# Patient Record
Sex: FEMALE | Race: WHITE | NOT HISPANIC OR LATINO | Employment: FULL TIME | URBAN - METROPOLITAN AREA
[De-identification: names, ages, dates, MRNs, and addresses within clinical notes are randomized per-mention and may not be internally consistent; named-entity substitution may affect disease eponyms.]

---

## 2018-05-02 ENCOUNTER — OFFICE VISIT - HEALTHEAST (OUTPATIENT)
Dept: FAMILY MEDICINE | Facility: CLINIC | Age: 53
End: 2018-05-02

## 2018-05-02 DIAGNOSIS — R21 FACIAL RASH: ICD-10-CM

## 2018-05-02 DIAGNOSIS — Z12.31 VISIT FOR SCREENING MAMMOGRAM: ICD-10-CM

## 2018-05-02 DIAGNOSIS — Z12.11 SCREEN FOR COLON CANCER: ICD-10-CM

## 2018-05-02 DIAGNOSIS — E03.9 HYPOTHYROIDISM: ICD-10-CM

## 2018-05-02 LAB
ALBUMIN SERPL-MCNC: 3.7 G/DL (ref 3.5–5)
ALP SERPL-CCNC: 94 U/L (ref 45–120)
ALT SERPL W P-5'-P-CCNC: 25 U/L (ref 0–45)
ANION GAP SERPL CALCULATED.3IONS-SCNC: 11 MMOL/L (ref 5–18)
AST SERPL W P-5'-P-CCNC: 20 U/L (ref 0–40)
BILIRUB SERPL-MCNC: 0.5 MG/DL (ref 0–1)
BUN SERPL-MCNC: 10 MG/DL (ref 8–22)
CALCIUM SERPL-MCNC: 9.3 MG/DL (ref 8.5–10.5)
CHLORIDE BLD-SCNC: 102 MMOL/L (ref 98–107)
CO2 SERPL-SCNC: 25 MMOL/L (ref 22–31)
CREAT SERPL-MCNC: 0.77 MG/DL (ref 0.6–1.1)
ERYTHROCYTE [DISTWIDTH] IN BLOOD BY AUTOMATED COUNT: 12.5 % (ref 11–14.5)
GFR SERPL CREATININE-BSD FRML MDRD: >60 ML/MIN/1.73M2
GLUCOSE BLD-MCNC: 112 MG/DL (ref 70–125)
HCT VFR BLD AUTO: 39.4 % (ref 35–47)
HGB BLD-MCNC: 13.4 G/DL (ref 12–16)
MCH RBC QN AUTO: 28.9 PG (ref 27–34)
MCHC RBC AUTO-ENTMCNC: 34 G/DL (ref 32–36)
MCV RBC AUTO: 85 FL (ref 80–100)
PLATELET # BLD AUTO: 323 THOU/UL (ref 140–440)
PMV BLD AUTO: 6.9 FL (ref 7–10)
POTASSIUM BLD-SCNC: 4.5 MMOL/L (ref 3.5–5)
PROT SERPL-MCNC: 7.1 G/DL (ref 6–8)
RBC # BLD AUTO: 4.63 MILL/UL (ref 3.8–5.4)
SODIUM SERPL-SCNC: 138 MMOL/L (ref 136–145)
T4 FREE SERPL-MCNC: 0.8 NG/DL (ref 0.7–1.8)
TSH SERPL DL<=0.005 MIU/L-ACNC: 4.27 UIU/ML (ref 0.3–5)
WBC: 5 THOU/UL (ref 4–11)

## 2018-05-02 RX ORDER — METRONIDAZOLE 10 MG/G
GEL TOPICAL
Qty: 45 G | Refills: 0 | Status: SHIPPED | OUTPATIENT
Start: 2018-05-02 | End: 2024-04-11

## 2018-05-03 LAB — ANA SER QL: 0.7 U

## 2018-05-08 LAB
SS-A/RO AUTOANTIBODIES - HISTORICAL: 2 EU
SS-B/LA AUTOANTIBODIES - HISTORICAL: 0 EU

## 2018-05-16 ENCOUNTER — HOSPITAL ENCOUNTER (OUTPATIENT)
Dept: MAMMOGRAPHY | Facility: CLINIC | Age: 53
Discharge: HOME | End: 2018-05-16
Attending: FAMILY MEDICINE

## 2018-05-16 DIAGNOSIS — Z12.31 VISIT FOR SCREENING MAMMOGRAM: ICD-10-CM

## 2018-05-17 ENCOUNTER — COMMUNICATION - HEALTHEAST (OUTPATIENT)
Dept: MAMMOGRAPHY | Facility: CLINIC | Age: 53
End: 2018-05-17

## 2018-05-23 ENCOUNTER — HOSPITAL ENCOUNTER (OUTPATIENT)
Dept: MAMMOGRAPHY | Facility: CLINIC | Age: 53
Discharge: HOME | End: 2018-05-23
Attending: FAMILY MEDICINE

## 2018-05-23 ENCOUNTER — HOSPITAL ENCOUNTER (OUTPATIENT)
Dept: ULTRASOUND IMAGING | Facility: CLINIC | Age: 53
Discharge: HOME | End: 2018-05-23
Attending: FAMILY MEDICINE

## 2018-05-23 DIAGNOSIS — N64.89 BREAST ASYMMETRY: ICD-10-CM

## 2018-06-07 ENCOUNTER — COMMUNICATION - HEALTHEAST (OUTPATIENT)
Dept: ADMINISTRATIVE | Facility: CLINIC | Age: 53
End: 2018-06-07

## 2019-09-15 ENCOUNTER — COMMUNICATION - HEALTHEAST (OUTPATIENT)
Dept: TELEHEALTH | Facility: CLINIC | Age: 54
End: 2019-09-15

## 2020-10-29 ENCOUNTER — AMBULATORY - HEALTHEAST (OUTPATIENT)
Dept: NURSING | Facility: CLINIC | Age: 55
End: 2020-10-29

## 2021-05-29 ENCOUNTER — RECORDS - HEALTHEAST (OUTPATIENT)
Dept: ADMINISTRATIVE | Facility: CLINIC | Age: 56
End: 2021-05-29

## 2021-05-30 ENCOUNTER — RECORDS - HEALTHEAST (OUTPATIENT)
Dept: ADMINISTRATIVE | Facility: CLINIC | Age: 56
End: 2021-05-30

## 2021-06-01 VITALS — BODY MASS INDEX: 43.12 KG/M2 | WEIGHT: 247.31 LBS

## 2021-06-17 NOTE — PROGRESS NOTES
ASSESSMENT/PLAN:    Facial rash  53-year-old female with hypothyroidism presented with worsening butterfly-like rash on her face.  Patient has hypothyroidism but has not been taking her medication for over a year.  I am checking thyroid function.  I spoke to the patient about differential diagnoses including thyroid dysfunction, SLE, rosacea.  I will give her MetroGel and obtain labs today.  Further management will depend on her results and her symptoms.  The patient verbalized understanding and agreed with the plan  -     Thyroid Stimulating Hormone (TSH)  -     HM2(CBC w/o Differential)  -     Comprehensive Metabolic Panel  -     Antinuclear Antibody (REYNALDO) Cascade  -     SS-A/RO Auto Antibodies  -     SS-B/LA Auto Antibodies  -     metroNIDAZOLE (METROGEL) 1 % gel; Apply twice daily  Dispense: 45 g; Refill: 0    Visit for screening mammogram  -     Mammo Screening Bilateral; Future; Expected date: 5/2/18    Screen for colon cancer  -     Ambulatory referral for Colonoscopy    Hypothyroidism  -     T4, Free  -     Thyroid Stimulating Hormone (TSH)    Orders Placed This Encounter   Procedures     Mammo Screening Bilateral     Standing Status:   Future     Standing Expiration Date:   8/2/2019     Order Specific Question:   Patient's previous breast density:     Answer:   Heterogeneously dense [3]     Order Specific Question:   Is the patient pregnant?     Answer:   No     Order Specific Question:   Can the procedure be changed per Radiologist protocol?     Answer:   Yes     T4, Free     Thyroid Stimulating Hormone (TSH)     HM2(CBC w/o Differential)     Comprehensive Metabolic Panel     Antinuclear Antibody (REYNALDO) Cascade     SS-A/RO Auto Antibodies     SS-B/LA Auto Antibodies     Ambulatory referral for Colonoscopy     Referral Priority:   Routine     Referral Type:   Colonoscopy     Referral Reason:   Evaluation and Treatment     Requested Specialty:   Gastroenterology     Number of Visits Requested:   1            CHIEF COMPLAINT:  Chief Complaint   Patient presents with     burning and itchy face     dry during the Winter,  tried OTC does get better, but when stops using, it comes right back       HISTORY OF PRESENT ILLNESS:  Louise is a 53 y.o. female presenting to the clinic today for a facial rash. She has been having problems with burning, itchy, dry skin of her face. She has had this for a while, but feels it has been worse since this winter. She also has redness of her face, most notably the cheeks. She has tried multiple topicals for the skin and benadryl. She does get some temporary relief with the topicals and OTC medications, but when she stops using these the redness and burning comes back. The redness will get better, but come back for her. Heat and taking a hot shower makes the redness, itching and burning worse. Denies sore throat, fevers, chills. She does have some joint pains in her knees and neck. No changes is bowel or bladder functions. No hair changes. No new lotions, soaps, or detergents. She is always fatigued. She does have a history of hypothyroidism, and was taking levothyroxine 88mcg. However, she stopped this medication one year ago. She took the levothyroxine for 2-3 years prior to stopping. She is not taking any medications at this time. She does have a history of eczema as a child.     She got a new dog this year.  Has had chronic knee, neck, back pain.  Chronic fatigue.     Health Maintenance: She is due for a mammogram and colonoscopy.         REVIEW OF SYSTEMS:   Constitutional: Negative.   HENT: Negative.   Eyes: Negative.   Respiratory: Negative.   Cardiovascular: Negative.   Gastrointestinal: Negative.   Endocrine: Negative.   Genitourinary: Negative.   Musculoskeletal: Negative.   Allergic/Immunologic: Negative.   Neurological: Negative.   Hematological: Negative.   Psychiatric/Behavioral: Negative.   All other systems are negative.    PFSH:  Family history of coronary artery  disease, diabetes, thyroid dysfunction.    No family history of any skin concerns    TOBACCO USE:  History   Smoking Status     Never Smoker   Smokeless Tobacco     Never Used       VITALS:  Vitals:    05/02/18 1534   BP: 116/84   Pulse: 64   Weight: (!) 247 lb 5 oz (112.2 kg)     Wt Readings from Last 3 Encounters:   05/02/18 (!) 247 lb 5 oz (112.2 kg)   12/12/16 (!) 236 lb 11.2 oz (107.4 kg)   12/10/15 (!) 233 lb 12.8 oz (106.1 kg)       PHYSICAL EXAM:  Constitutional: Patient is oriented to person, place, and time. Patient appears well-developed and well-nourished. No distress.   Cardiovascular: Normal rate.  Pulmonary/Chest: Effort normal. No respiratory distress.   Neurological: Patient is alert and oriented to person, place, and time.   Skin: Skin is warm and dry. Erythema of cheeks and nose bridge, with spotty erythema of forehead and chin.     Results for orders placed or performed in visit on 05/02/18   HM2(CBC w/o Differential)   Result Value Ref Range    WBC 5.0 4.0 - 11.0 thou/uL    RBC 4.63 3.80 - 5.40 mill/uL    Hemoglobin 13.4 12.0 - 16.0 g/dL    Hematocrit 39.4 35.0 - 47.0 %    MCV 85 80 - 100 fL    MCH 28.9 27.0 - 34.0 pg    MCHC 34.0 32.0 - 36.0 g/dL    RDW 12.5 11.0 - 14.5 %    Platelets 323 140 - 440 thou/uL    MPV 6.9 (L) 7.0 - 10.0 fL         ADDITIONAL HISTORY SUMMARIZED (2): None.  DECISION TO OBTAIN EXTRA INFORMATION (1): None.   RADIOLOGY TESTS (1): None.  LABS (1): Ordered labs today.  MEDICINE TESTS (1): None.  INDEPENDENT REVIEW (2 each): None.     ITara, am scribing for and in the presence of, Dr. Wakefield.    IWest MD , personally performed the services described in this documentation, as scribed by Tara Perez in my presence, and it is both accurate and complete.    MEDICATIONS:  Current Outpatient Prescriptions   Medication Sig Dispense Refill     fluticasone (FLONASE) 50 mcg/actuation nasal spray 1-2 sprays in each nostril at  bedtime. 16 g 0     levothyroxine (SYNTHROID, LEVOTHROID) 88 MCG tablet Take 88 mcg by mouth once. Take once a day       metroNIDAZOLE (METROGEL) 1 % gel Apply twice daily 45 g 0     No current facility-administered medications for this visit.        Total data points: 1

## 2021-07-13 ENCOUNTER — RECORDS - HEALTHEAST (OUTPATIENT)
Dept: ADMINISTRATIVE | Facility: CLINIC | Age: 56
End: 2021-07-13

## 2021-07-21 ENCOUNTER — RECORDS - HEALTHEAST (OUTPATIENT)
Dept: ADMINISTRATIVE | Facility: CLINIC | Age: 56
End: 2021-07-21

## 2021-08-21 ENCOUNTER — HEALTH MAINTENANCE LETTER (OUTPATIENT)
Age: 56
End: 2021-08-21

## 2021-10-16 ENCOUNTER — HEALTH MAINTENANCE LETTER (OUTPATIENT)
Age: 56
End: 2021-10-16

## 2022-09-25 ENCOUNTER — HEALTH MAINTENANCE LETTER (OUTPATIENT)
Age: 57
End: 2022-09-25

## 2023-04-17 ENCOUNTER — OFFICE VISIT (OUTPATIENT)
Dept: FAMILY MEDICINE | Facility: CLINIC | Age: 58
End: 2023-04-17
Payer: COMMERCIAL

## 2023-04-17 VITALS
OXYGEN SATURATION: 97 % | RESPIRATION RATE: 16 BRPM | DIASTOLIC BLOOD PRESSURE: 87 MMHG | BODY MASS INDEX: 41.15 KG/M2 | HEART RATE: 95 BPM | SYSTOLIC BLOOD PRESSURE: 143 MMHG | TEMPERATURE: 99.7 F | WEIGHT: 236 LBS

## 2023-04-17 DIAGNOSIS — J10.1 INFLUENZA A: Primary | ICD-10-CM

## 2023-04-17 DIAGNOSIS — E66.01 MORBID OBESITY (H): ICD-10-CM

## 2023-04-17 PROCEDURE — 99203 OFFICE O/P NEW LOW 30 MIN: CPT | Performed by: PHYSICIAN ASSISTANT

## 2023-04-17 RX ORDER — OSELTAMIVIR PHOSPHATE 75 MG/1
75 CAPSULE ORAL 2 TIMES DAILY
Qty: 10 CAPSULE | Refills: 0 | Status: SHIPPED | OUTPATIENT
Start: 2023-04-17 | End: 2023-04-22

## 2023-04-17 RX ORDER — BENZONATATE 100 MG/1
100 CAPSULE ORAL 3 TIMES DAILY PRN
Qty: 21 CAPSULE | Refills: 0 | Status: SHIPPED | OUTPATIENT
Start: 2023-04-17 | End: 2024-04-11

## 2023-04-17 NOTE — PATIENT INSTRUCTIONS
1. Take Tylenol or Ibuprofen as needed for fever relief.   2. Take Tamiflu, follow directions on prescription.  3. Increase fluids and rest.  4. Take Tessalon Perles along with any over the counter cough medicine as needed for cough relief.   5. Influenza is typically considered contagious one day prior and 5-7 days after your symptoms begin. I recommend returning to work/school after you are fever free for 24 hours. Continue to practice good hand hygiene and cough coverage well after you are fever free.   6. Follow up if you develop fever that can not be controlled, difficulty breathing, or severe dehydration.    Influenza  Influenza ( the flu ) is an infection that affects your respiratory tract (the mouth, nose, and lungs, and the passages between them). Unlike a cold, the flu can make you very ill. And it can lead to pneumonia, a serious lung infection. For some people, especially older adults, young children, and people with certain chronic conditions, the flu can have serious complications and even be fatal.  How Does the Flu Spread?  The flu is caused by viruses. The viruses spread through the air in droplets when someone who has the flu coughs, sneezes, laughs, or talks. You can become infected when you inhale these viruses directly. You can also become infected when you touch a surface on which the droplets have landed and then transfer the germs to your eyes, nose, or mouth. Touching used tissues, or sharing utensils, drinking glasses, or a toothbrush with an infected person can expose you to flu viruses, too.  What Are the Symptoms of the Flu?  Flu symptoms tend to come on quickly and may last a few days to a few weeks. They include:  Fever usually higher than 101 F  (38.3 C) and chills  Sore throat and headache  Dry cough  Runny nose  Tiredness and weakness  Muscle aches  Factors That Can Make Flu Worse  For some people, the flu can be very serious. The risk of complications is greater for:  Children  under age 5.  Adults 65 years of age and older.  People with a chronic illness, such as diabetes or heart, kidney, or lung disease.  People who live in a nursing home or long-term care facility.   How Is the Flu Treated?  Influenza usually improves after 7 days or so. In some cases, your health care provider may prescribe an antiviral medication. This may help you get well sooner. For the medication to help, you need to take it as soon as possible (ideally within 48 hours) after your symptoms start. If you develop pneumonia or other serious illness, hospital care may be needed.  Easing Flu Symptoms  Drink lots of fluids such as water, juice, and warm soup. A good rule is to drink enough so that you urinate your normal amount.  Get plenty of rest.  Ask your health care provider what to take for fever and pain.  Call your provider if your fever rises over 101 F (38.3 C) or you become dizzy, lightheaded, or short of breath.

## 2023-04-17 NOTE — PROGRESS NOTES
Patient presents with:  Cough: Has had cough muscles aches for 3 days nausea states had influenza test at work and was  positive for influenza A looking for Tamiflu and something for cough had n negative COVID test      Clinical Decision Making:  Patient had an influenza test that was positive at work.  She was within the 48-hour time.  Start Tamiflu.  Comorbidity includes morbid obesity.  Patient started on Tamiflu today.  Patient also prescribed Tessalon Perles for comfort.  Lungs are CTA.      ICD-10-CM    1. Influenza A  J10.1 oseltamivir (TAMIFLU) 75 MG capsule     benzonatate (TESSALON) 100 MG capsule      2. Morbid obesity (H)  E66.01           Patient Instructions     1. Take Tylenol or Ibuprofen as needed for fever relief.   2. Take Tamiflu, follow directions on prescription.  3. Increase fluids and rest.  4. Take Tessalon Perles along with any over the counter cough medicine as needed for cough relief.   5. Influenza is typically considered contagious one day prior and 5-7 days after your symptoms begin. I recommend returning to work/school after you are fever free for 24 hours. Continue to practice good hand hygiene and cough coverage well after you are fever free.   6. Follow up if you develop fever that can not be controlled, difficulty breathing, or severe dehydration.    Influenza  Influenza ( the flu ) is an infection that affects your respiratory tract (the mouth, nose, and lungs, and the passages between them). Unlike a cold, the flu can make you very ill. And it can lead to pneumonia, a serious lung infection. For some people, especially older adults, young children, and people with certain chronic conditions, the flu can have serious complications and even be fatal.  How Does the Flu Spread?  The flu is caused by viruses. The viruses spread through the air in droplets when someone who has the flu coughs, sneezes, laughs, or talks. You can become infected when you inhale these viruses directly.  You can also become infected when you touch a surface on which the droplets have landed and then transfer the germs to your eyes, nose, or mouth. Touching used tissues, or sharing utensils, drinking glasses, or a toothbrush with an infected person can expose you to flu viruses, too.  What Are the Symptoms of the Flu?  Flu symptoms tend to come on quickly and may last a few days to a few weeks. They include:    Fever usually higher than 101 F  (38.3 C) and chills    Sore throat and headache    Dry cough    Runny nose    Tiredness and weakness    Muscle aches  Factors That Can Make Flu Worse  For some people, the flu can be very serious. The risk of complications is greater for:    Children under age 5.    Adults 65 years of age and older.    People with a chronic illness, such as diabetes or heart, kidney, or lung disease.    People who live in a nursing home or long-term care facility.   How Is the Flu Treated?  Influenza usually improves after 7 days or so. In some cases, your health care provider may prescribe an antiviral medication. This may help you get well sooner. For the medication to help, you need to take it as soon as possible (ideally within 48 hours) after your symptoms start. If you develop pneumonia or other serious illness, hospital care may be needed.  Easing Flu Symptoms    Drink lots of fluids such as water, juice, and warm soup. A good rule is to drink enough so that you urinate your normal amount.    Get plenty of rest.    Ask your health care provider what to take for fever and pain.    Call your provider if your fever rises over 101 F (38.3 C) or you become dizzy, lightheaded, or short of breath.        HPI:  Louise Corado is a 58 year old female who presents today complaining of cough, body aches, and nausea for the past 3 days.  Patient completed an influenza test at work and it was positive for influenza A.  Patient is interested in receiving Tamiflu and something to help with her cough.   Patient had a negative COVID test.    History obtained from the patient.    Problem List:  2023-04: Morbid obesity (H)  2016-12: Throat pain  Herpes Zoster (Shingles)  Anterior Wall Chest Pain With Respiration  Contusion With Intact Skin Surface      No past medical history on file.    Social History     Tobacco Use     Smoking status: Never     Smokeless tobacco: Never   Vaping Use     Vaping status: Not on file   Substance Use Topics     Alcohol use: Not on file       Review of Systems    Vitals:    04/17/23 1827   BP: (!) 143/87   Pulse: 95   Resp: 16   Temp: 99.7  F (37.6  C)   TempSrc: Oral   SpO2: 97%   Weight: 107 kg (236 lb)       Physical Exam  Vitals and nursing note reviewed.   Constitutional:       General: She is not in acute distress.     Appearance: She is not toxic-appearing or diaphoretic.   HENT:      Head: Normocephalic and atraumatic.      Right Ear: External ear normal.      Left Ear: External ear normal.   Eyes:      Conjunctiva/sclera: Conjunctivae normal.   Cardiovascular:      Rate and Rhythm: Normal rate and regular rhythm.      Heart sounds: No murmur heard.  Pulmonary:      Effort: Pulmonary effort is normal. No respiratory distress.      Breath sounds: No stridor. No wheezing, rhonchi or rales.   Neurological:      Mental Status: She is alert.   Psychiatric:         Mood and Affect: Mood normal.         Behavior: Behavior normal.         Thought Content: Thought content normal.         Judgment: Judgment normal.           At the end of the encounter, I discussed results, diagnosis, medications. Discussed red flags for immediate return to clinic/ER, as well as indications for follow up if no improvement. Patient understood and agreed to plan. Patient was stable for discharge.

## 2023-04-17 NOTE — LETTER
April 17, 2023      Louise Corado  [address] [place] [address]        To Whom It May Concern:    Louise Corado  was seen on 4/17/23.  Please excuse her absence from work until she is fever free for 24 hours. Expected time away is 1-6 days.         Sincerely,        Alycia Vincent PA-C

## 2023-10-14 ENCOUNTER — HEALTH MAINTENANCE LETTER (OUTPATIENT)
Age: 58
End: 2023-10-14

## 2024-04-10 PROCEDURE — 96374 THER/PROPH/DIAG INJ IV PUSH: CPT | Mod: 59

## 2024-04-10 PROCEDURE — 99285 EMERGENCY DEPT VISIT HI MDM: CPT | Mod: 25

## 2024-04-10 ASSESSMENT — COLUMBIA-SUICIDE SEVERITY RATING SCALE - C-SSRS
2. HAVE YOU ACTUALLY HAD ANY THOUGHTS OF KILLING YOURSELF IN THE PAST MONTH?: NO
6. HAVE YOU EVER DONE ANYTHING, STARTED TO DO ANYTHING, OR PREPARED TO DO ANYTHING TO END YOUR LIFE?: NO
1. IN THE PAST MONTH, HAVE YOU WISHED YOU WERE DEAD OR WISHED YOU COULD GO TO SLEEP AND NOT WAKE UP?: NO

## 2024-04-11 ENCOUNTER — HOSPITAL ENCOUNTER (INPATIENT)
Facility: CLINIC | Age: 59
LOS: 1 days | Discharge: HOME | End: 2024-04-12
Attending: EMERGENCY MEDICINE | Admitting: HOSPITALIST
Payer: COMMERCIAL

## 2024-04-11 ENCOUNTER — APPOINTMENT (OUTPATIENT)
Dept: ULTRASOUND IMAGING | Facility: CLINIC | Age: 59
End: 2024-04-11
Attending: HOSPITALIST
Payer: COMMERCIAL

## 2024-04-11 DIAGNOSIS — D3A.8 NEUROENDOCRINE TUMOR (H): ICD-10-CM

## 2024-04-11 DIAGNOSIS — R07.1 PAINFUL RESPIRATION: Primary | ICD-10-CM

## 2024-04-11 DIAGNOSIS — R16.0 LIVER MASS: ICD-10-CM

## 2024-04-11 DIAGNOSIS — J90 PLEURAL EFFUSION: ICD-10-CM

## 2024-04-11 DIAGNOSIS — R91.8 LUNG MASS: ICD-10-CM

## 2024-04-11 LAB
ALBUMIN SERPL BCG-MCNC: 4 G/DL (ref 3.5–5.2)
ALP SERPL-CCNC: 88 U/L (ref 40–150)
ALT SERPL W P-5'-P-CCNC: 15 U/L (ref 0–50)
ANION GAP SERPL CALCULATED.3IONS-SCNC: 6 MMOL/L (ref 7–15)
APPEARANCE FLD: ABNORMAL
AST SERPL W P-5'-P-CCNC: 17 U/L (ref 0–45)
BASOPHILS # BLD AUTO: 0 10E3/UL (ref 0–0.2)
BASOPHILS NFR BLD AUTO: 0 %
BILIRUB SERPL-MCNC: 0.3 MG/DL
BUN SERPL-MCNC: 9.7 MG/DL (ref 6–20)
CALCIUM SERPL-MCNC: 9.2 MG/DL (ref 8.6–10)
CELL COUNT BODY FLUID SOURCE: ABNORMAL
CHLORIDE SERPL-SCNC: 104 MMOL/L (ref 98–107)
COLOR FLD: ABNORMAL
CREAT SERPL-MCNC: 0.68 MG/DL (ref 0.51–0.95)
DEPRECATED HCO3 PLAS-SCNC: 29 MMOL/L (ref 22–29)
EGFRCR SERPLBLD CKD-EPI 2021: >90 ML/MIN/1.73M2
EOSINOPHIL # BLD AUTO: 0 10E3/UL (ref 0–0.7)
EOSINOPHIL NFR BLD AUTO: 1 %
ERYTHROCYTE [DISTWIDTH] IN BLOOD BY AUTOMATED COUNT: 13 % (ref 10–15)
GLUCOSE SERPL-MCNC: 179 MG/DL (ref 70–99)
HCT VFR BLD AUTO: 38.3 % (ref 35–47)
HGB BLD-MCNC: 12.7 G/DL (ref 11.7–15.7)
IMM GRANULOCYTES # BLD: 0 10E3/UL
IMM GRANULOCYTES NFR BLD: 1 %
LD BODY BODY FLUID SOURCE: NORMAL
LDH FLD L TO P-CCNC: 112 U/L
LDH SERPL L TO P-CCNC: 179 U/L (ref 0–250)
LYMPHOCYTES # BLD AUTO: 0.8 10E3/UL (ref 0.8–5.3)
LYMPHOCYTES NFR BLD AUTO: 12 %
LYMPHOCYTES NFR FLD MANUAL: 84 %
MCH RBC QN AUTO: 27.7 PG (ref 26.5–33)
MCHC RBC AUTO-ENTMCNC: 33.2 G/DL (ref 31.5–36.5)
MCV RBC AUTO: 83 FL (ref 78–100)
MONOCYTES # BLD AUTO: 0.3 10E3/UL (ref 0–1.3)
MONOCYTES NFR BLD AUTO: 4 %
MONOS+MACROS NFR FLD MANUAL: 10 %
NEUTROPHILS # BLD AUTO: 5.5 10E3/UL (ref 1.6–8.3)
NEUTROPHILS NFR BLD AUTO: 83 %
NEUTS BAND NFR FLD MANUAL: 3 %
NRBC # BLD AUTO: 0 10E3/UL
NRBC BLD AUTO-RTO: 0 /100
OTHER CELLS FLD MANUAL: 3 %
PATH REV: NORMAL
PLATELET # BLD AUTO: 278 10E3/UL (ref 150–450)
POTASSIUM SERPL-SCNC: 4.1 MMOL/L (ref 3.4–5.3)
PROT FLD-MCNC: 4 G/DL
PROT SERPL-MCNC: 7 G/DL (ref 6.4–8.3)
PROTEIN BODY FLUID SOURCE: NORMAL
RBC # BLD AUTO: 4.59 10E6/UL (ref 3.8–5.2)
SODIUM SERPL-SCNC: 139 MMOL/L (ref 135–145)
WBC # BLD AUTO: 6.6 10E3/UL (ref 4–11)
WBC # FLD AUTO: 779 /UL

## 2024-04-11 PROCEDURE — 120N000001 HC R&B MED SURG/OB

## 2024-04-11 PROCEDURE — 83615 LACTATE (LD) (LDH) ENZYME: CPT | Performed by: HOSPITALIST

## 2024-04-11 PROCEDURE — 80053 COMPREHEN METABOLIC PANEL: CPT | Performed by: HOSPITALIST

## 2024-04-11 PROCEDURE — 88360 TUMOR IMMUNOHISTOCHEM/MANUAL: CPT | Mod: 26 | Performed by: PATHOLOGY

## 2024-04-11 PROCEDURE — 88341 IMHCHEM/IMCYTCHM EA ADD ANTB: CPT | Mod: 26 | Performed by: PATHOLOGY

## 2024-04-11 PROCEDURE — 250N000013 HC RX MED GY IP 250 OP 250 PS 637: Performed by: HOSPITALIST

## 2024-04-11 PROCEDURE — 36415 COLL VENOUS BLD VENIPUNCTURE: CPT | Performed by: HOSPITALIST

## 2024-04-11 PROCEDURE — 88342 IMHCHEM/IMCYTCHM 1ST ANTB: CPT | Mod: 26 | Performed by: PATHOLOGY

## 2024-04-11 PROCEDURE — 82664 ELECTROPHORETIC TEST: CPT | Performed by: HOSPITALIST

## 2024-04-11 PROCEDURE — 88305 TISSUE EXAM BY PATHOLOGIST: CPT | Mod: 26 | Performed by: PATHOLOGY

## 2024-04-11 PROCEDURE — 88360 TUMOR IMMUNOHISTOCHEM/MANUAL: CPT | Mod: TC | Performed by: HOSPITALIST

## 2024-04-11 PROCEDURE — 0W993ZX DRAINAGE OF RIGHT PLEURAL CAVITY, PERCUTANEOUS APPROACH, DIAGNOSTIC: ICD-10-PCS | Performed by: RADIOLOGY

## 2024-04-11 PROCEDURE — 84478 ASSAY OF TRIGLYCERIDES: CPT | Performed by: HOSPITALIST

## 2024-04-11 PROCEDURE — 89050 BODY FLUID CELL COUNT: CPT | Performed by: HOSPITALIST

## 2024-04-11 PROCEDURE — 88305 TISSUE EXAM BY PATHOLOGIST: CPT | Mod: TC | Performed by: HOSPITALIST

## 2024-04-11 PROCEDURE — 250N000011 HC RX IP 250 OP 636: Performed by: HOSPITALIST

## 2024-04-11 PROCEDURE — 258N000003 HC RX IP 258 OP 636: Performed by: HOSPITALIST

## 2024-04-11 PROCEDURE — 272N000042 US THORACENTESIS

## 2024-04-11 PROCEDURE — 250N000013 HC RX MED GY IP 250 OP 250 PS 637: Performed by: EMERGENCY MEDICINE

## 2024-04-11 PROCEDURE — 250N000011 HC RX IP 250 OP 636: Performed by: EMERGENCY MEDICINE

## 2024-04-11 PROCEDURE — 84157 ASSAY OF PROTEIN OTHER: CPT | Performed by: HOSPITALIST

## 2024-04-11 PROCEDURE — 87205 SMEAR GRAM STAIN: CPT | Performed by: HOSPITALIST

## 2024-04-11 PROCEDURE — 88112 CYTOPATH CELL ENHANCE TECH: CPT | Mod: 26 | Performed by: PATHOLOGY

## 2024-04-11 PROCEDURE — 88344 IMHCHEM/IMCYTCHM EA MLT ANTB: CPT | Mod: 26 | Performed by: PATHOLOGY

## 2024-04-11 PROCEDURE — 99207 PR APP CREDIT; MD BILLING SHARED VISIT: CPT | Performed by: INTERNAL MEDICINE

## 2024-04-11 PROCEDURE — 250N000013 HC RX MED GY IP 250 OP 250 PS 637: Performed by: INTERNAL MEDICINE

## 2024-04-11 PROCEDURE — 99255 IP/OBS CONSLTJ NEW/EST HI 80: CPT | Performed by: INTERNAL MEDICINE

## 2024-04-11 PROCEDURE — 87075 CULTR BACTERIA EXCEPT BLOOD: CPT | Performed by: HOSPITALIST

## 2024-04-11 PROCEDURE — 85004 AUTOMATED DIFF WBC COUNT: CPT | Performed by: HOSPITALIST

## 2024-04-11 PROCEDURE — 99222 1ST HOSP IP/OBS MODERATE 55: CPT | Performed by: HOSPITALIST

## 2024-04-11 RX ORDER — HYDROMORPHONE HCL IN WATER/PF 6 MG/30 ML
0.4 PATIENT CONTROLLED ANALGESIA SYRINGE INTRAVENOUS
Status: DISCONTINUED | OUTPATIENT
Start: 2024-04-11 | End: 2024-04-12 | Stop reason: HOSPADM

## 2024-04-11 RX ORDER — ACETAMINOPHEN 650 MG/1
650 SUPPOSITORY RECTAL EVERY 4 HOURS PRN
Status: DISCONTINUED | OUTPATIENT
Start: 2024-04-11 | End: 2024-04-12 | Stop reason: HOSPADM

## 2024-04-11 RX ORDER — LIDOCAINE 40 MG/G
CREAM TOPICAL
Status: DISCONTINUED | OUTPATIENT
Start: 2024-04-11 | End: 2024-04-12 | Stop reason: HOSPADM

## 2024-04-11 RX ORDER — PROCHLORPERAZINE 25 MG
25 SUPPOSITORY, RECTAL RECTAL EVERY 12 HOURS PRN
Status: DISCONTINUED | OUTPATIENT
Start: 2024-04-11 | End: 2024-04-12 | Stop reason: HOSPADM

## 2024-04-11 RX ORDER — ONDANSETRON 2 MG/ML
4 INJECTION INTRAMUSCULAR; INTRAVENOUS ONCE
Status: COMPLETED | OUTPATIENT
Start: 2024-04-11 | End: 2024-04-11

## 2024-04-11 RX ORDER — ONDANSETRON 2 MG/ML
4 INJECTION INTRAMUSCULAR; INTRAVENOUS EVERY 6 HOURS PRN
Status: DISCONTINUED | OUTPATIENT
Start: 2024-04-11 | End: 2024-04-12 | Stop reason: HOSPADM

## 2024-04-11 RX ORDER — PROCHLORPERAZINE MALEATE 10 MG
10 TABLET ORAL EVERY 6 HOURS PRN
Status: DISCONTINUED | OUTPATIENT
Start: 2024-04-11 | End: 2024-04-12 | Stop reason: HOSPADM

## 2024-04-11 RX ORDER — CALCIUM CARBONATE 500 MG/1
1000 TABLET, CHEWABLE ORAL 4 TIMES DAILY PRN
Status: DISCONTINUED | OUTPATIENT
Start: 2024-04-11 | End: 2024-04-12 | Stop reason: HOSPADM

## 2024-04-11 RX ORDER — LORAZEPAM 0.5 MG/1
.5-1 TABLET ORAL EVERY 4 HOURS PRN
Status: DISCONTINUED | OUTPATIENT
Start: 2024-04-11 | End: 2024-04-12 | Stop reason: HOSPADM

## 2024-04-11 RX ORDER — IBUPROFEN 200 MG
200-400 TABLET ORAL EVERY 4 HOURS PRN
COMMUNITY
End: 2024-04-30

## 2024-04-11 RX ORDER — SODIUM CHLORIDE, SODIUM LACTATE, POTASSIUM CHLORIDE, CALCIUM CHLORIDE 600; 310; 30; 20 MG/100ML; MG/100ML; MG/100ML; MG/100ML
INJECTION, SOLUTION INTRAVENOUS CONTINUOUS
Status: DISCONTINUED | OUTPATIENT
Start: 2024-04-11 | End: 2024-04-12 | Stop reason: HOSPADM

## 2024-04-11 RX ORDER — ACETAMINOPHEN 325 MG/1
325-650 TABLET ORAL EVERY 6 HOURS PRN
COMMUNITY

## 2024-04-11 RX ORDER — ONDANSETRON 4 MG/1
4 TABLET, ORALLY DISINTEGRATING ORAL EVERY 6 HOURS PRN
Status: DISCONTINUED | OUTPATIENT
Start: 2024-04-11 | End: 2024-04-12 | Stop reason: HOSPADM

## 2024-04-11 RX ORDER — AMOXICILLIN 250 MG
1 CAPSULE ORAL 2 TIMES DAILY PRN
Status: DISCONTINUED | OUTPATIENT
Start: 2024-04-11 | End: 2024-04-12 | Stop reason: HOSPADM

## 2024-04-11 RX ORDER — OXYCODONE HYDROCHLORIDE 5 MG/1
5 TABLET ORAL ONCE
Status: COMPLETED | OUTPATIENT
Start: 2024-04-11 | End: 2024-04-11

## 2024-04-11 RX ORDER — HYDROMORPHONE HYDROCHLORIDE 4 MG/1
2 TABLET ORAL EVERY 4 HOURS PRN
Status: DISCONTINUED | OUTPATIENT
Start: 2024-04-11 | End: 2024-04-12 | Stop reason: HOSPADM

## 2024-04-11 RX ORDER — ACETAMINOPHEN 325 MG/1
650 TABLET ORAL EVERY 4 HOURS PRN
Status: DISCONTINUED | OUTPATIENT
Start: 2024-04-11 | End: 2024-04-12 | Stop reason: HOSPADM

## 2024-04-11 RX ORDER — HYDROMORPHONE HCL IN WATER/PF 6 MG/30 ML
0.2 PATIENT CONTROLLED ANALGESIA SYRINGE INTRAVENOUS
Status: DISCONTINUED | OUTPATIENT
Start: 2024-04-11 | End: 2024-04-12 | Stop reason: HOSPADM

## 2024-04-11 RX ORDER — DOCUSATE SODIUM 100 MG/1
100 CAPSULE, LIQUID FILLED ORAL 2 TIMES DAILY
Status: DISCONTINUED | OUTPATIENT
Start: 2024-04-11 | End: 2024-04-12 | Stop reason: HOSPADM

## 2024-04-11 RX ORDER — GUAIFENESIN/DEXTROMETHORPHAN 100-10MG/5
10 SYRUP ORAL EVERY 4 HOURS PRN
Status: DISCONTINUED | OUTPATIENT
Start: 2024-04-11 | End: 2024-04-12 | Stop reason: HOSPADM

## 2024-04-11 RX ORDER — AMOXICILLIN 250 MG
2 CAPSULE ORAL 2 TIMES DAILY PRN
Status: DISCONTINUED | OUTPATIENT
Start: 2024-04-11 | End: 2024-04-12 | Stop reason: HOSPADM

## 2024-04-11 RX ADMIN — ONDANSETRON 4 MG: 2 INJECTION INTRAMUSCULAR; INTRAVENOUS at 09:01

## 2024-04-11 RX ADMIN — HYDROMORPHONE HYDROCHLORIDE 0.2 MG: 0.2 INJECTION, SOLUTION INTRAMUSCULAR; INTRAVENOUS; SUBCUTANEOUS at 23:52

## 2024-04-11 RX ADMIN — HYDROMORPHONE HYDROCHLORIDE 0.2 MG: 0.2 INJECTION, SOLUTION INTRAMUSCULAR; INTRAVENOUS; SUBCUTANEOUS at 06:18

## 2024-04-11 RX ADMIN — ACETAMINOPHEN 650 MG: 325 TABLET ORAL at 13:54

## 2024-04-11 RX ADMIN — HYDROMORPHONE HYDROCHLORIDE 0.2 MG: 0.2 INJECTION, SOLUTION INTRAMUSCULAR; INTRAVENOUS; SUBCUTANEOUS at 08:27

## 2024-04-11 RX ADMIN — ACETAMINOPHEN 650 MG: 325 TABLET ORAL at 23:22

## 2024-04-11 RX ADMIN — SODIUM CHLORIDE, POTASSIUM CHLORIDE, SODIUM LACTATE AND CALCIUM CHLORIDE: 600; 310; 30; 20 INJECTION, SOLUTION INTRAVENOUS at 02:22

## 2024-04-11 RX ADMIN — LORAZEPAM 0.5 MG: 0.5 TABLET ORAL at 23:22

## 2024-04-11 RX ADMIN — ONDANSETRON 4 MG: 2 INJECTION INTRAMUSCULAR; INTRAVENOUS at 01:51

## 2024-04-11 RX ADMIN — PROCHLORPERAZINE EDISYLATE 10 MG: 5 INJECTION INTRAMUSCULAR; INTRAVENOUS at 13:49

## 2024-04-11 RX ADMIN — Medication 5 MG: at 01:50

## 2024-04-11 RX ADMIN — ACETAMINOPHEN 650 MG: 325 TABLET ORAL at 04:38

## 2024-04-11 RX ADMIN — SODIUM CHLORIDE, POTASSIUM CHLORIDE, SODIUM LACTATE AND CALCIUM CHLORIDE: 600; 310; 30; 20 INJECTION, SOLUTION INTRAVENOUS at 13:57

## 2024-04-11 RX ADMIN — OXYCODONE HYDROCHLORIDE 5 MG: 5 TABLET ORAL at 01:50

## 2024-04-11 ASSESSMENT — ACTIVITIES OF DAILY LIVING (ADL)
ADLS_ACUITY_SCORE: 37
ADLS_ACUITY_SCORE: 37
ADLS_ACUITY_SCORE: 24
ADLS_ACUITY_SCORE: 35
ADLS_ACUITY_SCORE: 37
ADLS_ACUITY_SCORE: 35
ADLS_ACUITY_SCORE: 35
ADLS_ACUITY_SCORE: 26
ADLS_ACUITY_SCORE: 35
ADLS_ACUITY_SCORE: 37
ADLS_ACUITY_SCORE: 25
ADLS_ACUITY_SCORE: 37
ADLS_ACUITY_SCORE: 35
ADLS_ACUITY_SCORE: 37
ADLS_ACUITY_SCORE: 37
ADLS_ACUITY_SCORE: 26
ADLS_ACUITY_SCORE: 25
ADLS_ACUITY_SCORE: 37
ADLS_ACUITY_SCORE: 37
ADLS_ACUITY_SCORE: 35
ADLS_ACUITY_SCORE: 37

## 2024-04-11 NOTE — H&P
[facility] MEDICINE ADMISSION HISTORY AND PHYSICAL     Brief Synopsis:     Louise Corado is a 59 year old female who presented with complaints of chest pain, dyspnea. CTA chest done as outpt with large pleural effusion, concern for malignancy.    Initial evaluation revealed CTA chest from UR that showed large rt pleural effusion, pleural nodularity, multiple scattered lesions in lungs, liver.      Assessment and Plan:  Large rt pleural effusion  Scattered lesions in lungs, liver  Concern for malignancy  Ordered thoracentesis with fluid studies  Consult oncology regarding further workup  Symptomatic tx prn  Not up-to-date on screening colonoscopy, mammograms    Clinically Significant Risk Factors Present on Admission                                    Disposition Plan      Expected Discharge Date: 04/13/2024               Chief Complaint Back pain, dyspnea     HISTORY   Louise Corado is a 59 year old female who presented with complaints of back pain, dyspnea.    Per ED provider:  Louise Corado is a 59 y.o. female who presents to the UR for evaluation of right mid back pain and shortness of breath. Patient reports for several months she has been experiencing right mid back pain. This pain has been constant, it fluctuates, and is not brought on by a consistent provocation. Pain is made worse with moving or while wearing a bra, but not exacerbated with breathing. Patient also reports she has been experiencing shortness of breath with the back pain. Shortness of breath is exacerbated with exertion. Patient additionally reports she has been experiencing anterior chest pain intermittently that is exacerbated with eating. She denies any hx of any of these symptoms that she is presenting with. Patient also denies any chronic illnesses, being a smoker/drinker, or hx of DVT/PE.     Has had a slight cough, no fever.  No nausea, vomiting, diarrhea, dysuria, lower extremity edema.  Past Medical  History     No past medical history on file.     Surgical History   No past surgical history on file.  Family History      Family History   Problem Relation Age of Onset    Breast Cancer Mother 70.00    Breast Cancer Sister 53.00      Social History      Social History     Tobacco Use    Smoking status: Never    Smokeless tobacco: Never      Allergies   No Known Allergies  Prior to Admission Medications      Prior to Admission Medications   Prescriptions Last Dose Informant Patient Reported? Taking?   benzonatate (TESSALON) 100 MG capsule   No No   Sig: Take 1 capsule (100 mg) by mouth 3 times daily as needed for cough   fluticasone (FLONASE) 50 mcg/actuation nasal spray   No No   Sig: [FLUTICASONE (FLONASE) 50 MCG/ACTUATION NASAL SPRAY] 1-2 sprays in each nostril at bedtime.   Patient not taking: Reported on 4/17/2023   levothyroxine (SYNTHROID, LEVOTHROID) 88 MCG tablet   Yes No   Sig: [LEVOTHYROXINE (SYNTHROID, LEVOTHROID) 88 MCG TABLET] Take 88 mcg by mouth once. Take once a day   Patient not taking: Reported on 4/17/2023   metroNIDAZOLE (METROGEL) 1 % gel   No No   Sig: [METRONIDAZOLE (METROGEL) 1 % GEL] Apply twice daily   Patient not taking: Reported on 4/17/2023      Facility-Administered Medications: None      Review of Systems     A 12 point comprehensive review of systems was negative except as noted above in HPI.    PHYSICAL EXAMINATION     Vitals      Temp:  [97.8  F (36.6  C)] 97.8  F (36.6  C)  Pulse:  [83-93] 83  Resp:  [18-20] 18  BP: (159-204)/() 159/76  SpO2:  [94 %-96 %] 94 %    Examination   Physical Exam:    Gen: no acute distress, comfortable, alert, pleasant  ENT: no scleral icterus  Pulm: lungs are diminished at right base, breathing comfortably at rest on room air  CV: regular rate and rhythm, may be a little edema in the lower extremities, left worse than right  GI: abdomen is soft, nondistended  MSK: no obvious deformities of the extremities  Derm: Not pale, no jaundice  Psych:  appropriate affect      Pertinent Radiology     Radiology Results:   Recent Results (from the past 24 hour(s))   CTA Chest with Contrast    Narrative    For Patients: As a result of the 21st Century Cures Act, medical imaging exams and procedure reports are released immediately into your electronic medical record. You may view this report before your referring provider. If you have questions, please contact your health care provider.    EXAM: CTA CHEST  LOCATION: [facility]  DATE: 4/10/2024    INDICATION: Pulmonary embolism (PE) suspected, unknown D-dimer; right lower shoulder and right flank pain, shortness of breath, nonproductive cough  COMPARISON: 02/26/2014  TECHNIQUE: CT chest pulmonary angiogram during arterial phase injection of IV contrast. Multiplanar reformats and MIP reconstructions were performed. Dose reduction techniques were used.   CONTRAST: IOPAMIDOL 300 MG/ML  ML BOTTLE: 100mL    FINDINGS:  ANGIOGRAM CHEST: No evidence of central or segmental pulmonary embolus. No definite subsegmental embolus. Exam limited by streak artifact and motion. Thoracic aorta is negative for dissection. No CT evidence of right heart strain.    LUNGS AND PLEURA: New large right pleural effusion. Heterogeneous groundglass opacities in the right upper lobe. Subsegmental consolidation in the right middle lobe and right lower lobe and/or atelectasis. 11 mm left upper lobe pulmonary nodule, image 34 series 10. 6 monitor nodule left upper lobe image 44 suspected pleural nodularity along the right hemidiaphragm, new in the interval, best visualized on image 121 series 4. Discrete measurable pleural nodule anteriorly on image 48 series 4 reaches 12 mm.    MEDIASTINUM/AXILLAE: New mediastinal adenopathy including right paratracheal node reaching 2.1 cm, image 38 series 4, and subcarinal adenopathy reaching 2.4 cm, image 60. Confluent adenopathy in the right hilum. Additional enlarged prevascular, subcarinal,  and left hilar nodes.    CORONARY ARTERY CALCIFICATION: Cannot evaluate.    UPPER ABDOMEN: 3.6 cm hyperenhancing right hepatic lobe lesion, with additional smaller scattered left hepatic lobe lesion suspicious for metastatic disease.    MUSCULOSKELETAL: Localized sclerosis in the proximal manubrium    Impression    1.  No evidence of pulmonary embolus given limitations of exam.  2.  Right-sided pleural nodularity and large amount of pleural fluid suspicious for malignant effusion.  3.  Associated mediastinal and hilar adenopathy and left pulmonary nodules.  4.  Right-sided middle and lower lobe consolidation/atelectasis could represent coexistent pneumonia or postobstructive atelectasis.  5.  New sclerotic manubrial lesion suspicious for metastatic disease.  6.  Hepatic lesions suspicious for metastatic disease. Dedicated abdominal/hepatic hepatic imaging recommended.    Impression was called to Dr. Regina Ames by Dr. Ren Rodney on 4/10/2024 9:33 PM CDT.      Man Lehman [facility]   Phone: #[phone]

## 2024-04-11 NOTE — PLAN OF CARE
Problem: Nausea and Vomiting  Goal: Nausea and Vomiting Relief  Outcome: Progressing   Patient complaining of nausea with emesis x1 Zofran and compazine administered with good relief. After antiemetics patient able to eat few bites of her lunch.    Problem: Pain Acute  Goal: Optimal Pain Control and Function  Outcome: Progressing  Patient complained of back pain dilaudid was administered x1 did cause nausea. Patient wants to try oral dilaudid next time she needs pain medication.  Jeffery Sargent RN  4/11/2024  2:52 PM

## 2024-04-11 NOTE — PROGRESS NOTES
[facility]    Medicine Progress Note - Hospitalist Service    Date of Admission:  4/11/2024    Assessment & Plan   Louise Corado is a 59 year old female with history of obesity, presented with complaints of chest pain, dyspnea to [facility] ([facility]).   CTA chest from [facility] that showed large rt pleural effusion, pleural nodularity, multiple scattered lesions in lungs, liver.  Concern for malignancy.  Underwent thoracentesis on 4/11/24.     Large rt pleural effusion  Scattered lesions in lungs, liver  Concern for malignancy  Thoracentesis diagnostic/therapeutic on 4/11/24.   (Cell count, LDH, Total Protein, Triglycerides, cell count with diff, body fluid culture, and cytology).  Oncology consulted regarding further workup  Not up-to-date on screening colonoscopy, mammograms        Diet: NPO for Medical/Clinical Reasons Except for: Meds, Ice Chips    DVT Prophylaxis: Pneumatic Compression Devices  T[place]oco Catheter: Not present  Lines: None     Cardiac Monitoring: None  Code Status: Full Code      Clinically Significant Risk Factors Present on Admission                                  Disposition Plan            Jeremias Cruz DO  Hospitalist Service  [facility]  Securely message with [employer] (more info)  Text page via 20:[employer] Mobile Paging/Directory   ______________________________________________________________________    Interval History   Louise reports no new complaints.  Pain is improved with pain medications.     Physical Exam   Vital Signs: Temp: 97.8  F (36.6  C) Temp src: Oral BP: (!) 157/80 Pulse: 75   Resp: 16 SpO2: 91 % O2 Device: None (Room air)    Weight: 0 lbs 0 oz    Gen: no acute distress, comfortable, alert, pleasant  ENT: no scleral icterus  Pulm: lungs are diminished at right base, breathing comfortably at rest on room air  CV: regular rate and rhythm, may be a little edema in the lower extremities, left worse than right  GI: abdomen is soft,  nondistended  MSK: no obvious deformities of the extremities  Derm: Not pale, no jaundice  Psych: appropriate affect    Medical Decision Making       40 MINUTES SPENT BY ME on the date of service doing chart review, history, exam, documentation & further activities per the note.      Data     I have personally reviewed the following data over the past 24 hrs:    6.6  \   12.7   / 278     139 104 9.7 /  179 (H)   4.1 29 0.68 \     ALT: 15 AST: 17 AP: 88 TBILI: 0.3   ALB: 4.0 TOT PROTEIN: 7.0 LIPASE: N/A     Ferritin:  N/A % Retic:  N/A LDH:  179       Imaging results reviewed over the past 24 hrs:   Recent Results (from the past 24 hour(s))   CTA Chest with Contrast    Narrative    For Patients: As a result of the 21st Century Cures Act, medical imaging exams and procedure reports are released immediately into your electronic medical record. You may view this report before your referring provider. If you have questions, please contact your health care provider.    EXAM: CTA CHEST  LOCATION: [facility] [place]  DATE: 4/10/2024    INDICATION: Pulmonary embolism (PE) suspected, unknown D-dimer; right lower shoulder and right flank pain, shortness of breath, nonproductive cough  COMPARISON: 02/26/2014  TECHNIQUE: CT chest pulmonary angiogram during arterial phase injection of IV contrast. Multiplanar reformats and MIP reconstructions were performed. Dose reduction techniques were used.   CONTRAST: IOPAMIDOL 300 MG/ML  ML BOTTLE: 100mL    FINDINGS:  ANGIOGRAM CHEST: No evidence of central or segmental pulmonary embolus. No definite subsegmental embolus. Exam limited by streak artifact and motion. Thoracic aorta is negative for dissection. No CT evidence of right heart strain.    LUNGS AND PLEURA: New large right pleural effusion. Heterogeneous groundglass opacities in the right upper lobe. Subsegmental consolidation in the right middle lobe and right lower lobe and/or atelectasis. 11 mm left upper lobe  pulmonary nodule, image 34 series 10. 6 monitor nodule left upper lobe image 44 suspected pleural nodularity along the right hemidiaphragm, new in the interval, best visualized on image 121 series 4. Discrete measurable pleural nodule anteriorly on image 48 series 4 reaches 12 mm.    MEDIASTINUM/AXILLAE: New mediastinal adenopathy including right paratracheal node reaching 2.1 cm, image 38 series 4, and subcarinal adenopathy reaching 2.4 cm, image 60. Confluent adenopathy in the right hilum. Additional enlarged prevascular, subcarinal, and left hilar nodes.    CORONARY ARTERY CALCIFICATION: Cannot evaluate.    UPPER ABDOMEN: 3.6 cm hyperenhancing right hepatic lobe lesion, with additional smaller scattered left hepatic lobe lesion suspicious for metastatic disease.    MUSCULOSKELETAL: Localized sclerosis in the proximal manubrium    Impression    1.  No evidence of pulmonary embolus given limitations of exam.  2.  Right-sided pleural nodularity and large amount of pleural fluid suspicious for malignant effusion.  3.  Associated mediastinal and hilar adenopathy and left pulmonary nodules.  4.  Right-sided middle and lower lobe consolidation/atelectasis could represent coexistent pneumonia or postobstructive atelectasis.  5.  New sclerotic manubrial lesion suspicious for metastatic disease.  6.  Hepatic lesions suspicious for metastatic disease. Dedicated abdominal/hepatic hepatic imaging recommended.    Impression was called to Dr. Regina Ames by Dr. Ren Rodney on 4/10/2024 9:33 PM CDT.

## 2024-04-11 NOTE — ED TRIAGE NOTES
Patient arrives to the ER with c/o R mid back pain that started about a year ago that has come and gone. The past couple of months wasn't really helping as much. She also endorses dyspnea on exertion. She was sent over from the [facility] for further testing.      Triage Assessment (Adult)       Row Name 04/10/24 2324          Triage Assessment    Airway WDL WDL        Respiratory WDL    Respiratory WDL X;rhythm/pattern     Rhythm/Pattern, Respiratory dyspnea upon exertion        Skin Circulation/Temperature WDL    Skin Circulation/Temperature WDL WDL        Cardiac WDL    Cardiac WDL X  hypertensive        Peripheral/Neurovascular WDL    Peripheral Neurovascular WDL WDL        Cognitive/Neuro/Behavioral WDL    Cognitive/Neuro/Behavioral WDL WDL

## 2024-04-11 NOTE — ED NOTES
Expected Patient Referral to ED  10:28 PM    Referring Clinic/Provider:  [facility]    Reason for referral/Clinical facts:  Right posterior back pain and shortness of breath for months and months. Has very large right pleural effusion with nodularity along the pleura with mediastinal and hilar lymph nodes, Left lung, and liver, concern for need for admission due to large pleural effusion needing drainage, currently vitals normal and no oxygen need    Recommendations provided:  Send to ED for further evaluation    Caller was informed that this institution does possess the capabilities and/or resources to provide for patient and should be transferred to our facility.    Discussed that if direct admit is sought and any hurdles are encountered, this ED would be happy to see the patient and evaluate.    Informed caller that recommendations provided are recommendations based only on the facts provided and that they responsible to accept or reject the advice, or to seek a formal in person consultation as needed and that this ED will see/treat patient should they arrive.      Vanda Yepez MD  [facility] EMERGENCY ROOM  [facility] [id number] [phone]       Vanda Yepez MD  04/10/24 2343

## 2024-04-11 NOTE — ED PROVIDER NOTES
EMERGENCY DEPARTMENT ENCOUNTER      NAME: Louise Corado  AGE: 59 year old female  YOB: 1965  MRN: [id number]  EVALUATION DATE & TIME: 4/11/2024 12:27 AM    PCP: System, Provider Not In    ED PROVIDER: Vanda Yepez MD      Chief Complaint   Patient presents with    Back Pain    Shortness of Breath         FINAL IMPRESSION:  1. Lung mass    2. Pleural effusion          ED COURSE & MEDICAL DECISION MAKING:    Pertinent Labs & Imaging studies reviewed. (See chart for details)    1:32 AM I introduced myself to the patient, obtained patient history, performed a physical exam, and discussed plan for ED workup including potential diagnostic laboratory/imaging studies and interventions.    59 year old previously relatively healthy female who presents to the Emergency Department for evaluation after being found to have possible malignancy on evaluation at the emergency room today after having longstanding worsening right-sided upper back and chest pain for the past several months to year.  I did review the urgency room workup and actually spoke with the physician who saw her there.  Laboratory studies revealed normal white blood cell count of 6.7 with a hemoglobin of 13.9.  D-dimer was elevated and thus CT chest with contrast was obtained.  Troponin within normal limits.  BMP largely unremarkable.  CT revealed right-sided pleural nodularity and large amount of pleural fluid suspicious for malignant effusion.  Associated mediastinal and hilar adenopathy and left pulmonary nodules.  Right-sided middle and lower lobe consolidation/atelectasis could represent coexistent pneumonia or postobstructive atelectasis.  New sclerotic manubrial lesion suspicious for the static disease.  Hepatic lesions suspicious for metastatic disease.  With this, the emergency room sent the patient here for likely admission as would require drainage of the large pleural effusion and further workup of this newly diagnosed likely  cancer.  Patient and her  had discussed these results with the emergency room physician prior to arrival here so they understood what was occurring at this point.  They were in agreement with admission for further evaluation and management.  She was hypertensive on arrival however this improved and likely related to anxiety of this whole situation as well as possibly some discomfort however she states her pain is relatively improved.  She was in agreement with 5 mg of oral oxycodone and 4 mg of IV Zofran for pain control at this time.  Did also order melatonin as needed for sleep.  Discussed the case with the hospitalist Dr. Lehman who accepted the patient for admission.  We discussed that she also had had a slight cough however had a normal white blood cell count and thus felt that overall infectious process or pneumonia would be less likely and he agreed with holding on any antibiotics at this time.  He will place radiology consult for thoracentesis tomorrow morning.  She has no signs of respiratory distress and is satting normally on room air and thus does not require emergent thoracentesis at this time.  Patient was in agreement with this plan.  She was admitted in stable condition.         At the conclusion of the encounter I discussed the results of all of the tests and the disposition. The questions were answered. The patient or family acknowledged understanding and was agreeable with the care plan.       Medical Decision Making  Obtained supplemental history:Supplemental history obtained?: Documented in chart  Reviewed external records: External records reviewed?: Documented in chart  Care impacted by chronic illness:N/A  Care significantly affected by social determinants of health:N/A  Did you consider but not order tests?: Work up considered but not performed and documented in chart, if applicable  Did you interpret images independently?: Independent interpretation of ECG and images noted in  documentation, when applicable.  Consultation discussion with other provider:Did you involve another provider (consultant, , pharmacy, etc.)?: I discussed the care with another health care provider, see documentation for details.  Admit.      MEDICATIONS GIVEN IN THE EMERGENCY:  Medications   melatonin tablet 5 mg (5 mg Oral $Given 4/11/24 0150)   lidocaine 1 % 0.1-1 mL (has no administration in time range)   lidocaine (LMX4) cream (has no administration in time range)   sodium chloride (PF) 0.9% PF flush 3 mL (3 mLs Intracatheter $Given 4/11/24 0223)   sodium chloride (PF) 0.9% PF flush 3 mL (has no administration in time range)   senna-docusate (SENOKOT-S/PERICOLACE) 8.6-50 MG per tablet 1 tablet (has no administration in time range)     Or   senna-docusate (SENOKOT-S/PERICOLACE) 8.6-50 MG per tablet 2 tablet (has no administration in time range)   calcium carbonate (TUMS) chewable tablet 1,000 mg (has no administration in time range)   lactated ringers infusion ( Intravenous Rate/Dose Verify 4/11/24 0654)   acetaminophen (TYLENOL) tablet 650 mg (650 mg Oral $Given 4/11/24 0438)     Or   acetaminophen (TYLENOL) Suppository 650 mg ( Rectal See Alternative 4/11/24 0438)   HYDROmorphone (DILAUDID) half-tab 1 mg (has no administration in time range)   HYDROmorphone (DILAUDID) half-tab 2 mg (has no administration in time range)   HYDROmorphone (DILAUDID) injection 0.2 mg (0.2 mg Intravenous $Given 4/11/24 0618)   HYDROmorphone (DILAUDID) injection 0.4 mg (has no administration in time range)   docusate sodium (COLACE) capsule 100 mg (has no administration in time range)   ondansetron (ZOFRAN ODT) ODT tab 4 mg (has no administration in time range)     Or   ondansetron (ZOFRAN) injection 4 mg (has no administration in time range)   prochlorperazine (COMPAZINE) injection 10 mg (has no administration in time range)     Or   prochlorperazine (COMPAZINE) tablet 10 mg (has no administration in time range)     Or    prochlorperazine (COMPAZINE) suppository 25 mg (has no administration in time range)   benzocaine-menthol (CEPACOL) 15-3.6 MG lozenge 1 lozenge (has no administration in time range)   guaiFENesin-dextromethorphan (ROBITUSSIN DM) 100-10 MG/5ML syrup 10 mL (has no administration in time range)   oxyCODONE (ROXICODONE) tablet 5 mg (5 mg Oral $Given 4/11/24 0150)   ondansetron (ZOFRAN) injection 4 mg (4 mg Intravenous $Given 4/11/24 0151)       NEW PRESCRIPTIONS STARTED AT TODAY'S ER VISIT  New Prescriptions    No medications on file          =================================================================    HPI    Patient information was obtained from: Patient    Louise Corado is a 59 year old female with a pertinent history of obesity and herpes zoster who presents to this ED for evaluation of right-sided back/chest pain and shortness of breath.  Patient had reported that she had had months of right-sided upper back pain that she felt was potentially related to caring for her father and doing more lifting.  She states that then it started to radiate around into her chest and she noticed dyspnea on exertion that had been worsening and thus went to emergency room [facility] for evaluation.  She states she has had a slight cough over the past couple days but has been nonproductive.  No fevers.  She denies any abdominal pain, nausea, vomiting, diarrhea, numbness, tingling, weakness, or other complaints.  She reports she is otherwise healthy and not taking any daily medications.  States she was taking Tylenol and ibuprofen as needed for pain but this stopped working and that is why she came for evaluation.  She was seen at the [facility] emergency room and I reviewed the laboratory studies and CT imaging as above.  Was sent here for likely admission for pleural effusion drainage and further workup of likely new malignancy. She does state that she was exposed to her mother smoking as a child.  She states that maybe she  "smoked 1 or 2 cigarettes when she was 22 years old but no consistent smoking.      REVIEW OF SYSTEMS   Review of Systems   Pertinent positives and negatives are documented in the HPI. All other systems reviewed and are negative.      PAST MEDICAL HISTORY:  No past medical history on file.    PAST SURGICAL HISTORY:  No past surgical history on file.        CURRENT MEDICATIONS:    benzonatate (TESSALON) 100 MG capsule  fluticasone (FLONASE) 50 mcg/actuation nasal spray  levothyroxine (SYNTHROID, LEVOTHROID) 88 MCG tablet  metroNIDAZOLE (METROGEL) 1 % gel        ALLERGIES:  No Known Allergies    FAMILY HISTORY:  Family History   Problem Relation Age of Onset    Breast Cancer Mother 70.00    Breast Cancer Sister 53.00       SOCIAL HISTORY:   Social History     Socioeconomic History    Marital status:    Tobacco Use    Smoking status: Never    Smokeless tobacco: Never       VITALS:  BP (!) 157/80 (BP Location: Left arm, Patient Position: Supine)   Pulse 75   Temp 97.8  F (36.6  C) (Oral)   Resp 16   Ht 1.6 m (5' 3\")   SpO2 91%   BMI 41.81 kg/m      PHYSICAL EXAM    Physical Exam  Constitutional: Well developed, Well nourished, NAD, GCS 15  HENT: Normocephalic, Atraumatic, Bilateral external ears normal, Oropharynx normal, mucous membranes moist, Nose normal. Neck- Normal range of motion, No tenderness, Supple, No stridor.    Eyes: PERRL, EOMI, Conjunctiva normal, No discharge.   Respiratory: Somewhat diminished breath sounds on the right compared to the left, No respiratory distress, No wheezing or crackles, Speaks in full sentences easily. Satting normally on room air.    Cardiovascular: Normal heart rate, Regular rhythm, No murmurs, No rubs, No gallops. 2+ radial pulses bilaterally  GI: Bowel sounds normal, Soft, No tenderness, No masses, No rebound or guarding.   Musculoskeletal: 2+ DP pulses. No notable lower extremity edema. Good range of motion in all major joints. No tenderness to palpation or major " deformities noted.   Integument: Warm, Dry, No erythema, No rash. No petechiae.   Neurologic: Alert & oriented x 3, 5/5 strength in all 4 extremities bilaterally. Sensation intact to light touch in all 4 extremities and the face bilaterally. No focal deficits noted.   Psychiatric: Affect normal, Judgment normal, Mood normal. Cooperative.      LAB:  All pertinent labs reviewed and interpreted.  Results for orders placed or performed during the hospital encounter of 04/11/24   Comprehensive metabolic panel   Result Value Ref Range    Sodium 139 135 - 145 mmol/L    Potassium 4.1 3.4 - 5.3 mmol/L    Carbon Dioxide (CO2) 29 22 - 29 mmol/L    Anion Gap 6 (L) 7 - 15 mmol/L    Urea Nitrogen 9.7 6.0 - 20.0 mg/dL    Creatinine 0.68 0.51 - 0.95 mg/dL    GFR Estimate >90 >60 mL/min/1.73m2    Calcium 9.2 8.6 - 10.0 mg/dL    Chloride 104 98 - 107 mmol/L    Glucose 179 (H) 70 - 99 mg/dL    Alkaline Phosphatase 88 40 - 150 U/L    AST 17 0 - 45 U/L    ALT 15 0 - 50 U/L    Protein Total 7.0 6.4 - 8.3 g/dL    Albumin 4.0 3.5 - 5.2 g/dL    Bilirubin Total 0.3 <=1.2 mg/dL   Result Value Ref Range    Lactate Dehydrogenase 179 0 - 250 U/L   CBC with platelets and differential   Result Value Ref Range    WBC Count 6.6 4.0 - 11.0 10e3/uL    RBC Count 4.59 3.80 - 5.20 10e6/uL    Hemoglobin 12.7 11.7 - 15.7 g/dL    Hematocrit 38.3 35.0 - 47.0 %    MCV 83 78 - 100 fL    MCH 27.7 26.5 - 33.0 pg    MCHC 33.2 31.5 - 36.5 g/dL    RDW 13.0 10.0 - 15.0 %    Platelet Count 278 150 - 450 10e3/uL    % Neutrophils 83 %    % Lymphocytes 12 %    % Monocytes 4 %    % Eosinophils 1 %    % Basophils 0 %    % Immature Granulocytes 1 %    NRBCs per 100 WBC 0 <1 /100    Absolute Neutrophils 5.5 1.6 - 8.3 10e3/uL    Absolute Lymphocytes 0.8 0.8 - 5.3 10e3/uL    Absolute Monocytes 0.3 0.0 - 1.3 10e3/uL    Absolute Eosinophils 0.0 0.0 - 0.7 10e3/uL    Absolute Basophils 0.0 0.0 - 0.2 10e3/uL    Absolute Immature Granulocytes 0.0 <=0.4 10e3/uL    Absolute NRBCs 0.0  10e3/uL       RADIOLOGY:  Reviewed all pertinent imaging. Please see official radiology report.  US Thoracentesis    (Results Pending)       PROCEDURES:   None      [facility] Documentation:   CMS Diagnoses:               I, Altagraciaavril Suarez, am serving as a scribe to document services personally performed by Vanda Yepez MD based on my observation and the provider's statements to me. I, Vanda Yepez MD, attest that Altagracia Suarez is acting in a scribe capacity, has observed my performance of the services and has documented them in accordance with my direction.    Vanda Yepez MD  [facility] EMERGENCY ROOM  [facility] [id number] [phone]       Vanda Yepez MD  04/11/24 0710

## 2024-04-11 NOTE — PLAN OF CARE
Problem: Gas Exchange Impaired  Goal: Optimal Gas Exchange  Outcome: Progressing   Patient has a thoracentesis with 1 liter of fluid out. Breathing improved after thoracentesis . Fluid cystology results pending.  Jeffery Sargent RN  4/11/2024  2:54 PM

## 2024-04-12 ENCOUNTER — TRANSFERRED RECORDS (OUTPATIENT)
Dept: HEALTH INFORMATION MANAGEMENT | Facility: CLINIC | Age: 59
End: 2024-04-12

## 2024-04-12 ENCOUNTER — PATIENT OUTREACH (OUTPATIENT)
Dept: ONCOLOGY | Facility: CLINIC | Age: 59
End: 2024-04-12

## 2024-04-12 ENCOUNTER — APPOINTMENT (OUTPATIENT)
Dept: ULTRASOUND IMAGING | Facility: CLINIC | Age: 59
End: 2024-04-12
Attending: RADIOLOGY
Payer: COMMERCIAL

## 2024-04-12 VITALS
BODY MASS INDEX: 38.91 KG/M2 | RESPIRATION RATE: 16 BRPM | HEART RATE: 80 BPM | HEIGHT: 63 IN | OXYGEN SATURATION: 94 % | SYSTOLIC BLOOD PRESSURE: 137 MMHG | WEIGHT: 219.58 LBS | DIASTOLIC BLOOD PRESSURE: 72 MMHG | TEMPERATURE: 98 F

## 2024-04-12 DIAGNOSIS — R91.8 LUNG MASS: Primary | ICD-10-CM

## 2024-04-12 LAB — INR PPP: 1.06 (ref 0.85–1.15)

## 2024-04-12 PROCEDURE — 76942 ECHO GUIDE FOR BIOPSY: CPT

## 2024-04-12 PROCEDURE — 88305 TISSUE EXAM BY PATHOLOGIST: CPT | Mod: 26 | Performed by: PATHOLOGY

## 2024-04-12 PROCEDURE — 99239 HOSP IP/OBS DSCHRG MGMT >30: CPT | Performed by: STUDENT IN AN ORGANIZED HEALTH CARE EDUCATION/TRAINING PROGRAM

## 2024-04-12 PROCEDURE — 85610 PROTHROMBIN TIME: CPT | Performed by: RADIOLOGY

## 2024-04-12 PROCEDURE — 250N000011 HC RX IP 250 OP 636: Performed by: RADIOLOGY

## 2024-04-12 PROCEDURE — 88342 IMHCHEM/IMCYTCHM 1ST ANTB: CPT | Mod: 26 | Performed by: PATHOLOGY

## 2024-04-12 PROCEDURE — 36415 COLL VENOUS BLD VENIPUNCTURE: CPT | Performed by: RADIOLOGY

## 2024-04-12 PROCEDURE — 88360 TUMOR IMMUNOHISTOCHEM/MANUAL: CPT | Mod: TC | Performed by: STUDENT IN AN ORGANIZED HEALTH CARE EDUCATION/TRAINING PROGRAM

## 2024-04-12 PROCEDURE — 258N000003 HC RX IP 258 OP 636: Performed by: HOSPITALIST

## 2024-04-12 PROCEDURE — 88341 IMHCHEM/IMCYTCHM EA ADD ANTB: CPT | Mod: 26 | Performed by: PATHOLOGY

## 2024-04-12 PROCEDURE — 250N000009 HC RX 250: Performed by: RADIOLOGY

## 2024-04-12 PROCEDURE — 250N000013 HC RX MED GY IP 250 OP 250 PS 637: Performed by: HOSPITALIST

## 2024-04-12 PROCEDURE — 88360 TUMOR IMMUNOHISTOCHEM/MANUAL: CPT | Mod: 26 | Performed by: PATHOLOGY

## 2024-04-12 PROCEDURE — 0FD03ZX EXTRACTION OF LIVER, PERCUTANEOUS APPROACH, DIAGNOSTIC: ICD-10-PCS | Performed by: RADIOLOGY

## 2024-04-12 PROCEDURE — 88333 PATH CONSLTJ SURG CYTO XM 1: CPT | Mod: TC | Performed by: STUDENT IN AN ORGANIZED HEALTH CARE EDUCATION/TRAINING PROGRAM

## 2024-04-12 PROCEDURE — 250N000011 HC RX IP 250 OP 636: Performed by: HOSPITALIST

## 2024-04-12 RX ORDER — NALOXONE HYDROCHLORIDE 0.4 MG/ML
0.2 INJECTION, SOLUTION INTRAMUSCULAR; INTRAVENOUS; SUBCUTANEOUS
Status: DISCONTINUED | OUTPATIENT
Start: 2024-04-12 | End: 2024-04-12 | Stop reason: HOSPADM

## 2024-04-12 RX ORDER — ONDANSETRON 2 MG/ML
4 INJECTION INTRAMUSCULAR; INTRAVENOUS
Status: DISCONTINUED | OUTPATIENT
Start: 2024-04-12 | End: 2024-04-12 | Stop reason: HOSPADM

## 2024-04-12 RX ORDER — HYDROCODONE BITARTRATE AND ACETAMINOPHEN 5; 325 MG/1; MG/1
1 TABLET ORAL
Status: DISCONTINUED | OUTPATIENT
Start: 2024-04-12 | End: 2024-04-12 | Stop reason: HOSPADM

## 2024-04-12 RX ORDER — NALOXONE HYDROCHLORIDE 0.4 MG/ML
0.4 INJECTION, SOLUTION INTRAMUSCULAR; INTRAVENOUS; SUBCUTANEOUS
Status: DISCONTINUED | OUTPATIENT
Start: 2024-04-12 | End: 2024-04-12 | Stop reason: HOSPADM

## 2024-04-12 RX ORDER — LORAZEPAM 0.5 MG/1
.5-1 TABLET ORAL EVERY 8 HOURS PRN
Qty: 30 TABLET | Refills: 0 | Status: SHIPPED | OUTPATIENT
Start: 2024-04-12 | End: 2024-04-25

## 2024-04-12 RX ORDER — FENTANYL CITRATE 50 UG/ML
25-50 INJECTION, SOLUTION INTRAMUSCULAR; INTRAVENOUS EVERY 5 MIN PRN
Status: DISCONTINUED | OUTPATIENT
Start: 2024-04-12 | End: 2024-04-12 | Stop reason: HOSPADM

## 2024-04-12 RX ORDER — FLUMAZENIL 0.1 MG/ML
0.2 INJECTION, SOLUTION INTRAVENOUS
Status: DISCONTINUED | OUTPATIENT
Start: 2024-04-12 | End: 2024-04-12 | Stop reason: HOSPADM

## 2024-04-12 RX ORDER — OXYCODONE HYDROCHLORIDE 5 MG/1
5 TABLET ORAL EVERY 8 HOURS PRN
Qty: 18 TABLET | Refills: 0 | Status: SHIPPED | OUTPATIENT
Start: 2024-04-12 | End: 2024-04-25

## 2024-04-12 RX ORDER — TRAMADOL HYDROCHLORIDE 50 MG/1
50 TABLET ORAL EVERY 6 HOURS PRN
Qty: 20 TABLET | Refills: 0 | Status: SHIPPED | OUTPATIENT
Start: 2024-04-12 | End: 2024-04-30

## 2024-04-12 RX ORDER — ACETAMINOPHEN 325 MG/1
650 TABLET ORAL EVERY 4 HOURS PRN
Status: DISCONTINUED | OUTPATIENT
Start: 2024-04-12 | End: 2024-04-12 | Stop reason: HOSPADM

## 2024-04-12 RX ADMIN — SODIUM CHLORIDE, POTASSIUM CHLORIDE, SODIUM LACTATE AND CALCIUM CHLORIDE: 600; 310; 30; 20 INJECTION, SOLUTION INTRAVENOUS at 03:35

## 2024-04-12 RX ADMIN — LIDOCAINE HYDROCHLORIDE 15 ML: 10 INJECTION, SOLUTION EPIDURAL; INFILTRATION; INTRACAUDAL; PERINEURAL at 13:08

## 2024-04-12 RX ADMIN — ACETAMINOPHEN 650 MG: 325 TABLET ORAL at 10:46

## 2024-04-12 RX ADMIN — ACETAMINOPHEN 650 MG: 325 TABLET ORAL at 16:06

## 2024-04-12 RX ADMIN — FENTANYL CITRATE 50 MCG: 50 INJECTION, SOLUTION INTRAMUSCULAR; INTRAVENOUS at 13:09

## 2024-04-12 RX ADMIN — HYDROMORPHONE HYDROCHLORIDE 0.2 MG: 0.2 INJECTION, SOLUTION INTRAMUSCULAR; INTRAVENOUS; SUBCUTANEOUS at 05:08

## 2024-04-12 RX ADMIN — MIDAZOLAM HYDROCHLORIDE 1 MG: 1 INJECTION, SOLUTION INTRAMUSCULAR; INTRAVENOUS at 13:08

## 2024-04-12 RX ADMIN — FENTANYL CITRATE 50 MCG: 50 INJECTION, SOLUTION INTRAMUSCULAR; INTRAVENOUS at 13:00

## 2024-04-12 RX ADMIN — MIDAZOLAM HYDROCHLORIDE 1 MG: 1 INJECTION, SOLUTION INTRAMUSCULAR; INTRAVENOUS at 13:00

## 2024-04-12 ASSESSMENT — ACTIVITIES OF DAILY LIVING (ADL)
ADLS_ACUITY_SCORE: 25
ADLS_ACUITY_SCORE: 26
ADLS_ACUITY_SCORE: 25

## 2024-04-12 NOTE — PLAN OF CARE
Problem: Pain Acute  Goal: Optimal Pain Control and Function  Outcome: Progressing  Intervention: Develop Pain Management Plan  Recent Flowsheet Documentation  Taken 4/12/2024 0508 by Carleen Molina RN  Pain Management Interventions: medication (see MAR)  Taken 4/11/2024 2352 by Carleen Molina RN  Pain Management Interventions: medication (see MAR)  Taken 4/11/2024 2322 by Carleen Molina RN  Pain Management Interventions: medication (see MAR)  Intervention: Prevent or Manage Pain  Recent Flowsheet Documentation  Taken 4/11/2024 2352 by Carleen Molina RN  Medication Review/Management: medications reviewed  Intervention: Optimize Psychosocial Wellbeing  Recent Flowsheet Documentation  Taken 4/11/2024 2352 by Carleen Molina RN  Supportive Measures: active listening utilized   Goal Outcome Evaluation:       Complaints of pain at her middle back and towards the right side. PRN Dilaudid given overnight and was effective. Denied having any nausea and states that she hasn't had an issue with feeling short of breath on shift. Up independently to bathroom. Calls appropriately. IVF running. NPO since  midnight.

## 2024-04-12 NOTE — CONSULTS
SPIRITUAL HEALTH SERVICES CONSULT NOTE  [facility]; 2N    Saw pt Louise Corado per Spiritual Care Consult    Patient/Family Understanding of Illness and Goals of Care - Louise was lying flat, recovering from a procedure. Her  and son accompanied her. Louise is hopeful that she will be able to discharge today, noting that her hospital bed has not been comfortable for sleeping.     Distress and Loss - Experiencing uncertainty due to changes in her health    Strengths, Coping, and Resources - Not discussed    Meaning, Beliefs, and Spirituality - Louise is [employer]. She does not wish to reach out to her parish at this time. She welcomes a prayer.     Plan of Care: Spiritual care staff will remain available for further follow-up as requested by patient, family or staff.      Time Spent with Patient/Family: 10 minutes    Roxanne Molina M.Div.      Office: [phone] (for non-urgent requests)  Please Vocera or page through [facility] for time-sensitive requests

## 2024-04-12 NOTE — IR NOTE
"Patient Name: Louise Corado  Medical Record Number: [id number]  Today's Date: 4/12/2024    Procedure: US guided liver bx  Proceduralist: Dr. Ann      Sedation medications administered: 2 mg midazolam and 100 mcg fentanyl   Sedation time: 15 minutes    Report given to: Harini Mosley  : n/a    Other Notes: Pt arrived to US room 1 from  218 . Consent reviewed. Pt denies any questions or concerns regarding procedure. Pt positioned supine and monitored per protocol. Pathology present outside room for procedure.  Pt tolerated procedure without any noted complications. VSS on monitor. Pt transferred back to  218 .      02 sats reading 89-90% on RA upon arrival to the floor.  Placed patient on 2L nasal cannula.  Patient drowsy but easily rousable and states, \"I'm very comfortable right now\".      biopsy site has bandaid applied and is c/d/I at time of transfer to [facility].  Site assessed jointly with JANET Schuler.  Liver bx discharge instructions added to AVS.  Also discussed post-procedure recovery with patient prior to procedure.      Bedrest x2 hours per Dr. Ann (until 1520).  If 2 hour recovery period goes well for patient, can discharge home today from a Radiology standpoint.      "

## 2024-04-12 NOTE — PLAN OF CARE
Louise is A & O x4, independent, able to make needs known. No c/o pain, N/V. Stated that her SOB has resolved with thoracentesis. Still feeling sleepy from meds given earlier. Up in chair this afternoon, evening. Ate 75% dinner. Ambulated on unit. Had medium BM. IV infusing LR at 75. Hem/Onc MD visited. Patient checked after visit, in good spirits. VSS.

## 2024-04-12 NOTE — PROCEDURES
[facility]    Procedure: Imaging Procedure Note    Date/Time: 4/12/2024 1:23 PM    Performed by: Ciro Ann MD  Authorized by: Ciro Ann MD      UNIVERSAL PROTOCOL   Site Marked: Yes  Prior Images Obtained and Reviewed:  Yes  Required items: Required blood products, implants, devices and special equipment available    Patient identity confirmed:  Verbally with patient  Patient was reevaluated immediately before administering moderate or deep sedation or anesthesia  Confirmation Checklist:  Patient's identity using two indicators, relevant allergies, procedure was appropriate and matched the consent or emergent situation and correct equipment/implants were available  Time out: Immediately prior to the procedure a time out was called    Universal Protocol: the Joint Commission Universal Protocol was followed    Preparation: Patient was prepped and draped in usual sterile fashion    ESBL (mL):  5     ANESTHESIA    Local Anesthetic:  Lidocaine 1% with epinephrine  Anesthetic Total (mL):  20      SEDATION  Patient Sedated: Yes    Sedation:  Fentanyl, midazolam and see MAR for details  Vital signs: Vital signs monitored during sedation    See dictated procedure note for full details.    PROCEDURE  Describe Procedure: Limited US of liver demonstrated multiple hepatic masses. Performed US guided biopsy of right hepatic mass.    6 passes with 17/18 ga Bard coaxial needle. Needle tract plugged with gelfoam.  Patient Tolerance:  Patient tolerated the procedure well with no immediate complications  Length of time physician/provider present for 1:1 monitoring during sedation: 15

## 2024-04-12 NOTE — CONSULTS
Consultation - Hematology/Oncology  Louise Corado,  1965, MRN [id number]    Admitting Dx: Lung mass [R91.8]  Pleural effusion [J90]    PCP: System, Provider Not In, None   Code status:  Full Code       Extended Emergency Contact Information  Primary Emergency Contact: Rowdy Corado  Address: [address] [place] [address] [place] [address] [facility]  Mobile Phone: [phone]  Relation: Spouse  Secondary Emergency Contact: Bernard Jenkins   [place]  Mobile Phone: [phone]  Relation: Mother       Assessment and Plan     Likely advanced cancer: We will plan for liver biopsy to attain more tissue for better tumor classification and genetic testing.  She had a thoracentesis and feels symptomatically improved.  Cytology is pending.  Will try to get the biopsy done tomorrow.  If this cannot happen then she probably can be discharged home with plan for outpatient biopsy.  She will also need a PET scan as an outpatient.  I reviewed the radiographic findings with her.  Discussed that this is most likely an advanced malignancy.  Unable to discuss the prognosis or treatment options until we have a diagnosis.  Questions were answered.  Will see her after discharge to review her pathologic findings and make a plan going forward.       Chief Complaint <principal problem not specified>       HPI      We have been requested by Dr. Cruz to evaluate Louise Corado who is a 59 year old year old female for possible cancer.  She presented to the emergency room with progressive dyspnea.  She has been noticing progressive shortness of breath for a few months but has progressed to the point now where she can only ambulate a few feet before getting significantly short of breath.  She has had some cough.  No hemoptysis.  Denies unintentional weight loss or significant pain.  While in the emergency room she had a chest CT performed which showed no evidence of PE, right-sided pleural nodularity and a large right  effusion.  She also had some left-sided pulmonary nodules and some evidence of postobstructive atelectasis.  There was also evidence of liver metastases.  She is feeling better after her thoracentesis.  1 L was removed.       Medical History  No past medical history on file.   Surgical History  She  has no past surgical history on file.   Social History  Reviewed, and she  reports that she has never smoked. She has never used smokeless tobacco.   Allergies  No Known Allergies Family History  Reviewed, and family history includes Breast Cancer (age of onset: 53.00) in her sister; Breast Cancer (age of onset: 70.00) in her mother.   Psychosocial Needs  Social History     Social History Narrative    Not on file     Additional psychosocial needs reviewed per nursing assessment.     Prior to Admission Medications   Medications Prior to Admission   Medication Sig Dispense Refill Last Dose    acetaminophen (TYLENOL) 325 MG tablet Take 325-650 mg by mouth every 6 hours as needed for mild pain   4/11/2024 at am    ibuprofen (ADVIL/MOTRIN) 200 MG tablet Take 200-400 mg by mouth every 4 hours as needed for pain   4/10/2024 at 1500          Review of Systems:    As above in the history.     Review of Systems otherwise Negative for:  General: chills, fever or night sweats  Psychological: anxiety or depression  Ophthalmic: blurry vision, double vision or loss of vision, vision change  ENT: epistaxis, oral lesions, hearing changes  Hematological and Lymphatic: bleeding, bruising, jaundice, swollen lymph nodes  Endocrine: hot flashes, unexpected weight changes  Respiratory: cough, hemoptysis, orthopnea  Cardiovascular: chest pain, edema, palpitations or PND  Gastrointestinal: abdominal pain, blood in stools, change in bowel habits, constipation, diarrhea or nausea/vomiting  Genito-Urinary: change in urinary stream, incontinence, frequency/urgency  Musculoskeletal: joint pain, stiffness, swelling, muscle pain or  "weakness  Neurological: dizziness, headaches, numbness/tingling  Dermatological: lumps and rash    ECOG performance status is     Physical Exam:    Vitals:    04/11/24 1500 04/11/24 1515 04/11/24 1530 04/11/24 1647   BP:    109/54   Pulse: 76 74 73 91   Resp:    18   Temp:    97.5  F (36.4  C)   TempSrc:    Oral   SpO2: 92% 93% 94% 93%   Weight:    107 kg (235 lb 14.3 oz)   Height:    1.6 m (5' 2.99\")     General: patient appears stated age of 59 year old. Nontoxic and in no distress.   HEENT: Head: atraumatic, normocephalic. Sclerae anicteric.  Chest:  Normal respiratory effort  Cardiac:  No edema.   Abdomen: abdomen is non-distended  Extremities: normal tone and muscle bulk.  Skin: no lesions or rash. Warm and dry.   CNS: alert and oriented x3. Grossly non-focal.   Psychiatric: normal mood and affect.        Pertinent Labs:    Lab Results: personally reviewed.   Lab Results   Component Value Date     04/11/2024    CO2 29 04/11/2024    CO2 25 05/02/2018    BUN 9.7 04/11/2024    BUN 10 05/02/2018     Lab Results   Component Value Date    WBC 6.6 04/11/2024    HGB 12.7 04/11/2024    HCT 38.3 04/11/2024    MCV 83 04/11/2024     04/11/2024        Pertinent Radiology:    Radiology Results: Personally reviewed image/s and Personally reviewed impression/s    No results found.             "

## 2024-04-12 NOTE — PROGRESS NOTES
New Patient Oncology Nurse Navigator Note     Referring provider: Dr. Colton Arroyo    Referring Clinic/Organization: [facility]  Referred to: Medical Oncology  Requested provider (if applicable): Dr. Barreto  Referral Received: 04/12/24       Evaluation for :   Diagnosis   R91.8 (ICD-10-CM) - Lung mass   R16.0 (ICD-10-CM) - Liver mass     Clinical History (per Nurse review of records provided):      04/10/2024 CTA Chest w/ contrast (bookmarked) showed:   Impression    1.  No evidence of pulmonary embolus given limitations of exam.  2.  Right-sided pleural nodularity and large amount of pleural fluid suspicious for malignant effusion.  3.  Associated mediastinal and hilar adenopathy and left pulmonary nodules.  4.  Right-sided middle and lower lobe consolidation/atelectasis could represent coexistent pneumonia or postobstructive atelectasis.  5.  New sclerotic manubrial lesion suspicious for metastatic disease.  6.  Hepatic lesions suspicious for metastatic disease. Dedicated abdominal/hepatic hepatic imaging recommended.    04/11/2024 Inpatient note from Dr. Barreto (bookmarked)     04/11/2024 US Thoracentesis (cytology pending as of 4/15)    04/12/2024 Surgical Pathology (bookmarked) showed:   Final Diagnosis   MASS, LIVER, ULTRASOUND-GUIDED NEEDLE CORE BIOPSY:  -POORLY DIFFERENTIATED MALIGNANT NEOPLASM, CDX-2 (+)  -PLEASE SEE COMMENT      Electronically signed by Harpreet Leroy MD on 4/15/2024 at 10:46 AM     Comment  [facility]   The combined morphologic and immunophenotypic features of this lesion are nonspecific.  Expression of CDX-2 suggests origin in the gastrointestinal tract.  Additional studies are being performed for further characterization.  The results will be reported in an addendum.        Clinical Assessment / Barriers to Care (Per Nurse):  Never smoker  Records Location: [facility]   Records Needed: None  Additional testing needed prior to consult: PET  Referral updates and Plan:     4/12: Msg sent  to Dr. Barreto, next available is 5/3. Liver bx path still pending.    4/15: Per chart review, PET has been ordered by Dr. Barreto. Pt is discharged and Liver bx results are finalized. Per clinic director, next available with Dr. Barreto is 4/29 and there are only 30 min appts available and Dr. Barreto needs 60 min for NEW pts.    Writer called Louise to discuss the referral. Writer offered her a last min med onc appt with Dr. Mackay 4/16 but she declined this, noting she was instructed by Dr. Barreto to get PET scan then follow up with him in clinic. Writer explained I have sent him a message and am waiting to hear back on when to schedule follow up with him but have not heard back yet. Will continue to keep her posted.     Per Dr. Tejeda's RNCC ok to use 30 min return spot on 4/29. PET is now scheduled 4/17. Writer spoke with Louise and she expressed frustration that she cannot be seen sooner, closer to her PET appt. Message sent to Dr. Barreto.    4/16: Per Dr. Barreto, ok to add on 4/18 at 11:00. Clarifying message sent to Dr. Barreto about which location he would like to see Louise at. Louise was updated that Dr. Barreto will see her on the 18th in [place]. Dr. Barreto messaged his clinic schedulers to schedule. Will watch to confirm appt is scheduled.     FARIHA SalinasN, RN, OCN  [facility] Oncology Nurse Navigator  [phone] / [phone]

## 2024-04-12 NOTE — PRE-PROCEDURE
GENERAL PRE-PROCEDURE:   Date/Time:  4/12/2024 12:45 PM    Written consent obtained?: Yes    Risks and benefits: Risks, benefits and alternatives were discussed    Consent given by:  Patient  Patient states understanding of procedure being performed: Yes    Patient's understanding of procedure matches consent: Yes    Procedure consent matches procedure scheduled: Yes    Expected level of sedation:  Moderate  Appropriately NPO:  Yes  ASA Class:  1  Mallampati  :  Grade 1- soft palate, uvula, tonsillar pillars, and posterior pharyngeal wall visible  Lungs:  Lungs clear with good breath sounds bilaterally  Heart:  Normal heart sounds and rate  History & Physical reviewed:  History and physical reviewed and no updates needed  Statement of review:  I have reviewed the lab findings, diagnostic data, medications, and the plan for sedation

## 2024-04-12 NOTE — DISCHARGE SUMMARY
"[facility]  Hospitalist Discharge Summary      Date of Admission:  4/11/2024  Date of Discharge:  4/12/2024  Discharging Provider: DAYSI MCCLELLAND MD  Discharge Service: Hospitalist Service    Discharge Diagnoses   Large R pleural effusion  Scattered lesions in lungs, liver concerned for malignancy    Clinically Significant Risk Factors     # Obesity: Estimated body mass index is 38.91 kg/m  as calculated from the following:    Height as of this encounter: 1.6 m (5' 2.99\").    Weight as of this encounter: 99.6 kg (219 lb 9.3 oz).       Follow-ups Needed After Discharge   Follow-up Appointments     Follow-up and recommended labs and tests       Follow up with primary care provider within 7 days for hospital follow-   up.  Follow up with hematology/oncology.            Unresulted Labs Ordered in the Past 30 Days of this Admission       Date and Time Order Name Status Description    4/12/2024 12:49 PM Surgical Pathology Exam In process     4/11/2024  2:04 AM Cytology non gyn In process     4/11/2024  2:04 AM Anaerobic bacterial culture In process     4/11/2024  2:04 AM Triglycerides Body Fluid with Reflex to Chylomicron Electrophoresis In process     4/11/2024  2:04 AM Pleural fluid Aerobic Bacterial Culture Routine With Gram Stain Preliminary         These results will be followed up by our group and Man Baron    Discharge Disposition   Discharged to home  Condition at discharge: Stable    Hospital Course   Louise Corado is a 59 year old female with history of obesity, presented with complaints of chest pain, dyspnea to [facility] ([facility]).   CTA chest from UR that showed large rt pleural effusion, pleural nodularity, multiple scattered lesions in lungs, liver.  Concern for malignancy.  Underwent thoracentesis on 4/11/24 and liver biopsy on 4/12/24. Oncology is consulted and to set up follow-up for her. Her pain and dyspnea are better with prn meds. She is to be discharged " home.    Based on pathology back after discharge, she has malignancy in her liver which could be primary site or metastatic. Her pleural effusion finds rare atypical cell which suggests that to be primary site or metastatic site of cancer.    Consultations This Hospital Stay   HEMATOLOGY IP CONSULT  INTERVENTIONAL RADIOLOGY ADULT/PEDS IP CONSULT    Code Status   Full Code    Time Spent on this Encounter   I, DAYSI MCCLELLAND MD, personally saw the patient today and spent greater than 30 minutes discharging this patient.       DAYSI MCCLELLAND MD  [address] [address]  Phone: [phone]  Fax: [phone]  ______________________________________________________________________    Physical Exam   Vital Signs: Temp: 98  F (36.7  C) Temp src: Oral BP: 137/72 Pulse: 80   Resp: 16 SpO2: 94 % O2 Device: Nasal cannula Oxygen Delivery: 1 LPM  Weight: 219 lbs 9.25 oz  General Appearance: Alert and wake, not in distress  Respiratory: clear lungs, no crackles or wheezing  Cardiovascular: rhythmic, normal S1 and S2, no murmur  Neurology: oriented x 3  Psych: cooperative and calm, normal affect  GI: soft, non-tender, normal bowel sound         Primary Care Physician   Provider Not In System    Discharge Orders      Primary Care Referral      Adult Oncology/Hematology  Referral      Reason for your hospital stay    Shortness of breath and back pain     Activity    Your activity upon discharge: activity as tolerated     Follow-up and recommended labs and tests     Follow up with primary care provider within 7 days for hospital follow- up.  Follow up with hematology/oncology.     Diet    Follow this diet upon discharge: Orders Placed This Encounter     Regular diet       Significant Results and Procedures   Most Recent 3 CBC's:  Recent Labs   Lab Test 04/11/24  0607 05/02/18  1558   WBC 6.6 5.0   HGB 12.7 13.4   MCV 83 85    323     Most Recent 3  BMP's:  Recent Labs   Lab Test 04/11/24  0607 05/02/18  1558    138   POTASSIUM 4.1 4.5   CHLORIDE 104 102   CO2 29 25   BUN 9.7 10   CR 0.68 0.77   ANIONGAP 6* 11   ANGELA 9.2 9.3   * 112     Most Recent 2 LFT's:  Recent Labs   Lab Test 04/11/24  0607 05/02/18  1558   AST 17 20   ALT 15 25   ALKPHOS 88 94   BILITOTAL 0.3 0.5   ,   Results for orders placed or performed during the hospital encounter of 04/11/24   US Thoracentesis    Narrative    EXAM:   1. RIGHT THORACENTESIS  2. ULTRASOUND GUIDANCE  LOCATION: [facility]  DATE: 4/11/2024    INDICATION: Pleural effusion.    PROCEDURE: Informed consent obtained. Time out performed. The chest was prepped and draped in sterile fashion. 10 mL of 1 % lidocaine was infused into the local soft tissues. Under direct ultrasound guidance, a 5 Tajik catheter system was placed into   the pleural effusion.     1 liters of darron-colored fluid were removed and sent to lab, if requested.    Patient tolerated procedure well.    Ultrasound imaging was obtained and placed in the patient's permanent medical record.      Impression    IMPRESSION:  Status post right ultrasound-guided thoracentesis.    Reference CPT Code: 00064   US Biopsy Liver    Narrative    EXAM:  1. PERCUTANEOUS BIOPSY RIGHT HEPATIC MASS  2. ULTRASOUND GUIDANCE  3. CONSCIOUS SEDATION  LOCATION: [facility]  DATE: 4/12/2024    INDICATION: Right pleural effusion with pleural nodularity, mediastinal lymphadenopathy and multiple hepatic masses suspicious for metastatic malignancy.    PROCEDURE: Informed consent obtained. Site marked. Prior images reviewed. Required items made available. Patient identity was confirmed verbally and with arm band. Patient reevaluated immediately before administering sedation. Universal protocol was   followed. Time out performed. A limited ultrasound of the liver is performed demonstrating multiple hypoechoic masses. A 5 cm mass  in the right hepatic lobe was localized and the overlying skin prepped and draped in sterile fashion. 20 mL of 1 percent   lidocaine was infused into the local soft tissues. Using standard technique and under direct ultrasound guidance, a 17/18 gauge Bard coaxial biopsy needle was used to make total of 6 core biopsies. Pulmonary cytopathologic analysis was deemed adequate.    The patient tolerated the procedure well. No complications.    SEDATION: Versed 2 mg. Fentanyl 100 mcg. The procedure was performed with administration intravenous conscious sedation with appropriate preoperative, intraoperative, and postoperative evaluation.    15 minutes of supervised face to face conscious sedation time was provided by a radiology nurse under my direct supervision.      Impression    IMPRESSION:  Ultrasound-guided percutaneous biopsy of right hepatic mass performed without complication. Final pathology pending.    Reference CPT Code: 51217, 05261, 51190       Discharge Medications   Current Discharge Medication List        START taking these medications    Details   oxyCODONE (ROXICODONE) 5 MG tablet Take 1 tablet (5 mg) by mouth every 8 hours as needed for pain  Qty: 18 tablet, Refills: 0    Associated Diagnoses: Painful respiration           CONTINUE these medications which have NOT CHANGED    Details   acetaminophen (TYLENOL) 325 MG tablet Take 325-650 mg by mouth every 6 hours as needed for mild pain      ibuprofen (ADVIL/MOTRIN) 200 MG tablet Take 200-400 mg by mouth every 4 hours as needed for pain           Allergies   No Known Allergies

## 2024-04-12 NOTE — CONSULTS
Chart and imaging reviewed. Will attempt US guided biopsy of liver mass. Pt may need additional imaging of abdomen and pelvis if liver mass not accessible by US guidance.

## 2024-04-12 NOTE — PLAN OF CARE
Pt A&Ox4 and VSS expect for mild hypertension. Pt had liver biopsy NPO prior surgery and pt states no blood thinner. Pt discharging with son and . Discharge instructions given and pt discharged. Biopsy site was within normal limits. Pt was on bedrest for 2 hours after biopsy.     Problem: Pain Acute  Goal: Optimal Pain Control and Function  Outcome: Met   Goal Outcome Evaluation:

## 2024-04-14 DIAGNOSIS — J90 PLEURAL EFFUSION: Primary | ICD-10-CM

## 2024-04-14 DIAGNOSIS — C78.7 METASTASES TO THE LIVER (H): ICD-10-CM

## 2024-04-16 ENCOUNTER — PATIENT OUTREACH (OUTPATIENT)
Dept: ONCOLOGY | Facility: HOSPITAL | Age: 59
End: 2024-04-16
Payer: COMMERCIAL

## 2024-04-16 LAB
BACTERIA PLR CULT: NO GROWTH
GRAM STAIN RESULT: NORMAL
GRAM STAIN RESULT: NORMAL

## 2024-04-16 NOTE — PROGRESS NOTES
[facility]: Cancer Care                                                                                          Called IR scheduling to let them know to cancel Dr. Barreto IR liver biopsy order due to the patient having a liver biopsy on 4/12 per Dr. Barreto' request.    Signature:  Carolin Biswas RN

## 2024-04-17 ENCOUNTER — HOSPITAL ENCOUNTER (OUTPATIENT)
Dept: PET IMAGING | Facility: HOSPITAL | Age: 59
Discharge: HOME | End: 2024-04-17
Attending: INTERNAL MEDICINE | Admitting: INTERNAL MEDICINE
Payer: COMMERCIAL

## 2024-04-17 DIAGNOSIS — R91.8 LUNG MASS: ICD-10-CM

## 2024-04-17 LAB
CHYLO FLD QL: NORMAL
CHYLOMICRON BILL: NORMAL
GLUCOSE BLDC GLUCOMTR-MCNC: 141 MG/DL (ref 70–99)
TRIGL FLD-MCNC: 33 MG/DL

## 2024-04-17 PROCEDURE — A9552 F18 FDG: HCPCS | Performed by: INTERNAL MEDICINE

## 2024-04-17 PROCEDURE — 343N000001 HC RX 343: Performed by: INTERNAL MEDICINE

## 2024-04-17 PROCEDURE — 78816 PET IMAGE W/CT FULL BODY: CPT | Mod: PI

## 2024-04-17 PROCEDURE — 82962 GLUCOSE BLOOD TEST: CPT

## 2024-04-17 RX ADMIN — FLUDEOXYGLUCOSE F-18 13.13 MILLICURIE: 500 INJECTION, SOLUTION INTRAVENOUS at 09:40

## 2024-04-18 ENCOUNTER — ONCOLOGY VISIT (OUTPATIENT)
Dept: ONCOLOGY | Facility: HOSPITAL | Age: 59
End: 2024-04-18
Attending: INTERNAL MEDICINE
Payer: COMMERCIAL

## 2024-04-18 VITALS
OXYGEN SATURATION: 98 % | RESPIRATION RATE: 18 BRPM | SYSTOLIC BLOOD PRESSURE: 145 MMHG | WEIGHT: 217.5 LBS | HEART RATE: 89 BPM | HEIGHT: 63 IN | BODY MASS INDEX: 38.54 KG/M2 | TEMPERATURE: 98.3 F | DIASTOLIC BLOOD PRESSURE: 67 MMHG

## 2024-04-18 DIAGNOSIS — R91.8 LUNG MASS: ICD-10-CM

## 2024-04-18 DIAGNOSIS — R16.0 LIVER MASS: ICD-10-CM

## 2024-04-18 DIAGNOSIS — C78.7 SECONDARY MALIGNANT NEOPLASM OF LIVER (H): Primary | ICD-10-CM

## 2024-04-18 LAB — BACTERIA PLR CULT: NORMAL

## 2024-04-18 PROCEDURE — 99213 OFFICE O/P EST LOW 20 MIN: CPT | Performed by: INTERNAL MEDICINE

## 2024-04-18 PROCEDURE — G2211 COMPLEX E/M VISIT ADD ON: HCPCS | Performed by: INTERNAL MEDICINE

## 2024-04-18 PROCEDURE — 99205 OFFICE O/P NEW HI 60 MIN: CPT | Performed by: INTERNAL MEDICINE

## 2024-04-18 ASSESSMENT — PAIN SCALES - GENERAL: PAINLEVEL: EXTREME PAIN (8)

## 2024-04-18 NOTE — PROGRESS NOTES
"Oncology Rooming Note    April 18, 2024 11:06 AM   Louise Corado is a 59 year old female who presents for:    Chief Complaint   Patient presents with    Oncology Clinic Visit     Return visit to review PET done yesterday. Lung mass. Liver mass.     Initial Vitals: BP (!) 145/67 (BP Location: Right arm, Patient Position: Sitting, Cuff Size: Adult Large)   Pulse 89   Temp 98.3  F (36.8  C) (Oral)   Resp 18   Ht 1.6 m (5' 2.99\")   Wt 98.7 kg (217 lb 8 oz)   SpO2 98%   BMI 38.54 kg/m   Estimated body mass index is 38.54 kg/m  as calculated from the following:    Height as of this encounter: 1.6 m (5' 2.99\").    Weight as of this encounter: 98.7 kg (217 lb 8 oz). Body surface area is 2.09 meters squared.  Extreme Pain (8) Comment: Data Unavailable   No LMP recorded. Patient is postmenopausal.  Allergies reviewed: Yes  Medications reviewed: Yes    Medications: Medication refills not needed today.  Pharmacy name entered into Screenie:    [id number] [place], [place] - [address] AT [facility]  [id number] [place], [place] - [address] AT [facility]    Frailty Screening:   Is the patient here for a new oncology consult visit in cancer care? 2. No      Clinical concerns: Return visit to review PET done yesterday. Lung mass. Liver mass. RUQ pain 8/10.      Shannan Gandara CMA            "

## 2024-04-18 NOTE — LETTER
"    4/18/2024         RE: Louise Corado  [address]   [place] [address]        Dear Colleague,    Thank you for referring your patient, Louise Corado, to the [facility]. Please see a copy of my visit note below.    Oncology Rooming Note    April 18, 2024 11:06 AM   Louise Corado is a 59 year old female who presents for:    Chief Complaint   Patient presents with     Oncology Clinic Visit     Return visit to review PET done yesterday. Lung mass. Liver mass.     Initial Vitals: BP (!) 145/67 (BP Location: Right arm, Patient Position: Sitting, Cuff Size: Adult Large)   Pulse 89   Temp 98.3  F (36.8  C) (Oral)   Resp 18   Ht 1.6 m (5' 2.99\")   Wt 98.7 kg (217 lb 8 oz)   SpO2 98%   BMI 38.54 kg/m   Estimated body mass index is 38.54 kg/m  as calculated from the following:    Height as of this encounter: 1.6 m (5' 2.99\").    Weight as of this encounter: 98.7 kg (217 lb 8 oz). Body surface area is 2.09 meters squared.  Extreme Pain (8) Comment: Data Unavailable   No LMP recorded. Patient is postmenopausal.  Allergies reviewed: Yes  Medications reviewed: Yes    Medications: Medication refills not needed today.  Pharmacy name entered into [facility]:    [id number] [place], [place] - [address] AT [id number] [place], [place] - [address] AT [facility]    Frailty Screening:   Is the patient here for a new oncology consult visit in cancer care? 2. No      Clinical concerns: Return visit to review PET done yesterday. Lung mass. Liver mass. RUQ pain 8/10.      [facility] Hematology and Oncology Progress Note    Patient: Louise Corado  MRN: [id number]  Date of Service: Apr 18, 2024        Assessment and Plan:    1.  Metastatic adenocarcinoma: We reviewed her liver biopsy.  This shows a poorly differentiated malignant neoplasm, CDX2 positive.  " Nonspecific immunophenotype.  Possible GI tract origin.  Additional studies are still pending.  Will have the specimen sent out for CancerTYPE ID..  Will also consider pathology second opinion. Her PET/CT was also reviewed.  I personally reviewed the images.  This shows malignant involvement of the liver, mediastinal nodes, left upper quadrant omentum, manubrium, and bilateral pleura.  There is some wall thickening and FDG activity in a short segment of the terminal ileum.  All of these findings were reviewed with the patient and her .  We talked about the plan going forward.  She is going to have port placement in anticipation of systemic therapy.  I would like her to get a colonoscopy and, if negative, an upper endoscopy.  An urgent referral will be placed to GI in this regard.  We will also wait the biopsy NGS results.  She will be going out of town for a long weekend next week.  Will have her return to clinic in about 2 weeks to hopefully review all of the information that is currently pending.  At that time we can make a more definitive plan going forward.  At this time it seems like this might be a colorectal or hepatobiliary primary.  As such would anticipate treating her with gemcitabine/cisplatin/nivolumab or FOLFOX with Avastin.  They had an opportunity to have their questions answered.    2.  Pleural effusion: Thoracentesis performed on April 11.  1 L was removed.  Cytology shows chronic inflammation with rare atypical cells.  We are going to get her set up for a thoracentesis on the 24th.  I asked her to let us know in the interim if she feels like she is getting more short of breath.    3. Anxiety: She is taking her lorazepam a little more frequently now.  This was refilled for her.  If it becomes a chronic problem then we can start citalopram or something similar.      4.  Pain: She has occasional twinges of right thoracic/upper quadrant pain.  They only last for a few minutes.  I asked her to  take ibuprofen 600 mg twice daily prophylactically to try to control this better.  She also has tramadol and oxycodone at home.      Medical decision Making:  I spent  68 minutes in the care of this patient today, which included time necessary for preparation for the visit, face to face time with the patient, communication of recommendations to the care team, and documentation time.  Today's visit was centered around a cancer diagnosis in the setting of additional medical comorbidities. Complex medical decision making was required. The risk of additional morbidity without further treatment is high.     ECOG Performance  0    Diagnosis:    1.  Metastatic carcinoma, poorly differentiated: Diagnosed April 2024 from a liver biopsy.  Her PET/CT shows malignant involvement of the liver, mediastinal nodes, left upper quadrant omentum, manubrium, and bilateral pleura.  There is some wall thickening and FDG activity in a short segment of the terminal ileum.    Treatment:    None to date    Interim History:    Louise returns today for a follow-up visit.  I first saw her about a week ago while she was hospitalized.  She presented with worsening shortness of breath and was found to have a large right-sided pleural effusion with evidence of metastatic cancer.  She is here to review the results of the liver biopsy.  She states that she is feeling somewhat short of breath but not as bad as when she was admitted to the hospital.  She gets twinges of right upper quadrant/lower right thoracic pain usually a few times a day.  No headaches, vision changes, weakness, or nausea or vomiting    Review of Systems:    As above in the history.     Review of Systems otherwise Negative for:  General: chills, fever or night sweats  Psychological: anxiety or depression  Ophthalmic: blurry vision, double vision or loss of vision, vision change  ENT: epistaxis, oral lesions, hearing changes  Hematological and Lymphatic: bleeding, bruising,  "jaundice, swollen lymph nodes  Endocrine: hot flashes, unexpected weight changes  Respiratory: cough, hemoptysis, orthopnea  Cardiovascular: chest pain, edema, palpitations or PND  Gastrointestinal: blood in stools, change in bowel habits, constipation, diarrhea or nausea/vomiting  Genito-Urinary: change in urinary stream, incontinence, frequency/urgency  Musculoskeletal: joint pain, stiffness, swelling, muscle pain  Neurological: dizziness, headaches, numbness/tingling  Dermatological: lumps and rash    Physical Exam:    BP (!) 145/67 (BP Location: Right arm, Patient Position: Sitting, Cuff Size: Adult Large)   Pulse 89   Temp 98.3  F (36.8  C) (Oral)   Resp 18   Ht 1.6 m (5' 2.99\")   Wt 98.7 kg (217 lb 8 oz)   SpO2 98%   BMI 38.54 kg/m      General: patient appears stated age of 59 year old. Nontoxic and in no distress.   HEENT: Head: atraumatic, normocephalic. Sclerae anicteric.  Chest:  Normal respiratory effort  Cardiac:  No edema.   Abdomen: abdomen is non-distended  Extremities: normal tone and muscle bulk.  Skin: no lesions or rash on visible skin. Warm and dry.   CNS: alert and oriented. Grossly non-focal.   Psychiatric: normal mood and affect.     Lab Results:    Recent Results (from the past 168 hour(s))   INR   Result Value Ref Range    INR 1.06 0.85 - 1.15   Surgical Pathology Exam   Result Value Ref Range    Case Report       Surgical Pathology Report                         Case: [id number]                                  Authorizing Provider:  Colton Arroyo MD           Collected:           04/12/2024 01:00 PM          Ordering Location:     [facility]          Received:            04/12/2024 01:22 PM                                 [facility]                                                                             Interventional Radiology                                                     Pathologist:           Harpreet Leroy MD                                     "                    Specimen:    Liver                                                                                      Final Diagnosis       MASS, LIVER, ULTRASOUND-GUIDED NEEDLE CORE BIOPSY:  -POORLY DIFFERENTIATED MALIGNANT NEOPLASM, CDX-2 (+)  -PLEASE SEE COMMENT        Comment       The combined morphologic and immunophenotypic features of this lesion are nonspecific.  Expression of CDX-2 suggests origin in the gastrointestinal tract.  Additional studies are being performed for further characterization.  The results will be reported in an addendum.        Clinical Information       Liver masses.      Gross Description       A(). Liver, :  Site #1, Episode #1, # Passes 5: Adequate. BHS  Biopsy was performed under Ultrasound guidance by Dr. Ciro Ann with 5 pass(es) from which:    5 Air-dried smear(s)  1 cell/tissue block slides are prepared and placed into formalin at 1300.    Assisted by:  PRISCILLA Zuniga      Microscopic Description       Diff-Quik stained touch imprint slides show several clusters of epithelioid cells with moderate to severe nuclear pleomorphism overall.  In addition, several tumor giant cells are identified.  The cells have finely to coarsely granular chromatin and conspicuous nucleoli.  Many of the cells have eccentrically positioned nuclei.  The background is bloody, and occasional reactive-appearing hepatocytes are observed.  H&E-stained needle biopsy sections show liver parenchyma that is partially replaced by nests of neoplastic cells with cytological features described above.  Cytoplasm is eosinophilic.  Rare microcalcifications are identified.  The uninvolved liver parenchyma is notable for mild macrovesicular steatosis (10-15% of parenchyma).    Immunohistochemical stains are performed on paraffin sections of needle biopsy in order to characterize the neoplastic cells.  The results are as follows:    Cytokeratin-7: Negative  Cytokeratin-20: Negative  CDX2: Positive  SATB2: Rare  positive tumor cells identified; vast majority of the cells are negative  GATA3: Negative  TTF-1: Negative  PAX8: Negative    All controls stain appropriately.        MCRS Yes (A) N/A    Performing Labs       The technical component of this testing was completed at [facility]      Case Images     Glucose by meter   Result Value Ref Range    GLUCOSE BY METER POCT 141 (H) 70 - 99 mg/dL     Imaging:    PET Oncology Whole Body    Result Date: 4/17/2024  EXAM: PET ONCOLOGY WHOLE BODY LOCATION: [facility] DATE: 4/17/2024 INDICATION: Right middle lobe pulmonary mass COMPARISON: CTs from 4/10/2024 and 2/26/2014 are reviewed. CONTRAST: None TECHNIQUE: Serum glucose level 141  mg/dL. One hour post intravenous administration of 13.1  mCi F-18 FDG, PET imaging was performed from the skull vertex to feet, utilizing attenuation correction with concurrent axial CT and PET/CT image fusion. Dose reduction techniques were used. PET/CT FINDINGS: 5.6 x 5.2 cm markedly FDG avid (SUVmax 7.0) solid mass in the right lobe of the liver with an adjacent 3.2 x 2.6 cm (SUVmax 5.2) solid nodule. FDG avid right hilar, subcarinal, and lower right paratracheal lymphadenopathy. The subcarinal  station 7 node, as an example, measures 2.6 x 1.8 cm (SUVmax  5.2). FDG avid bilateral pleural soft tissue nodularity with a moderate-sized right pleural effusion. FDG avid soft tissue nodularity in the left upper quadrant greater omentum. Moderately FDG avid (SUVmax 5.0) sclerosis in the manubrium. Moderate circumferential wall thickening and inflammatory FDG activity involving a short segment of terminal ileum with inflammation in the adjacent mesenteric fat and some tethering of adjacent loops of small bowel. Extensive fat deposition in the wall of the cecum. CT FINDINGS: Passive atelectasis in the right lung. Small non-FDG avid bilateral pulmonary nodules, most of which  are too small for reliable PET characterization. Trace calcified atherosclerosis. No coronary artery calcifications. Diffuse fatty infiltration of the liver. Noncirrhotic liver. Benign hyperostosis frontalis interna. Benign bone island left femoral neck. Mild degenerative change in the spine.     IMPRESSION: 1.  Findings suspicious for metastatic cancer involving the liver, thoracic lymph nodes, omentum, bilateral pleura, and manubrium. The appearance is nonspecific and the primary tumor site is unclear. 2.  Inflammation in the terminal ileum suggests Crohn's disease.     US Biopsy Liver    Result Date: 4/12/2024  EXAM: 1. PERCUTANEOUS BIOPSY RIGHT HEPATIC MASS 2. ULTRASOUND GUIDANCE 3. CONSCIOUS SEDATION LOCATION: [facility] DATE: 4/12/2024 INDICATION: Right pleural effusion with pleural nodularity, mediastinal lymphadenopathy and multiple hepatic masses suspicious for metastatic malignancy. PROCEDURE: Informed consent obtained. Site marked. Prior images reviewed. Required items made available. Patient identity was confirmed verbally and with arm band. Patient reevaluated immediately before administering sedation. Universal protocol was followed. Time out performed. A limited ultrasound of the liver is performed demonstrating multiple hypoechoic masses. A 5 cm mass in the right hepatic lobe was localized and the overlying skin prepped and draped in sterile fashion. 20 mL of 1 percent lidocaine was infused into the local soft tissues. Using standard technique and under direct ultrasound guidance, a 17/18 gauge Bard coaxial biopsy needle was used to make total of 6 core biopsies. Pulmonary cytopathologic analysis was deemed adequate. The patient tolerated the procedure well. No complications. SEDATION: Versed 2 mg. Fentanyl 100 mcg. The procedure was performed with administration intravenous conscious sedation with appropriate preoperative, intraoperative, and postoperative evaluation. 15  minutes of supervised face to face conscious sedation time was provided by a radiology nurse under my direct supervision.     IMPRESSION: Ultrasound-guided percutaneous biopsy of right hepatic mass performed without complication. Final pathology pending. Reference CPT Code: 55193, 70369, 43717    US Thoracentesis    Result Date: 4/11/2024  EXAM: 1. RIGHT THORACENTESIS 2. ULTRASOUND GUIDANCE LOCATION: [facility] DATE: 4/11/2024 INDICATION: Pleural effusion. PROCEDURE: Informed consent obtained. Time out performed. The chest was prepped and draped in sterile fashion. 10 mL of 1 % lidocaine was infused into the local soft tissues. Under direct ultrasound guidance, a 5 Omani catheter system was placed into the pleural effusion. 1 liters of darron-colored fluid were removed and sent to lab, if requested. Patient tolerated procedure well. Ultrasound imaging was obtained and placed in the patient's permanent medical record.     IMPRESSION: Status post right ultrasound-guided thoracentesis. Reference CPT Code: 92642    CTA Chest with Contrast    Result Date: 4/10/2024  For Patients: As a result of the 21st Century Cures Act, medical imaging exams and procedure reports are released immediately into your electronic medical record. You may view this report before your referring provider. If you have questions, please contact your health care provider. EXAM: CTA CHEST LOCATION: [facility] DATE: 4/10/2024 INDICATION: Pulmonary embolism (PE) suspected, unknown D-dimer; right lower shoulder and right flank pain, shortness of breath, nonproductive cough COMPARISON: 02/26/2014 TECHNIQUE: CT chest pulmonary angiogram during arterial phase injection of IV contrast. Multiplanar reformats and MIP reconstructions were performed. Dose reduction techniques were used. CONTRAST: IOPAMIDOL 300 MG/ML  ML BOTTLE: 100mL FINDINGS: ANGIOGRAM CHEST: No evidence of central or segmental pulmonary  embolus. No definite subsegmental embolus. Exam limited by streak artifact and motion. Thoracic aorta is negative for dissection. No CT evidence of right heart strain. LUNGS AND PLEURA: New large right pleural effusion. Heterogeneous groundglass opacities in the right upper lobe. Subsegmental consolidation in the right middle lobe and right lower lobe and/or atelectasis. 11 mm left upper lobe pulmonary nodule, image 34 series 10. 6 monitor nodule left upper lobe image 44 suspected pleural nodularity along the right hemidiaphragm, new in the interval, best visualized on image 121 series 4. Discrete measurable pleural nodule anteriorly on image 48 series 4 reaches 12 mm. MEDIASTINUM/AXILLAE: New mediastinal adenopathy including right paratracheal node reaching 2.1 cm, image 38 series 4, and subcarinal adenopathy reaching 2.4 cm, image 60. Confluent adenopathy in the right hilum. Additional enlarged prevascular, subcarinal, and left hilar nodes. CORONARY ARTERY CALCIFICATION: Cannot evaluate. UPPER ABDOMEN: 3.6 cm hyperenhancing right hepatic lobe lesion, with additional smaller scattered left hepatic lobe lesion suspicious for metastatic disease. MUSCULOSKELETAL: Localized sclerosis in the proximal manubrium    1.  No evidence of pulmonary embolus given limitations of exam. 2.  Right-sided pleural nodularity and large amount of pleural fluid suspicious for malignant effusion. 3.  Associated mediastinal and hilar adenopathy and left pulmonary nodules. 4.  Right-sided middle and lower lobe consolidation/atelectasis could represent coexistent pneumonia or postobstructive atelectasis. 5.  New sclerotic manubrial lesion suspicious for metastatic disease. 6.  Hepatic lesions suspicious for metastatic disease. Dedicated abdominal/hepatic hepatic imaging recommended. Impression was called to Dr. Regina Ames by Dr. Ren Rodney on 4/10/2024 9:33 PM CDT.      Signed by: Man Barreto MD  +      Again, thank you for  allowing me to participate in the care of your patient.        Sincerely,        Man Barreto MD

## 2024-04-18 NOTE — PROGRESS NOTES
[facility] Hematology and Oncology Progress Note    Patient: Louise Corado  MRN: [id number]  Date of Service: Apr 18, 2024        Assessment and Plan:    1.  Metastatic adenocarcinoma: We reviewed her liver biopsy.  This shows a poorly differentiated malignant neoplasm, CDX2 positive.  Nonspecific immunophenotype.  Possible GI tract origin.  Additional studies are still pending.  Will have the specimen sent out for CancerTYPE ID..  Will also consider pathology second opinion. Her PET/CT was also reviewed.  I personally reviewed the images.  This shows malignant involvement of the liver, mediastinal nodes, left upper quadrant omentum, manubrium, and bilateral pleura.  There is some wall thickening and FDG activity in a short segment of the terminal ileum.  All of these findings were reviewed with the patient and her .  We talked about the plan going forward.  She is going to have port placement in anticipation of systemic therapy.  I would like her to get a colonoscopy and, if negative, an upper endoscopy.  An urgent referral will be placed to GI in this regard.  We will also wait the biopsy NGS results.  She will be going out of town for a long weekend next week.  Will have her return to clinic in about 2 weeks to hopefully review all of the information that is currently pending.  At that time we can make a more definitive plan going forward.  At this time it seems like this might be a colorectal or hepatobiliary primary.  As such would anticipate treating her with gemcitabine/cisplatin/nivolumab or FOLFOX with Avastin.  They had an opportunity to have their questions answered.    2.  Pleural effusion: Thoracentesis performed on April 11.  1 L was removed.  Cytology shows chronic inflammation with rare atypical cells.  We are going to get her set up for a thoracentesis on the 24th.  I asked her to let us know in the interim if she feels like she is getting more short of breath.    3. Anxiety: She is  taking her lorazepam a little more frequently now.  This was refilled for her.  If it becomes a chronic problem then we can start citalopram or something similar.      4.  Pain: She has occasional twinges of right thoracic/upper quadrant pain.  They only last for a few minutes.  I asked her to take ibuprofen 600 mg twice daily prophylactically to try to control this better.  She also has tramadol and oxycodone at home.      Medical decision Making:  I spent  68 minutes in the care of this patient today, which included time necessary for preparation for the visit, face to face time with the patient, communication of recommendations to the care team, and documentation time.  Today's visit was centered around a cancer diagnosis in the setting of additional medical comorbidities. Complex medical decision making was required. The risk of additional morbidity without further treatment is high.     ECOG Performance  0    Diagnosis:    1.  Metastatic carcinoma, poorly differentiated: Diagnosed April 2024 from a liver biopsy.  Her PET/CT shows malignant involvement of the liver, mediastinal nodes, left upper quadrant omentum, manubrium, and bilateral pleura.  There is some wall thickening and FDG activity in a short segment of the terminal ileum.    Treatment:    None to date    Interim History:    Louise returns today for a follow-up visit.  I first saw her about a week ago while she was hospitalized.  She presented with worsening shortness of breath and was found to have a large right-sided pleural effusion with evidence of metastatic cancer.  She is here to review the results of the liver biopsy.  She states that she is feeling somewhat short of breath but not as bad as when she was admitted to the hospital.  She gets twinges of right upper quadrant/lower right thoracic pain usually a few times a day.  No headaches, vision changes, weakness, or nausea or vomiting    Review of Systems:    As above in the history.  "    Review of Systems otherwise Negative for:  General: chills, fever or night sweats  Psychological: anxiety or depression  Ophthalmic: blurry vision, double vision or loss of vision, vision change  ENT: epistaxis, oral lesions, hearing changes  Hematological and Lymphatic: bleeding, bruising, jaundice, swollen lymph nodes  Endocrine: hot flashes, unexpected weight changes  Respiratory: cough, hemoptysis, orthopnea  Cardiovascular: chest pain, edema, palpitations or PND  Gastrointestinal: blood in stools, change in bowel habits, constipation, diarrhea or nausea/vomiting  Genito-Urinary: change in urinary stream, incontinence, frequency/urgency  Musculoskeletal: joint pain, stiffness, swelling, muscle pain  Neurological: dizziness, headaches, numbness/tingling  Dermatological: lumps and rash    Physical Exam:    BP (!) 145/67 (BP Location: Right arm, Patient Position: Sitting, Cuff Size: Adult Large)   Pulse 89   Temp 98.3  F (36.8  C) (Oral)   Resp 18   Ht 1.6 m (5' 2.99\")   Wt 98.7 kg (217 lb 8 oz)   SpO2 98%   BMI 38.54 kg/m      General: patient appears stated age of 59 year old. Nontoxic and in no distress.   HEENT: Head: atraumatic, normocephalic. Sclerae anicteric.  Chest:  Normal respiratory effort  Cardiac:  No edema.   Abdomen: abdomen is non-distended  Extremities: normal tone and muscle bulk.  Skin: no lesions or rash on visible skin. Warm and dry.   CNS: alert and oriented. Grossly non-focal.   Psychiatric: normal mood and affect.     Lab Results:    Recent Results (from the past 168 hour(s))   INR   Result Value Ref Range    INR 1.06 0.85 - 1.15   Surgical Pathology Exam   Result Value Ref Range    Case Report       Surgical Pathology Report                         Case: [id number]                                  Authorizing Provider:  Colton Arroyo MD           Collected:           04/12/2024 01:00 PM          Ordering Location:     [facility]          Received:            04/12/2024 " 01:22 PM                                 [facility]                                                                             Interventional Radiology                                                     Pathologist:           Harpreet Leroy MD                                                        Specimen:    Liver                                                                                      Final Diagnosis       MASS, LIVER, ULTRASOUND-GUIDED NEEDLE CORE BIOPSY:  -POORLY DIFFERENTIATED MALIGNANT NEOPLASM, CDX-2 (+)  -PLEASE SEE COMMENT        Comment       The combined morphologic and immunophenotypic features of this lesion are nonspecific.  Expression of CDX-2 suggests origin in the gastrointestinal tract.  Additional studies are being performed for further characterization.  The results will be reported in an addendum.        Clinical Information       Liver masses.      Gross Description       A(). Liver, :  Site #1, Episode #1, # Passes 5: Adequate. BHS  Biopsy was performed under Ultrasound guidance by Dr. Ciro Ann with 5 pass(es) from which:    5 Air-dried smear(s)  1 cell/tissue block slides are prepared and placed into formalin at 1300.    Assisted by:  PRISCILLA Zuniga      Microscopic Description       Diff-Quik stained touch imprint slides show several clusters of epithelioid cells with moderate to severe nuclear pleomorphism overall.  In addition, several tumor giant cells are identified.  The cells have finely to coarsely granular chromatin and conspicuous nucleoli.  Many of the cells have eccentrically positioned nuclei.  The background is bloody, and occasional reactive-appearing hepatocytes are observed.  H&E-stained needle biopsy sections show liver parenchyma that is partially replaced by nests of neoplastic cells with cytological features described above.  Cytoplasm is eosinophilic.  Rare microcalcifications are identified.  The uninvolved liver parenchyma is  notable for mild macrovesicular steatosis (10-15% of parenchyma).    Immunohistochemical stains are performed on paraffin sections of needle biopsy in order to characterize the neoplastic cells.  The results are as follows:    Cytokeratin-7: Negative  Cytokeratin-20: Negative  CDX2: Positive  SATB2: Rare positive tumor cells identified; vast majority of the cells are negative  GATA3: Negative  TTF-1: Negative  PAX8: Negative    All controls stain appropriately.        MCRS Yes (A) N/A    Performing Labs       The technical component of this testing was completed at [facility]      Case Images     Glucose by meter   Result Value Ref Range    GLUCOSE BY METER POCT 141 (H) 70 - 99 mg/dL     Imaging:    PET Oncology Whole Body    Result Date: 4/17/2024  EXAM: PET ONCOLOGY WHOLE BODY LOCATION: [facility] DATE: 4/17/2024 INDICATION: Right middle lobe pulmonary mass COMPARISON: CTs from 4/10/2024 and 2/26/2014 are reviewed. CONTRAST: None TECHNIQUE: Serum glucose level 141  mg/dL. One hour post intravenous administration of 13.1  mCi F-18 FDG, PET imaging was performed from the skull vertex to feet, utilizing attenuation correction with concurrent axial CT and PET/CT image fusion. Dose reduction techniques were used. PET/CT FINDINGS: 5.6 x 5.2 cm markedly FDG avid (SUVmax 7.0) solid mass in the right lobe of the liver with an adjacent 3.2 x 2.6 cm (SUVmax 5.2) solid nodule. FDG avid right hilar, subcarinal, and lower right paratracheal lymphadenopathy. The subcarinal  station 7 node, as an example, measures 2.6 x 1.8 cm (SUVmax  5.2). FDG avid bilateral pleural soft tissue nodularity with a moderate-sized right pleural effusion. FDG avid soft tissue nodularity in the left upper quadrant greater omentum. Moderately FDG avid (SUVmax 5.0) sclerosis in the manubrium. Moderate circumferential wall thickening and inflammatory FDG activity  involving a short segment of terminal ileum with inflammation in the adjacent mesenteric fat and some tethering of adjacent loops of small bowel. Extensive fat deposition in the wall of the cecum. CT FINDINGS: Passive atelectasis in the right lung. Small non-FDG avid bilateral pulmonary nodules, most of which are too small for reliable PET characterization. Trace calcified atherosclerosis. No coronary artery calcifications. Diffuse fatty infiltration of the liver. Noncirrhotic liver. Benign hyperostosis frontalis interna. Benign bone island left femoral neck. Mild degenerative change in the spine.     IMPRESSION: 1.  Findings suspicious for metastatic cancer involving the liver, thoracic lymph nodes, omentum, bilateral pleura, and manubrium. The appearance is nonspecific and the primary tumor site is unclear. 2.  Inflammation in the terminal ileum suggests Crohn's disease.     US Biopsy Liver    Result Date: 4/12/2024  EXAM: 1. PERCUTANEOUS BIOPSY RIGHT HEPATIC MASS 2. ULTRASOUND GUIDANCE 3. CONSCIOUS SEDATION LOCATION: [facility] DATE: 4/12/2024 INDICATION: Right pleural effusion with pleural nodularity, mediastinal lymphadenopathy and multiple hepatic masses suspicious for metastatic malignancy. PROCEDURE: Informed consent obtained. Site marked. Prior images reviewed. Required items made available. Patient identity was confirmed verbally and with arm band. Patient reevaluated immediately before administering sedation. Universal protocol was followed. Time out performed. A limited ultrasound of the liver is performed demonstrating multiple hypoechoic masses. A 5 cm mass in the right hepatic lobe was localized and the overlying skin prepped and draped in sterile fashion. 20 mL of 1 percent lidocaine was infused into the local soft tissues. Using standard technique and under direct ultrasound guidance, a 17/18 gauge Bard coaxial biopsy needle was used to make total of 6 core biopsies.  Pulmonary cytopathologic analysis was deemed adequate. The patient tolerated the procedure well. No complications. SEDATION: Versed 2 mg. Fentanyl 100 mcg. The procedure was performed with administration intravenous conscious sedation with appropriate preoperative, intraoperative, and postoperative evaluation. 15 minutes of supervised face to face conscious sedation time was provided by a radiology nurse under my direct supervision.     IMPRESSION: Ultrasound-guided percutaneous biopsy of right hepatic mass performed without complication. Final pathology pending. Reference CPT Code: 03647, 03562, 99622    US Thoracentesis    Result Date: 4/11/2024  EXAM: 1. RIGHT THORACENTESIS 2. ULTRASOUND GUIDANCE LOCATION: [facility] DATE: 4/11/2024 INDICATION: Pleural effusion. PROCEDURE: Informed consent obtained. Time out performed. The chest was prepped and draped in sterile fashion. 10 mL of 1 % lidocaine was infused into the local soft tissues. Under direct ultrasound guidance, a 5 Liberian catheter system was placed into the pleural effusion. 1 liters of darron-colored fluid were removed and sent to lab, if requested. Patient tolerated procedure well. Ultrasound imaging was obtained and placed in the patient's permanent medical record.     IMPRESSION: Status post right ultrasound-guided thoracentesis. Reference CPT Code: 95531    CTA Chest with Contrast    Result Date: 4/10/2024  For Patients: As a result of the 21st Century Cures Act, medical imaging exams and procedure reports are released immediately into your electronic medical record. You may view this report before your referring provider. If you have questions, please contact your health care provider. EXAM: CTA CHEST LOCATION: [facility] DATE: 4/10/2024 INDICATION: Pulmonary embolism (PE) suspected, unknown D-dimer; right lower shoulder and right flank pain, shortness of breath, nonproductive cough COMPARISON: 02/26/2014  TECHNIQUE: CT chest pulmonary angiogram during arterial phase injection of IV contrast. Multiplanar reformats and MIP reconstructions were performed. Dose reduction techniques were used. CONTRAST: IOPAMIDOL 300 MG/ML  ML BOTTLE: 100mL FINDINGS: ANGIOGRAM CHEST: No evidence of central or segmental pulmonary embolus. No definite subsegmental embolus. Exam limited by streak artifact and motion. Thoracic aorta is negative for dissection. No CT evidence of right heart strain. LUNGS AND PLEURA: New large right pleural effusion. Heterogeneous groundglass opacities in the right upper lobe. Subsegmental consolidation in the right middle lobe and right lower lobe and/or atelectasis. 11 mm left upper lobe pulmonary nodule, image 34 series 10. 6 monitor nodule left upper lobe image 44 suspected pleural nodularity along the right hemidiaphragm, new in the interval, best visualized on image 121 series 4. Discrete measurable pleural nodule anteriorly on image 48 series 4 reaches 12 mm. MEDIASTINUM/AXILLAE: New mediastinal adenopathy including right paratracheal node reaching 2.1 cm, image 38 series 4, and subcarinal adenopathy reaching 2.4 cm, image 60. Confluent adenopathy in the right hilum. Additional enlarged prevascular, subcarinal, and left hilar nodes. CORONARY ARTERY CALCIFICATION: Cannot evaluate. UPPER ABDOMEN: 3.6 cm hyperenhancing right hepatic lobe lesion, with additional smaller scattered left hepatic lobe lesion suspicious for metastatic disease. MUSCULOSKELETAL: Localized sclerosis in the proximal manubrium    1.  No evidence of pulmonary embolus given limitations of exam. 2.  Right-sided pleural nodularity and large amount of pleural fluid suspicious for malignant effusion. 3.  Associated mediastinal and hilar adenopathy and left pulmonary nodules. 4.  Right-sided middle and lower lobe consolidation/atelectasis could represent coexistent pneumonia or postobstructive atelectasis. 5.  New sclerotic manubrial  lesion suspicious for metastatic disease. 6.  Hepatic lesions suspicious for metastatic disease. Dedicated abdominal/hepatic hepatic imaging recommended. Impression was called to Dr. Regina Ames by Dr. Ren Rodney on 4/10/2024 9:33 PM CDT.      Signed by: aMn Brareto MD  +

## 2024-04-22 ENCOUNTER — MYC MEDICAL ADVICE (OUTPATIENT)
Dept: INTERVENTIONAL RADIOLOGY/VASCULAR | Facility: CLINIC | Age: 59
End: 2024-04-22
Payer: COMMERCIAL

## 2024-04-22 NOTE — PROGRESS NOTES
Interventional Radiology - Pre Procedure Chart Review  Outpatient - [facility]  Apr 23, 2024    Procedure Requested: port placement  Requested by: Dr. Barreto      History and Physical Reviewed: H&P documented within 30 days (by Dr. Lehman on 4/11/2024).  I have personally reviewed the patient's medical history and have updated the medical record as necessary.    HPI: 59 year old patient with history of Secondary malignant neoplasm of liver and plans for chemotherapy. Here today to have a port inserted.     Clinical notes reviewed    Pertinent medications reviewed:   Anticoagulation: none per chart review   Antibiotic: ancef 2g IV for IR procedure    No Known Allergies    IMAGING:  EXAM: PET ONCOLOGY WHOLE BODY  LOCATION: [facility]  DATE: 4/17/2024     INDICATION: Right middle lobe pulmonary mass   COMPARISON: CTs from 4/10/2024 and 2/26/2014 are reviewed.   CONTRAST: None   TECHNIQUE: Serum glucose level 141  mg/dL. One hour post intravenous administration of 13.1  mCi F-18 FDG, PET imaging was performed from the skull vertex to feet, utilizing attenuation correction with concurrent axial CT and PET/CT image fusion. Dose   reduction techniques were used.     PET/CT FINDINGS: 5.6 x 5.2 cm markedly FDG avid (SUVmax 7.0) solid mass in the right lobe of the liver with an adjacent 3.2 x 2.6 cm (SUVmax 5.2) solid nodule. FDG avid right hilar, subcarinal, and lower right paratracheal lymphadenopathy. The subcarinal   station 7 node, as an example, measures 2.6 x 1.8 cm (SUVmax  5.2). FDG avid bilateral pleural soft tissue nodularity with a moderate-sized right pleural effusion. FDG avid soft tissue nodularity in the left upper quadrant greater omentum. Moderately   FDG avid (SUVmax 5.0) sclerosis in the manubrium.      Moderate circumferential wall thickening and inflammatory FDG activity involving a short segment of terminal ileum with inflammation in the adjacent  "mesenteric fat and some tethering of adjacent loops of small bowel. Extensive fat deposition in the wall   of the cecum.     CT FINDINGS: Passive atelectasis in the right lung. Small non-FDG avid bilateral pulmonary nodules, most of which are too small for reliable PET characterization. Trace calcified atherosclerosis. No coronary artery calcifications. Diffuse fatty   infiltration of the liver. Noncirrhotic liver. Benign hyperostosis frontalis interna. Benign bone island left femoral neck. Mild degenerative change in the spine.                                                                      IMPRESSION:     1.  Findings suspicious for metastatic cancer involving the liver, thoracic lymph nodes, omentum, bilateral pleura, and manubrium. The appearance is nonspecific and the primary tumor site is unclear.   2.  Inflammation in the terminal ileum suggests Crohn's disease.     EXAM:  There were no vitals taken for this visit.  Not assessed    LABS:  INR (no units)   Date Value   04/12/2024 1.06       Lab Results   Component Value Date    WBC 6.6 04/11/2024    HGB 12.7 04/11/2024     04/11/2024       No results found for: \"CREATININE\"    No components found for: \"K\"      PLAN:  Planning for port placement with sedation; laterality per radiologist in IR.     Radiologist to further review procedure with patient.    EMR reviewed. No formal assessment completed.     Total time: 15 minutes       ABDIAZIZ Jhaveri CNP  Interventional Radiology    "

## 2024-04-23 ENCOUNTER — HOSPITAL ENCOUNTER (OUTPATIENT)
Dept: INTERVENTIONAL RADIOLOGY/VASCULAR | Facility: CLINIC | Age: 59
Discharge: HOME | End: 2024-04-23
Attending: INTERNAL MEDICINE | Admitting: RADIOLOGY
Payer: COMMERCIAL

## 2024-04-23 VITALS
TEMPERATURE: 98.3 F | HEART RATE: 79 BPM | SYSTOLIC BLOOD PRESSURE: 131 MMHG | RESPIRATION RATE: 16 BRPM | OXYGEN SATURATION: 97 % | DIASTOLIC BLOOD PRESSURE: 65 MMHG

## 2024-04-23 DIAGNOSIS — R91.8 LUNG MASS: ICD-10-CM

## 2024-04-23 DIAGNOSIS — R07.1 PAINFUL RESPIRATION: ICD-10-CM

## 2024-04-23 DIAGNOSIS — C7A.8 NEUROENDOCRINE CARCINOMA (H): Primary | ICD-10-CM

## 2024-04-23 DIAGNOSIS — D3A.8 NEUROENDOCRINE TUMOR (H): Primary | ICD-10-CM

## 2024-04-23 PROCEDURE — 272N000500 HC NEEDLE CR2

## 2024-04-23 PROCEDURE — 250N000011 HC RX IP 250 OP 636: Performed by: RADIOLOGY

## 2024-04-23 PROCEDURE — 36561 INSERT TUNNELED CV CATH: CPT

## 2024-04-23 PROCEDURE — 250N000009 HC RX 250: Performed by: RADIOLOGY

## 2024-04-23 PROCEDURE — 250N000011 HC RX IP 250 OP 636: Performed by: NURSE PRACTITIONER

## 2024-04-23 PROCEDURE — 250N000009 HC RX 250: Performed by: NURSE PRACTITIONER

## 2024-04-23 PROCEDURE — C1788 PORT, INDWELLING, IMP: HCPCS

## 2024-04-23 PROCEDURE — 99152 MOD SED SAME PHYS/QHP 5/>YRS: CPT

## 2024-04-23 RX ORDER — NALOXONE HYDROCHLORIDE 0.4 MG/ML
0.4 INJECTION, SOLUTION INTRAMUSCULAR; INTRAVENOUS; SUBCUTANEOUS
Status: DISCONTINUED | OUTPATIENT
Start: 2024-04-23 | End: 2024-04-24 | Stop reason: HOSPADM

## 2024-04-23 RX ORDER — HEPARIN SODIUM,PORCINE 10 UNIT/ML
5-10 VIAL (ML) INTRAVENOUS
Status: CANCELLED | OUTPATIENT
Start: 2024-04-23

## 2024-04-23 RX ORDER — SODIUM CHLORIDE 9 MG/ML
INJECTION, SOLUTION INTRAVENOUS CONTINUOUS
Status: DISCONTINUED | OUTPATIENT
Start: 2024-04-23 | End: 2024-04-24 | Stop reason: HOSPADM

## 2024-04-23 RX ORDER — CEFAZOLIN SODIUM/WATER 2 G/20 ML
2 SYRINGE (ML) INTRAVENOUS
Status: COMPLETED | OUTPATIENT
Start: 2024-04-23 | End: 2024-04-23

## 2024-04-23 RX ORDER — NALOXONE HYDROCHLORIDE 0.4 MG/ML
0.2 INJECTION, SOLUTION INTRAMUSCULAR; INTRAVENOUS; SUBCUTANEOUS
Status: DISCONTINUED | OUTPATIENT
Start: 2024-04-23 | End: 2024-04-24 | Stop reason: HOSPADM

## 2024-04-23 RX ORDER — LIDOCAINE 40 MG/G
CREAM TOPICAL
Status: DISCONTINUED | OUTPATIENT
Start: 2024-04-23 | End: 2024-04-24 | Stop reason: HOSPADM

## 2024-04-23 RX ORDER — HEPARIN SODIUM (PORCINE) LOCK FLUSH IV SOLN 100 UNIT/ML 100 UNIT/ML
500 SOLUTION INTRAVENOUS ONCE
Status: COMPLETED | OUTPATIENT
Start: 2024-04-23 | End: 2024-04-23

## 2024-04-23 RX ORDER — FLUMAZENIL 0.1 MG/ML
0.2 INJECTION, SOLUTION INTRAVENOUS
Status: DISCONTINUED | OUTPATIENT
Start: 2024-04-23 | End: 2024-04-24 | Stop reason: HOSPADM

## 2024-04-23 RX ORDER — HEPARIN SODIUM (PORCINE) LOCK FLUSH IV SOLN 100 UNIT/ML 100 UNIT/ML
5-10 SOLUTION INTRAVENOUS
Status: CANCELLED | OUTPATIENT
Start: 2024-04-23

## 2024-04-23 RX ORDER — FENTANYL CITRATE 50 UG/ML
25-50 INJECTION, SOLUTION INTRAMUSCULAR; INTRAVENOUS EVERY 5 MIN PRN
Status: DISCONTINUED | OUTPATIENT
Start: 2024-04-23 | End: 2024-04-24 | Stop reason: HOSPADM

## 2024-04-23 RX ORDER — LIDOCAINE HYDROCHLORIDE AND EPINEPHRINE 10; 10 MG/ML; UG/ML
1-20 INJECTION, SOLUTION INFILTRATION; PERINEURAL ONCE
Status: COMPLETED | OUTPATIENT
Start: 2024-04-23 | End: 2024-04-23

## 2024-04-23 RX ORDER — HEPARIN SODIUM,PORCINE 10 UNIT/ML
5-10 VIAL (ML) INTRAVENOUS EVERY 24 HOURS
Status: CANCELLED | OUTPATIENT
Start: 2024-04-23

## 2024-04-23 RX ADMIN — LIDOCAINE HYDROCHLORIDE 20 ML: 10 INJECTION, SOLUTION INFILTRATION; PERINEURAL at 13:21

## 2024-04-23 RX ADMIN — MIDAZOLAM HYDROCHLORIDE 1 MG: 1 INJECTION, SOLUTION INTRAMUSCULAR; INTRAVENOUS at 13:20

## 2024-04-23 RX ADMIN — FENTANYL CITRATE 50 MCG: 50 INJECTION, SOLUTION INTRAMUSCULAR; INTRAVENOUS at 13:20

## 2024-04-23 RX ADMIN — FENTANYL CITRATE 50 MCG: 50 INJECTION, SOLUTION INTRAMUSCULAR; INTRAVENOUS at 13:25

## 2024-04-23 RX ADMIN — MIDAZOLAM HYDROCHLORIDE 1 MG: 1 INJECTION, SOLUTION INTRAMUSCULAR; INTRAVENOUS at 13:16

## 2024-04-23 RX ADMIN — HEPARIN 500 UNITS: 100 SYRINGE at 13:30

## 2024-04-23 RX ADMIN — SODIUM CHLORIDE 1 BAG: 9 INJECTION, SOLUTION INTRAVENOUS at 13:09

## 2024-04-23 RX ADMIN — Medication 2 G: at 12:37

## 2024-04-23 RX ADMIN — LIDOCAINE HYDROCHLORIDE,EPINEPHRINE BITARTRATE 20 ML: 10; .01 INJECTION, SOLUTION INFILTRATION; PERINEURAL at 13:22

## 2024-04-23 NOTE — PROGRESS NOTES
Interventional Radiology Brief Post Procedure Note    Procedure: IR CHEST PORT PLACEMENT > 5 YRS OF AGE        Time Out: Prior to the start of the procedure and with procedural staff participation, I verbally confirmed the patient s identity using two indicators, relevant allergies, that the procedure was appropriate and matched the consent or emergent situation, and that the correct equipment/implants were available. Immediately prior to starting the procedure I conducted the Time Out with the procedural staff and re-confirmed the patient s name, procedure, and site/side. (The [employer] universal protocol was followed.)  Yes    Medications   Medication Event Details Admin User Admin Time       Sedation: IR Nurse Monitored Care   Post Procedure Summary:  Prior to the start of the procedure and with procedural staff participation, I verbally confirmed the patient s identity using two indicators, relevant allergies, that the procedure was appropriate and matched the consent or emergent situation, and that the correct equipment/implants were available. Immediately prior to starting the procedure I conducted the Time Out with the procedural staff and re-confirmed the patient s name, procedure, and site/side. (The Joint [employer] universal protocol was followed.)  Yes       Sedatives: Fentanyl and Midazolam (Versed)    Vital signs, airway and pulse oximetry were monitored and remained stable throughout the procedure and sedation was maintained until the procedure was complete.  The patient was monitored by staff until sedation discharge criteria were met.    Patient tolerance: Patient tolerated the procedure well with no immediate complications.    Time of sedation in minutes: 15 Minutes minutes from beginning to end of physician one to one monitoring.    Findings: WNL    Estimated Blood Loss: Minimal    Fluoroscopy Time: 0.2 minute(s)    SPECIMENS: None    Complications: 1. None     Condition: Stable    Plan:  Port ready for use    Comments: See dictated procedure note for full details.    Wesley Williamson MD

## 2024-04-23 NOTE — PROGRESS NOTES
[facility] Procedure Note        Sedation:      Performed by: Wesley Williamson MD  Authorized by: Wesley Williamson MD    Pre-Procedure Assessment done at 1300.    Expected Level:  Moderate Sedation    Indication:  Sedation is required to allow for  Port    Consent obtained from patient after discussing the risks, benefits and alternatives.    PO Intake:  Appropriately NPO for procedure    ASA Class:  Class 2 - MILD SYSTEMIC DISEASE, NO ACUTE PROBLEMS, NO FUNCTIONAL LIMITATIONS.    Mallampati:  Grade 2:  Soft palate, base of uvula, tonsillar pillars, and portion of posterior pharyngeal wall visible    Lungs: Lungs Clear with good breath sounds bilaterally.     Heart: Normal heart sounds and rate    History and physical reviewed and no updates needed. I have reviewed the lab findings, diagnostic data, medications, and the plan for sedation. I have determined this patient to be an appropriate candidate for the planned sedation and procedure and have reassessed the patient IMMEDIATELY PRIOR to sedation and procedure.

## 2024-04-23 NOTE — SEDATION DOCUMENTATION
Patient Name: Louise Corado  Medical Record Number: [id number]  Today's Date: 4/23/2024    Procedure: Image guided chest port placement with sedation  Proceduralist: Dr. Williamson  Pathology present: NA    Procedure Start: 1319  Procedure end: 1332  Sedation medications administered: 2 mg versed, 100 mcg fentanyl     : BAL    Other Notes: Pt arrived to IR room 2 from Radiology waiting room. Consent reviewed. Pt denies any questions or concerns regarding procedure. Pt positioned supine and monitored per protocol. Pt tolerated procedure without any noted complications. Pt transferred back to IR pre/post 2.    Angiodynamics SmartPort 8F placed right chest today. Lot # [id number] Ref # [id number].     Discharge education given to patient and , AVS provided. Site clean, dry and intact upon discharge. Wheelchair ride to car without incident.

## 2024-04-23 NOTE — DISCHARGE INSTRUCTIONS
Port Placement Procedure Discharge Instructions:  You had a port placed. A port is a small medical device that is placed under the skin and is connected to a vein with a catheter (thin, flexible tube). Ports can be used to administer IV medications (including chemotherapy), fluids or blood products or for blood lab draws. Please follow the below instructions after your procedure:    Care Instructions:  - If you received sedation for your procedure, do not drive or operate heavy machinery for the rest of the day.  - You may shower beginning tomorrow (post procedure day #1). Do not scrub site until well healed; pat dry gently with a towel.  - You likely have skin adhesive over your port site. Skin adhesive works like a bandage to keep the site covered and protected. Do not use antibiotic ointment or creams/lotions over adhesive as it can break it down. The skin adhesive will peel off on its own (typically in 5-14 days).  - Avoid submerging the port site under water (ex: tub baths, Jacuzzis, lakes, hot tubs and pools) for 10 days or until your site is well healed.  - You may have some discomfort, minimal swelling, redness and/or bruising at your port site/procedure site. You may take over the counter pain medication for discomfort (follow the package directions) or apply an ice pack wrapped in a towel over the site (rotating 20 minutes with ice pack on and 20 minutes with ice pack off) for comfort as needed. It can take several days for these to resolve.  - Avoid heavy lifting (greater than 10 pounds) and strenuous activities for 2 days following your procedure.   - If you experience significant bleeding at site, apply pressure with hands above the clavicle bone, sit upright and seek immediate medical assistance.  - Ports need to be flushed approximately every 4-6 weeks, if not being used more frequently. Follow up with the provider who ordered your port placement for further instructions for this.    Seek medical  evaluation or contact [facility] RN [facility] at [phone] if you experience the following:  - Uncontrolled bleeding from port site  - Fever (greater than 101 F (38.3C)  - Purulent (yellow/green/foul smelling) drainage from port insertion site  - Increasing pain at port site  - Increasing redness at port site          * Recovery After Conscious Sedation (Adult)  We gave you medicine by vein to make you sleepy or relaxed during your procedure. This may have included both a pain medicine and sleeping medicine. Most of the effects have worn off. But you may still feel sleepy for the next 6 to 8 hours.  Home care  Follow these guidelines when you get home:  You may feel sleepy and clumsy and have poor balance for the next few hours.  A responsible adult should stay with you for the next 8 hours. This person should make sure your condition doesn t get worse.  Don't drink any alcohol for the next 24 hours.  Don't drive, operate dangerous machinery, make important business or personal decisions or sign legal documents during the next 24 hours.  You may vomit (throw up) if you eat too soon after the procedure. If this happens, drink small amounts of water, juice or clear broth. Wait to try solid food until you no longer have nausea (upset stomach).  Note: Your care team may tell you not to take any medicine by mouth for pain or sleep in the next 4 hours. These medicines may react with the medicines you had in the hospital. This could cause a much stronger response than usual.  Follow-up care  Follow up with your care team if you are not alert and back to your usual level of activity within 12 hours.  When to seek medical advice  Call your care team right away if any of these occur:  You still feel sleepy or clumsy after 12 hours, or your sleepiness gets worse  Weakness or dizziness gets worse  Repeated vomiting  If you can't be woken up and someone is staying with you, they should call 911.  For informational purposes only.  Not to replace the advice of your health care provider.  Copyright   2018 [employer]. All rights reserved.

## 2024-04-24 ENCOUNTER — TELEPHONE (OUTPATIENT)
Dept: ONCOLOGY | Facility: HOSPITAL | Age: 59
End: 2024-04-24
Payer: COMMERCIAL

## 2024-04-24 ENCOUNTER — HOSPITAL ENCOUNTER (OUTPATIENT)
Dept: ULTRASOUND IMAGING | Facility: HOSPITAL | Age: 59
Discharge: HOME | End: 2024-04-24
Attending: INTERNAL MEDICINE | Admitting: INTERNAL MEDICINE
Payer: COMMERCIAL

## 2024-04-24 ENCOUNTER — PATIENT OUTREACH (OUTPATIENT)
Dept: ONCOLOGY | Facility: HOSPITAL | Age: 59
End: 2024-04-24
Payer: COMMERCIAL

## 2024-04-24 DIAGNOSIS — C78.7 SECONDARY MALIGNANT NEOPLASM OF LIVER (H): ICD-10-CM

## 2024-04-24 DIAGNOSIS — R91.8 LUNG MASS: ICD-10-CM

## 2024-04-24 DIAGNOSIS — R07.1 PAINFUL RESPIRATION: ICD-10-CM

## 2024-04-24 DIAGNOSIS — C7A.8 NEUROENDOCRINE CARCINOMA METASTATIC TO LIVER (H): ICD-10-CM

## 2024-04-24 DIAGNOSIS — C7B.8 NEUROENDOCRINE CARCINOMA METASTATIC TO LIVER (H): ICD-10-CM

## 2024-04-24 PROCEDURE — 272N000042 US THORACENTESIS

## 2024-04-24 RX ORDER — LORAZEPAM 0.5 MG/1
.5-1 TABLET ORAL EVERY 8 HOURS PRN
Qty: 30 TABLET | Refills: 0 | Status: CANCELLED | OUTPATIENT
Start: 2024-04-24

## 2024-04-24 RX ORDER — OXYCODONE HYDROCHLORIDE 5 MG/1
5 TABLET ORAL EVERY 8 HOURS PRN
Qty: 18 TABLET | Refills: 0 | Status: CANCELLED | OUTPATIENT
Start: 2024-04-24

## 2024-04-24 NOTE — PROGRESS NOTES
[facility]: Cancer Care                                                                                          Received a call from the patient who needed some assistance in filling out the KELLIE form.  I assisted the patient over the phone and completing the KELLIE form.  They stated that the LA has to start on 4/11/2024 because that was the first day that Dr. Barreto saw the patient in the hospital for a consult.  Patient verbalized understanding.  Upon reviewing the [facility] paperwork, patient is needing 1 more signature on the form and so patient agreed to come in earlier than her scheduled thoracentesis today to fill out the remaining paperwork and sign the employee signature section of the [facility] paperwork.  Patient also stated that she would get the fax number of her spouses employer.  Patient asked about any follow-up appointments with Dr. Barreto.  They stated that our schedulers would give her a call to schedule a follow-up appointment with Dr. Barreto to review the endoscopy results.    Signature:  Carolin Biswas RN

## 2024-04-24 NOTE — PROGRESS NOTES
[facility]: Cancer Care                                                                                          Met patient and spouse in the lobby to sign FMLA paperwork. Also had patient fill ut a consent to communicate form. Patient completed KELLIE forms. Patient requested refill of oxycodone and wanted to check to see if Dr. Barreto sent in an ativan prescription.     Signature:  Carolin Biswas RN

## 2024-04-24 NOTE — PROGRESS NOTES
[facility]: Cancer Care                                                                                          Called patient to go over FMLA paperwork.  Per patient, the FMLA paperwork is due today and patient was adamant that he get faxed over to both her employer and her 's employer by today.  I stated that the patient will need to fill out an KELLIE for both employers before we fill out the FMLA paperwork.  Patient verbalized understanding and stated that she was looking for continuous FMLA and not intermittent FMLA.  Patient stated that she has a lot of chest pain and is not able to physically work right now.  Patient stated that her endoscopy appointment at [facility] is for tomorrow 4/25/2024 at 12:45 PM.  I emailed her a copy of the KELLIE form via the fax machine.    Signature:  Carolin Biswas RN

## 2024-04-24 NOTE — PROGRESS NOTES
[facility]: Cancer Care                                                                                          Called patient to let her know that Dr. Barreto sent in an ativan prescription on 4/12 so it should be ready for pickup. Patient stated that she called her pharmacy yesterday and it wasn't available for pickup. I stated I would call the pharmacy. Patient verbalized understanding.     Signature:  Carolin Biswas RN

## 2024-04-24 NOTE — TELEPHONE ENCOUNTER
I received a phone call today from Louise.  She is calling because she turned in FMLA paperwork on 4/18 when she was in the clinic to see Dr. Barreto.  It is due today.  She states she called 2 days ago and left a message and has not received a phone call back.  She would like a call today to discuss.  She can be reached at [phone]. Thanks!    Argelia Simon RN on 4/24/2024 at 9:03 AM

## 2024-04-24 NOTE — PROGRESS NOTES
[facility]: Cancer Care                                                                                          Patient and spouse came back to the clinic after thoracentesis procedure to I've me the fax number of her spouse's employer for the [facility] paperwork. Confirmed patient's upcoming GI procedure with the patient and follow up appt with Dr. Barreto for next week 5/1.     Signature:  Carolin Biswas RN

## 2024-04-25 ENCOUNTER — TRANSFERRED RECORDS (OUTPATIENT)
Dept: MULTI SPECIALTY CLINIC | Facility: CLINIC | Age: 59
End: 2024-04-25
Payer: COMMERCIAL

## 2024-04-25 ENCOUNTER — TELEPHONE (OUTPATIENT)
Dept: ONCOLOGY | Facility: HOSPITAL | Age: 59
End: 2024-04-25
Payer: COMMERCIAL

## 2024-04-25 ENCOUNTER — DOCUMENTATION ONLY (OUTPATIENT)
Dept: ONCOLOGY | Facility: HOSPITAL | Age: 59
End: 2024-04-25
Payer: COMMERCIAL

## 2024-04-25 RX ORDER — LORAZEPAM 0.5 MG/1
TABLET ORAL
Qty: 30 TABLET | OUTPATIENT
Start: 2024-04-25

## 2024-04-25 RX ORDER — LORAZEPAM 0.5 MG/1
.5-1 TABLET ORAL EVERY 8 HOURS PRN
Qty: 30 TABLET | Refills: 2 | Status: SHIPPED | OUTPATIENT
Start: 2024-04-25 | End: 2024-05-08

## 2024-04-25 RX ORDER — OXYCODONE HYDROCHLORIDE 5 MG/1
5 TABLET ORAL EVERY 8 HOURS PRN
Qty: 30 TABLET | Refills: 0 | Status: SHIPPED | OUTPATIENT
Start: 2024-04-25 | End: 2024-05-08

## 2024-04-25 NOTE — PROGRESS NOTES
Faxed KELLIE's and FMLA paperwork to [employer]. Faxed FMLA paperwork for employee [id number]. Faxed Family FMLA to [employer].    Lady Johnson LPN on 4/25/2024 at 11:43 AM

## 2024-04-27 DIAGNOSIS — C7B.8 NEUROENDOCRINE CARCINOMA METASTATIC TO LIVER (H): Primary | ICD-10-CM

## 2024-04-27 DIAGNOSIS — C7A.8 NEUROENDOCRINE CARCINOMA METASTATIC TO LIVER (H): Primary | ICD-10-CM

## 2024-04-30 ENCOUNTER — OFFICE VISIT (OUTPATIENT)
Dept: FAMILY MEDICINE | Facility: CLINIC | Age: 59
End: 2024-04-30
Attending: STUDENT IN AN ORGANIZED HEALTH CARE EDUCATION/TRAINING PROGRAM
Payer: COMMERCIAL

## 2024-04-30 ENCOUNTER — PATIENT OUTREACH (OUTPATIENT)
Dept: ONCOLOGY | Facility: HOSPITAL | Age: 59
End: 2024-04-30

## 2024-04-30 VITALS
HEART RATE: 84 BPM | SYSTOLIC BLOOD PRESSURE: 124 MMHG | TEMPERATURE: 98 F | RESPIRATION RATE: 14 BRPM | DIASTOLIC BLOOD PRESSURE: 60 MMHG | BODY MASS INDEX: 37.56 KG/M2 | OXYGEN SATURATION: 97 % | HEIGHT: 63 IN | WEIGHT: 212 LBS

## 2024-04-30 DIAGNOSIS — Z12.11 SCREEN FOR COLON CANCER: ICD-10-CM

## 2024-04-30 DIAGNOSIS — Z12.31 VISIT FOR SCREENING MAMMOGRAM: ICD-10-CM

## 2024-04-30 DIAGNOSIS — Z11.59 NEED FOR HEPATITIS C SCREENING TEST: ICD-10-CM

## 2024-04-30 DIAGNOSIS — R52 PAIN: ICD-10-CM

## 2024-04-30 DIAGNOSIS — E66.01 MORBID OBESITY (H): ICD-10-CM

## 2024-04-30 DIAGNOSIS — Z00.00 ENCOUNTER FOR PREVENTATIVE ADULT HEALTH CARE EXAMINATION: Primary | ICD-10-CM

## 2024-04-30 DIAGNOSIS — R01.1 SYSTOLIC MURMUR: ICD-10-CM

## 2024-04-30 DIAGNOSIS — R73.09 ELEVATED HEMOGLOBIN A1C: ICD-10-CM

## 2024-04-30 DIAGNOSIS — R91.8 LUNG MASS: ICD-10-CM

## 2024-04-30 DIAGNOSIS — R73.09 ELEVATED GLUCOSE: ICD-10-CM

## 2024-04-30 DIAGNOSIS — Z12.4 CERVICAL CANCER SCREENING: ICD-10-CM

## 2024-04-30 DIAGNOSIS — Z11.4 SCREENING FOR HIV (HUMAN IMMUNODEFICIENCY VIRUS): ICD-10-CM

## 2024-04-30 DIAGNOSIS — Z00.00 ROUTINE GENERAL MEDICAL EXAMINATION AT A HEALTH CARE FACILITY: ICD-10-CM

## 2024-04-30 DIAGNOSIS — J90 PLEURAL EFFUSION: ICD-10-CM

## 2024-04-30 DIAGNOSIS — C7B.8 METASTATIC MALIGNANT NEUROENDOCRINE TUMOR TO LIVER (H): ICD-10-CM

## 2024-04-30 DIAGNOSIS — R06.02 SHORTNESS OF BREATH: ICD-10-CM

## 2024-04-30 LAB — HBA1C MFR BLD: 6.8 % (ref 0–5.6)

## 2024-04-30 PROCEDURE — 90472 IMMUNIZATION ADMIN EACH ADD: CPT | Performed by: STUDENT IN AN ORGANIZED HEALTH CARE EDUCATION/TRAINING PROGRAM

## 2024-04-30 PROCEDURE — 90471 IMMUNIZATION ADMIN: CPT | Performed by: STUDENT IN AN ORGANIZED HEALTH CARE EDUCATION/TRAINING PROGRAM

## 2024-04-30 PROCEDURE — 90677 PCV20 VACCINE IM: CPT | Performed by: STUDENT IN AN ORGANIZED HEALTH CARE EDUCATION/TRAINING PROGRAM

## 2024-04-30 PROCEDURE — 99396 PREV VISIT EST AGE 40-64: CPT | Mod: 25 | Performed by: STUDENT IN AN ORGANIZED HEALTH CARE EDUCATION/TRAINING PROGRAM

## 2024-04-30 PROCEDURE — 36415 COLL VENOUS BLD VENIPUNCTURE: CPT | Performed by: STUDENT IN AN ORGANIZED HEALTH CARE EDUCATION/TRAINING PROGRAM

## 2024-04-30 PROCEDURE — 99214 OFFICE O/P EST MOD 30 MIN: CPT | Mod: 25 | Performed by: STUDENT IN AN ORGANIZED HEALTH CARE EDUCATION/TRAINING PROGRAM

## 2024-04-30 PROCEDURE — 90480 ADMN SARSCOV2 VAC 1/ONLY CMP: CPT | Performed by: STUDENT IN AN ORGANIZED HEALTH CARE EDUCATION/TRAINING PROGRAM

## 2024-04-30 PROCEDURE — 83880 ASSAY OF NATRIURETIC PEPTIDE: CPT | Performed by: STUDENT IN AN ORGANIZED HEALTH CARE EDUCATION/TRAINING PROGRAM

## 2024-04-30 PROCEDURE — G0145 SCR C/V CYTO,THINLAYER,RESCR: HCPCS | Performed by: STUDENT IN AN ORGANIZED HEALTH CARE EDUCATION/TRAINING PROGRAM

## 2024-04-30 PROCEDURE — 87389 HIV-1 AG W/HIV-1&-2 AB AG IA: CPT | Performed by: STUDENT IN AN ORGANIZED HEALTH CARE EDUCATION/TRAINING PROGRAM

## 2024-04-30 PROCEDURE — 86803 HEPATITIS C AB TEST: CPT | Performed by: STUDENT IN AN ORGANIZED HEALTH CARE EDUCATION/TRAINING PROGRAM

## 2024-04-30 PROCEDURE — 83036 HEMOGLOBIN GLYCOSYLATED A1C: CPT | Performed by: STUDENT IN AN ORGANIZED HEALTH CARE EDUCATION/TRAINING PROGRAM

## 2024-04-30 PROCEDURE — 90750 HZV VACC RECOMBINANT IM: CPT | Performed by: STUDENT IN AN ORGANIZED HEALTH CARE EDUCATION/TRAINING PROGRAM

## 2024-04-30 PROCEDURE — 91320 SARSCV2 VAC 30MCG TRS-SUC IM: CPT | Performed by: STUDENT IN AN ORGANIZED HEALTH CARE EDUCATION/TRAINING PROGRAM

## 2024-04-30 PROCEDURE — 90715 TDAP VACCINE 7 YRS/> IM: CPT | Performed by: STUDENT IN AN ORGANIZED HEALTH CARE EDUCATION/TRAINING PROGRAM

## 2024-04-30 PROCEDURE — 80061 LIPID PANEL: CPT | Performed by: STUDENT IN AN ORGANIZED HEALTH CARE EDUCATION/TRAINING PROGRAM

## 2024-04-30 PROCEDURE — 82947 ASSAY GLUCOSE BLOOD QUANT: CPT | Performed by: STUDENT IN AN ORGANIZED HEALTH CARE EDUCATION/TRAINING PROGRAM

## 2024-04-30 PROCEDURE — 87624 HPV HI-RISK TYP POOLED RSLT: CPT | Performed by: STUDENT IN AN ORGANIZED HEALTH CARE EDUCATION/TRAINING PROGRAM

## 2024-04-30 RX ORDER — PREGABALIN 25 MG/1
25 CAPSULE ORAL 2 TIMES DAILY
Qty: 60 CAPSULE | Refills: 0 | Status: SHIPPED | OUTPATIENT
Start: 2024-04-30 | End: 2024-06-07

## 2024-04-30 NOTE — PROGRESS NOTES
[facility]: Cancer Care                                                                                          Called patient to let her know that the pathology results of the colonoscopy will most liekly not be resulted until mid to late next week and so we will need to reschedule her follow up appt with Dr. Barreto. Patient rescheduled follow up appt to 5/13 at 8:30am. Patient verbalized understanding. I will send patient a [employer] retargeting message with the address to the cancer center in [facility].     Signature:  Carolin Biswas RN

## 2024-04-30 NOTE — PATIENT INSTRUCTIONS
Return in one month for hepatitis B vaccination    Return in 6 months for shingles vaccination and hepatitis B vaccination.   Preventive Care Advice   This is general advice given by our system to help you stay healthy. However, your care team may have specific advice just for you. Please talk to your care team about your preventive care needs.  Nutrition  Eat 5 or more servings of fruits and vegetables each day.  Try wheat bread, brown rice and whole grain pasta (instead of white bread, rice, and pasta).  Get enough calcium and vitamin D. Check the label on foods and aim for 100% of the RDA (recommended daily allowance).  Lifestyle  Exercise at least 150 minutes each week   (30 minutes a day, 5 days a week).  Do muscle strengthening activities 2 days a week. These help control your weight and prevent disease.  No smoking.  Wear sunscreen to prevent skin cancer.  Have a dental exam and cleaning every 6 months.  Yearly exams  See your health care team every year to talk about:  Any changes in your health.  Any medicines your care team has prescribed.  Preventive care, family planning, and ways to prevent chronic diseases.  Shots (vaccines)   HPV shots (up to age 26), if you've never had them before.  Hepatitis B shots (up to age 59), if you've never had them before.  COVID-19 shot: Get this shot when it's due.  Flu shot: Get a flu shot every year.  Tetanus shot: Get a tetanus shot every 10 years.  Pneumococcal, hepatitis A, and RSV shots: Ask your care team if you need these based on your risk.  Shingles shot (for age 50 and up).  General health tests  Diabetes screening:  Starting at age 35, Get screened for diabetes at least every 3 years.  If you are younger than age 35, ask your care team if you should be screened for diabetes.  Cholesterol test: At age 39, start having a cholesterol test every 5 years, or more often if advised.  Bone density scan (DEXA): At age 50, ask your care team if you should have this scan  for osteoporosis (brittle bones).  Hepatitis C: Get tested at least once in your life.  STIs (sexually transmitted infections)  Before age 24: Ask your care team if you should be screened for STIs.  After age 24: Get screened for STIs if you're at risk. You are at risk for STIs (including HIV) if:  You are sexually active with more than one person.  You don't use condoms every time.  You or a partner was diagnosed with a sexually transmitted infection.  If you are at risk for HIV, ask about PrEP medicine to prevent HIV.  Get tested for HIV at least once in your life, whether you are at risk for HIV or not.  Cancer screening tests  Cervical cancer screening: If you have a cervix, begin getting regular cervical cancer screening tests at age 21. Most people who have regular screenings with normal results can stop after age 65. Talk about this with your provider.  Breast cancer scan (mammogram): If you've ever had breasts, begin having regular mammograms starting at age 40. This is a scan to check for breast cancer.  Colon cancer screening: It is important to start screening for colon cancer at age 45.  Have a colonoscopy test every 10 years (or more often if you're at risk) Or, ask your provider about stool tests like a FIT test every year or Cologuard test every 3 years.  To learn more about your testing options, visit: [email or web address]://[email or web address]/471084.pdf.  For help making a decision, visit: https://bit.ly/cq32663.  Prostate cancer screening test: If you have a prostate and are age 55 to 69, ask your provider if you would benefit from a yearly prostate cancer screening test.  Lung cancer screening: If you are a current or former smoker age 50 to 80, ask your care team if ongoing lung cancer screenings are right for you.  For informational purposes only. Not to replace the advice of your health care provider. Copyright   2023 [employer]. All rights reserved. Clinically reviewed by the [facility]  Transitions Program. [place] [id number] - REV 01/24.

## 2024-04-30 NOTE — PROGRESS NOTES
Assessment & Plan     Encounter for preventative adult health care examination  Louise is a 59-year-old female presents today for adult preventative examination and establish care with follow-up after hospital visit.  This is discussed further below.    Recommended age-appropriate screening for patient, to include breast cancer screening, cervical cancer screening , she was agreeable and Pap smear was collected today, mammogram was ordered, she will make an appointment to do this.  Per [place] [facility]  guidelines recommend hepatitis C and HIV screening, she is agreeable and this was ordered.    Reports colonoscopy was done in April 2024 as part of her workup for cancer, still awaiting those notes from [facility].    - Lipid panel reflex to direct LDL Non-fasting  - Hemoglobin A1c  - Lipid panel reflex to direct LDL Non-fasting  - Hemoglobin A1c      Cervical cancer screening  Patient with atrophic postmenopausal cervix, specimen was collected and sent to pathology.  - Pap Screen with HPV - recommended age 30 - 65 years    Visit for screening mammogram    - MA SCREENING DIGITAL BILAT - Future  (s+30)    Need for hepatitis C screening test    - Hepatitis C Screen Reflex to HCV RNA Quant and Genotype  - Hepatitis C Screen Reflex to HCV RNA Quant and Genotype    Screening for HIV (human immunodeficiency virus)    - HIV Antigen Antibody Combo  - HIV Antigen Antibody Combo    Morbid obesity (H)  Recommend 5 servings of vegetables and fruits daily, 150 minutes exercise weekly.    Elevated glucose  Had elevated fasting glucose on April 11, 2024 and April 17, 2024, recommend repeat with A1c to evaluate for possible diabetes.  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  The shortness of breath  occurred with walking about a block, causing her to need to take a break.  Because of a positive D-dimer, CT scan was ordered and although PE was not found, there were findings concerning for widespread metastatic cancer as well as a large right-sided pleural effusion.  She had thoracentesis on April 11 as well as liver biopsy on April 12.  Pathology showed malignancy in her liver which could be either primary or metastatic, pleural effusions had rare atypical cells which suggested to either be primary or metastatic site of cancer.  Findings seem most suggestive of GI tract (colon versus hepatobiliary) being primary site.  Colonoscopy was done last week per patient with [facility], did not have an official report however the patient reports to me that the colonoscopy is believed that what they found in the terminal ileum was cancerous.    Second thoracentesis was done on the 24th, she reports that pain and shortness of breath did improve after the second thoracentesis however still having significant pain.  This is on the right chest, last for few minutes, is very severe.  She has been taking oxycodone 3 times a day, 5 mg each time.  We discussed other options for treatment, do recommend that she attempt pregabalin starting at 25 mg twice daily, we will monitor for improvement.  She should avoid taking benzodiazepine, which she was taking for these episodes.  Question whether these are related to the thoracentesis, versus possible costal nerve injury.  She reports this pain never started until she had the thoracentesis done.  Will continue to monitor, if symptoms starts to suggest more of a panic attack type picture will recommend for SSRI.    Patient had a port placed in anticipation of chemotherapy.  Has follow-up with oncology to review results on May 13, 2024    - pregabalin (LYRICA) 25 MG capsule  Dispense: 60 capsule; Refill: 0      Systolic murmur  On examination today has 2 out of 6 holosystolic murmur,  "recommended echocardiogram to further evaluate, especially given that she has been reporting shortness of breath, also will obtain BNP  - Echocardiogram Complete              Patient to follow-up in the next 2 to 4 weeks    MED REC REQUIRED  Post Medication Reconciliation Status: discharge medications reconciled and changed, per note/orders  BMI  Estimated body mass index is 37.56 kg/m  as calculated from the following:    Height as of this encounter: 1.6 m (5' 2.99\").    Weight as of this encounter: 96.2 kg (212 lb).   Weight management plan: Discussed healthy diet and exercise guidelines          Charlene Gil is a 59 year old, presenting for the following health issues:  Hospital F/U (04/11/2024 Sob, chest and back pain.) and [facility]        4/30/2024    12:47 PM   Additional Questions   Roomed by Harriet LEW     Via the Health Maintenance questionnaire, the patient has reported the following services have been completed -Colonscopy, this information has been sent to the abstraction team.  HPI     Had originally gone to the ED due to back pain and some shortness of breath.  Has had the back pain in thoracic area for about a year.  The shortness of breath has been worsening over the past couple of months.  Has had to sit and rest after going about a block.    Still has the back pain, but since the thoracentesis has had chest pain as well. The pain is under the right breast area which can travel from mid to lateral right chest, with a squeezing/tight pain 'feels like someone is strangling you from the inside\".  Raising her right arm really bothers the pain. This pain occurs episodically and will lead to feeling of not being able to breathe, where the pain is 10/10.  Did not have this prior to thoracentesis, but has not had as bad of episodes since the second thoracentesis.  These episodes last for minutes with the intense severity but tightness persists. She will have some improvement with laying in " "recliner or standing up.  Will take ativan with these attacks and it does help.     Had been having pain despite motrin use, had been taking it daily for the past two months, but she stopped taking Motrin because her gastroenterologist told her that there were signs of NSAID overuse on her exam.  Has been given tramadol before and it did not work, has started taking oxycodone 5 mg, which works for 4-5 hours usually, but sometimes will last more than 8 hours.  She would like to try something else      Review of Systems  Constitutional, neuro, ENT, endocrine, pulmonary, cardiac, gastrointestinal, genitourinary, musculoskeletal, integument and psychiatric systems are negative, except as otherwise noted.      Objective    /60 (BP Location: Right arm, Patient Position: Sitting, Cuff Size: Adult Large)   Pulse 84   Temp 98  F (36.7  C) (Oral)   Resp 14   Ht 1.6 m (5' 2.99\")   Wt 96.2 kg (212 lb)   SpO2 97%   BMI 37.56 kg/m    Body mass index is 37.56 kg/m .  Physical Exam   GENERAL: alert and no distress  EYES: Eyes grossly normal to inspection, PERRL and conjunctivae and sclerae normal  HENT: ear canals and TM's normal, nose and mouth without ulcers or lesions  NECK: no adenopathy, no asymmetry, masses, or scars  RESP: lungs clear to auscultation - no rales, rhonchi or wheezes  CV: regular rates and rhythm, normal S1 S2, no S3 or S4, grade 2/6 holosystolic murmur heard best over the left lateral chest, peripheral pulses strong, and no peripheral edema  ABDOMEN: soft, nontender, no hepatosplenomegaly, no masses and bowel sounds normal   (female): normal postmenopausal female external genitalia, normal urethral meatus , normal vaginal mucosa, and normal postmenopausal cervix with atrophy  MS: no gross musculoskeletal defects noted, no edema  SKIN: no suspicious lesions or rashes  NEURO: Normal strength and tone, mentation intact and speech normal  PSYCH: mentation appears normal, affect " [facility]    Hospital Outpatient Visit on 04/17/2024   Component Date Value Ref Range Status    GLUCOSE BY METER POCT 04/17/2024 141 (H)  70 - 99 mg/dL Final         Narrative & Impression   EXAM:   1. RIGHT THORACENTESIS  2. ULTRASOUND GUIDANCE  LOCATION: [facility]  DATE: 4/24/2024     INDICATION: Pleural effusion.     PROCEDURE: Informed consent obtained. Time out performed. The chest was prepped and draped in sterile fashion. 10 mL of 1 % lidocaine was infused into the local soft tissues. Under direct ultrasound guidance, a 5 Maori catheter system was placed into   the pleural effusion.      450 mL of clear red fluid were removed and sent to lab, if requested.     Patient tolerated procedure well.     Ultrasound imaging was obtained and placed in the patient's permanent medical record.                                                                      IMPRESSION:  Status post right ultrasound-guided thoracentesis.     Reference CPT Code: 95628     Study Result    Narrative & Impression   EXAM: PET ONCOLOGY WHOLE BODY  LOCATION: [facility]  DATE: 4/17/2024     INDICATION: Right middle lobe pulmonary mass   COMPARISON: CTs from 4/10/2024 and 2/26/2014 are reviewed.   CONTRAST: None   TECHNIQUE: Serum glucose level 141  mg/dL. One hour post intravenous administration of 13.1  mCi F-18 FDG, PET imaging was performed from the skull vertex to feet, utilizing attenuation correction with concurrent axial CT and PET/CT image fusion. Dose   reduction techniques were used.     PET/CT FINDINGS: 5.6 x 5.2 cm markedly FDG avid (SUVmax 7.0) solid mass in the right lobe of the liver with an adjacent 3.2 x 2.6 cm (SUVmax 5.2) solid nodule. FDG avid right hilar, subcarinal, and lower right paratracheal lymphadenopathy. The subcarinal   station 7 node, as an example, measures 2.6 x 1.8 cm (SUVmax  5.2). FDG avid bilateral pleural soft tissue nodularity with a moderate-sized  right pleural effusion. FDG avid soft tissue nodularity in the left upper quadrant greater omentum. Moderately   FDG avid (SUVmax 5.0) sclerosis in the manubrium.      Moderate circumferential wall thickening and inflammatory FDG activity involving a short segment of terminal ileum with inflammation in the adjacent mesenteric fat and some tethering of adjacent loops of small bowel. Extensive fat deposition in the wall   of the cecum.     CT FINDINGS: Passive atelectasis in the right lung. Small non-FDG avid bilateral pulmonary nodules, most of which are too small for reliable PET characterization. Trace calcified atherosclerosis. No coronary artery calcifications. Diffuse fatty   infiltration of the liver. Noncirrhotic liver. Benign hyperostosis frontalis interna. Benign bone island left femoral neck. Mild degenerative change in the spine.                                                                      IMPRESSION:     1.  Findings suspicious for metastatic cancer involving the liver, thoracic lymph nodes, omentum, bilateral pleura, and manubrium. The appearance is nonspecific and the primary tumor site is unclear.   2.  Inflammation in the terminal ileum suggests Crohn's disease.      Narrative & Impression   EXAM:  1. PERCUTANEOUS BIOPSY RIGHT HEPATIC MASS  2. ULTRASOUND GUIDANCE  3. CONSCIOUS SEDATION  LOCATION: [facility]  DATE: 4/12/2024     INDICATION: Right pleural effusion with pleural nodularity, mediastinal lymphadenopathy and multiple hepatic masses suspicious for metastatic malignancy.     PROCEDURE: Informed consent obtained. Site marked. Prior images reviewed. Required items made available. Patient identity was confirmed verbally and with arm band. Patient reevaluated immediately before administering sedation. Universal protocol was   followed. Time out performed. A limited ultrasound of the liver is performed demonstrating multiple hypoechoic masses. A 5 cm mass in the  right hepatic lobe was localized and the overlying skin prepped and draped in sterile fashion. 20 mL of 1 percent   lidocaine was infused into the local soft tissues. Using standard technique and under direct ultrasound guidance, a 17/18 gauge Bard coaxial biopsy needle was used to make total of 6 core biopsies. Pulmonary cytopathologic analysis was deemed adequate.     The patient tolerated the procedure well. No complications.     SEDATION: Versed 2 mg. Fentanyl 100 mcg. The procedure was performed with administration intravenous conscious sedation with appropriate preoperative, intraoperative, and postoperative evaluation.     15 minutes of supervised face to face conscious sedation time was provided by a radiology nurse under my direct supervision.                                                                      IMPRESSION:  Ultrasound-guided percutaneous biopsy of right hepatic mass performed without complication. Final pathology pending.     Reference CPT Code: 67795, 44025, 24966     Narrative & Impression   EXAM:   1. RIGHT THORACENTESIS  2. ULTRASOUND GUIDANCE  LOCATION: [facility]  DATE: 4/11/2024     INDICATION: Pleural effusion.     PROCEDURE: Informed consent obtained. Time out performed. The chest was prepped and draped in sterile fashion. 10 mL of 1 % lidocaine was infused into the local soft tissues. Under direct ultrasound guidance, a 5 Lao catheter system was placed into   the pleural effusion.      1 liters of darron-colored fluid were removed and sent to lab, if requested.     Patient tolerated procedure well.     Ultrasound imaging was obtained and placed in the patient's permanent medical record.                                                                      IMPRESSION:  Status post right ultrasound-guided thoracentesis.        CTA CHEST WITH CONTRAST  Order: [id number]  Impression    1.  No evidence of pulmonary embolus given limitations of exam.  2.   Right-sided pleural nodularity and large amount of pleural fluid suspicious for malignant effusion.  3.  Associated mediastinal and hilar adenopathy and left pulmonary nodules.  4.  Right-sided middle and lower lobe consolidation/atelectasis could represent coexistent pneumonia or postobstructive atelectasis.  5.  New sclerotic manubrial lesion suspicious for metastatic disease.  6.  Hepatic lesions suspicious for metastatic disease. Dedicated abdominal/hepatic hepatic imaging recommended.    Impression was called to Dr. Regina Ames by Dr. Ren Rodney on 4/10/2024 9:33 PM CDT.  Narrative    For Patients: As a result of the 21st Century Cures Act, medical imaging exams and procedure reports are released immediately into your electronic medical record. You may view this report before your referring provider. If you have questions, please contact your health care provider.    EXAM: CTA CHEST  LOCATION: [facility]  DATE: 4/10/2024    INDICATION: Pulmonary embolism (PE) suspected, unknown D-dimer; right lower shoulder and right flank pain, shortness of breath, nonproductive cough  COMPARISON: 02/26/2014  TECHNIQUE: CT chest pulmonary angiogram during arterial phase injection of IV contrast. Multiplanar reformats and MIP reconstructions were performed. Dose reduction techniques were used.  CONTRAST: IOPAMIDOL 300 MG/ML  ML BOTTLE: 100mL    FINDINGS:  ANGIOGRAM CHEST: No evidence of central or segmental pulmonary embolus. No definite subsegmental embolus. Exam limited by streak artifact and motion. Thoracic aorta is negative for dissection. No CT evidence of right heart strain.    LUNGS AND PLEURA: New large right pleural effusion. Heterogeneous groundglass opacities in the right upper lobe. Subsegmental consolidation in the right middle lobe and right lower lobe and/or atelectasis. 11 mm left upper lobe pulmonary nodule, image 34 series 10. 6 monitor nodule left upper lobe image 44 suspected  pleural nodularity along the right hemidiaphragm, new in the interval, best visualized on image 121 series 4. Discrete measurable pleural nodule anteriorly on image 48 series 4 reaches 12 mm.    MEDIASTINUM/AXILLAE: New mediastinal adenopathy including right paratracheal node reaching 2.1 cm, image 38 series 4, and subcarinal adenopathy reaching 2.4 cm, image 60. Confluent adenopathy in the right hilum. Additional enlarged prevascular, subcarinal, and left hilar nodes.    CORONARY ARTERY CALCIFICATION: Cannot evaluate.    UPPER ABDOMEN: 3.6 cm hyperenhancing right hepatic lobe lesion, with additional smaller scattered left hepatic lobe lesion suspicious for metastatic disease.    MUSCULOSKELETAL: Localized sclerosis in the proximal manubrium          Case Report   Surgical Pathology Report                         Case: [id number]                                   Authorizing Provider:  Colton Arroyo MD           Collected:           04/12/2024 01:00 PM           Ordering Location:     [facility]          Received:            04/12/2024 01:22 PM                                  [facility]                                                                              Interventional Radiology                                                     Pathologist:           Harpreet Leroy MD                                                         Specimen:    Liver                                                                                      Addendum   This addendum is issued in order to report the results of additional immunohistochemical stains performed on this liver biopsy specimen.  The results are as follows:     Cytokeratin-19: Variably positive, moderate staining intensity overall  Chromogranin: Strongly and diffusely positive  Synaptophysin: Strongly and diffusely positive  Estrogen receptor: Variably positive (2+ out of 3 nuclear staining, 75% of tumor cells)  Ki-67: Moderate increase in  proliferation index (3+ out of 3 nuclear staining, 15-20% of tumor cells)  P63: Negative     The combined morphologic and immunophenotypic features of this lesion are most consistent with a neuroendocrine carcinoma.  The lesion most likely arises in the gastrointestinal tract due to expression of CDX2 (strong) and SATB2 (weak and focal, 5% of tumor cells).  Possible specific sites of origin include, but are not limited to, the lower gastrointestinal tract or the biliary tract.  Correlation with clinical and radiographic studies is required.      Addendum electronically signed by Harpreet Leroy MD on 4/22/2024 at  9:20 PM   Final Diagnosis   MASS, LIVER, ULTRASOUND-GUIDED NEEDLE CORE BIOPSY:  -POORLY DIFFERENTIATED MALIGNANT NEOPLASM, CDX-2 (+)  -PLEASE SEE COMMENT      Electronically signed by Harpreet Leroy MD on 4/15/2024 at 10:46 AM   Comment  [facility]   The combined morphologic and immunophenotypic features of this lesion are nonspecific.  Expression of CDX-2 suggests origin in the gastrointestinal tract.  Additional studies are being performed for further characterization.  The results will be reported in an addendum.       Contains abnormal data Cell Count Body Fluid  Order: [id number] - Part of Panel Order [id number]  Status: Final result       Visible to patient: Yes (seen)    0 Result Notes         Component  Ref Range & Units 2 wk ago    Color  Colorless, Yellow Red Abnormal     Clarity  Clear Turbid Abnormal     Cell Count Fluid Source Pleural Cavity, Right    Total Nucleated Cells  /uL 779          Signed Electronically by: Kee Mccullough MD

## 2024-04-30 NOTE — PROGRESS NOTES
[facility]: Cancer Care                                                                                          Called [facility] to check on the status of upper endoscopy & colonoscopy results. Per staff member that I talked to on the phone, the results are still pending and it usually takes 7-10 business days for the results to finalize.     Signature:  Carolin Biswas RN

## 2024-05-01 ENCOUNTER — TRANSFERRED RECORDS (OUTPATIENT)
Dept: HEALTH INFORMATION MANAGEMENT | Facility: CLINIC | Age: 59
End: 2024-05-01
Payer: COMMERCIAL

## 2024-05-01 DIAGNOSIS — R73.09 ELEVATED HEMOGLOBIN A1C: Primary | ICD-10-CM

## 2024-05-01 LAB
CHOLEST SERPL-MCNC: 212 MG/DL
FASTING STATUS PATIENT QL REPORTED: NO
FASTING STATUS PATIENT QL REPORTED: NO
GLUCOSE SERPL-MCNC: 122 MG/DL (ref 70–99)
HCV AB SERPL QL IA: NONREACTIVE
HDLC SERPL-MCNC: 34 MG/DL
HIV 1+2 AB+HIV1 P24 AG SERPL QL IA: NONREACTIVE
LDLC SERPL CALC-MCNC: 142 MG/DL
NONHDLC SERPL-MCNC: 178 MG/DL
NT-PROBNP SERPL-MCNC: 38 PG/ML (ref 0–900)
TRIGL SERPL-MCNC: 182 MG/DL

## 2024-05-03 DIAGNOSIS — R73.03 PREDIABETES: Primary | ICD-10-CM

## 2024-05-06 ENCOUNTER — PATIENT OUTREACH (OUTPATIENT)
Dept: ONCOLOGY | Facility: HOSPITAL | Age: 59
End: 2024-05-06
Payer: COMMERCIAL

## 2024-05-06 ENCOUNTER — MYC MEDICAL ADVICE (OUTPATIENT)
Dept: ONCOLOGY | Facility: HOSPITAL | Age: 59
End: 2024-05-06
Payer: COMMERCIAL

## 2024-05-06 LAB
BKR LAB AP GYN ADEQUACY: NORMAL
BKR LAB AP GYN INTERPRETATION: NORMAL
BKR LAB AP HPV REFLEX: NORMAL
BKR LAB AP LMP: NORMAL
BKR LAB AP PREVIOUS ABNORMAL: NORMAL
PATH REPORT.COMMENTS IMP SPEC: NORMAL
PATH REPORT.COMMENTS IMP SPEC: NORMAL
PATH REPORT.RELEVANT HX SPEC: NORMAL

## 2024-05-06 NOTE — PROGRESS NOTES
[facility]: Cancer Care                                                                                          Called [facility] to get the results of the colonoscopy and upper endoscopy faxed over to our clinic.    Signature:  Carolin Biswas RN

## 2024-05-07 ENCOUNTER — TELEPHONE (OUTPATIENT)
Dept: ONCOLOGY | Facility: HOSPITAL | Age: 59
End: 2024-05-07
Payer: COMMERCIAL

## 2024-05-07 DIAGNOSIS — J90 PLEURAL EFFUSION: Primary | ICD-10-CM

## 2024-05-07 NOTE — TELEPHONE ENCOUNTER
Pierre from [employer] called requesting a phone call back. They have questions on the order form that was submitted. They can be reached at [phone], option 3. Reference number for the case is [id number]. Thank you!    Argelia Simon RN on 5/7/2024 at 2:41 PM

## 2024-05-08 ENCOUNTER — TELEPHONE (OUTPATIENT)
Dept: ONCOLOGY | Facility: HOSPITAL | Age: 59
End: 2024-05-08

## 2024-05-08 ENCOUNTER — PATIENT OUTREACH (OUTPATIENT)
Dept: ONCOLOGY | Facility: HOSPITAL | Age: 59
End: 2024-05-08
Payer: COMMERCIAL

## 2024-05-08 ENCOUNTER — HOSPITAL ENCOUNTER (OUTPATIENT)
Dept: ULTRASOUND IMAGING | Facility: HOSPITAL | Age: 59
Discharge: HOME | End: 2024-05-08
Attending: INTERNAL MEDICINE
Payer: COMMERCIAL

## 2024-05-08 ENCOUNTER — ONCOLOGY VISIT (OUTPATIENT)
Dept: ONCOLOGY | Facility: HOSPITAL | Age: 59
End: 2024-05-08
Attending: INTERNAL MEDICINE
Payer: COMMERCIAL

## 2024-05-08 VITALS
WEIGHT: 211 LBS | SYSTOLIC BLOOD PRESSURE: 119 MMHG | HEART RATE: 91 BPM | DIASTOLIC BLOOD PRESSURE: 56 MMHG | OXYGEN SATURATION: 97 % | TEMPERATURE: 99.5 F | BODY MASS INDEX: 37.39 KG/M2 | HEIGHT: 63 IN | RESPIRATION RATE: 28 BRPM

## 2024-05-08 DIAGNOSIS — C7B.8 NEUROENDOCRINE CARCINOMA METASTATIC TO LIVER (H): Primary | ICD-10-CM

## 2024-05-08 DIAGNOSIS — J90 PLEURAL EFFUSION: ICD-10-CM

## 2024-05-08 DIAGNOSIS — R07.1 PAINFUL RESPIRATION: ICD-10-CM

## 2024-05-08 DIAGNOSIS — G89.3 CANCER RELATED PAIN: ICD-10-CM

## 2024-05-08 DIAGNOSIS — C7A.8 NEUROENDOCRINE CARCINOMA METASTATIC TO LIVER (H): Primary | ICD-10-CM

## 2024-05-08 DIAGNOSIS — R91.8 LUNG MASS: ICD-10-CM

## 2024-05-08 LAB
HUMAN PAPILLOMA VIRUS 16 DNA: NEGATIVE
HUMAN PAPILLOMA VIRUS 18 DNA: NEGATIVE
HUMAN PAPILLOMA VIRUS FINAL DIAGNOSIS: NORMAL
HUMAN PAPILLOMA VIRUS OTHER HR: NEGATIVE
PATH REPORT.ADDENDUM SPEC: ABNORMAL
PATH REPORT.COMMENTS IMP SPEC: ABNORMAL
PATH REPORT.COMMENTS IMP SPEC: YES
PATH REPORT.FINAL DX SPEC: ABNORMAL
PATH REPORT.GROSS SPEC: ABNORMAL
PATH REPORT.RELEVANT HX SPEC: ABNORMAL

## 2024-05-08 PROCEDURE — 99213 OFFICE O/P EST LOW 20 MIN: CPT | Mod: 25 | Performed by: INTERNAL MEDICINE

## 2024-05-08 PROCEDURE — 272N000042 US THORACENTESIS

## 2024-05-08 PROCEDURE — 99215 OFFICE O/P EST HI 40 MIN: CPT | Performed by: INTERNAL MEDICINE

## 2024-05-08 PROCEDURE — G2211 COMPLEX E/M VISIT ADD ON: HCPCS | Performed by: INTERNAL MEDICINE

## 2024-05-08 RX ORDER — HEPARIN SODIUM (PORCINE) LOCK FLUSH IV SOLN 100 UNIT/ML 100 UNIT/ML
5 SOLUTION INTRAVENOUS
Status: CANCELLED | OUTPATIENT
Start: 2024-06-03

## 2024-05-08 RX ORDER — LORAZEPAM 2 MG/ML
0.5 INJECTION INTRAMUSCULAR EVERY 4 HOURS PRN
Status: CANCELLED | OUTPATIENT
Start: 2024-05-14

## 2024-05-08 RX ORDER — DIPHENHYDRAMINE HYDROCHLORIDE 50 MG/ML
50 INJECTION INTRAMUSCULAR; INTRAVENOUS
Status: CANCELLED
Start: 2024-06-05

## 2024-05-08 RX ORDER — LORAZEPAM 0.5 MG/1
.5-1 TABLET ORAL EVERY 6 HOURS PRN
Status: SHIPPED
Start: 2024-05-08 | End: 2024-05-20

## 2024-05-08 RX ORDER — HEPARIN SODIUM,PORCINE 10 UNIT/ML
5-20 VIAL (ML) INTRAVENOUS DAILY PRN
Status: CANCELLED | OUTPATIENT
Start: 2024-06-05

## 2024-05-08 RX ORDER — LORAZEPAM 2 MG/ML
0.5 INJECTION INTRAMUSCULAR EVERY 4 HOURS PRN
Status: CANCELLED | OUTPATIENT
Start: 2024-05-15

## 2024-05-08 RX ORDER — HEPARIN SODIUM,PORCINE 10 UNIT/ML
5-20 VIAL (ML) INTRAVENOUS DAILY PRN
Status: CANCELLED | OUTPATIENT
Start: 2024-05-14

## 2024-05-08 RX ORDER — MEPERIDINE HYDROCHLORIDE 25 MG/ML
25 INJECTION INTRAMUSCULAR; INTRAVENOUS; SUBCUTANEOUS EVERY 30 MIN PRN
Status: CANCELLED | OUTPATIENT
Start: 2024-06-05

## 2024-05-08 RX ORDER — ALBUTEROL SULFATE 90 UG/1
1-2 AEROSOL, METERED RESPIRATORY (INHALATION)
Status: CANCELLED
Start: 2024-06-03

## 2024-05-08 RX ORDER — HEPARIN SODIUM,PORCINE 10 UNIT/ML
5-20 VIAL (ML) INTRAVENOUS DAILY PRN
Status: CANCELLED | OUTPATIENT
Start: 2024-06-04

## 2024-05-08 RX ORDER — EPINEPHRINE 1 MG/ML
0.3 INJECTION, SOLUTION INTRAMUSCULAR; SUBCUTANEOUS EVERY 5 MIN PRN
Status: CANCELLED | OUTPATIENT
Start: 2024-06-04

## 2024-05-08 RX ORDER — HEPARIN SODIUM,PORCINE 10 UNIT/ML
5-20 VIAL (ML) INTRAVENOUS DAILY PRN
Status: CANCELLED | OUTPATIENT
Start: 2024-05-13

## 2024-05-08 RX ORDER — HEPARIN SODIUM (PORCINE) LOCK FLUSH IV SOLN 100 UNIT/ML 100 UNIT/ML
5 SOLUTION INTRAVENOUS
Status: CANCELLED | OUTPATIENT
Start: 2024-05-13

## 2024-05-08 RX ORDER — ALBUTEROL SULFATE 0.83 MG/ML
2.5 SOLUTION RESPIRATORY (INHALATION)
Status: CANCELLED | OUTPATIENT
Start: 2024-06-05

## 2024-05-08 RX ORDER — METHYLPREDNISOLONE SODIUM SUCCINATE 125 MG/2ML
125 INJECTION, POWDER, LYOPHILIZED, FOR SOLUTION INTRAMUSCULAR; INTRAVENOUS
Status: CANCELLED
Start: 2024-05-15

## 2024-05-08 RX ORDER — DIPHENHYDRAMINE HYDROCHLORIDE 50 MG/ML
50 INJECTION INTRAMUSCULAR; INTRAVENOUS
Status: CANCELLED
Start: 2024-05-15

## 2024-05-08 RX ORDER — DIPHENHYDRAMINE HYDROCHLORIDE 50 MG/ML
50 INJECTION INTRAMUSCULAR; INTRAVENOUS
Status: CANCELLED
Start: 2024-05-13

## 2024-05-08 RX ORDER — ALBUTEROL SULFATE 90 UG/1
1-2 AEROSOL, METERED RESPIRATORY (INHALATION)
Status: CANCELLED
Start: 2024-06-05

## 2024-05-08 RX ORDER — EPINEPHRINE 1 MG/ML
0.3 INJECTION, SOLUTION INTRAMUSCULAR; SUBCUTANEOUS EVERY 5 MIN PRN
Status: CANCELLED | OUTPATIENT
Start: 2024-05-13

## 2024-05-08 RX ORDER — MEPERIDINE HYDROCHLORIDE 25 MG/ML
25 INJECTION INTRAMUSCULAR; INTRAVENOUS; SUBCUTANEOUS EVERY 30 MIN PRN
Status: CANCELLED | OUTPATIENT
Start: 2024-05-13

## 2024-05-08 RX ORDER — PALONOSETRON 0.05 MG/ML
0.25 INJECTION, SOLUTION INTRAVENOUS ONCE
Status: CANCELLED | OUTPATIENT
Start: 2024-06-03

## 2024-05-08 RX ORDER — ALBUTEROL SULFATE 0.83 MG/ML
2.5 SOLUTION RESPIRATORY (INHALATION)
Status: CANCELLED | OUTPATIENT
Start: 2024-06-03

## 2024-05-08 RX ORDER — ALBUTEROL SULFATE 0.83 MG/ML
2.5 SOLUTION RESPIRATORY (INHALATION)
Status: CANCELLED | OUTPATIENT
Start: 2024-05-14

## 2024-05-08 RX ORDER — ALBUTEROL SULFATE 90 UG/1
1-2 AEROSOL, METERED RESPIRATORY (INHALATION)
Status: CANCELLED
Start: 2024-05-15

## 2024-05-08 RX ORDER — LORAZEPAM 2 MG/ML
0.5 INJECTION INTRAMUSCULAR EVERY 4 HOURS PRN
Status: CANCELLED | OUTPATIENT
Start: 2024-06-04

## 2024-05-08 RX ORDER — METHYLPREDNISOLONE SODIUM SUCCINATE 125 MG/2ML
125 INJECTION, POWDER, LYOPHILIZED, FOR SOLUTION INTRAMUSCULAR; INTRAVENOUS
Status: CANCELLED
Start: 2024-06-05

## 2024-05-08 RX ORDER — EPINEPHRINE 1 MG/ML
0.3 INJECTION, SOLUTION INTRAMUSCULAR; SUBCUTANEOUS EVERY 5 MIN PRN
Status: CANCELLED | OUTPATIENT
Start: 2024-05-15

## 2024-05-08 RX ORDER — MEPERIDINE HYDROCHLORIDE 25 MG/ML
25 INJECTION INTRAMUSCULAR; INTRAVENOUS; SUBCUTANEOUS EVERY 30 MIN PRN
Status: CANCELLED | OUTPATIENT
Start: 2024-05-15

## 2024-05-08 RX ORDER — HEPARIN SODIUM,PORCINE 10 UNIT/ML
5-20 VIAL (ML) INTRAVENOUS DAILY PRN
Status: CANCELLED | OUTPATIENT
Start: 2024-05-15

## 2024-05-08 RX ORDER — EPINEPHRINE 1 MG/ML
0.3 INJECTION, SOLUTION INTRAMUSCULAR; SUBCUTANEOUS EVERY 5 MIN PRN
Status: CANCELLED | OUTPATIENT
Start: 2024-06-05

## 2024-05-08 RX ORDER — LORAZEPAM 2 MG/ML
0.5 INJECTION INTRAMUSCULAR EVERY 4 HOURS PRN
Status: CANCELLED | OUTPATIENT
Start: 2024-05-13

## 2024-05-08 RX ORDER — ONDANSETRON 8 MG/1
8 TABLET, FILM COATED ORAL EVERY 6 HOURS PRN
Qty: 30 TABLET | Refills: 2 | Status: SHIPPED | OUTPATIENT
Start: 2024-05-12 | End: 2024-09-24

## 2024-05-08 RX ORDER — MEPERIDINE HYDROCHLORIDE 25 MG/ML
25 INJECTION INTRAMUSCULAR; INTRAVENOUS; SUBCUTANEOUS EVERY 30 MIN PRN
Status: CANCELLED | OUTPATIENT
Start: 2024-05-14

## 2024-05-08 RX ORDER — EPINEPHRINE 1 MG/ML
0.3 INJECTION, SOLUTION INTRAMUSCULAR; SUBCUTANEOUS EVERY 5 MIN PRN
Status: CANCELLED | OUTPATIENT
Start: 2024-06-03

## 2024-05-08 RX ORDER — HEPARIN SODIUM,PORCINE 10 UNIT/ML
5-20 VIAL (ML) INTRAVENOUS DAILY PRN
Status: CANCELLED | OUTPATIENT
Start: 2024-06-03

## 2024-05-08 RX ORDER — METHYLPREDNISOLONE SODIUM SUCCINATE 125 MG/2ML
125 INJECTION, POWDER, LYOPHILIZED, FOR SOLUTION INTRAMUSCULAR; INTRAVENOUS
Status: CANCELLED
Start: 2024-05-13

## 2024-05-08 RX ORDER — METHYLPREDNISOLONE SODIUM SUCCINATE 125 MG/2ML
125 INJECTION, POWDER, LYOPHILIZED, FOR SOLUTION INTRAMUSCULAR; INTRAVENOUS
Status: CANCELLED
Start: 2024-06-04

## 2024-05-08 RX ORDER — LORAZEPAM 2 MG/ML
0.5 INJECTION INTRAMUSCULAR EVERY 4 HOURS PRN
Status: CANCELLED | OUTPATIENT
Start: 2024-06-03

## 2024-05-08 RX ORDER — MEPERIDINE HYDROCHLORIDE 25 MG/ML
25 INJECTION INTRAMUSCULAR; INTRAVENOUS; SUBCUTANEOUS EVERY 30 MIN PRN
Status: CANCELLED | OUTPATIENT
Start: 2024-06-03

## 2024-05-08 RX ORDER — MEPERIDINE HYDROCHLORIDE 25 MG/ML
25 INJECTION INTRAMUSCULAR; INTRAVENOUS; SUBCUTANEOUS EVERY 30 MIN PRN
Status: CANCELLED | OUTPATIENT
Start: 2024-06-04

## 2024-05-08 RX ORDER — LORAZEPAM 2 MG/ML
0.5 INJECTION INTRAMUSCULAR EVERY 4 HOURS PRN
Status: CANCELLED | OUTPATIENT
Start: 2024-06-05

## 2024-05-08 RX ORDER — HEPARIN SODIUM (PORCINE) LOCK FLUSH IV SOLN 100 UNIT/ML 100 UNIT/ML
5 SOLUTION INTRAVENOUS
Status: CANCELLED | OUTPATIENT
Start: 2024-05-15

## 2024-05-08 RX ORDER — HEPARIN SODIUM (PORCINE) LOCK FLUSH IV SOLN 100 UNIT/ML 100 UNIT/ML
5 SOLUTION INTRAVENOUS
Status: CANCELLED | OUTPATIENT
Start: 2024-06-04

## 2024-05-08 RX ORDER — ALBUTEROL SULFATE 90 UG/1
1-2 AEROSOL, METERED RESPIRATORY (INHALATION)
Status: CANCELLED
Start: 2024-05-13

## 2024-05-08 RX ORDER — ALBUTEROL SULFATE 0.83 MG/ML
2.5 SOLUTION RESPIRATORY (INHALATION)
Status: CANCELLED | OUTPATIENT
Start: 2024-05-13

## 2024-05-08 RX ORDER — DIPHENHYDRAMINE HYDROCHLORIDE 50 MG/ML
50 INJECTION INTRAMUSCULAR; INTRAVENOUS
Status: CANCELLED
Start: 2024-06-04

## 2024-05-08 RX ORDER — ALBUTEROL SULFATE 0.83 MG/ML
2.5 SOLUTION RESPIRATORY (INHALATION)
Status: CANCELLED | OUTPATIENT
Start: 2024-05-15

## 2024-05-08 RX ORDER — HEPARIN SODIUM (PORCINE) LOCK FLUSH IV SOLN 100 UNIT/ML 100 UNIT/ML
5 SOLUTION INTRAVENOUS
Status: CANCELLED | OUTPATIENT
Start: 2024-05-14

## 2024-05-08 RX ORDER — DIPHENHYDRAMINE HYDROCHLORIDE 50 MG/ML
50 INJECTION INTRAMUSCULAR; INTRAVENOUS
Status: CANCELLED
Start: 2024-06-03

## 2024-05-08 RX ORDER — DIPHENHYDRAMINE HYDROCHLORIDE 50 MG/ML
50 INJECTION INTRAMUSCULAR; INTRAVENOUS
Status: CANCELLED
Start: 2024-05-14

## 2024-05-08 RX ORDER — OXYCODONE HYDROCHLORIDE 5 MG/1
5-10 TABLET ORAL EVERY 4 HOURS PRN
Qty: 60 TABLET | Refills: 0 | Status: SHIPPED | OUTPATIENT
Start: 2024-05-08 | End: 2024-05-20

## 2024-05-08 RX ORDER — ALBUTEROL SULFATE 0.83 MG/ML
2.5 SOLUTION RESPIRATORY (INHALATION)
Status: CANCELLED | OUTPATIENT
Start: 2024-06-04

## 2024-05-08 RX ORDER — HEPARIN SODIUM (PORCINE) LOCK FLUSH IV SOLN 100 UNIT/ML 100 UNIT/ML
5 SOLUTION INTRAVENOUS
Status: CANCELLED | OUTPATIENT
Start: 2024-06-05

## 2024-05-08 RX ORDER — ALBUTEROL SULFATE 90 UG/1
1-2 AEROSOL, METERED RESPIRATORY (INHALATION)
Status: CANCELLED
Start: 2024-06-04

## 2024-05-08 RX ORDER — EPINEPHRINE 1 MG/ML
0.3 INJECTION, SOLUTION INTRAMUSCULAR; SUBCUTANEOUS EVERY 5 MIN PRN
Status: CANCELLED | OUTPATIENT
Start: 2024-05-14

## 2024-05-08 RX ORDER — PALONOSETRON 0.05 MG/ML
0.25 INJECTION, SOLUTION INTRAVENOUS ONCE
Status: CANCELLED | OUTPATIENT
Start: 2024-05-13

## 2024-05-08 RX ORDER — METHYLPREDNISOLONE SODIUM SUCCINATE 125 MG/2ML
125 INJECTION, POWDER, LYOPHILIZED, FOR SOLUTION INTRAMUSCULAR; INTRAVENOUS
Status: CANCELLED
Start: 2024-05-14

## 2024-05-08 RX ORDER — ALBUTEROL SULFATE 90 UG/1
1-2 AEROSOL, METERED RESPIRATORY (INHALATION)
Status: CANCELLED
Start: 2024-05-14

## 2024-05-08 RX ORDER — LIDOCAINE/PRILOCAINE 2.5 %-2.5%
CREAM (GRAM) TOPICAL PRN
Qty: 30 G | Refills: 1 | Status: SHIPPED | OUTPATIENT
Start: 2024-05-08

## 2024-05-08 RX ORDER — METHYLPREDNISOLONE SODIUM SUCCINATE 125 MG/2ML
125 INJECTION, POWDER, LYOPHILIZED, FOR SOLUTION INTRAMUSCULAR; INTRAVENOUS
Status: CANCELLED
Start: 2024-06-03

## 2024-05-08 ASSESSMENT — PAIN SCALES - GENERAL: PAINLEVEL: MODERATE PAIN (5)

## 2024-05-08 NOTE — PROGRESS NOTES
[facility]: Cancer Care                                                                                          Called [facility] to inquire about Louise Corado. Per [facility] staff personnel, a staff member named Claudia from [place] called [facility] on Monday 4/29 to ask them to cancel testing. After [facility] looked further at the patient's [employer] form and file, they called our clinic back and are recommending additional testing. Specifically they are recommending [facility] (NGS) solid tumor testing, and IHC PDL1 testing. Both of those testing go hand in hand. I stated I would ask Dr. Barreto if he would like those additional tests and then give them a call back.     Signature:  Carolin Biswas RN

## 2024-05-08 NOTE — TELEPHONE ENCOUNTER
Retail Pharmacy Prior Authorization Team   Phone: [phone]    PA Initiation    Medication: OXYCODONE HCL 5 MG PO TABS  Insurance Company: [employer] - Phone [phone] Fax [phone]  Pharmacy Filling the Rx: [id number] [place], [place] - [address] AT [facility] [address]  Filling Pharmacy Phone: [phone]  Filling Pharmacy Fax: [phone]  Start Date: 5/8/2024

## 2024-05-08 NOTE — PROGRESS NOTES
"Oncology Rooming Note    May 8, 2024 9:43 AM   Louise Corado is a 59 year old female who presents for:    Chief Complaint   Patient presents with    Oncology Clinic Visit     Return visit related to Secondary malignant neoplasm of liver; Lung mass, Liver mass       Initial Vitals: /56 (BP Location: Left arm, Patient Position: Sitting, Cuff Size: Adult Large)   Pulse 91   Temp 99.5  F (37.5  C) (Tympanic)   Resp 28   Ht 1.6 m (5' 3\")   Wt 95.7 kg (211 lb)   SpO2 97%   BMI 37.38 kg/m   Estimated body mass index is 37.38 kg/m  as calculated from the following:    Height as of this encounter: 1.6 m (5' 3\").    Weight as of this encounter: 95.7 kg (211 lb). Body surface area is 2.06 meters squared.  Moderate Pain (5) Comment: Data Unavailable   No LMP recorded. Patient is postmenopausal.  Allergies reviewed: Yes  Medications reviewed: Yes    Medications: MEDICATION REFILLS NEEDED TODAY. Provider was notified.  Pharmacy name entered into [facility]:    [id number] [place], [place] - [address] AT [id number] [place], [place] - [address] AT [facility]    Frailty Screening:   Is the patient here for a new oncology consult visit in cancer care? 2. No      Clinical concerns: Pain and shortness of breath has been very bothersome. Pt having thoracentesis today after apt  Dr. Barreto was notified.      RAGHAVENDRA GAGE CMA            "

## 2024-05-08 NOTE — LETTER
"    5/8/2024         RE: Louise Corado  [address]   [place] [address]        Dear Colleague,    Thank you for referring your patient, Louise Corado, to the [facility]. Please see a copy of my visit note below.    Oncology Rooming Note    May 8, 2024 9:43 AM   Louise Corado is a 59 year old female who presents for:    Chief Complaint   Patient presents with     Oncology Clinic Visit     Return visit related to Secondary malignant neoplasm of liver; Lung mass, Liver mass       Initial Vitals: /56 (BP Location: Left arm, Patient Position: Sitting, Cuff Size: Adult Large)   Pulse 91   Temp 99.5  F (37.5  C) (Tympanic)   Resp 28   Ht 1.6 m (5' 3\")   Wt 95.7 kg (211 lb)   SpO2 97%   BMI 37.38 kg/m   Estimated body mass index is 37.38 kg/m  as calculated from the following:    Height as of this encounter: 1.6 m (5' 3\").    Weight as of this encounter: 95.7 kg (211 lb). Body surface area is 2.06 meters squared.  Moderate Pain (5) Comment: Data Unavailable   No LMP recorded. Patient is postmenopausal.  Allergies reviewed: Yes  Medications reviewed: Yes    Medications: MEDICATION REFILLS NEEDED TODAY. Provider was notified.  Pharmacy name entered into [facility]:    [id number] [place], [place] - [address] AT [id number] [place], [place] - [address] AT [facility]    Frailty Screening:   Is the patient here for a new oncology consult visit in cancer care? 2. No      Clinical concerns: Pain and shortness of breath has been very bothersome. Pt having thoracentesis today after apt  Dr. Barreto was notified.      RAGHAVENDRA GAGE CMA              [facility] Hematology and Oncology Progress Note    Patient: Louise Corado  MRN: [id number]  Date of Service: May 8, 2024        Assessment and Plan:    1.  Metastatic neuroendocrine carcinoma: I spent some time reviewing her pathology today with " the reading pathologist.  Her Ki-67 is approaching 20% with significant cellular atypia.  This appears to be a more poorly differentiated higher grade tumor.  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  She had a colonoscopy which showed a lesion at the terminal ileum with biopsy showing a grade 1 well-differentiated neuroendocrine tumor.  I think that there was no higher grade tumor found secondary to sampling differences.  More tissue was obtained with her liver core biopsies.  The pathologist reviewing agree that this is a higher grade poorly differentiated tumor.  This was all reviewed with Louise and her .  We talked about the plan going forward.  She has a Port-A-Cath in place.  Regarding started on chemotherapy with carboplatin and etoposide.  I reviewed the schedule of this regimen.  We talked about some of the more common side effects which include, but are not limited to, alopecia, cytopenias, fatigue, nausea, and diarrhea.  She was given some written information about these medications to take, and review.  Will try to start her next Monday.  Will image after cycles 2, 4, and 6.  Will plan for at least 6 cycles.  Next generation sequencing is in progress as is cancerTYPE ID.    2.  Pleural effusion: She had a second thoracentesis performed on April 24.  450 mL of clear red fluid was removed.  This was done prior to vacation to make sure that she was feeling okay.    3. Anxiety: Lorazepam seems to be helping.  If her anxiety becomes more consistent problem then we can start citalopram or something similar.      4.  Pain/Spells: Having some episodes where she has flushing and shortness of breath with abdominal discomfort.  I think these might be secondary to carcinoid syndrome.  Otherwise he may be panic attacks.  Finally she could have painful attacks from her pleural metastases.  She was recently started on  Lyrica 25 mg twice daily for this problem.  She is also taking Oxy codon and Tylenol.  I told her that it would be okay to take the Lyrica and oxycodone in proximity to each other as the Lyrica is at a very low dose.  Would anticipate that her symptoms improve relatively quickly after restart her treatment.    5.. Bowels: Started an over-the-counter stool softener.  Will continue to monitor.    Medical decision Making:  I spent 45 minutes in the care of this patient today, which included time necessary for preparation for the visit, face to face time with the patient, communication of recommendations to the care team, and documentation time.  Today's visit was centered around a cancer diagnosis in the setting of additional medical comorbidities. Complex medical decision making was required. The risk of additional morbidity without further treatment is high.     ECOG Performance  0    Diagnosis:    1.  Metastatic neuroendocrine carcinoma, poorly differentiated: Diagnosed April 2024 from a liver biopsy.  Her PET/CT shows malignant involvement of the liver, mediastinal nodes, left upper quadrant omentum, manubrium, and bilateral pleura.  There is some wall thickening and FDG activity in a short segment of the terminal ileum.    Treatment:    None to date    Interim History:    Louise returns today for a follow-up visit.  At her last visit she had an upper endoscopy and colonoscopy as well as a Port-A-Cath placement.  She also had a thoracentesis performed in April 24.  She continues to have episodes of sudden onset flushing and chest wall pain.  She has some associated shortness of breath with this.  These episodes last a few minutes.  Seem to be provoked by activity and startle.  She has no diarrhea or sweating.  No headaches.    Review of Systems:    As above in the history.     Review of Systems otherwise Negative for:  General: chills, fever or night sweats  Psychological: anxiety or depression  Ophthalmic: blurry  "vision, double vision or loss of vision, vision change  ENT: epistaxis, oral lesions, hearing changes  Hematological and Lymphatic: bleeding, bruising, jaundice, swollen lymph nodes  Endocrine: hot flashes, unexpected weight changes  Respiratory: cough, hemoptysis, orthopnea  Cardiovascular: edema, palpitations or PND  Gastrointestinal: blood in stools, change in bowel habits, constipation, diarrhea or nausea/vomiting  Genito-Urinary: change in urinary stream, incontinence, frequency/urgency  Musculoskeletal: joint pain, stiffness, swelling, muscle pain  Neurological: dizziness, headaches, numbness/tingling  Dermatological: lumps and rash    Physical Exam:    /56 (BP Location: Left arm, Patient Position: Sitting, Cuff Size: Adult Large)   Pulse 91   Temp 99.5  F (37.5  C) (Tympanic)   Resp 28   Ht 1.6 m (5' 3\")   Wt 95.7 kg (211 lb)   SpO2 97%   BMI 37.38 kg/m      General: patient appears stated age of 59 year old. Nontoxic and in no distress.   HEENT: Head: atraumatic, normocephalic. Sclerae anicteric.  Chest:  Normal respiratory effort  Cardiac:  No edema.   Abdomen: abdomen is non-distended  Extremities: normal tone and muscle bulk.  Skin: no lesions or rash on visible skin. Warm and dry.   CNS: alert and oriented. Grossly non-focal.   Psychiatric: normal mood and affect.     Lab Results:    No results found for this or any previous visit (from the past 168 hour(s)).    Imaging:    US Thoracentesis    Result Date: 5/8/2024  EXAM: 1. RIGHT THORACENTESIS 2. ULTRASOUND GUIDANCE LOCATION: [facility] DATE: 5/8/2024 INDICATION: Pleural effusion. PROCEDURE: Informed consent obtained. Time out performed. The chest was prepped and draped in sterile fashion. 8 mL of 1 % lidocaine was infused into the local soft tissues. Under direct ultrasound guidance, a 5 Upper sorbian catheter system was placed into the  pleural effusion. 0.5 liters of clear red fluid were removed and sent to lab, if " requested. Patient tolerated procedure well. Ultrasound imaging was obtained and placed in the patient's permanent medical record.     IMPRESSION: Status post right ultrasound-guided thoracentesis. Reference CPT Code: 93326    US Thoracentesis    Result Date: 4/24/2024  EXAM: 1. RIGHT THORACENTESIS 2. ULTRASOUND GUIDANCE LOCATION: [facility] DATE: 4/24/2024 INDICATION: Pleural effusion. PROCEDURE: Informed consent obtained. Time out performed. The chest was prepped and draped in sterile fashion. 10 mL of 1 % lidocaine was infused into the local soft tissues. Under direct ultrasound guidance, a 5 Sinhala catheter system was placed into the pleural effusion. 450 mL of clear red fluid were removed and sent to lab, if requested. Patient tolerated procedure well. Ultrasound imaging was obtained and placed in the patient's permanent medical record.     IMPRESSION: Status post right ultrasound-guided thoracentesis. Reference CPT Code: 75797    IR Chest Port Placement > 5 Yrs of Age    Result Date: 4/23/2024  [place] RADIOLOGY EXAM: IMPLANTABLE RIGHT INTERNAL JUGULAR CHEST PORT PLACEMENT LOCATION: [facility] CLINICAL HISTORY: The patient has a history of metastatic cancer unknown primary and needs long-term access for chemotherapy. PROCEDURES PERFORMED: Ultrasound guided puncture of the internal jugular vein. Creation of subcutaneous tunnel in chest wall. Placement of a jugular PowerPort. MODERATE SEDATION: Versed and Fentanyl were administered intravenously for moderate sedation. Pulse oximetry, heart rate and blood pressure were continuously monitored by an independent trained observer. The physician spent 13 minutes of face-to-face moderate sedation time with the patient. ADDITIONAL MEDICATIONS: IV antibiotics were administered within one hour of port placement. See EMR for dose details. CONTRAST: none FLUOROSCOPIC TIME: 0.2 minutes CUMULATIVE AIR KERMA/DOSE: 3 mGy. STERILE  BARRIER TECHNIQUE: Maximal Sterile Barrier Technique Utilized: Cap AND mask AND sterile gown AND sterile gloves AND sterile full body drape AND hand hygiene AND skin preparation 2% chlorhexidine for cutaneous antisepsis (or acceptable alternative  antiseptics). Sterile Ultrasound Technique Utilized ?Sterile gel AND sterile probe covers. UNIVERSAL PROTOCOL: Standard universal protocol per facility guidelines was followed. See EMR for documentation. TECHNIQUE: Risks, benefits and alternatives were explained to the patient in detail.  All questions were answered.  Written, informed consent was given to proceed with the procedure. The patient was then brought to the angiography suite and placed in the  supine position. The neck and chest prepped and draped in the usual sterile fashion. One percent lidocaine was used for local anesthesia. Then, under ultrasound guidance, the internal jugular vein was accessed with a micropuncture system. An 0.035 guidewire which was advanced into the IVC under fluoroscopic guidance. A subcutaneous pocket was then created in the chest wall, inferior to the clavicle. The port was then placed into the pocket. A subcutaneous tunnel was created between the port pocket  and the venous access site using blunt dissection. The port catheter was attached to a tunneling device and brought through the tunnel. It was then measured and cut to the appropriate length. The venous access site was serially dilated and a peel away sheath was advanced over the wire and into the SVC under direct fluoroscopic visualization. The catheter was placed through the peel-away sheath into the SVC. The port was accessed, tested and flushed with with 5 cc of 100 units/cc heparinized saline solution. The chest wall incision was closed with layered absorbable suture and surgical glue.  FINDINGS: Ultrasound demonstrates a patent, anechoic and compressible  jugular  vein. Ultrasound images obtained during access demonstrate  the needle tip within the vein. An ultrasound image was stored permanently for documentation. Fluoroscopic images obtained following placement of the catheter, show that the catheter terminates near the cavoatrial junction.     IMPRESSION: Fluoroscopic and ultrasound guided placement of right internal jugular Power Port. :     PET Oncology Whole Body    Result Date: 4/17/2024  EXAM: PET ONCOLOGY WHOLE BODY LOCATION: [facility] DATE: 4/17/2024 INDICATION: Right middle lobe pulmonary mass COMPARISON: CTs from 4/10/2024 and 2/26/2014 are reviewed. CONTRAST: None TECHNIQUE: Serum glucose level 141  mg/dL. One hour post intravenous administration of 13.1  mCi F-18 FDG, PET imaging was performed from the skull vertex to feet, utilizing attenuation correction with concurrent axial CT and PET/CT image fusion. Dose reduction techniques were used. PET/CT FINDINGS: 5.6 x 5.2 cm markedly FDG avid (SUVmax 7.0) solid mass in the right lobe of the liver with an adjacent 3.2 x 2.6 cm (SUVmax 5.2) solid nodule. FDG avid right hilar, subcarinal, and lower right paratracheal lymphadenopathy. The subcarinal  station 7 node, as an example, measures 2.6 x 1.8 cm (SUVmax  5.2). FDG avid bilateral pleural soft tissue nodularity with a moderate-sized right pleural effusion. FDG avid soft tissue nodularity in the left upper quadrant greater omentum. Moderately FDG avid (SUVmax 5.0) sclerosis in the manubrium. Moderate circumferential wall thickening and inflammatory FDG activity involving a short segment of terminal ileum with inflammation in the adjacent mesenteric fat and some tethering of adjacent loops of small bowel. Extensive fat deposition in the wall of the cecum. CT FINDINGS: Passive atelectasis in the right lung. Small non-FDG avid bilateral pulmonary nodules, most of which are too small for reliable PET characterization. Trace calcified atherosclerosis. No coronary artery calcifications. Diffuse fatty  infiltration of the liver. Noncirrhotic liver. Benign hyperostosis frontalis interna. Benign bone island left femoral neck. Mild degenerative change in the spine.     IMPRESSION: 1.  Findings suspicious for metastatic cancer involving the liver, thoracic lymph nodes, omentum, bilateral pleura, and manubrium. The appearance is nonspecific and the primary tumor site is unclear. 2.  Inflammation in the terminal ileum suggests Crohn's disease.     US Biopsy Liver    Result Date: 4/12/2024  EXAM: 1. PERCUTANEOUS BIOPSY RIGHT HEPATIC MASS 2. ULTRASOUND GUIDANCE 3. CONSCIOUS SEDATION LOCATION: [facility] DATE: 4/12/2024 INDICATION: Right pleural effusion with pleural nodularity, mediastinal lymphadenopathy and multiple hepatic masses suspicious for metastatic malignancy. PROCEDURE: Informed consent obtained. Site marked. Prior images reviewed. Required items made available. Patient identity was confirmed verbally and with arm band. Patient reevaluated immediately before administering sedation. Universal protocol was followed. Time out performed. A limited ultrasound of the liver is performed demonstrating multiple hypoechoic masses. A 5 cm mass in the right hepatic lobe was localized and the overlying skin prepped and draped in sterile fashion. 20 mL of 1 percent lidocaine was infused into the local soft tissues. Using standard technique and under direct ultrasound guidance, a 17/18 gauge Bard coaxial biopsy needle was used to make total of 6 core biopsies. Pulmonary cytopathologic analysis was deemed adequate. The patient tolerated the procedure well. No complications. SEDATION: Versed 2 mg. Fentanyl 100 mcg. The procedure was performed with administration intravenous conscious sedation with appropriate preoperative, intraoperative, and postoperative evaluation. 15 minutes of supervised face to face conscious sedation time was provided by a radiology nurse under my direct supervision.      IMPRESSION: Ultrasound-guided percutaneous biopsy of right hepatic mass performed without complication. Final pathology pending. Reference CPT Code: 65991, 72717, 65015    US Thoracentesis    Result Date: 4/11/2024  EXAM: 1. RIGHT THORACENTESIS 2. ULTRASOUND GUIDANCE LOCATION: [facility] DATE: 4/11/2024 INDICATION: Pleural effusion. PROCEDURE: Informed consent obtained. Time out performed. The chest was prepped and draped in sterile fashion. 10 mL of 1 % lidocaine was infused into the local soft tissues. Under direct ultrasound guidance, a 5 Sammarinese catheter system was placed into the pleural effusion. 1 liters of darron-colored fluid were removed and sent to lab, if requested. Patient tolerated procedure well. Ultrasound imaging was obtained and placed in the patient's permanent medical record.     IMPRESSION: Status post right ultrasound-guided thoracentesis. Reference CPT Code: 26712    CTA CHEST WITH CONTRAST    Result Date: 4/10/2024  For Patients: As a result of the 21st Century Cures Act, medical imaging exams and procedure reports are released immediately into your electronic medical record. You may view this report before your referring provider. If you have questions, please contact your health care provider. EXAM: CTA CHEST LOCATION: [facility] Room [place] DATE: 4/10/2024 INDICATION: Pulmonary embolism (PE) suspected, unknown D-dimer; right lower shoulder and right flank pain, shortness of breath, nonproductive cough COMPARISON: 02/26/2014 TECHNIQUE: CT chest pulmonary angiogram during arterial phase injection of IV contrast. Multiplanar reformats and MIP reconstructions were performed. Dose reduction techniques were used. CONTRAST: IOPAMIDOL 300 MG/ML  ML BOTTLE: 100mL FINDINGS: ANGIOGRAM CHEST: No evidence of central or segmental pulmonary embolus. No definite subsegmental embolus. Exam limited by streak artifact and motion. Thoracic aorta is negative for dissection.  No CT evidence of right heart strain. LUNGS AND PLEURA: New large right pleural effusion. Heterogeneous groundglass opacities in the right upper lobe. Subsegmental consolidation in the right middle lobe and right lower lobe and/or atelectasis. 11 mm left upper lobe pulmonary nodule, image 34 series 10. 6 monitor nodule left upper lobe image 44 suspected pleural nodularity along the right hemidiaphragm, new in the interval, best visualized on image 121 series 4. Discrete measurable pleural nodule anteriorly on image 48 series 4 reaches 12 mm. MEDIASTINUM/AXILLAE: New mediastinal adenopathy including right paratracheal node reaching 2.1 cm, image 38 series 4, and subcarinal adenopathy reaching 2.4 cm, image 60. Confluent adenopathy in the right hilum. Additional enlarged prevascular, subcarinal, and left hilar nodes. CORONARY ARTERY CALCIFICATION: Cannot evaluate. UPPER ABDOMEN: 3.6 cm hyperenhancing right hepatic lobe lesion, with additional smaller scattered left hepatic lobe lesion suspicious for metastatic disease. MUSCULOSKELETAL: Localized sclerosis in the proximal manubrium    1.  No evidence of pulmonary embolus given limitations of exam. 2.  Right-sided pleural nodularity and large amount of pleural fluid suspicious for malignant effusion. 3.  Associated mediastinal and hilar adenopathy and left pulmonary nodules. 4.  Right-sided middle and lower lobe consolidation/atelectasis could represent coexistent pneumonia or postobstructive atelectasis. 5.  New sclerotic manubrial lesion suspicious for metastatic disease. 6.  Hepatic lesions suspicious for metastatic disease. Dedicated abdominal/hepatic hepatic imaging recommended. Impression was called to Dr. Regina Ames by Dr. Ren Rodney on 4/10/2024 9:33 PM CDT.        Signed by: Man Barreto MD  +      Again, thank you for allowing me to participate in the care of your patient.        Sincerely,        Man Barreto MD

## 2024-05-08 NOTE — PROGRESS NOTES
[facility] Hematology and Oncology Progress Note    Patient: Luoise Corado  MRN: [id number]  Date of Service: May 8, 2024        Assessment and Plan:    1.  Metastatic neuroendocrine carcinoma: I spent some time reviewing her pathology today with the reading pathologist.  Her Ki-67 is approaching 20% with significant cellular atypia.  This appears to be a more poorly differentiated higher grade tumor.  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  She had a colonoscopy which showed a lesion at the terminal ileum with biopsy showing a grade 1 well-differentiated neuroendocrine tumor.  I think that there was no higher grade tumor found secondary to sampling differences.  More tissue was obtained with her liver core biopsies.  The pathologist reviewing agree that this is a higher grade poorly differentiated tumor.  This was all reviewed with Louise and her .  We talked about the plan going forward.  She has a Port-A-Cath in place.  Regarding started on chemotherapy with carboplatin and etoposide.  I reviewed the schedule of this regimen.  We talked about some of the more common side effects which include, but are not limited to, alopecia, cytopenias, fatigue, nausea, and diarrhea.  She was given some written information about these medications to take, and review.  Will try to start her next Monday.  Will image after cycles 2, 4, and 6.  Will plan for at least 6 cycles.  Next generation sequencing is in progress as is cancerTYPE ID.    2.  Pleural effusion: She had a second thoracentesis performed on April 24.  450 mL of clear red fluid was removed.  This was done prior to vacation to make sure that she was feeling okay.    3. Anxiety: Lorazepam seems to be helping.  If her anxiety becomes more consistent problem then we can start citalopram or something similar.      4.  Pain/Spells: Having some episodes where she  has flushing and shortness of breath with abdominal discomfort.  I think these might be secondary to carcinoid syndrome.  Otherwise he may be panic attacks.  Finally she could have painful attacks from her pleural metastases.  She was recently started on Lyrica 25 mg twice daily for this problem.  She is also taking Oxy codon and Tylenol.  I told her that it would be okay to take the Lyrica and oxycodone in proximity to each other as the Lyrica is at a very low dose.  Would anticipate that her symptoms improve relatively quickly after restart her treatment.    5.. Bowels: Started an over-the-counter stool softener.  Will continue to monitor.    Medical decision Making:  I spent 45 minutes in the care of this patient today, which included time necessary for preparation for the visit, face to face time with the patient, communication of recommendations to the care team, and documentation time.  Today's visit was centered around a cancer diagnosis in the setting of additional medical comorbidities. Complex medical decision making was required. The risk of additional morbidity without further treatment is high.     ECOG Performance  0    Diagnosis:    1.  Metastatic neuroendocrine carcinoma, poorly differentiated: Diagnosed April 2024 from a liver biopsy.  Her PET/CT shows malignant involvement of the liver, mediastinal nodes, left upper quadrant omentum, manubrium, and bilateral pleura.  There is some wall thickening and FDG activity in a short segment of the terminal ileum.    Treatment:    None to date    Interim History:    Louise returns today for a follow-up visit.  At her last visit she had an upper endoscopy and colonoscopy as well as a Port-A-Cath placement.  She also had a thoracentesis performed in April 24.  She continues to have episodes of sudden onset flushing and chest wall pain.  She has some associated shortness of breath with this.  These episodes last a few minutes.  Seem to be provoked by activity  "and startle.  She has no diarrhea or sweating.  No headaches.    Review of Systems:    As above in the history.     Review of Systems otherwise Negative for:  General: chills, fever or night sweats  Psychological: anxiety or depression  Ophthalmic: blurry vision, double vision or loss of vision, vision change  ENT: epistaxis, oral lesions, hearing changes  Hematological and Lymphatic: bleeding, bruising, jaundice, swollen lymph nodes  Endocrine: hot flashes, unexpected weight changes  Respiratory: cough, hemoptysis, orthopnea  Cardiovascular: edema, palpitations or PND  Gastrointestinal: blood in stools, change in bowel habits, constipation, diarrhea or nausea/vomiting  Genito-Urinary: change in urinary stream, incontinence, frequency/urgency  Musculoskeletal: joint pain, stiffness, swelling, muscle pain  Neurological: dizziness, headaches, numbness/tingling  Dermatological: lumps and rash    Physical Exam:    /56 (BP Location: Left arm, Patient Position: Sitting, Cuff Size: Adult Large)   Pulse 91   Temp 99.5  F (37.5  C) (Tympanic)   Resp 28   Ht 1.6 m (5' 3\")   Wt 95.7 kg (211 lb)   SpO2 97%   BMI 37.38 kg/m      General: patient appears stated age of 59 year old. Nontoxic and in no distress.   HEENT: Head: atraumatic, normocephalic. Sclerae anicteric.  Chest:  Normal respiratory effort  Cardiac:  No edema.   Abdomen: abdomen is non-distended  Extremities: normal tone and muscle bulk.  Skin: no lesions or rash on visible skin. Warm and dry.   CNS: alert and oriented. Grossly non-focal.   Psychiatric: normal mood and affect.     Lab Results:    No results found for this or any previous visit (from the past 168 hour(s)).    Imaging:    US Thoracentesis    Result Date: 5/8/2024  EXAM: 1. RIGHT THORACENTESIS 2. ULTRASOUND GUIDANCE LOCATION: [facility] DATE: 5/8/2024 INDICATION: Pleural effusion. PROCEDURE: Informed consent obtained. Time out performed. The chest was prepped and " draped in sterile fashion. 8 mL of 1 % lidocaine was infused into the local soft tissues. Under direct ultrasound guidance, a 5 Venezuelan catheter system was placed into the  pleural effusion. 0.5 liters of clear red fluid were removed and sent to lab, if requested. Patient tolerated procedure well. Ultrasound imaging was obtained and placed in the patient's permanent medical record.     IMPRESSION: Status post right ultrasound-guided thoracentesis. Reference CPT Code: 72658    US Thoracentesis    Result Date: 4/24/2024  EXAM: 1. RIGHT THORACENTESIS 2. ULTRASOUND GUIDANCE LOCATION: [facility] DATE: 4/24/2024 INDICATION: Pleural effusion. PROCEDURE: Informed consent obtained. Time out performed. The chest was prepped and draped in sterile fashion. 10 mL of 1 % lidocaine was infused into the local soft tissues. Under direct ultrasound guidance, a 5 Venezuelan catheter system was placed into the pleural effusion. 450 mL of clear red fluid were removed and sent to lab, if requested. Patient tolerated procedure well. Ultrasound imaging was obtained and placed in the patient's permanent medical record.     IMPRESSION: Status post right ultrasound-guided thoracentesis. Reference CPT Code: 38402    IR Chest Port Placement > 5 Yrs of Age    Result Date: 4/23/2024  [place] RADIOLOGY EXAM: IMPLANTABLE RIGHT INTERNAL JUGULAR CHEST PORT PLACEMENT LOCATION: [facility] CLINICAL HISTORY: The patient has a history of metastatic cancer unknown primary and needs long-term access for chemotherapy. PROCEDURES PERFORMED: Ultrasound guided puncture of the internal jugular vein. Creation of subcutaneous tunnel in chest wall. Placement of a jugular PowerPort. MODERATE SEDATION: Versed and Fentanyl were administered intravenously for moderate sedation. Pulse oximetry, heart rate and blood pressure were continuously monitored by an independent trained observer. The physician spent 13 minutes of  face-to-face moderate sedation time with the patient. ADDITIONAL MEDICATIONS: IV antibiotics were administered within one hour of port placement. See EMR for dose details. CONTRAST: none FLUOROSCOPIC TIME: 0.2 minutes CUMULATIVE AIR KERMA/DOSE: 3 mGy. STERILE BARRIER TECHNIQUE: Maximal Sterile Barrier Technique Utilized: Cap AND mask AND sterile gown AND sterile gloves AND sterile full body drape AND hand hygiene AND skin preparation 2% chlorhexidine for cutaneous antisepsis (or acceptable alternative  antiseptics). Sterile Ultrasound Technique Utilized ?Sterile gel AND sterile probe covers. UNIVERSAL PROTOCOL: Standard universal protocol per facility guidelines was followed. See EMR for documentation. TECHNIQUE: Risks, benefits and alternatives were explained to the patient in detail.  All questions were answered.  Written, informed consent was given to proceed with the procedure. The patient was then brought to the angiography suite and placed in the  supine position. The neck and chest prepped and draped in the usual sterile fashion. One percent lidocaine was used for local anesthesia. Then, under ultrasound guidance, the internal jugular vein was accessed with a micropuncture system. An 0.035 guidewire which was advanced into the IVC under fluoroscopic guidance. A subcutaneous pocket was then created in the chest wall, inferior to the clavicle. The port was then placed into the pocket. A subcutaneous tunnel was created between the port pocket  and the venous access site using blunt dissection. The port catheter was attached to a tunneling device and brought through the tunnel. It was then measured and cut to the appropriate length. The venous access site was serially dilated and a peel away sheath was advanced over the wire and into the SVC under direct fluoroscopic visualization. The catheter was placed through the peel-away sheath into the SVC. The port was accessed, tested and flushed with with 5 cc of 100  units/cc heparinized saline solution. The chest wall incision was closed with layered absorbable suture and surgical glue.  FINDINGS: Ultrasound demonstrates a patent, anechoic and compressible  jugular  vein. Ultrasound images obtained during access demonstrate the needle tip within the vein. An ultrasound image was stored permanently for documentation. Fluoroscopic images obtained following placement of the catheter, show that the catheter terminates near the cavoatrial junction.     IMPRESSION: Fluoroscopic and ultrasound guided placement of right internal jugular Power Port. :     PET Oncology Whole Body    Result Date: 4/17/2024  EXAM: PET ONCOLOGY WHOLE BODY LOCATION: [facility] DATE: 4/17/2024 INDICATION: Right middle lobe pulmonary mass COMPARISON: CTs from 4/10/2024 and 2/26/2014 are reviewed. CONTRAST: None TECHNIQUE: Serum glucose level 141  mg/dL. One hour post intravenous administration of 13.1  mCi F-18 FDG, PET imaging was performed from the skull vertex to feet, utilizing attenuation correction with concurrent axial CT and PET/CT image fusion. Dose reduction techniques were used. PET/CT FINDINGS: 5.6 x 5.2 cm markedly FDG avid (SUVmax 7.0) solid mass in the right lobe of the liver with an adjacent 3.2 x 2.6 cm (SUVmax 5.2) solid nodule. FDG avid right hilar, subcarinal, and lower right paratracheal lymphadenopathy. The subcarinal  station 7 node, as an example, measures 2.6 x 1.8 cm (SUVmax  5.2). FDG avid bilateral pleural soft tissue nodularity with a moderate-sized right pleural effusion. FDG avid soft tissue nodularity in the left upper quadrant greater omentum. Moderately FDG avid (SUVmax 5.0) sclerosis in the manubrium. Moderate circumferential wall thickening and inflammatory FDG activity involving a short segment of terminal ileum with inflammation in the adjacent mesenteric fat and some tethering of adjacent loops of small bowel. Extensive fat deposition in the wall  of the cecum. CT FINDINGS: Passive atelectasis in the right lung. Small non-FDG avid bilateral pulmonary nodules, most of which are too small for reliable PET characterization. Trace calcified atherosclerosis. No coronary artery calcifications. Diffuse fatty infiltration of the liver. Noncirrhotic liver. Benign hyperostosis frontalis interna. Benign bone island left femoral neck. Mild degenerative change in the spine.     IMPRESSION: 1.  Findings suspicious for metastatic cancer involving the liver, thoracic lymph nodes, omentum, bilateral pleura, and manubrium. The appearance is nonspecific and the primary tumor site is unclear. 2.  Inflammation in the terminal ileum suggests Crohn's disease.     US Biopsy Liver    Result Date: 4/12/2024  EXAM: 1. PERCUTANEOUS BIOPSY RIGHT HEPATIC MASS 2. ULTRASOUND GUIDANCE 3. CONSCIOUS SEDATION LOCATION: [facility] DATE: 4/12/2024 INDICATION: Right pleural effusion with pleural nodularity, mediastinal lymphadenopathy and multiple hepatic masses suspicious for metastatic malignancy. PROCEDURE: Informed consent obtained. Site marked. Prior images reviewed. Required items made available. Patient identity was confirmed verbally and with arm band. Patient reevaluated immediately before administering sedation. Universal protocol was followed. Time out performed. A limited ultrasound of the liver is performed demonstrating multiple hypoechoic masses. A 5 cm mass in the right hepatic lobe was localized and the overlying skin prepped and draped in sterile fashion. 20 mL of 1 percent lidocaine was infused into the local soft tissues. Using standard technique and under direct ultrasound guidance, a 17/18 gauge Bard coaxial biopsy needle was used to make total of 6 core biopsies. Pulmonary cytopathologic analysis was deemed adequate. The patient tolerated the procedure well. No complications. SEDATION: Versed 2 mg. Fentanyl 100 mcg. The procedure was performed with  administration intravenous conscious sedation with appropriate preoperative, intraoperative, and postoperative evaluation. 15 minutes of supervised face to face conscious sedation time was provided by a radiology nurse under my direct supervision.     IMPRESSION: Ultrasound-guided percutaneous biopsy of right hepatic mass performed without complication. Final pathology pending. Reference CPT Code: 14708, 23099, 36931    US Thoracentesis    Result Date: 4/11/2024  EXAM: 1. RIGHT THORACENTESIS 2. ULTRASOUND GUIDANCE LOCATION: [facility] DATE: 4/11/2024 INDICATION: Pleural effusion. PROCEDURE: Informed consent obtained. Time out performed. The chest was prepped and draped in sterile fashion. 10 mL of 1 % lidocaine was infused into the local soft tissues. Under direct ultrasound guidance, a 5 Vietnamese catheter system was placed into the pleural effusion. 1 liters of darron-colored fluid were removed and sent to lab, if requested. Patient tolerated procedure well. Ultrasound imaging was obtained and placed in the patient's permanent medical record.     IMPRESSION: Status post right ultrasound-guided thoracentesis. Reference CPT Code: 25704    CTA CHEST WITH CONTRAST    Result Date: 4/10/2024  For Patients: As a result of the 21st Century Cures Act, medical imaging exams and procedure reports are released immediately into your electronic medical record. You may view this report before your referring provider. If you have questions, please contact your health care provider. EXAM: CTA CHEST LOCATION: [facility] [place] DATE: 4/10/2024 INDICATION: Pulmonary embolism (PE) suspected, unknown D-dimer; right lower shoulder and right flank pain, shortness of breath, nonproductive cough COMPARISON: 02/26/2014 TECHNIQUE: CT chest pulmonary angiogram during arterial phase injection of IV contrast. Multiplanar reformats and MIP reconstructions were performed. Dose reduction techniques were used.  CONTRAST: IOPAMIDOL 300 MG/ML  ML BOTTLE: 100mL FINDINGS: ANGIOGRAM CHEST: No evidence of central or segmental pulmonary embolus. No definite subsegmental embolus. Exam limited by streak artifact and motion. Thoracic aorta is negative for dissection. No CT evidence of right heart strain. LUNGS AND PLEURA: New large right pleural effusion. Heterogeneous groundglass opacities in the right upper lobe. Subsegmental consolidation in the right middle lobe and right lower lobe and/or atelectasis. 11 mm left upper lobe pulmonary nodule, image 34 series 10. 6 monitor nodule left upper lobe image 44 suspected pleural nodularity along the right hemidiaphragm, new in the interval, best visualized on image 121 series 4. Discrete measurable pleural nodule anteriorly on image 48 series 4 reaches 12 mm. MEDIASTINUM/AXILLAE: New mediastinal adenopathy including right paratracheal node reaching 2.1 cm, image 38 series 4, and subcarinal adenopathy reaching 2.4 cm, image 60. Confluent adenopathy in the right hilum. Additional enlarged prevascular, subcarinal, and left hilar nodes. CORONARY ARTERY CALCIFICATION: Cannot evaluate. UPPER ABDOMEN: 3.6 cm hyperenhancing right hepatic lobe lesion, with additional smaller scattered left hepatic lobe lesion suspicious for metastatic disease. MUSCULOSKELETAL: Localized sclerosis in the proximal manubrium    1.  No evidence of pulmonary embolus given limitations of exam. 2.  Right-sided pleural nodularity and large amount of pleural fluid suspicious for malignant effusion. 3.  Associated mediastinal and hilar adenopathy and left pulmonary nodules. 4.  Right-sided middle and lower lobe consolidation/atelectasis could represent coexistent pneumonia or postobstructive atelectasis. 5.  New sclerotic manubrial lesion suspicious for metastatic disease. 6.  Hepatic lesions suspicious for metastatic disease. Dedicated abdominal/hepatic hepatic imaging recommended. Impression was called to Dr. Tapia  Kwaku by Dr. Ren Rodney on 4/10/2024 9:33 PM CDT.        Signed by: Man Barreto MD  +

## 2024-05-08 NOTE — PROGRESS NOTES
[facility]: Cancer Care                                                                                          Made a chemo ed folder for the patient and discussed briefly the components of the chemo ed folder. Patient and I agreed on a a time and date for a chemo teach on 5/9 after 12pm. Answered patient questions to the best of my ability. Sent a Myrl message for [employer] and [employer] resources.     Signature:  Carolin Biswas RN

## 2024-05-09 ENCOUNTER — TELEPHONE (OUTPATIENT)
Dept: ONCOLOGY | Facility: HOSPITAL | Age: 59
End: 2024-05-09
Payer: COMMERCIAL

## 2024-05-09 ENCOUNTER — PATIENT OUTREACH (OUTPATIENT)
Dept: ONCOLOGY | Facility: HOSPITAL | Age: 59
End: 2024-05-09
Payer: COMMERCIAL

## 2024-05-09 NOTE — TELEPHONE ENCOUNTER
Carolin Biswas RN care coordinator called to give approval per Dr. Barreto for further testing that is requested.    Cher Lott RN

## 2024-05-09 NOTE — TELEPHONE ENCOUNTER
Adrianna from [employer] calls and leaves message on triage voicemail. Per message their pathologist is recommending neocomprehensive solid tumor testing from results of cancer type ID. They are also recommending PDL1 testing. They are wondering if ordering provider would ok adding this testing on. Please call [phone] ref# [id number] with recommendations.    Cher Lott RN

## 2024-05-09 NOTE — TELEPHONE ENCOUNTER
This was not able to be initiated via CMM. I spoke to Adrianna at [facility]. This has been approved from 5/9/24 to 5/9/25. She is faxing the approval letter. I will update the encounter when received. Pharmacy was notified.

## 2024-05-09 NOTE — PROGRESS NOTES
[facility]: Cancer Care                                                                                          Called patient to attempt to go over chemotherapy teaching. Patient preferred for RNCC to call tomorrow May 10th by 10am. RNCC will plan to call patient tomorrow, May 10th by 10am.     Signature:  Carolin Biswas RN

## 2024-05-09 NOTE — PROGRESS NOTES
[facility]: Cancer Care                                                                                          Called [employer] to let them know that per Dr. Barreto, it is okay to proceed with the additional recommended testing of the [facility]ai Comprehensive (NGS) solid tumor testing, and IHC PDL1 testing. Staff member confirmed that those additional tests will be added on to the pathology testing.     Signature:  Carolin Biswas RN

## 2024-05-10 ENCOUNTER — PATIENT OUTREACH (OUTPATIENT)
Dept: ONCOLOGY | Facility: HOSPITAL | Age: 59
End: 2024-05-10
Payer: COMMERCIAL

## 2024-05-10 NOTE — TELEPHONE ENCOUNTER
Prior Authorization Approval    Authorization Effective Date: 4/9/2024  Authorization Expiration Date: 5/9/2025  Medication: oxyCODONE (ROXICODONE) 5 MG tablet-PA initiated  Approved Dose/Quantity:   Reference #:     Insurance Company: [employer] - Phone [phone] Fax [phone]  Expected CoPay:       CoPay Card Available:      Foundation Assistance Needed:    Which Pharmacy is filling the prescription (Not needed for infusion/clinic administered): [id number] [place], [place] - [facility] [address] AT [facility]  Pharmacy Notified:  yes  Patient Notified:  yes    Pharmacy previously called with approval

## 2024-05-10 NOTE — PROGRESS NOTES
[facility]: Cancer Care Plan of Care Education Note                                    Discussion with Patient:                                                      Went over chemotherapy teaching with the patient. Answered most of her questions to the best of my ability. Patient had some follow up questions that I was unable to answer during the conversation but I stated I would follow up with Dr. Barreto on those questions and then get back to her. The questions were     1) Whether Dr. Barreto would like a CT scan after cycle 2 and cycle 4?   2) If Trilaciclib was taken out of her treatment plan?   3) If vaccines are okay to receive during chemotherapy?   4) If she is okay to start chemotherapy on Mon 5/13 due to having a lingering cough with little production of mucous after catching a cold a few weeks ago from family?     Goals          General     Functional (pt-stated)      Notes - Note created  5/8/2024  1:20 PM by Carolin Biswas RN     Goal Statement: I want to maintain or improve my current ability for my activities of daily living.   Date Goal set: 5/8/2024  Barriers: disease burden  Strengths: support, health awareness, and involvement with Care Team  Date to Achieve By: ongoing  Patient expressed understanding of goal:  Yes   Action steps to achieve this goal:  I will rest when needed.  I will communicate with my care team if not able to maintain ADLs.                Assessment:                                                      Assessment completed with:: Patient    Plan of Care Education   Yearly learning assessment completed?: No  Does patient understand diagnosis?: Yes  Does patient understand treatment plan/regimen?: Yes  Preparing for treatment:: Reviewed treatment preparation information with patient (vascular access, day of chemo, visitor policy, what to bring, etc.)  Vascular access education provided for:: Port  Side effect education:: Diarrhea/Constipation;Fatigue;Hair  loss;Immune-mediated effects;Infection;Lab value monitoring (anemia, neutropenia, thrombocytopenia);Urinary;Mouth sores;Mylosuppression;Nausea/Vomiting;Neuropathy  Safety/self care at home reviewed with patient:: Yes  Coping - concerns/fears reviewed with patient:: Yes  Plan of Care:: SONYA follow-up appointment;Lab appointment;Imaging;MD follow-up appointment;Treatment schedule  When to call provider:: Bleeding;Increased shortness of breath;New/worsening pain;Shaking chills;Temperature >100.4F;Uncontrolled diarrhea/constipation;Uncontrolled nausea/vomiting  Reasons for deferring treatment reviewed with patient:: Yes  Additional education provided for: : Neutropenic precautions  Procedure education provided for: : Port/PICC placement    Evaluation of Learning  Patient Education Provided: Yes  Readiness:: Acceptance  Method:: Booklet/Handout;Explanation  Response:: Verbalizes understanding        Intervention/Education provided during outreach:                                                         Follow up call in 1-2 weeks  Patient to follow up as scheduled at next appt  Patient to call/[employer] message with updates  Confirmed patient has clinic and triage numbers    Signature:  Carolin Biswas RN

## 2024-05-10 NOTE — PROGRESS NOTES
[facility]: Cancer Care                                                                                          Sent patient a mychart regarding update on her questions that she had earlier from chemo teach.    Signature:  Carolin Biswas RN

## 2024-05-13 ENCOUNTER — LAB (OUTPATIENT)
Dept: INFUSION THERAPY | Facility: HOSPITAL | Age: 59
End: 2024-05-13
Attending: INTERNAL MEDICINE
Payer: COMMERCIAL

## 2024-05-13 ENCOUNTER — PATIENT OUTREACH (OUTPATIENT)
Dept: ONCOLOGY | Facility: HOSPITAL | Age: 59
End: 2024-05-13

## 2024-05-13 VITALS
OXYGEN SATURATION: 95 % | RESPIRATION RATE: 16 BRPM | SYSTOLIC BLOOD PRESSURE: 101 MMHG | HEIGHT: 63 IN | TEMPERATURE: 98 F | WEIGHT: 212.1 LBS | DIASTOLIC BLOOD PRESSURE: 58 MMHG | BODY MASS INDEX: 37.58 KG/M2

## 2024-05-13 DIAGNOSIS — C7A.8 NEUROENDOCRINE CARCINOMA METASTATIC TO LIVER (H): Primary | ICD-10-CM

## 2024-05-13 DIAGNOSIS — C7B.8 NEUROENDOCRINE CARCINOMA METASTATIC TO LIVER (H): Primary | ICD-10-CM

## 2024-05-13 LAB
ALBUMIN SERPL BCG-MCNC: 3.9 G/DL (ref 3.5–5.2)
ALP SERPL-CCNC: 88 U/L (ref 40–150)
ALT SERPL W P-5'-P-CCNC: <5 U/L (ref 0–50)
ANION GAP SERPL CALCULATED.3IONS-SCNC: 11 MMOL/L (ref 7–15)
AST SERPL W P-5'-P-CCNC: 13 U/L (ref 0–45)
BASOPHILS # BLD AUTO: 0 10E3/UL (ref 0–0.2)
BASOPHILS NFR BLD AUTO: 0 %
BILIRUB SERPL-MCNC: 0.4 MG/DL
BUN SERPL-MCNC: 7.7 MG/DL (ref 6–20)
CALCIUM SERPL-MCNC: 8.9 MG/DL (ref 8.6–10)
CHLORIDE SERPL-SCNC: 100 MMOL/L (ref 98–107)
CREAT SERPL-MCNC: 0.6 MG/DL (ref 0.51–0.95)
DEPRECATED HCO3 PLAS-SCNC: 26 MMOL/L (ref 22–29)
EGFRCR SERPLBLD CKD-EPI 2021: >90 ML/MIN/1.73M2
EOSINOPHIL # BLD AUTO: 0.1 10E3/UL (ref 0–0.7)
EOSINOPHIL NFR BLD AUTO: 2 %
ERYTHROCYTE [DISTWIDTH] IN BLOOD BY AUTOMATED COUNT: 13.1 % (ref 10–15)
GLUCOSE SERPL-MCNC: 191 MG/DL (ref 70–99)
HCT VFR BLD AUTO: 34.5 % (ref 35–47)
HGB BLD-MCNC: 11.2 G/DL (ref 11.7–15.7)
IMM GRANULOCYTES # BLD: 0 10E3/UL
IMM GRANULOCYTES NFR BLD: 0 %
LYMPHOCYTES # BLD AUTO: 0.8 10E3/UL (ref 0.8–5.3)
LYMPHOCYTES NFR BLD AUTO: 14 %
MCH RBC QN AUTO: 26.1 PG (ref 26.5–33)
MCHC RBC AUTO-ENTMCNC: 32.5 G/DL (ref 31.5–36.5)
MCV RBC AUTO: 80 FL (ref 78–100)
MONOCYTES # BLD AUTO: 0.3 10E3/UL (ref 0–1.3)
MONOCYTES NFR BLD AUTO: 6 %
NEUTROPHILS # BLD AUTO: 4.1 10E3/UL (ref 1.6–8.3)
NEUTROPHILS NFR BLD AUTO: 77 %
NRBC # BLD AUTO: 0 10E3/UL
NRBC BLD AUTO-RTO: 0 /100
PLATELET # BLD AUTO: 336 10E3/UL (ref 150–450)
POTASSIUM SERPL-SCNC: 3.9 MMOL/L (ref 3.4–5.3)
PROT SERPL-MCNC: 7.2 G/DL (ref 6.4–8.3)
RBC # BLD AUTO: 4.29 10E6/UL (ref 3.8–5.2)
SODIUM SERPL-SCNC: 137 MMOL/L (ref 135–145)
WBC # BLD AUTO: 5.3 10E3/UL (ref 4–11)

## 2024-05-13 PROCEDURE — 250N000011 HC RX IP 250 OP 636: Performed by: INTERNAL MEDICINE

## 2024-05-13 PROCEDURE — 85025 COMPLETE CBC W/AUTO DIFF WBC: CPT | Performed by: INTERNAL MEDICINE

## 2024-05-13 PROCEDURE — 36591 DRAW BLOOD OFF VENOUS DEVICE: CPT | Performed by: INTERNAL MEDICINE

## 2024-05-13 PROCEDURE — 96417 CHEMO IV INFUS EACH ADDL SEQ: CPT

## 2024-05-13 PROCEDURE — 96375 TX/PRO/DX INJ NEW DRUG ADDON: CPT

## 2024-05-13 PROCEDURE — 96367 TX/PROPH/DG ADDL SEQ IV INF: CPT

## 2024-05-13 PROCEDURE — 258N000003 HC RX IP 258 OP 636: Performed by: INTERNAL MEDICINE

## 2024-05-13 PROCEDURE — 96413 CHEMO IV INFUSION 1 HR: CPT

## 2024-05-13 PROCEDURE — 82040 ASSAY OF SERUM ALBUMIN: CPT | Performed by: INTERNAL MEDICINE

## 2024-05-13 RX ORDER — HEPARIN SODIUM (PORCINE) LOCK FLUSH IV SOLN 100 UNIT/ML 100 UNIT/ML
5 SOLUTION INTRAVENOUS
Status: DISCONTINUED | OUTPATIENT
Start: 2024-05-13 | End: 2024-05-13 | Stop reason: HOSPADM

## 2024-05-13 RX ORDER — ALBUTEROL SULFATE 90 UG/1
1-2 AEROSOL, METERED RESPIRATORY (INHALATION)
Status: DISCONTINUED | OUTPATIENT
Start: 2024-05-13 | End: 2024-05-13 | Stop reason: HOSPADM

## 2024-05-13 RX ORDER — DIPHENHYDRAMINE HYDROCHLORIDE 50 MG/ML
50 INJECTION INTRAMUSCULAR; INTRAVENOUS
Status: DISCONTINUED | OUTPATIENT
Start: 2024-05-13 | End: 2024-05-13 | Stop reason: HOSPADM

## 2024-05-13 RX ORDER — ALBUTEROL SULFATE 0.83 MG/ML
2.5 SOLUTION RESPIRATORY (INHALATION)
Status: DISCONTINUED | OUTPATIENT
Start: 2024-05-13 | End: 2024-05-13 | Stop reason: HOSPADM

## 2024-05-13 RX ORDER — EPINEPHRINE 1 MG/ML
0.3 INJECTION, SOLUTION INTRAMUSCULAR; SUBCUTANEOUS EVERY 5 MIN PRN
Status: DISCONTINUED | OUTPATIENT
Start: 2024-05-13 | End: 2024-05-13 | Stop reason: HOSPADM

## 2024-05-13 RX ORDER — MEPERIDINE HYDROCHLORIDE 50 MG/ML
25 INJECTION INTRAMUSCULAR; INTRAVENOUS; SUBCUTANEOUS EVERY 30 MIN PRN
Status: DISCONTINUED | OUTPATIENT
Start: 2024-05-13 | End: 2024-05-13 | Stop reason: HOSPADM

## 2024-05-13 RX ORDER — METHYLPREDNISOLONE SODIUM SUCCINATE 125 MG/2ML
125 INJECTION, POWDER, LYOPHILIZED, FOR SOLUTION INTRAMUSCULAR; INTRAVENOUS
Status: DISCONTINUED | OUTPATIENT
Start: 2024-05-13 | End: 2024-05-13 | Stop reason: HOSPADM

## 2024-05-13 RX ORDER — PALONOSETRON 0.05 MG/ML
0.25 INJECTION, SOLUTION INTRAVENOUS ONCE
Status: COMPLETED | OUTPATIENT
Start: 2024-05-13 | End: 2024-05-13

## 2024-05-13 RX ADMIN — ETOPOSIDE 200 MG: 20 INJECTION, SOLUTION INTRAVENOUS at 11:52

## 2024-05-13 RX ADMIN — CARBOPLATIN 695 MG: 10 INJECTION, SOLUTION INTRAVENOUS at 11:16

## 2024-05-13 RX ADMIN — HEPARIN 5 ML: 100 SYRINGE at 13:23

## 2024-05-13 RX ADMIN — SODIUM CHLORIDE 250 ML: 9 INJECTION, SOLUTION INTRAVENOUS at 10:30

## 2024-05-13 RX ADMIN — FOSAPREPITANT: 150 INJECTION, POWDER, LYOPHILIZED, FOR SOLUTION INTRAVENOUS at 10:46

## 2024-05-13 RX ADMIN — PALONOSETRON 0.25 MG: 0.05 INJECTION, SOLUTION INTRAVENOUS at 10:28

## 2024-05-13 NOTE — PROGRESS NOTES
[facility]: Cancer Care                                                                                          Met with Louise in the infusion bay as she had presented an application to her infusion nurse for [facility] [employer], a resource to obtain a free wig. Relayed that writer would be happy to facilitate the completion of the form but also offered a local alternative, if she is interested. She is feeling very financially stretched at this time with medical bills and loss of income. She additionally has a daughter graduating and they have additional expenses. She does not feel that she can afford a wig at this time. Shared with her about [facility] program and she would like a referral placed. Writer made a referral to [facility] and Louise understands that the , Marleen , will contact her on her mobile line to offer an apt time for a wig fitting. Louise would much prefer this over the mail option that she brought form in for today, as she would like a fitting. Form that Louise brought to clinic for [facility] was returned to her uncompleted as she no longer wants to pursue that option,. Louise would also like a prescription for a wig and she will contact her insurance carrier about amount of coverage for a new wig. Hair loss resource list provided for salons that provide wigs/services if she decides to pursue new wig option. She will make her determination on purchase vs [facility] after she contacts her insurance info.  -Will provide prescription for cranial prosthesis when she returns to clinic on 5/14 for treatment as order not placed prior to her departure today. She stated understanding and appreciation.    Signature:  Liliana Bro RN

## 2024-05-13 NOTE — PROGRESS NOTES
Infusion Nursing Note:  Louise Corado presents today for D1C1 Chemotherapy.    Patient seen by provider today: No   present during visit today: Not Applicable.    Note: Presents today for D1C1 Carboplatin and Etopiside, with emend, decadron and aloxi.as premeds.  Reviewed side effects and adverse reactions with her and her , all questions answered.  She tolerated treatment well, offers no complaints.  She will return tomorrow for D2C1.  PAC flushed with NS and heparin and left intact per patient request.  She does have significant mid back pain with movement.  But seems very comfortable in a reclined sitting position during treatment.  Discharge to home ambulating per self with spouse.       Intravenous Access:  Labs drawn without difficulty.  Implanted Port.    Treatment Conditions:  Results reviewed, labs MET treatment parameters, ok to proceed with treatment.      Post Infusion Assessment:  Patient tolerated infusion without incident.  Blood return noted pre and post infusion.  Site patent and intact, free from redness, edema or discomfort.  No evidence of extravasations.       Discharge Plan:   Patient and/or family verbalized understanding of discharge instructions and all questions answered.      Roslyn Echavarria RN

## 2024-05-14 ENCOUNTER — INFUSION THERAPY VISIT (OUTPATIENT)
Dept: INFUSION THERAPY | Facility: HOSPITAL | Age: 59
End: 2024-05-14
Attending: INTERNAL MEDICINE
Payer: COMMERCIAL

## 2024-05-14 VITALS
SYSTOLIC BLOOD PRESSURE: 127 MMHG | DIASTOLIC BLOOD PRESSURE: 68 MMHG | OXYGEN SATURATION: 98 % | TEMPERATURE: 97.8 F | RESPIRATION RATE: 16 BRPM | HEART RATE: 78 BPM

## 2024-05-14 DIAGNOSIS — C7B.8 NEUROENDOCRINE CARCINOMA METASTATIC TO LIVER (H): Primary | ICD-10-CM

## 2024-05-14 DIAGNOSIS — C7A.8 NEUROENDOCRINE CARCINOMA METASTATIC TO LIVER (H): Primary | ICD-10-CM

## 2024-05-14 PROCEDURE — 250N000011 HC RX IP 250 OP 636: Performed by: INTERNAL MEDICINE

## 2024-05-14 PROCEDURE — 96415 CHEMO IV INFUSION ADDL HR: CPT

## 2024-05-14 PROCEDURE — 96375 TX/PRO/DX INJ NEW DRUG ADDON: CPT

## 2024-05-14 PROCEDURE — 96413 CHEMO IV INFUSION 1 HR: CPT

## 2024-05-14 PROCEDURE — 258N000003 HC RX IP 258 OP 636: Performed by: INTERNAL MEDICINE

## 2024-05-14 RX ORDER — ALBUTEROL SULFATE 0.83 MG/ML
2.5 SOLUTION RESPIRATORY (INHALATION)
Status: DISCONTINUED | OUTPATIENT
Start: 2024-05-14 | End: 2024-05-14 | Stop reason: HOSPADM

## 2024-05-14 RX ORDER — METHYLPREDNISOLONE SODIUM SUCCINATE 125 MG/2ML
125 INJECTION, POWDER, LYOPHILIZED, FOR SOLUTION INTRAMUSCULAR; INTRAVENOUS
Status: DISCONTINUED | OUTPATIENT
Start: 2024-05-14 | End: 2024-05-14 | Stop reason: HOSPADM

## 2024-05-14 RX ORDER — EPINEPHRINE 1 MG/ML
0.3 INJECTION, SOLUTION INTRAMUSCULAR; SUBCUTANEOUS EVERY 5 MIN PRN
Status: DISCONTINUED | OUTPATIENT
Start: 2024-05-14 | End: 2024-05-14 | Stop reason: HOSPADM

## 2024-05-14 RX ORDER — HEPARIN SODIUM (PORCINE) LOCK FLUSH IV SOLN 100 UNIT/ML 100 UNIT/ML
5 SOLUTION INTRAVENOUS
Status: DISCONTINUED | OUTPATIENT
Start: 2024-05-14 | End: 2024-05-14 | Stop reason: HOSPADM

## 2024-05-14 RX ORDER — MEPERIDINE HYDROCHLORIDE 50 MG/ML
25 INJECTION INTRAMUSCULAR; INTRAVENOUS; SUBCUTANEOUS EVERY 30 MIN PRN
Status: DISCONTINUED | OUTPATIENT
Start: 2024-05-14 | End: 2024-05-14 | Stop reason: HOSPADM

## 2024-05-14 RX ORDER — ALBUTEROL SULFATE 90 UG/1
1-2 AEROSOL, METERED RESPIRATORY (INHALATION)
Status: DISCONTINUED | OUTPATIENT
Start: 2024-05-14 | End: 2024-05-14 | Stop reason: HOSPADM

## 2024-05-14 RX ORDER — DIPHENHYDRAMINE HYDROCHLORIDE 50 MG/ML
50 INJECTION INTRAMUSCULAR; INTRAVENOUS
Status: DISCONTINUED | OUTPATIENT
Start: 2024-05-14 | End: 2024-05-14 | Stop reason: HOSPADM

## 2024-05-14 RX ADMIN — ETOPOSIDE 200 MG: 20 INJECTION, SOLUTION INTRAVENOUS at 13:21

## 2024-05-14 RX ADMIN — DEXAMETHASONE SODIUM PHOSPHATE 12 MG: 10 INJECTION, SOLUTION INTRAMUSCULAR; INTRAVENOUS at 12:47

## 2024-05-14 RX ADMIN — SODIUM CHLORIDE 250 ML: 9 INJECTION, SOLUTION INTRAVENOUS at 12:53

## 2024-05-14 RX ADMIN — HEPARIN 5 ML: 100 SYRINGE at 14:59

## 2024-05-14 NOTE — PROGRESS NOTES
[facility]: Cancer Care                                                                                          Met with Louise, and her  ,when she  came to clinic on 5/14 for D2C1 chemo, to follow up on hair loss resources as planned. Of note, spoke with Louise on 5/13, refer to that note for full details. Relayed update that [facility] Referral was placed and writer has not heard from  at this time but she may have been out of the salon. Marleen  generally will contact writer  to acknowledge that she has received the email and will then reach out to client. She should anticipate a call to her cell phone once Marleen reads her email.  Additionally, prescription/Order for Cranial prosthesis was provided  in case she elects to pursue purchase of a wig. She was instructed to call her insurance carrier about coverage and to verify the accepted diagnosis as they may require and additional diagnosis code on the order such as alopecia areata, She can contact writer if new diagnosis code is needed on the order. She has writer's contact information for future reference if any questions or concerns arise with hair loss needs. She understands that [facility] is her RNCC and she will be following her care with Dr. Barreto. She stated understanding and appreciation.    Signature:  Liliana Bro RN

## 2024-05-14 NOTE — PROGRESS NOTES
Infusion Nursing Note:  Louise Corado presents today for D2C1 chemotherapy.    Patient seen by provider today: No   present during visit today: Not Applicable.    Note: Tolerated day 2 well, offers no complaints.  States she did have some nausea last night and today.  She took PO zofran as prescribed with good results in controlling nausea.  .PAC deaccessed today, flushed with NS and heparin.Discharge to home with  ambulating per self. Will return tomorrow for Day 3 cycle 1.      Intravenous Access:  Implanted Port.    Treatment Conditions:  Results reviewed, labs MET treatment parameters, ok to proceed with treatment.      Post Infusion Assessment:  Patient tolerated infusion without incident.  Blood return noted pre and post infusion.  Site patent and intact, free from redness, edema or discomfort.  No evidence of extravasations.  Access discontinued per protocol.       Discharge Plan:   Patient and/or family verbalized understanding of discharge instructions and all questions answered.      Roslyn Echavarria RN

## 2024-05-15 ENCOUNTER — INFUSION THERAPY VISIT (OUTPATIENT)
Dept: INFUSION THERAPY | Facility: HOSPITAL | Age: 59
End: 2024-05-15
Attending: INTERNAL MEDICINE
Payer: COMMERCIAL

## 2024-05-15 VITALS
TEMPERATURE: 97.8 F | HEART RATE: 80 BPM | DIASTOLIC BLOOD PRESSURE: 76 MMHG | OXYGEN SATURATION: 95 % | SYSTOLIC BLOOD PRESSURE: 132 MMHG | RESPIRATION RATE: 16 BRPM

## 2024-05-15 DIAGNOSIS — C7A.8 NEUROENDOCRINE CARCINOMA METASTATIC TO LIVER (H): Primary | ICD-10-CM

## 2024-05-15 DIAGNOSIS — C7B.8 NEUROENDOCRINE CARCINOMA METASTATIC TO LIVER (H): Primary | ICD-10-CM

## 2024-05-15 LAB
BKR LAB AP ADD'L TEST STATUS: NORMAL
BKR LAB AP ADD'L TEST STATUS: NORMAL
BKR PATH ADDL TEST FINAL COMMENTS: NORMAL
BKR PATH ADDL TEST FINAL COMMENTS: NORMAL

## 2024-05-15 PROCEDURE — 96413 CHEMO IV INFUSION 1 HR: CPT

## 2024-05-15 PROCEDURE — 258N000003 HC RX IP 258 OP 636: Performed by: INTERNAL MEDICINE

## 2024-05-15 PROCEDURE — 96367 TX/PROPH/DG ADDL SEQ IV INF: CPT

## 2024-05-15 PROCEDURE — 250N000011 HC RX IP 250 OP 636: Performed by: INTERNAL MEDICINE

## 2024-05-15 RX ORDER — DIPHENHYDRAMINE HYDROCHLORIDE 50 MG/ML
50 INJECTION INTRAMUSCULAR; INTRAVENOUS
Status: DISCONTINUED | OUTPATIENT
Start: 2024-05-15 | End: 2024-05-15 | Stop reason: HOSPADM

## 2024-05-15 RX ORDER — HEPARIN SODIUM (PORCINE) LOCK FLUSH IV SOLN 100 UNIT/ML 100 UNIT/ML
5 SOLUTION INTRAVENOUS
Status: DISCONTINUED | OUTPATIENT
Start: 2024-05-15 | End: 2024-05-15 | Stop reason: HOSPADM

## 2024-05-15 RX ORDER — METHYLPREDNISOLONE SODIUM SUCCINATE 125 MG/2ML
125 INJECTION, POWDER, LYOPHILIZED, FOR SOLUTION INTRAMUSCULAR; INTRAVENOUS
Status: DISCONTINUED | OUTPATIENT
Start: 2024-05-15 | End: 2024-05-15 | Stop reason: HOSPADM

## 2024-05-15 RX ORDER — MEPERIDINE HYDROCHLORIDE 50 MG/ML
25 INJECTION INTRAMUSCULAR; INTRAVENOUS; SUBCUTANEOUS EVERY 30 MIN PRN
Status: DISCONTINUED | OUTPATIENT
Start: 2024-05-15 | End: 2024-05-15 | Stop reason: HOSPADM

## 2024-05-15 RX ORDER — EPINEPHRINE 1 MG/ML
0.3 INJECTION, SOLUTION INTRAMUSCULAR; SUBCUTANEOUS EVERY 5 MIN PRN
Status: DISCONTINUED | OUTPATIENT
Start: 2024-05-15 | End: 2024-05-15 | Stop reason: HOSPADM

## 2024-05-15 RX ORDER — ALBUTEROL SULFATE 90 UG/1
1-2 AEROSOL, METERED RESPIRATORY (INHALATION)
Status: DISCONTINUED | OUTPATIENT
Start: 2024-05-15 | End: 2024-05-15 | Stop reason: HOSPADM

## 2024-05-15 RX ORDER — ALBUTEROL SULFATE 0.83 MG/ML
2.5 SOLUTION RESPIRATORY (INHALATION)
Status: DISCONTINUED | OUTPATIENT
Start: 2024-05-15 | End: 2024-05-15 | Stop reason: HOSPADM

## 2024-05-15 RX ADMIN — DEXAMETHASONE SODIUM PHOSPHATE 12 MG: 10 INJECTION, SOLUTION INTRAMUSCULAR; INTRAVENOUS at 12:57

## 2024-05-15 RX ADMIN — SODIUM CHLORIDE 250 ML: 9 INJECTION, SOLUTION INTRAVENOUS at 12:55

## 2024-05-15 RX ADMIN — ETOPOSIDE 200 MG: 20 INJECTION, SOLUTION INTRAVENOUS at 13:39

## 2024-05-15 RX ADMIN — HEPARIN 5 ML: 100 SYRINGE at 14:42

## 2024-05-15 NOTE — PROGRESS NOTES
Infusion Nursing Note:  Louise Corado presents today for D3C1 etoposide.    Patient seen by provider today: No   present during visit today: Not Applicable.    Note: /76 (Patient Position: Sitting, Cuff Size: Adult Regular)   Pulse 80   Temp 97.8  F (36.6  C) (Oral)   Resp 16   SpO2 95%    Reviewed plan of care. Pt premedicated with IV dexamethasone. Reviewed strategies for managing nausea, importance of maintaining po hydration and calories. Pt expressed concerns re: status as pre-diabetic so discussed impact of dex on blood sugar levels, importance of maintaining nutrition to help body recover from chemotherapy.      Intravenous Access:  Implanted Port.    Treatment Conditions:  N/A      Post Infusion Assessment:  Patient tolerated infusion without incident.  Blood return noted pre and post infusion.  Site patent and intact, free from redness, edema or discomfort.  Access discontinued per protocol.       Discharge Plan:   Patient will return 6/3 for next appointment.   Patient discharged in stable condition accompanied by: , Rowdy.  Departure Mode: Ambulatory.      Dianna Mensah RN

## 2024-05-17 ENCOUNTER — HOSPITAL ENCOUNTER (OUTPATIENT)
Dept: CARDIOLOGY | Facility: CLINIC | Age: 59
Discharge: HOME | End: 2024-05-17
Attending: STUDENT IN AN ORGANIZED HEALTH CARE EDUCATION/TRAINING PROGRAM | Admitting: STUDENT IN AN ORGANIZED HEALTH CARE EDUCATION/TRAINING PROGRAM
Payer: COMMERCIAL

## 2024-05-17 ENCOUNTER — TELEPHONE (OUTPATIENT)
Dept: ONCOLOGY | Facility: HOSPITAL | Age: 59
End: 2024-05-17

## 2024-05-17 ENCOUNTER — PATIENT OUTREACH (OUTPATIENT)
Dept: ONCOLOGY | Facility: HOSPITAL | Age: 59
End: 2024-05-17

## 2024-05-17 DIAGNOSIS — R01.1 SYSTOLIC MURMUR: ICD-10-CM

## 2024-05-17 LAB — LVEF ECHO: NORMAL

## 2024-05-17 PROCEDURE — 93306 TTE W/DOPPLER COMPLETE: CPT | Mod: 26 | Performed by: INTERNAL MEDICINE

## 2024-05-17 PROCEDURE — 93306 TTE W/DOPPLER COMPLETE: CPT

## 2024-05-17 NOTE — TELEPHONE ENCOUNTER
[facility]: Cancer Care                                                                                          Returned call to Louise, see separate encounter from same date, 5/17/24    Signature:  Liliana Bro RN

## 2024-05-17 NOTE — PROGRESS NOTES
"[facility]: Cancer Care                                                                                          Returned call Nicolette to follow up on hair loss resource. Shared that writer sent secure email to Marleen Coordinator of [facility] on Monday and has been watching to see if the email was opened but it has not.  also sent Marleen a courtesy email that secure email was sent and provided phone number for writer if she is unable to open it. She may be out of the office this week, this is a volunteer/collaborative relationship with [facility] so writer unsure of her schedule. Will follow up again with Marleen and will let her know by Tuesday if writer has not heard from her. Inquired about the prescription for wig. She did call her insurance company and has a 350 benefit if she goes to a in-network Salon, [facility] in [place] is the closest. Encouraged her to  consider scheduling an apt for late next week in the event that [facility] does not work out in a timely fashion, so she has an option in place as she loses her hair. She confirmed with her insurance company that the diagnosis on her order will work for her coverage.  Inquired about how she felt after her first cycle and she did have nausea, no emesis. She did take anti-emetics and they were helpful. She is now eating and drinking well and stated that she is feeling \"better\" at this time. She has writer's contact information for future reference and will call her early next week about hair with heart update.  -Email sent to Marleen requesting update on if she has capacity to help assist patient with wig    Signature:  Liliana Bro RN   "

## 2024-05-17 NOTE — TELEPHONE ENCOUNTER
Louise left a  requesting to speak with Liliana regarding a wig resource that Liliana was working on. KIRT Aponte RN, OCN, CBCN

## 2024-05-20 ENCOUNTER — MYC REFILL (OUTPATIENT)
Dept: ONCOLOGY | Facility: HOSPITAL | Age: 59
End: 2024-05-20
Payer: COMMERCIAL

## 2024-05-20 DIAGNOSIS — R91.8 LUNG MASS: ICD-10-CM

## 2024-05-20 DIAGNOSIS — R07.1 PAINFUL RESPIRATION: ICD-10-CM

## 2024-05-21 ENCOUNTER — PATIENT OUTREACH (OUTPATIENT)
Dept: ONCOLOGY | Facility: HOSPITAL | Age: 59
End: 2024-05-21
Payer: COMMERCIAL

## 2024-05-21 RX ORDER — LORAZEPAM 0.5 MG/1
.5-1 TABLET ORAL EVERY 6 HOURS PRN
Qty: 30 TABLET | Refills: 1 | Status: SHIPPED | OUTPATIENT
Start: 2024-05-21

## 2024-05-21 RX ORDER — OXYCODONE HYDROCHLORIDE 5 MG/1
5-10 TABLET ORAL EVERY 4 HOURS PRN
Qty: 60 TABLET | Refills: 0 | Status: SHIPPED | OUTPATIENT
Start: 2024-05-21 | End: 2024-06-03

## 2024-05-21 NOTE — PROGRESS NOTES
"[facility]: Cancer Care                                                                                          Reached out to the patient to check in on her and see how she was doing post chemo infusion. Per patient, she is still dealing with a lot of chest and back pain and has been increasing the frequency of oxycodone. She describes her pain as a stabbing pain in her chest and then a pins and needles type of stabbing pain in her back. Her primary doctor prescribed her pregabalin 25mg but she was wanting to check with Dr. Barreto on how to best manage her pain regimen since her current dose of pregabalin isn't helping her and she is needing to use more oxycodone. Patient was also concerned about her elevated blood sugars. I directed her to contact her primary care doctor who typically manages her glucose. Patient was wondering when she would start to feel \"better\" after starting chemotherapy. She was hopeful that after the first cycle, she would've felt better. Patient verbalized understanding.     Signature:  Carolin Biswas RN  "

## 2024-05-21 NOTE — PROGRESS NOTES
[facility]: Cancer Care                                                                                          5/21/24, 1050 Called Louise to follow up on [facility] referral, spoke most recently about t his on 5/17/24.  Writer received message from Marleen,  and she had been out ill for a week delaying her response to Louise about setting up time for wig fitting/selection. Louise was pleased to  be offered an apt  on 5/23 and will go with her mother and daughter to select a wig. She extended appreciation for the referral and follow up. She has our contact information for future reference.    Signature:  Liliana Bro RN

## 2024-05-23 ENCOUNTER — PATIENT OUTREACH (OUTPATIENT)
Dept: ONCOLOGY | Facility: HOSPITAL | Age: 59
End: 2024-05-23
Payer: COMMERCIAL

## 2024-05-23 NOTE — PROGRESS NOTES
[facility]/Voicemail    Clinical Data: Care Coordinator Outreach    Outreach attempted x 1.  Left message on patient's voicemail with call back information and requested return call.    Plan: Care Coordinator will try to reach patient again in 1-2 business days.    Left a voice message to ask how often she is taking oxycodone. Per Dr. Barreto, if she is taking 2 oxycodone 4 times a day, t hen he may add MS Contin to provide longer relief. Per Dr. Barreto, patient should stop taking the pregabalin since the patient stated it does not help with her pain. Dr. Barreto also stated that it could take a few chemo cycles (few weeks to a few months) to start to feel better.

## 2024-05-24 ENCOUNTER — PATIENT OUTREACH (OUTPATIENT)
Dept: ONCOLOGY | Facility: HOSPITAL | Age: 59
End: 2024-05-24
Payer: COMMERCIAL

## 2024-05-24 DIAGNOSIS — C7B.8 NEUROENDOCRINE CARCINOMA METASTATIC TO LIVER (H): Primary | ICD-10-CM

## 2024-05-24 DIAGNOSIS — C7A.8 NEUROENDOCRINE CARCINOMA METASTATIC TO LIVER (H): Primary | ICD-10-CM

## 2024-05-24 RX ORDER — MORPHINE SULFATE 15 MG/1
15 TABLET, FILM COATED, EXTENDED RELEASE ORAL EVERY 12 HOURS
Qty: 60 TABLET | Refills: 0 | Status: SHIPPED | OUTPATIENT
Start: 2024-05-24 | End: 2024-06-24

## 2024-05-24 NOTE — PROGRESS NOTES
[facility]: Cancer Care                                                                                          Met patient in the lobby to hand over workplace accommodations paperwork that will need to be filled out by our clinic.       Signature:  Carolin Biswas RN

## 2024-05-24 NOTE — PROGRESS NOTES
[facility]: Cancer Care                                                                                          Called patient to ask how often she is taking oxycodone. Per Patient, she is taking about 5-7 doses of oxycodone every 4 hours. I stated that Dr. Barreto may recommend adding MS Contin to provide longer relief. Also relayed to patient that Dr. Barreto stated that it could take a few weeks to a few months for the patient to start feeling better. Patient stated she would drop off workplace accomodation paperwork.     Signature:  Carolin Biswas RN

## 2024-05-28 ENCOUNTER — MEDICAL CORRESPONDENCE (OUTPATIENT)
Dept: HEALTH INFORMATION MANAGEMENT | Facility: CLINIC | Age: 59
End: 2024-05-28
Payer: COMMERCIAL

## 2024-05-28 ENCOUNTER — DOCUMENTATION ONLY (OUTPATIENT)
Dept: ONCOLOGY | Facility: HOSPITAL | Age: 59
End: 2024-05-28
Payer: COMMERCIAL

## 2024-05-28 NOTE — PROGRESS NOTES
Form Type: Employer Accommodation Request    Care Team Physician: Dr. Man Barreto    Date Faxed: 05/28/2024    Recipient: [facility] - C/O Lew Celaya, Reasonable Accommodation [id number]    Copy of Forms Sent to MR: YES

## 2024-05-29 ENCOUNTER — PATIENT OUTREACH (OUTPATIENT)
Dept: ONCOLOGY | Facility: HOSPITAL | Age: 59
End: 2024-05-29
Payer: COMMERCIAL

## 2024-05-29 LAB
PATH REPORT.ADDENDUM SPEC: ABNORMAL
PATH REPORT.COMMENTS IMP SPEC: ABNORMAL
PATH REPORT.COMMENTS IMP SPEC: YES
PATH REPORT.FINAL DX SPEC: ABNORMAL
PATH REPORT.GROSS SPEC: ABNORMAL
PATH REPORT.MICROSCOPIC SPEC OTHER STN: ABNORMAL
PATH REPORT.RELEVANT HX SPEC: ABNORMAL
PHOTO IMAGE: ABNORMAL

## 2024-05-29 NOTE — PROGRESS NOTES
[facility]: Cancer Care                                                                                          Patient called because she had questions about her workplace accommodations paperwork. Her employer had the following questions that needed clarification:    1) Seeking clarification on the August timeframe. Can your provider state an approximate date in August?    2) Are you currently using PPE? What PPE are you requesting?    3) In limiting patient interaction, to what extent should it be?     Stated that I would get clarification and follow up with her so that she can respond to her employer.     Signature:  Carolin Biswas RN

## 2024-05-31 ENCOUNTER — PATIENT OUTREACH (OUTPATIENT)
Dept: ONCOLOGY | Facility: HOSPITAL | Age: 59
End: 2024-05-31
Payer: COMMERCIAL

## 2024-05-31 NOTE — PROGRESS NOTES
[facility]: Cancer Care                                                                                          See [employer] message.    Signature:  Carolin Biswas RN

## 2024-06-03 ENCOUNTER — INFUSION THERAPY VISIT (OUTPATIENT)
Dept: INFUSION THERAPY | Facility: HOSPITAL | Age: 59
End: 2024-06-03
Attending: STUDENT IN AN ORGANIZED HEALTH CARE EDUCATION/TRAINING PROGRAM
Payer: COMMERCIAL

## 2024-06-03 ENCOUNTER — MYC REFILL (OUTPATIENT)
Dept: ONCOLOGY | Facility: HOSPITAL | Age: 59
End: 2024-06-03

## 2024-06-03 VITALS
BODY MASS INDEX: 37.57 KG/M2 | SYSTOLIC BLOOD PRESSURE: 113 MMHG | RESPIRATION RATE: 20 BRPM | TEMPERATURE: 97.7 F | DIASTOLIC BLOOD PRESSURE: 64 MMHG | HEIGHT: 63 IN | HEART RATE: 83 BPM | OXYGEN SATURATION: 94 %

## 2024-06-03 DIAGNOSIS — C7A.8 NEUROENDOCRINE CARCINOMA METASTATIC TO LIVER (H): Primary | ICD-10-CM

## 2024-06-03 DIAGNOSIS — C7B.8 NEUROENDOCRINE CARCINOMA METASTATIC TO LIVER (H): Primary | ICD-10-CM

## 2024-06-03 DIAGNOSIS — R07.1 PAINFUL RESPIRATION: ICD-10-CM

## 2024-06-03 LAB
ALBUMIN SERPL BCG-MCNC: 3.8 G/DL (ref 3.5–5.2)
ALP SERPL-CCNC: 92 U/L (ref 40–150)
ALT SERPL W P-5'-P-CCNC: <5 U/L (ref 0–50)
ANION GAP SERPL CALCULATED.3IONS-SCNC: 12 MMOL/L (ref 7–15)
AST SERPL W P-5'-P-CCNC: 13 U/L (ref 0–45)
BASOPHILS # BLD MANUAL: 0 10E3/UL (ref 0–0.2)
BASOPHILS NFR BLD MANUAL: 1 %
BILIRUB SERPL-MCNC: 0.3 MG/DL
BUN SERPL-MCNC: 8.1 MG/DL (ref 6–20)
CALCIUM SERPL-MCNC: 9.2 MG/DL (ref 8.6–10)
CHLORIDE SERPL-SCNC: 103 MMOL/L (ref 98–107)
CREAT SERPL-MCNC: 0.7 MG/DL (ref 0.51–0.95)
DEPRECATED HCO3 PLAS-SCNC: 26 MMOL/L (ref 22–29)
EGFRCR SERPLBLD CKD-EPI 2021: >90 ML/MIN/1.73M2
EOSINOPHIL # BLD MANUAL: 0 10E3/UL (ref 0–0.7)
EOSINOPHIL NFR BLD MANUAL: 1 %
ERYTHROCYTE [DISTWIDTH] IN BLOOD BY AUTOMATED COUNT: 14.2 % (ref 10–15)
GLUCOSE SERPL-MCNC: 179 MG/DL (ref 70–99)
HCT VFR BLD AUTO: 32.8 % (ref 35–47)
HGB BLD-MCNC: 10.7 G/DL (ref 11.7–15.7)
LYMPHOCYTES # BLD MANUAL: 0.9 10E3/UL (ref 0.8–5.3)
LYMPHOCYTES NFR BLD MANUAL: 24 %
MAGNESIUM SERPL-MCNC: 2.3 MG/DL (ref 1.7–2.3)
MCH RBC QN AUTO: 26.8 PG (ref 26.5–33)
MCHC RBC AUTO-ENTMCNC: 32.6 G/DL (ref 31.5–36.5)
MCV RBC AUTO: 82 FL (ref 78–100)
METAMYELOCYTES # BLD MANUAL: 0.1 10E3/UL
METAMYELOCYTES NFR BLD MANUAL: 2 %
MONOCYTES # BLD MANUAL: 0.5 10E3/UL (ref 0–1.3)
MONOCYTES NFR BLD MANUAL: 12 %
MYELOCYTES # BLD MANUAL: 0.1 10E3/UL
MYELOCYTES NFR BLD MANUAL: 3 %
NEUTROPHILS # BLD MANUAL: 2.2 10E3/UL (ref 1.6–8.3)
NEUTROPHILS NFR BLD MANUAL: 57 %
NRBC # BLD AUTO: 0 10E3/UL
NRBC BLD AUTO-RTO: 0 /100
PLAT MORPH BLD: ABNORMAL
PLATELET # BLD AUTO: 409 10E3/UL (ref 150–450)
POTASSIUM SERPL-SCNC: 4.1 MMOL/L (ref 3.4–5.3)
PROT SERPL-MCNC: 7.3 G/DL (ref 6.4–8.3)
RBC # BLD AUTO: 4 10E6/UL (ref 3.8–5.2)
RBC MORPH BLD: ABNORMAL
SODIUM SERPL-SCNC: 141 MMOL/L (ref 135–145)
WBC # BLD AUTO: 3.9 10E3/UL (ref 4–11)

## 2024-06-03 PROCEDURE — 96415 CHEMO IV INFUSION ADDL HR: CPT

## 2024-06-03 PROCEDURE — 96367 TX/PROPH/DG ADDL SEQ IV INF: CPT

## 2024-06-03 PROCEDURE — 83735 ASSAY OF MAGNESIUM: CPT

## 2024-06-03 PROCEDURE — 80053 COMPREHEN METABOLIC PANEL: CPT | Performed by: INTERNAL MEDICINE

## 2024-06-03 PROCEDURE — 250N000011 HC RX IP 250 OP 636: Performed by: INTERNAL MEDICINE

## 2024-06-03 PROCEDURE — 85025 COMPLETE CBC W/AUTO DIFF WBC: CPT | Performed by: INTERNAL MEDICINE

## 2024-06-03 PROCEDURE — 96375 TX/PRO/DX INJ NEW DRUG ADDON: CPT

## 2024-06-03 PROCEDURE — 85007 BL SMEAR W/DIFF WBC COUNT: CPT | Performed by: INTERNAL MEDICINE

## 2024-06-03 PROCEDURE — 96413 CHEMO IV INFUSION 1 HR: CPT

## 2024-06-03 PROCEDURE — 258N000003 HC RX IP 258 OP 636: Performed by: INTERNAL MEDICINE

## 2024-06-03 PROCEDURE — 36591 DRAW BLOOD OFF VENOUS DEVICE: CPT | Performed by: INTERNAL MEDICINE

## 2024-06-03 PROCEDURE — 96417 CHEMO IV INFUS EACH ADDL SEQ: CPT

## 2024-06-03 RX ORDER — ALBUTEROL SULFATE 90 UG/1
1-2 AEROSOL, METERED RESPIRATORY (INHALATION)
Status: DISCONTINUED | OUTPATIENT
Start: 2024-06-03 | End: 2024-06-03 | Stop reason: HOSPADM

## 2024-06-03 RX ORDER — ALBUTEROL SULFATE 90 UG/1
1-2 AEROSOL, METERED RESPIRATORY (INHALATION)
Status: CANCELLED
Start: 2024-06-24

## 2024-06-03 RX ORDER — PALONOSETRON 0.05 MG/ML
0.25 INJECTION, SOLUTION INTRAVENOUS ONCE
Qty: 5 ML | Refills: 0 | Status: COMPLETED | OUTPATIENT
Start: 2024-06-03 | End: 2024-06-03

## 2024-06-03 RX ORDER — ALBUTEROL SULFATE 90 UG/1
1-2 AEROSOL, METERED RESPIRATORY (INHALATION)
Status: CANCELLED
Start: 2024-07-16

## 2024-06-03 RX ORDER — HEPARIN SODIUM,PORCINE 10 UNIT/ML
5-20 VIAL (ML) INTRAVENOUS DAILY PRN
Status: CANCELLED | OUTPATIENT
Start: 2024-06-24

## 2024-06-03 RX ORDER — MEPERIDINE HYDROCHLORIDE 50 MG/ML
25 INJECTION INTRAMUSCULAR; INTRAVENOUS; SUBCUTANEOUS EVERY 30 MIN PRN
Status: CANCELLED | OUTPATIENT
Start: 2024-06-26

## 2024-06-03 RX ORDER — DIPHENHYDRAMINE HYDROCHLORIDE 50 MG/ML
50 INJECTION INTRAMUSCULAR; INTRAVENOUS
Status: CANCELLED
Start: 2024-06-24

## 2024-06-03 RX ORDER — METHYLPREDNISOLONE SODIUM SUCCINATE 125 MG/2ML
125 INJECTION, POWDER, LYOPHILIZED, FOR SOLUTION INTRAMUSCULAR; INTRAVENOUS
Status: CANCELLED
Start: 2024-06-25

## 2024-06-03 RX ORDER — ALBUTEROL SULFATE 90 UG/1
1-2 AEROSOL, METERED RESPIRATORY (INHALATION)
Status: CANCELLED
Start: 2024-07-17

## 2024-06-03 RX ORDER — EPINEPHRINE 1 MG/ML
0.3 INJECTION, SOLUTION INTRAMUSCULAR; SUBCUTANEOUS EVERY 5 MIN PRN
Status: CANCELLED | OUTPATIENT
Start: 2024-06-25

## 2024-06-03 RX ORDER — MEPERIDINE HYDROCHLORIDE 50 MG/ML
25 INJECTION INTRAMUSCULAR; INTRAVENOUS; SUBCUTANEOUS EVERY 30 MIN PRN
Status: CANCELLED | OUTPATIENT
Start: 2024-07-15

## 2024-06-03 RX ORDER — PALONOSETRON 0.05 MG/ML
0.25 INJECTION, SOLUTION INTRAVENOUS ONCE
Status: CANCELLED | OUTPATIENT
Start: 2024-07-15

## 2024-06-03 RX ORDER — EPINEPHRINE 1 MG/ML
0.3 INJECTION, SOLUTION INTRAMUSCULAR; SUBCUTANEOUS EVERY 5 MIN PRN
Status: CANCELLED | OUTPATIENT
Start: 2024-07-15

## 2024-06-03 RX ORDER — HEPARIN SODIUM (PORCINE) LOCK FLUSH IV SOLN 100 UNIT/ML 100 UNIT/ML
5 SOLUTION INTRAVENOUS
Status: CANCELLED | OUTPATIENT
Start: 2024-07-16

## 2024-06-03 RX ORDER — DIPHENHYDRAMINE HYDROCHLORIDE 50 MG/ML
50 INJECTION INTRAMUSCULAR; INTRAVENOUS
Status: CANCELLED
Start: 2024-06-25

## 2024-06-03 RX ORDER — DIPHENHYDRAMINE HYDROCHLORIDE 50 MG/ML
50 INJECTION INTRAMUSCULAR; INTRAVENOUS
Status: CANCELLED
Start: 2024-07-17

## 2024-06-03 RX ORDER — HEPARIN SODIUM (PORCINE) LOCK FLUSH IV SOLN 100 UNIT/ML 100 UNIT/ML
5 SOLUTION INTRAVENOUS
Status: CANCELLED | OUTPATIENT
Start: 2024-07-17

## 2024-06-03 RX ORDER — ALBUTEROL SULFATE 0.83 MG/ML
2.5 SOLUTION RESPIRATORY (INHALATION)
Status: CANCELLED | OUTPATIENT
Start: 2024-07-15

## 2024-06-03 RX ORDER — METHYLPREDNISOLONE SODIUM SUCCINATE 125 MG/2ML
125 INJECTION, POWDER, LYOPHILIZED, FOR SOLUTION INTRAMUSCULAR; INTRAVENOUS
Status: CANCELLED
Start: 2024-07-15

## 2024-06-03 RX ORDER — METHYLPREDNISOLONE SODIUM SUCCINATE 125 MG/2ML
125 INJECTION, POWDER, LYOPHILIZED, FOR SOLUTION INTRAMUSCULAR; INTRAVENOUS
Status: CANCELLED
Start: 2024-07-16

## 2024-06-03 RX ORDER — HEPARIN SODIUM (PORCINE) LOCK FLUSH IV SOLN 100 UNIT/ML 100 UNIT/ML
5 SOLUTION INTRAVENOUS
Status: CANCELLED | OUTPATIENT
Start: 2024-06-25

## 2024-06-03 RX ORDER — MEPERIDINE HYDROCHLORIDE 50 MG/ML
25 INJECTION INTRAMUSCULAR; INTRAVENOUS; SUBCUTANEOUS EVERY 30 MIN PRN
Status: CANCELLED | OUTPATIENT
Start: 2024-06-25

## 2024-06-03 RX ORDER — HEPARIN SODIUM,PORCINE 10 UNIT/ML
5-20 VIAL (ML) INTRAVENOUS DAILY PRN
Status: CANCELLED | OUTPATIENT
Start: 2024-07-17

## 2024-06-03 RX ORDER — MEPERIDINE HYDROCHLORIDE 50 MG/ML
25 INJECTION INTRAMUSCULAR; INTRAVENOUS; SUBCUTANEOUS EVERY 30 MIN PRN
Status: DISCONTINUED | OUTPATIENT
Start: 2024-06-03 | End: 2024-06-03 | Stop reason: HOSPADM

## 2024-06-03 RX ORDER — HEPARIN SODIUM (PORCINE) LOCK FLUSH IV SOLN 100 UNIT/ML 100 UNIT/ML
5 SOLUTION INTRAVENOUS
Status: CANCELLED | OUTPATIENT
Start: 2024-06-24

## 2024-06-03 RX ORDER — HEPARIN SODIUM,PORCINE 10 UNIT/ML
5-20 VIAL (ML) INTRAVENOUS DAILY PRN
Status: CANCELLED | OUTPATIENT
Start: 2024-06-25

## 2024-06-03 RX ORDER — HEPARIN SODIUM (PORCINE) LOCK FLUSH IV SOLN 100 UNIT/ML 100 UNIT/ML
5 SOLUTION INTRAVENOUS
Status: CANCELLED | OUTPATIENT
Start: 2024-06-26

## 2024-06-03 RX ORDER — METHYLPREDNISOLONE SODIUM SUCCINATE 125 MG/2ML
125 INJECTION, POWDER, LYOPHILIZED, FOR SOLUTION INTRAMUSCULAR; INTRAVENOUS
Status: DISCONTINUED | OUTPATIENT
Start: 2024-06-03 | End: 2024-06-03 | Stop reason: HOSPADM

## 2024-06-03 RX ORDER — LORAZEPAM 2 MG/ML
0.5 INJECTION INTRAMUSCULAR EVERY 4 HOURS PRN
Status: CANCELLED | OUTPATIENT
Start: 2024-06-26

## 2024-06-03 RX ORDER — EPINEPHRINE 1 MG/ML
0.3 INJECTION, SOLUTION INTRAMUSCULAR; SUBCUTANEOUS EVERY 5 MIN PRN
Status: CANCELLED | OUTPATIENT
Start: 2024-07-17

## 2024-06-03 RX ORDER — DIPHENHYDRAMINE HYDROCHLORIDE 50 MG/ML
50 INJECTION INTRAMUSCULAR; INTRAVENOUS
Status: CANCELLED
Start: 2024-07-16

## 2024-06-03 RX ORDER — METHYLPREDNISOLONE SODIUM SUCCINATE 125 MG/2ML
125 INJECTION, POWDER, LYOPHILIZED, FOR SOLUTION INTRAMUSCULAR; INTRAVENOUS
Status: CANCELLED
Start: 2024-07-17

## 2024-06-03 RX ORDER — EPINEPHRINE 1 MG/ML
0.3 INJECTION, SOLUTION INTRAMUSCULAR; SUBCUTANEOUS EVERY 5 MIN PRN
Status: DISCONTINUED | OUTPATIENT
Start: 2024-06-03 | End: 2024-06-03 | Stop reason: HOSPADM

## 2024-06-03 RX ORDER — HEPARIN SODIUM,PORCINE 10 UNIT/ML
5-20 VIAL (ML) INTRAVENOUS DAILY PRN
Status: CANCELLED | OUTPATIENT
Start: 2024-07-15

## 2024-06-03 RX ORDER — EPINEPHRINE 1 MG/ML
0.3 INJECTION, SOLUTION INTRAMUSCULAR; SUBCUTANEOUS EVERY 5 MIN PRN
Status: CANCELLED | OUTPATIENT
Start: 2024-06-26

## 2024-06-03 RX ORDER — MEPERIDINE HYDROCHLORIDE 50 MG/ML
25 INJECTION INTRAMUSCULAR; INTRAVENOUS; SUBCUTANEOUS EVERY 30 MIN PRN
Status: CANCELLED | OUTPATIENT
Start: 2024-06-24

## 2024-06-03 RX ORDER — ALBUTEROL SULFATE 0.83 MG/ML
2.5 SOLUTION RESPIRATORY (INHALATION)
Status: CANCELLED | OUTPATIENT
Start: 2024-06-24

## 2024-06-03 RX ORDER — PALONOSETRON 0.05 MG/ML
0.25 INJECTION, SOLUTION INTRAVENOUS ONCE
Status: CANCELLED | OUTPATIENT
Start: 2024-06-24

## 2024-06-03 RX ORDER — ALBUTEROL SULFATE 0.83 MG/ML
2.5 SOLUTION RESPIRATORY (INHALATION)
Status: CANCELLED | OUTPATIENT
Start: 2024-06-26

## 2024-06-03 RX ORDER — DIPHENHYDRAMINE HYDROCHLORIDE 50 MG/ML
50 INJECTION INTRAMUSCULAR; INTRAVENOUS
Status: CANCELLED
Start: 2024-06-26

## 2024-06-03 RX ORDER — ALBUTEROL SULFATE 0.83 MG/ML
2.5 SOLUTION RESPIRATORY (INHALATION)
Status: CANCELLED | OUTPATIENT
Start: 2024-07-16

## 2024-06-03 RX ORDER — LORAZEPAM 2 MG/ML
0.5 INJECTION INTRAMUSCULAR EVERY 4 HOURS PRN
Status: CANCELLED | OUTPATIENT
Start: 2024-07-16

## 2024-06-03 RX ORDER — ALBUTEROL SULFATE 90 UG/1
1-2 AEROSOL, METERED RESPIRATORY (INHALATION)
Status: CANCELLED
Start: 2024-06-25

## 2024-06-03 RX ORDER — DIPHENHYDRAMINE HYDROCHLORIDE 50 MG/ML
50 INJECTION INTRAMUSCULAR; INTRAVENOUS
Status: CANCELLED
Start: 2024-07-15

## 2024-06-03 RX ORDER — METHYLPREDNISOLONE SODIUM SUCCINATE 125 MG/2ML
125 INJECTION, POWDER, LYOPHILIZED, FOR SOLUTION INTRAMUSCULAR; INTRAVENOUS
Status: CANCELLED
Start: 2024-06-24

## 2024-06-03 RX ORDER — ALBUTEROL SULFATE 90 UG/1
1-2 AEROSOL, METERED RESPIRATORY (INHALATION)
Status: CANCELLED
Start: 2024-06-26

## 2024-06-03 RX ORDER — ALBUTEROL SULFATE 0.83 MG/ML
2.5 SOLUTION RESPIRATORY (INHALATION)
Status: DISCONTINUED | OUTPATIENT
Start: 2024-06-03 | End: 2024-06-03 | Stop reason: HOSPADM

## 2024-06-03 RX ORDER — ALBUTEROL SULFATE 90 UG/1
1-2 AEROSOL, METERED RESPIRATORY (INHALATION)
Status: CANCELLED
Start: 2024-07-15

## 2024-06-03 RX ORDER — HEPARIN SODIUM,PORCINE 10 UNIT/ML
5-20 VIAL (ML) INTRAVENOUS DAILY PRN
Status: DISCONTINUED | OUTPATIENT
Start: 2024-06-03 | End: 2024-06-03 | Stop reason: HOSPADM

## 2024-06-03 RX ORDER — LORAZEPAM 2 MG/ML
0.5 INJECTION INTRAMUSCULAR EVERY 4 HOURS PRN
Status: CANCELLED | OUTPATIENT
Start: 2024-06-24

## 2024-06-03 RX ORDER — HEPARIN SODIUM,PORCINE 10 UNIT/ML
5-20 VIAL (ML) INTRAVENOUS DAILY PRN
Status: CANCELLED | OUTPATIENT
Start: 2024-07-16

## 2024-06-03 RX ORDER — LORAZEPAM 2 MG/ML
0.5 INJECTION INTRAMUSCULAR EVERY 4 HOURS PRN
Status: CANCELLED | OUTPATIENT
Start: 2024-07-17

## 2024-06-03 RX ORDER — LORAZEPAM 2 MG/ML
0.5 INJECTION INTRAMUSCULAR EVERY 4 HOURS PRN
Status: CANCELLED | OUTPATIENT
Start: 2024-06-25

## 2024-06-03 RX ORDER — MEPERIDINE HYDROCHLORIDE 50 MG/ML
25 INJECTION INTRAMUSCULAR; INTRAVENOUS; SUBCUTANEOUS EVERY 30 MIN PRN
Status: CANCELLED | OUTPATIENT
Start: 2024-07-16

## 2024-06-03 RX ORDER — MEPERIDINE HYDROCHLORIDE 50 MG/ML
25 INJECTION INTRAMUSCULAR; INTRAVENOUS; SUBCUTANEOUS EVERY 30 MIN PRN
Status: CANCELLED | OUTPATIENT
Start: 2024-07-17

## 2024-06-03 RX ORDER — LORAZEPAM 2 MG/ML
0.5 INJECTION INTRAMUSCULAR EVERY 4 HOURS PRN
Status: DISCONTINUED | OUTPATIENT
Start: 2024-06-03 | End: 2024-06-03 | Stop reason: HOSPADM

## 2024-06-03 RX ORDER — LORAZEPAM 2 MG/ML
0.5 INJECTION INTRAMUSCULAR EVERY 4 HOURS PRN
Status: CANCELLED | OUTPATIENT
Start: 2024-07-15

## 2024-06-03 RX ORDER — ALBUTEROL SULFATE 0.83 MG/ML
2.5 SOLUTION RESPIRATORY (INHALATION)
Status: CANCELLED | OUTPATIENT
Start: 2024-07-17

## 2024-06-03 RX ORDER — HEPARIN SODIUM,PORCINE 10 UNIT/ML
5-20 VIAL (ML) INTRAVENOUS DAILY PRN
Status: CANCELLED | OUTPATIENT
Start: 2024-06-26

## 2024-06-03 RX ORDER — EPINEPHRINE 1 MG/ML
0.3 INJECTION, SOLUTION INTRAMUSCULAR; SUBCUTANEOUS EVERY 5 MIN PRN
Status: CANCELLED | OUTPATIENT
Start: 2024-07-16

## 2024-06-03 RX ORDER — EPINEPHRINE 1 MG/ML
0.3 INJECTION, SOLUTION INTRAMUSCULAR; SUBCUTANEOUS EVERY 5 MIN PRN
Status: CANCELLED | OUTPATIENT
Start: 2024-06-24

## 2024-06-03 RX ORDER — HEPARIN SODIUM (PORCINE) LOCK FLUSH IV SOLN 100 UNIT/ML 100 UNIT/ML
5 SOLUTION INTRAVENOUS
Status: CANCELLED | OUTPATIENT
Start: 2024-07-15

## 2024-06-03 RX ORDER — ALBUTEROL SULFATE 0.83 MG/ML
2.5 SOLUTION RESPIRATORY (INHALATION)
Status: CANCELLED | OUTPATIENT
Start: 2024-06-25

## 2024-06-03 RX ORDER — HEPARIN SODIUM (PORCINE) LOCK FLUSH IV SOLN 100 UNIT/ML 100 UNIT/ML
5 SOLUTION INTRAVENOUS
Status: DISCONTINUED | OUTPATIENT
Start: 2024-06-03 | End: 2024-06-03 | Stop reason: HOSPADM

## 2024-06-03 RX ORDER — METHYLPREDNISOLONE SODIUM SUCCINATE 125 MG/2ML
125 INJECTION, POWDER, LYOPHILIZED, FOR SOLUTION INTRAMUSCULAR; INTRAVENOUS
Status: CANCELLED
Start: 2024-06-26

## 2024-06-03 RX ORDER — DIPHENHYDRAMINE HYDROCHLORIDE 50 MG/ML
50 INJECTION INTRAMUSCULAR; INTRAVENOUS
Status: DISCONTINUED | OUTPATIENT
Start: 2024-06-03 | End: 2024-06-03 | Stop reason: HOSPADM

## 2024-06-03 RX ADMIN — PALONOSETRON 0.25 MG: 0.05 INJECTION, SOLUTION INTRAVENOUS at 10:24

## 2024-06-03 RX ADMIN — CARBOPLATIN 615 MG: 10 INJECTION, SOLUTION INTRAVENOUS at 10:55

## 2024-06-03 RX ADMIN — HEPARIN 5 ML: 100 SYRINGE at 13:27

## 2024-06-03 RX ADMIN — FOSAPREPITANT: 150 INJECTION, POWDER, LYOPHILIZED, FOR SOLUTION INTRAVENOUS at 10:01

## 2024-06-03 RX ADMIN — SODIUM CHLORIDE 250 ML: 9 INJECTION, SOLUTION INTRAVENOUS at 09:30

## 2024-06-03 RX ADMIN — ETOPOSIDE 200 MG: 20 INJECTION INTRAVENOUS at 11:44

## 2024-06-03 NOTE — PROGRESS NOTES
Infusion Nursing Note:  Louise Corado presents today for Chemotherapy Infusions of Carboplatin and Etoposide.    Patient seen by provider today: No   present during visit today: Not Applicable.    Note: Patient ambulated into Infusion Care accompanied by her . Patient is alert and oriented and VSS. PORT had been accessed in the clinic and labs drawn, Premeds administered and PPE donned. Carboplatin double checked with Sandee ESTEVES RN. Patient tolerated infusion without any problems. Etoposide double checked with Meenu ONEAL RN. Patient tolerated infusion of Etoposide without any problems. PORT fushed with Normal Saline and 6 ml Heparin and de-accessed. Dry surgical glue still present around PORT site. Dry 2 x 2 gauze taped ove rPORT site and all questions answered before discharge.      Intravenous Access:  Implanted Port.    Treatment Conditions:  Lab Results   Component Value Date    HGB 10.7 (L) 06/03/2024    WBC 3.9 (L) 06/03/2024    ANEU 2.2 06/03/2024    ANEUTAUTO 4.1 05/13/2024     06/03/2024        Lab Results   Component Value Date     06/03/2024    POTASSIUM 4.1 06/03/2024    MAG 2.3 06/03/2024    CR 0.70 06/03/2024    ANGELA 9.2 06/03/2024    BILITOTAL 0.3 06/03/2024    ALBUMIN 3.8 06/03/2024    ALT <5 06/03/2024    AST 13 06/03/2024       Results reviewed, labs MET treatment parameters, ok to proceed with treatment.      Post Infusion Assessment:  Patient tolerated infusion without incident.  Site patent and intact, free from redness, edema or discomfort.  No evidence of extravasations.  Access discontinued per protocol.       Discharge Plan:   Patient and/or family verbalized understanding of discharge instructions and all questions answered.  Patient discharged in stable condition accompanied by: .  Departure Mode: Ambulatory.      Frida Camarillo RN,OCN

## 2024-06-04 ENCOUNTER — INFUSION THERAPY VISIT (OUTPATIENT)
Dept: INFUSION THERAPY | Facility: HOSPITAL | Age: 59
End: 2024-06-04
Attending: STUDENT IN AN ORGANIZED HEALTH CARE EDUCATION/TRAINING PROGRAM
Payer: COMMERCIAL

## 2024-06-04 ENCOUNTER — PATIENT OUTREACH (OUTPATIENT)
Dept: ONCOLOGY | Facility: HOSPITAL | Age: 59
End: 2024-06-04

## 2024-06-04 ENCOUNTER — DOCUMENTATION ONLY (OUTPATIENT)
Dept: ONCOLOGY | Facility: HOSPITAL | Age: 59
End: 2024-06-04

## 2024-06-04 VITALS
SYSTOLIC BLOOD PRESSURE: 123 MMHG | HEART RATE: 70 BPM | DIASTOLIC BLOOD PRESSURE: 68 MMHG | RESPIRATION RATE: 18 BRPM | OXYGEN SATURATION: 95 % | TEMPERATURE: 97.8 F

## 2024-06-04 DIAGNOSIS — C7B.8 NEUROENDOCRINE CARCINOMA METASTATIC TO LIVER (H): Primary | ICD-10-CM

## 2024-06-04 DIAGNOSIS — C7A.8 NEUROENDOCRINE CARCINOMA METASTATIC TO LIVER (H): Primary | ICD-10-CM

## 2024-06-04 PROCEDURE — 250N000011 HC RX IP 250 OP 636: Performed by: INTERNAL MEDICINE

## 2024-06-04 PROCEDURE — 96413 CHEMO IV INFUSION 1 HR: CPT

## 2024-06-04 PROCEDURE — 96367 TX/PROPH/DG ADDL SEQ IV INF: CPT

## 2024-06-04 PROCEDURE — 258N000003 HC RX IP 258 OP 636: Performed by: INTERNAL MEDICINE

## 2024-06-04 RX ORDER — ALBUTEROL SULFATE 0.83 MG/ML
2.5 SOLUTION RESPIRATORY (INHALATION)
Status: DISCONTINUED | OUTPATIENT
Start: 2024-06-04 | End: 2024-06-04 | Stop reason: HOSPADM

## 2024-06-04 RX ORDER — MEPERIDINE HYDROCHLORIDE 50 MG/ML
25 INJECTION INTRAMUSCULAR; INTRAVENOUS; SUBCUTANEOUS EVERY 30 MIN PRN
Status: DISCONTINUED | OUTPATIENT
Start: 2024-06-04 | End: 2024-06-04 | Stop reason: HOSPADM

## 2024-06-04 RX ORDER — HEPARIN SODIUM (PORCINE) LOCK FLUSH IV SOLN 100 UNIT/ML 100 UNIT/ML
5 SOLUTION INTRAVENOUS
Status: DISCONTINUED | OUTPATIENT
Start: 2024-06-04 | End: 2024-06-04 | Stop reason: HOSPADM

## 2024-06-04 RX ORDER — ALBUTEROL SULFATE 90 UG/1
1-2 AEROSOL, METERED RESPIRATORY (INHALATION)
Status: DISCONTINUED | OUTPATIENT
Start: 2024-06-04 | End: 2024-06-04 | Stop reason: HOSPADM

## 2024-06-04 RX ORDER — EPINEPHRINE 1 MG/ML
0.3 INJECTION, SOLUTION INTRAMUSCULAR; SUBCUTANEOUS EVERY 5 MIN PRN
Status: DISCONTINUED | OUTPATIENT
Start: 2024-06-04 | End: 2024-06-04 | Stop reason: HOSPADM

## 2024-06-04 RX ORDER — METHYLPREDNISOLONE SODIUM SUCCINATE 125 MG/2ML
125 INJECTION, POWDER, LYOPHILIZED, FOR SOLUTION INTRAMUSCULAR; INTRAVENOUS
Status: DISCONTINUED | OUTPATIENT
Start: 2024-06-04 | End: 2024-06-04 | Stop reason: HOSPADM

## 2024-06-04 RX ORDER — OXYCODONE HYDROCHLORIDE 5 MG/1
5-10 TABLET ORAL EVERY 4 HOURS PRN
Qty: 60 TABLET | Refills: 0 | Status: SHIPPED | OUTPATIENT
Start: 2024-06-04 | End: 2024-06-18

## 2024-06-04 RX ORDER — DIPHENHYDRAMINE HYDROCHLORIDE 50 MG/ML
50 INJECTION INTRAMUSCULAR; INTRAVENOUS
Status: DISCONTINUED | OUTPATIENT
Start: 2024-06-04 | End: 2024-06-04 | Stop reason: HOSPADM

## 2024-06-04 RX ADMIN — SODIUM CHLORIDE 250 ML: 9 INJECTION, SOLUTION INTRAVENOUS at 09:44

## 2024-06-04 RX ADMIN — SODIUM CHLORIDE 200 MG: 9 INJECTION, SOLUTION INTRAVENOUS at 10:27

## 2024-06-04 RX ADMIN — HEPARIN 5 ML: 100 SYRINGE at 11:31

## 2024-06-04 RX ADMIN — DEXAMETHASONE SODIUM PHOSPHATE 12 MG: 10 INJECTION, SOLUTION INTRAMUSCULAR; INTRAVENOUS at 09:44

## 2024-06-04 NOTE — PROGRESS NOTES
"[facility]: Cancer Care                                                                                          Patient reviewed [facility] paperwork with me. Per the patient, she is waiting to hear from her manager to see if she will be provided workplace accomodations. She said if she hears back from her manager, then we will proceed with the [facility] paperwork and select \"reduced work schedule\" and \"intermittent leave\" if they are able to find a temporary position for her. If they are not able to find a temporary position, then we will select continuous leave so that she is protected from her current job.     Signature:  Carolin Biswas RN  "

## 2024-06-04 NOTE — PROGRESS NOTES
Pt requested a handicap parking application while in clinic today. Pt was informed Dr. Barreto is no longer in clinic and pt stated she will be seeing Brenda Pearson CNP on Monday 06/10/24. Pt was given an application filled out without signature, to bring to appointment on 06/10/24 for Brenda to sign.

## 2024-06-04 NOTE — PROGRESS NOTES
Infusion Nursing Note:  Louise Corado presents today for C2D2 Dexamethasone and Etoposide.    Patient seen by provider today: No   present during visit today: Not Applicable.    Note: Patient experienced some nausea last evening and took her zofran with slight relief of nausea. No emesis. Pt. Took ativan before bed and slept fair. Towards the end of her Etoposide today she became nauseated and took one of her zofran tablets. Drinking and eating well.       Intravenous Access:  Implanted Port.    Treatment Conditions:  Not Applicable.      Post Infusion Assessment:  Patient tolerated infusion without incident.  Site patent and intact, free from redness, edema or discomfort.  No evidence of extravasations.  Access discontinued per protocol.       Discharge Plan:   Patient and/or family verbalized understanding of discharge instructions and all questions answered.      Iza Bill RN

## 2024-06-05 ENCOUNTER — INFUSION THERAPY VISIT (OUTPATIENT)
Dept: INFUSION THERAPY | Facility: HOSPITAL | Age: 59
End: 2024-06-05
Attending: INTERNAL MEDICINE
Payer: COMMERCIAL

## 2024-06-05 VITALS
RESPIRATION RATE: 16 BRPM | HEART RATE: 70 BPM | SYSTOLIC BLOOD PRESSURE: 134 MMHG | OXYGEN SATURATION: 98 % | DIASTOLIC BLOOD PRESSURE: 75 MMHG | TEMPERATURE: 98.1 F

## 2024-06-05 DIAGNOSIS — C7A.8 NEUROENDOCRINE CARCINOMA METASTATIC TO LIVER (H): Primary | ICD-10-CM

## 2024-06-05 DIAGNOSIS — C7B.8 NEUROENDOCRINE CARCINOMA METASTATIC TO LIVER (H): Primary | ICD-10-CM

## 2024-06-05 PROCEDURE — 258N000003 HC RX IP 258 OP 636: Performed by: INTERNAL MEDICINE

## 2024-06-05 PROCEDURE — 96413 CHEMO IV INFUSION 1 HR: CPT

## 2024-06-05 PROCEDURE — 96375 TX/PRO/DX INJ NEW DRUG ADDON: CPT

## 2024-06-05 PROCEDURE — 96415 CHEMO IV INFUSION ADDL HR: CPT

## 2024-06-05 PROCEDURE — 250N000011 HC RX IP 250 OP 636: Performed by: INTERNAL MEDICINE

## 2024-06-05 RX ORDER — ALBUTEROL SULFATE 0.83 MG/ML
2.5 SOLUTION RESPIRATORY (INHALATION)
Status: DISCONTINUED | OUTPATIENT
Start: 2024-06-05 | End: 2024-06-05 | Stop reason: HOSPADM

## 2024-06-05 RX ORDER — METHYLPREDNISOLONE SODIUM SUCCINATE 125 MG/2ML
125 INJECTION, POWDER, LYOPHILIZED, FOR SOLUTION INTRAMUSCULAR; INTRAVENOUS
Status: DISCONTINUED | OUTPATIENT
Start: 2024-06-05 | End: 2024-06-05 | Stop reason: HOSPADM

## 2024-06-05 RX ORDER — MEPERIDINE HYDROCHLORIDE 50 MG/ML
25 INJECTION INTRAMUSCULAR; INTRAVENOUS; SUBCUTANEOUS EVERY 30 MIN PRN
Status: DISCONTINUED | OUTPATIENT
Start: 2024-06-05 | End: 2024-06-05 | Stop reason: HOSPADM

## 2024-06-05 RX ORDER — DIPHENHYDRAMINE HYDROCHLORIDE 50 MG/ML
50 INJECTION INTRAMUSCULAR; INTRAVENOUS
Status: DISCONTINUED | OUTPATIENT
Start: 2024-06-05 | End: 2024-06-05 | Stop reason: HOSPADM

## 2024-06-05 RX ORDER — HEPARIN SODIUM (PORCINE) LOCK FLUSH IV SOLN 100 UNIT/ML 100 UNIT/ML
5 SOLUTION INTRAVENOUS
Status: DISCONTINUED | OUTPATIENT
Start: 2024-06-05 | End: 2024-06-05 | Stop reason: HOSPADM

## 2024-06-05 RX ORDER — ALBUTEROL SULFATE 90 UG/1
1-2 AEROSOL, METERED RESPIRATORY (INHALATION)
Status: DISCONTINUED | OUTPATIENT
Start: 2024-06-05 | End: 2024-06-05 | Stop reason: HOSPADM

## 2024-06-05 RX ORDER — EPINEPHRINE 1 MG/ML
0.3 INJECTION, SOLUTION INTRAMUSCULAR; SUBCUTANEOUS EVERY 5 MIN PRN
Status: DISCONTINUED | OUTPATIENT
Start: 2024-06-05 | End: 2024-06-05 | Stop reason: HOSPADM

## 2024-06-05 RX ADMIN — HEPARIN 5 ML: 100 SYRINGE at 12:08

## 2024-06-05 RX ADMIN — DEXAMETHASONE SODIUM PHOSPHATE 12 MG: 10 INJECTION, SOLUTION INTRAMUSCULAR; INTRAVENOUS at 10:15

## 2024-06-05 RX ADMIN — SODIUM CHLORIDE 250 ML: 9 INJECTION, SOLUTION INTRAVENOUS at 09:57

## 2024-06-05 RX ADMIN — ETOPOSIDE 200 MG: 20 INJECTION, SOLUTION INTRAVENOUS at 10:31

## 2024-06-05 NOTE — PROGRESS NOTES
Infusion Nursing Note:  Louise Corado presents today for D3C2.    Patient seen by provider today: No   present during visit today: Not Applicable.    Note: Tolerated treatment well, Offers no complaints, all questions answered  Discharge to home ambulating with sister..      Intravenous Access:  PAC    Treatment Conditions:  Results reviewed, labs MET treatment parameters, ok to proceed with treatment.      Post Infusion Assessment:  Patient tolerated infusion without incident.  Site patent and intact, free from redness, edema or discomfort.  No evidence of extravasations.  Access discontinued per protocol.       Discharge Plan:   Patient and/or family verbalized understanding of discharge instructions and all questions answered.      Roslyn Echavarria RN    Quality 431: Preventive Care And Screening: Unhealthy Alcohol Use - Screening: Patient screened for unhealthy alcohol use using a single question and scores less than 2 times per year Quality 110: Preventive Care And Screening: Influenza Immunization: Influenza Immunization Ordered or Recommended, but not Administered Detail Level: Zone Quality 226: Preventive Care And Screening: Tobacco Use: Screening And Cessation Intervention: Patient screened for tobacco and never smoked Quality 131: Pain Assessment And Follow-Up: Pain assessment using a standardized tool is documented as negative, no follow-up plan required

## 2024-06-07 ENCOUNTER — DOCUMENTATION ONLY (OUTPATIENT)
Dept: ONCOLOGY | Facility: HOSPITAL | Age: 59
End: 2024-06-07

## 2024-06-07 ENCOUNTER — OFFICE VISIT (OUTPATIENT)
Dept: FAMILY MEDICINE | Facility: CLINIC | Age: 59
End: 2024-06-07
Payer: COMMERCIAL

## 2024-06-07 VITALS
HEART RATE: 73 BPM | RESPIRATION RATE: 14 BRPM | WEIGHT: 210.8 LBS | TEMPERATURE: 98.1 F | OXYGEN SATURATION: 96 % | BODY MASS INDEX: 37.35 KG/M2 | HEIGHT: 63 IN | SYSTOLIC BLOOD PRESSURE: 134 MMHG | DIASTOLIC BLOOD PRESSURE: 60 MMHG

## 2024-06-07 DIAGNOSIS — R73.09 ELEVATED HEMOGLOBIN A1C: ICD-10-CM

## 2024-06-07 DIAGNOSIS — C7B.8 NEUROENDOCRINE CARCINOMA METASTATIC TO LIVER (H): ICD-10-CM

## 2024-06-07 DIAGNOSIS — C7A.8 NEUROENDOCRINE CARCINOMA METASTATIC TO LIVER (H): ICD-10-CM

## 2024-06-07 DIAGNOSIS — Z78.9 HEPATITIS B VACCINATION STATUS UNKNOWN: ICD-10-CM

## 2024-06-07 DIAGNOSIS — R73.09 ELEVATED GLUCOSE: Primary | ICD-10-CM

## 2024-06-07 DIAGNOSIS — Z92.241 HISTORY OF RECENT STEROID USE: ICD-10-CM

## 2024-06-07 DIAGNOSIS — E78.2 MIXED HYPERLIPIDEMIA: ICD-10-CM

## 2024-06-07 PROCEDURE — 99214 OFFICE O/P EST MOD 30 MIN: CPT | Performed by: STUDENT IN AN ORGANIZED HEALTH CARE EDUCATION/TRAINING PROGRAM

## 2024-06-07 PROCEDURE — 87340 HEPATITIS B SURFACE AG IA: CPT | Performed by: STUDENT IN AN ORGANIZED HEALTH CARE EDUCATION/TRAINING PROGRAM

## 2024-06-07 PROCEDURE — 80061 LIPID PANEL: CPT | Performed by: STUDENT IN AN ORGANIZED HEALTH CARE EDUCATION/TRAINING PROGRAM

## 2024-06-07 PROCEDURE — 86706 HEP B SURFACE ANTIBODY: CPT | Performed by: STUDENT IN AN ORGANIZED HEALTH CARE EDUCATION/TRAINING PROGRAM

## 2024-06-07 PROCEDURE — 82570 ASSAY OF URINE CREATININE: CPT | Performed by: STUDENT IN AN ORGANIZED HEALTH CARE EDUCATION/TRAINING PROGRAM

## 2024-06-07 PROCEDURE — 82947 ASSAY GLUCOSE BLOOD QUANT: CPT | Performed by: STUDENT IN AN ORGANIZED HEALTH CARE EDUCATION/TRAINING PROGRAM

## 2024-06-07 PROCEDURE — 82043 UR ALBUMIN QUANTITATIVE: CPT | Performed by: STUDENT IN AN ORGANIZED HEALTH CARE EDUCATION/TRAINING PROGRAM

## 2024-06-07 PROCEDURE — G2211 COMPLEX E/M VISIT ADD ON: HCPCS | Performed by: STUDENT IN AN ORGANIZED HEALTH CARE EDUCATION/TRAINING PROGRAM

## 2024-06-07 PROCEDURE — 36415 COLL VENOUS BLD VENIPUNCTURE: CPT | Performed by: STUDENT IN AN ORGANIZED HEALTH CARE EDUCATION/TRAINING PROGRAM

## 2024-06-07 RX ORDER — METFORMIN HCL 500 MG
500 TABLET, EXTENDED RELEASE 24 HR ORAL
Qty: 90 TABLET | Refills: 0 | Status: SHIPPED | OUTPATIENT
Start: 2024-06-07

## 2024-06-07 NOTE — PROGRESS NOTES
Assessment & Plan     Elevated glucose  Elevated hemoglobin A1c  History of recent steroid use  Louise is a 59-year-old female with metastatic neuroendocrine carcinoma, and elevated glucose and A1c on recent lab testing.  A1c on April 30 was 6.8%.  Fasting glucose was 122.  Did not meet ADA criteria for diabetes.  There have been several other blood sugar readings done, very difficult for me to ascertain whether these were done fasting.  I recommend repeating fasting glucose to assess for diabetes.  Do believe that this likely will be increased due to recent steroid use.  She will be getting dexamethasone every 3 weeks as part of her chemotherapy.    Although diabetes may be diagnosed, there will be some question as to whether this was caused by prednisone or not.  Of note, her lab work done on April 30 was prior to any steroid use, and does show lab work very near diabetes diagnosed already.    Regardless, I recommend that we start metformin at 500 mg XR, which I would start regardless if she remains in prediabetic range versus diabetic.  She is agreeable and we will start this today.    If diabetic, would also recommend to start rosuvastatin 5 mg daily, and aspirin 81 mg for primary prevention.  LDL goal be below 70.    She reports understanding of plan, we will contact patient when results return.    Mixed hyperlipidemia  Patient at low risk for ASCVD per 10-year estimated risk ACC calculator.    The 10-year ASCVD risk score (Fishers DK, et al., 2019) is: 4.2%    Values used to calculate the score:      Age: 59 years      Sex: Female      Is Non- : No      Diabetic: No      Tobacco smoker: No      Systolic Blood Pressure: 134 mmHg      Is BP treated: No      HDL Cholesterol: 34 mg/dL      Total Cholesterol: 212 mg/dL    Do not recommend lipid-lowering medication unless diabetes is diagnosed.    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 Barreto, currently  undergoing chemotherapy with carb and etoposide per review of notes.  Planning for at least 6 cycles, they are managing the side effects, however she reports that she still having some nausea, she wishes to speak to Dr. Barreto about other options other than ondansetron.    Hepatitis B vaccination status unknown  Recommend for vaccinations, if oncology is agreeable, would recommend for hepatitis B vaccine, and COVID vaccination.  Understand if this is not recommended at this point time, if due to decreased immunogenicity.  We will do at a different date if needed.bShe is unsure about hepatitis B status, she does think that she had this given to her before, we will obtain serology.  Did receive shingles vaccination on April 30, 2024, will defer to oncology whether this should be given during chemotherapy, does have a window of 2 to 6 months after last vaccination ideally.        Follow-up in 1 month, blood sugar checks.    Charlene Gil is a 59 year old, presenting for the following health issues:  Follow Up (Blood sugar check.)        6/7/2024     8:59 AM   Additional Questions   Roomed by JODI SHAFER     History of Present Illness       Diabetes:   She presents for follow up of diabetes.    She is not checking blood glucose.        She is concerned about other.    She is not experiencing numbness or burning in feet, excessive thirst, blurry vision, weight changes or redness, sores or blisters on feet.           Reason for visit:  Follow up on blood sugar / A1C - getting Dexamethasone with chemo.  cholesterol f/u.  F/U with and discuss Vaccines as not recommended with chemo drug Carboplatin.    She eats 0-1 servings of fruits and vegetables daily.She consumes 0 sweetened beverage(s) daily.She exercises with enough effort to increase her heart rate 9 or less minutes per day.  She exercises with enough effort to increase her heart rate 3 or less days per week.   She is taking medications regularly.       Reports that  "she is having nausea, but is tolerating the chemotherapy okay.  No other concerns or questions today.          Review of Systems  Constitutional, neuro, ENT, endocrine, pulmonary, cardiac, gastrointestinal, genitourinary, musculoskeletal, integument and psychiatric systems are negative, except as otherwise noted.      Objective    /60 (BP Location: Right arm, Patient Position: Sitting, Cuff Size: Adult Large)   Pulse 73   Temp 98.1  F (36.7  C) (Oral)   Resp 14   Ht 1.6 m (5' 3\")   Wt 95.6 kg (210 lb 12.8 oz)   SpO2 96%   BMI 37.34 kg/m    Body mass index is 37.34 kg/m .  Physical Exam   GENERAL: alert and no distress  EYES: Eyes grossly normal to inspection, PERRL and conjunctivae and sclerae normal  HENT: ear canals and TM's normal, nose and mouth without ulcers or lesions  NECK: no adenopathy, no asymmetry, masses, or scars  RESP: lungs clear to auscultation - no rales, rhonchi or wheezes  CV: regular rate and rhythm, normal S1 S2, no S3 or S4, no murmur, click or rub, no peripheral edema  ABDOMEN: soft, nontender, no hepatosplenomegaly, no masses and bowel sounds normal  MS: no gross musculoskeletal defects noted, no edema  SKIN: no suspicious lesions or rashes  NEURO: Normal strength and tone, mentation intact and speech normal  PSYCH: mentation appears normal, affect normal/bright    Lab on 06/03/2024   Component Date Value Ref Range Status    Sodium 06/03/2024 141  135 - 145 mmol/L Final    Reference intervals for this test were updated on 09/26/2023 to more accurately reflect our healthy population. There may be differences in the flagging of prior results with similar values performed with this method. Interpretation of those prior results can be made in the context of the updated reference intervals.     Potassium 06/03/2024 4.1  3.4 - 5.3 mmol/L Final    Carbon Dioxide (CO2) 06/03/2024 26  22 - 29 mmol/L Final    Anion Gap 06/03/2024 12  7 - 15 mmol/L Final    Urea Nitrogen 06/03/2024 8.1  " 6.0 - 20.0 mg/dL Final    Creatinine 06/03/2024 0.70  0.51 - 0.95 mg/dL Final    GFR Estimate 06/03/2024 >90  >60 mL/min/1.73m2 Final    Calcium 06/03/2024 9.2  8.6 - 10.0 mg/dL Final    Chloride 06/03/2024 103  98 - 107 mmol/L Final    Glucose 06/03/2024 179 (H)  70 - 99 mg/dL Final    Alkaline Phosphatase 06/03/2024 92  40 - 150 U/L Final    AST 06/03/2024 13  0 - 45 U/L Final    Reference intervals for this test were updated on 6/12/2023 to more accurately reflect our healthy population. There may be differences in the flagging of prior results with similar values performed with this method. Interpretation of those prior results can be made in the context of the updated reference intervals.    ALT 06/03/2024 <5  0 - 50 U/L Final    Reference intervals for this test were updated on 6/12/2023 to more accurately reflect our healthy population. There may be differences in the flagging of prior results with similar values performed with this method. Interpretation of those prior results can be made in the context of the updated reference intervals.      Protein Total 06/03/2024 7.3  6.4 - 8.3 g/dL Final    Albumin 06/03/2024 3.8  3.5 - 5.2 g/dL Final    Bilirubin Total 06/03/2024 0.3  <=1.2 mg/dL Final    WBC Count 06/03/2024 3.9 (L)  4.0 - 11.0 10e3/uL Final    Preliminary ANC is 2.30    RBC Count 06/03/2024 4.00  3.80 - 5.20 10e6/uL Final    Hemoglobin 06/03/2024 10.7 (L)  11.7 - 15.7 g/dL Final    Hematocrit 06/03/2024 32.8 (L)  35.0 - 47.0 % Final    MCV 06/03/2024 82  78 - 100 fL Final    MCH 06/03/2024 26.8  26.5 - 33.0 pg Final    MCHC 06/03/2024 32.6  31.5 - 36.5 g/dL Final    RDW 06/03/2024 14.2  10.0 - 15.0 % Final    Platelet Count 06/03/2024 409  150 - 450 10e3/uL Final    NRBCs per 100 WBC 06/03/2024 0  <1 /100 Final    Absolute NRBCs 06/03/2024 0.0  10e3/uL Final    % Neutrophils 06/03/2024 57  % Final    % Lymphocytes 06/03/2024 24  % Final    % Monocytes 06/03/2024 12  % Final    % Eosinophils  06/03/2024 1  % Final    % Basophils 06/03/2024 1  % Final    % Metamyelocytes 06/03/2024 2  % Final    % Myelocytes 06/03/2024 3  % Final    Absolute Neutrophils 06/03/2024 2.2  1.6 - 8.3 10e3/uL Final    Absolute Lymphocytes 06/03/2024 0.9  0.8 - 5.3 10e3/uL Final    Absolute Monocytes 06/03/2024 0.5  0.0 - 1.3 10e3/uL Final    Absolute Eosinophils 06/03/2024 0.0  0.0 - 0.7 10e3/uL Final    Absolute Basophils 06/03/2024 0.0  0.0 - 0.2 10e3/uL Final    Absolute Metamyelocytes 06/03/2024 0.1 (H)  <=0.0 10e3/uL Final    Absolute Myelocytes 06/03/2024 0.1 (H)  <=0.0 10e3/uL Final    RBC Morphology 06/03/2024 Confirmed RBC Indices   Final    Platelet Assessment 06/03/2024 Automated Count Confirmed. Platelet morphology is normal.  Automated Count Confirmed. Platelet morphology is normal. Final    Magnesium 06/03/2024 2.3  1.7 - 2.3 mg/dL Final     Lab Results   Component Value Date    A1C 6.8 04/30/2024     The longitudinal plan of care for the diagnosis(es)/condition(s) as documented were addressed during this visit. Due to the added complexity in care, I will continue to support Louise in the subsequent management and with ongoing continuity of care.          Signed Electronically by: Kee Mccullough MD

## 2024-06-07 NOTE — PROGRESS NOTES
Form Type: FMLA     Care Team Physician: Dr. Man Barreto    Date Faxed: 06/07/2024    Recipient: [employer] at [phone]    Copy of Forms Sent to MR: YES    Pt requested copy of completed paperwork to be left at the  to be picked up

## 2024-06-07 NOTE — PATIENT INSTRUCTIONS
I would consider shingrix vaccination (July - Nov), hepatitis B vaccination (pending serology labwork) and RSV in the fall (due to immunocompromised state).

## 2024-06-08 LAB
CHOLEST SERPL-MCNC: 171 MG/DL
CREAT UR-MCNC: 105 MG/DL
FASTING STATUS PATIENT QL REPORTED: YES
FASTING STATUS PATIENT QL REPORTED: YES
GLUCOSE SERPL-MCNC: 121 MG/DL (ref 70–99)
HBV SURFACE AB SERPL IA-ACNC: >1000 M[IU]/ML
HBV SURFACE AB SERPL IA-ACNC: REACTIVE M[IU]/ML
HBV SURFACE AG SERPL QL IA: NONREACTIVE
HDLC SERPL-MCNC: 35 MG/DL
LDLC SERPL CALC-MCNC: 98 MG/DL
MICROALBUMIN UR-MCNC: 19.3 MG/L
MICROALBUMIN/CREAT UR: 18.38 MG/G CR (ref 0–25)
NONHDLC SERPL-MCNC: 136 MG/DL
TRIGL SERPL-MCNC: 190 MG/DL

## 2024-06-11 NOTE — PROGRESS NOTES
[facility] Hematology and Oncology Outpatient Progress Note    Patient: Louise Corado  MRN: [id number]  Date of Service: Jun 12, 2024          Reason for Visit    No chief complaint on file.      Cancer Staging   No matching staging information was found for the patient.      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 Nausea was the most prevalent s/e and she felt the Zofran wasn't super helpful. She does have some ongoing pain in her right chest wall, that is managed well with oxycodone and tylenol. We reviewed labs today including CMP which is entirely unremarkable and CBC, which shows a decreased hgb of 9.8, decreased WBC of 2.0 with ANC of 1.3, platelets are normal.   Return on 6/24 for consideration of next cycle of treatment as planned + labs and to review CT scan results.   Will image after cycles 2, 4, and 6.  Will plan for at least 6 cycles.  Next generation sequencing is in progress as is cancerTYPE ID.  Compazine rx sent in to be used alternating with Zofran.   CT scan planned for 6/19.  Thoracentesis to be done 6/21 if possible. Will evaluate CT scan on 6/19 to see if fluid is increasing in the lung and will cancel Thoracentesis if not a significant amount noted.   Continue Oxycodone and Tylenol for pain management.   ______________________________________________________________________________    History of Present Illness/ Interval History    Ms. Louise Corado  is a 59 year old female patient who comes in for follow up. She is doing ok. She notes that the second cycle was harder than the first - Nausea has been the biggest side effect. She has ongoing pain  in the chest cavity since starting  "thoracenteses (has had 3 so far). Has noticed some more SOB and wonders if she is due for another tap off her lung. She noted some constipation following the first cycle. Has taken senna and once she needed a bisacodyl. She has a poor appetite, but can drink well. Takes long acting ms daily, using oxycodone and tylenol for breakthrough.     ECOG performance status   0- Fully active, without restriction      Pain       ROS  A 14 point review of systems was obtained.  Positive findings noted in the history.  The remainder of the review of system is otherwise negative.      Oncology History/Treatment  Diagnosis:    1.  Metastatic neuroendocrine carcinoma, poorly differentiated: Diagnosed April 2024 from a liver biopsy.  Her PET/CT shows malignant involvement of the liver, mediastinal nodes, left upper quadrant omentum, manubrium, and bilateral pleura.  There is some wall thickening and FDG activity in a short segment of the terminal ileum.    Treatment:    5/13/2024 - Carboplatin Etoposide initiated      Physical Exam    /74   Pulse 80   Temp 99.1  F (37.3  C) (Tympanic)   Ht 1.6 m (5' 2.99\")   Wt 92.4 kg (203 lb 11.2 oz)   SpO2 98%   BMI 36.09 kg/m      GENERAL: Alert and oriented to time place and person. Seated comfortably. In no distress.  HEAD: Atraumatic and normocephalic. No alopecia.  EYES: LEV, EOMI. No erythema. No icterus.  ORAL CAVITY: Moist. No mucosal lesion or tonsillar enlargement.  NECK: supple. No thyroid enlargement.  LYMPH NODES: No palpable supraclavicular, cervical, axillary or inguinal lymphadenopathy.  CHEST: clear to auscultation bilaterally. Resonant to percussion throughout bilaterally. Symmetrical breath movements bilaterally.  CVS: RRR  ABDOMEN: Soft. Not tender. Not distended. No palpable hepatomegaly or splenomegaly. No other mass palpable. Bowel sounds present.  EXTREMITIES: Warm. No peripheral edema.  SKIN: no rash, or bruising or purpura.   NEURO: No gross deficit noted. " Non-antalgic gait.      Lab Results    Recent Results (from the past 168 hour(s))   Albumin Random Urine Quantitative with Creat Ratio   Result Value Ref Range    Creatinine Urine mg/dL 105.0 mg/dL    Albumin Urine mg/L 19.3 mg/L    Albumin Urine mg/g Cr 18.38 0.00 - 25.00 mg/g Cr   Glucose   Result Value Ref Range    Glucose 121 (H) 70 - 99 mg/dL    Patient Fasting > 8hrs? Yes    Lipid panel reflex to direct LDL Fasting   Result Value Ref Range    Cholesterol 171 <200 mg/dL    Triglycerides 190 (H) <150 mg/dL    Direct Measure HDL 35 (L) >=50 mg/dL    LDL Cholesterol Calculated 98 <=100 mg/dL    Non HDL Cholesterol 136 (H) <130 mg/dL    Patient Fasting > 8hrs? Yes    Hepatitis B Surface Antibody   Result Value Ref Range    Hepatitis B Surface Antibody Reactive     Hepatitis B Surface Antibody Instrument Value >1,000.00 <8.5 m[IU]/mL   Hepatitis B surface antigen   Result Value Ref Range    Hepatitis B Surface Antigen Nonreactive Nonreactive     I reviewed the above labs today.  Imaging    Echocardiogram Complete    Result Date: 2024  [id number] [address]  Name: RAYA PARHAM MRN: [id number] : 1965 Study Date: 2024 10:45 AM Age: 59 yrs Gender: Female Patient Location: [facility] Reason For Study: Systolic murmur Ordering Physician: WOLF DENG Referring Physician: WOLF DENG Performed By: AD  BSA: 2.0 m2 Height: 63 in Weight: 212 lb HR: 85 ______________________________________________________________________________ Procedure Complete Echo Adult. ______________________________________________________________________________ Interpretation Summary  Left ventricular size, wall motion and function are normal. The ejection fraction is 60-65%. Normal right ventricle size and systolic function. No hemodynamically significant valvular abnormalities on 2D or color flow imaging.  ______________________________________________________________________________ Left Ventricle Left ventricular size, wall motion and function are normal. The ejection fraction is 60-65%. There is normal left ventricular wall thickness. Left ventricular diastolic function is normal. No regional wall motion abnormalities noted.  Right Ventricle Normal right ventricle size and systolic function.  Atria Normal left atrial size. Right atrial size is normal. There is no color Doppler evidence of an atrial shunt.  Mitral Valve Mitral valve leaflets appear normal. There is no evidence of mitral stenosis or clinically significant mitral regurgitation.  Tricuspid Valve Tricuspid valve leaflets appear normal. There is trace tricuspid regurgitation.  Aortic Valve The aortic valve is not well visualized. Mild aortic valve calcification is present. No aortic regurgitation is present. No hemodynamically significant valvular aortic stenosis.  Pulmonic Valve The pulmonic valve is not well seen, but is grossly normal. This degree of valvular regurgitation is within normal limits.  Vessels The aorta root is normal. Normal size ascending aorta. IVC diameter <2.1 cm collapsing >50% with sniff suggests a normal RA pressure of 3 mmHg.  Pericardium There is no pericardial effusion.  ______________________________________________________________________________ MMode/2D Measurements & Calculations IVSd: 0.99 cm LVIDd: 4.6 cm LVIDs: 2.7 cm LVPWd: 1.4 cm FS: 41.9 %  LV mass(C)d: 197.3 grams LV mass(C)dI: 99.5 grams/m2 Ao root diam: 2.8 cm LA dimension: 4.2 cm asc Aorta Diam: 3.0 cm LA/Ao: 1.5 LVOT diam: 1.8 cm LVOT area: 2.5 cm2 Ao root diam index Ht(cm/m): 1.7 Ao root diam index BSA (cm/m2): 1.4 Asc Ao diam index BSA (cm/m2): 1.5 Asc Ao diam index Ht(cm/m): 1.9 EF Biplane: 63.0 % LA Volume Indexed (AL/bp): 29.4 ml/m2  RV Base: 3.4 cm RWT: 0.60 TAPSE: 2.3 cm  Time Measurements MM HR: 85.0 BPM  Doppler Measurements & Calculations MV E max spring:  116.0 cm/sec MV A max french: 115.0 cm/sec MV E/A: 1.0 MV max P.9 mmHg MV mean PG: 3.0 mmHg MV V2 VTI: 33.9 cm MVA(VTI): 2.5 cm2 MV dec slope: 463.0 cm/sec2 MV dec time: 0.25 sec Ao V2 max: 200.5 cm/sec Ao max P.0 mmHg Ao V2 mean: 137.0 cm/sec Ao mean P.0 mmHg Ao V2 VTI: 39.9 cm SINGH(I,D): 2.2 cm2 SINGH(V,D): 2.2 cm2 LV V1 max P.1 mmHg LV V1 max: 174.0 cm/sec LV V1 VTI: 33.8 cm SV(LVOT): 86.0 ml SI(LVOT): 43.4 ml/m2 PA acc time: 0.08 sec AV French Ratio (DI): 0.87  SINGH Index (cm2/m2): 1.1 E/E': 10.9 E/E' avg: 10.4 Lateral E/e': 9.8 Medial E/e': 10.9 Peak E' French: 10.6 cm/sec RV S French: 15.8 cm/sec  ______________________________________________________________________________ Report approved by: Zoey Alvarado 2024 12:47 PM          Billing  Total time 60 minutes, to include face to face visit, review of EMR, ordering, documentation and coordination of care on date of service   complexity modifier for longitudinal care.     Signed by: ABDIAZIZ Mendez CNP

## 2024-06-12 ENCOUNTER — ONCOLOGY VISIT (OUTPATIENT)
Dept: ONCOLOGY | Facility: HOSPITAL | Age: 59
End: 2024-06-12
Payer: COMMERCIAL

## 2024-06-12 ENCOUNTER — LAB (OUTPATIENT)
Dept: INFUSION THERAPY | Facility: HOSPITAL | Age: 59
End: 2024-06-12
Payer: COMMERCIAL

## 2024-06-12 VITALS
HEIGHT: 63 IN | SYSTOLIC BLOOD PRESSURE: 122 MMHG | TEMPERATURE: 99.1 F | HEART RATE: 80 BPM | BODY MASS INDEX: 36.09 KG/M2 | OXYGEN SATURATION: 98 % | WEIGHT: 203.7 LBS | DIASTOLIC BLOOD PRESSURE: 74 MMHG

## 2024-06-12 DIAGNOSIS — C7A.8 NEUROENDOCRINE CARCINOMA METASTATIC TO LIVER (H): Primary | ICD-10-CM

## 2024-06-12 DIAGNOSIS — R07.1 PAINFUL RESPIRATION: ICD-10-CM

## 2024-06-12 DIAGNOSIS — G89.3 CANCER RELATED PAIN: ICD-10-CM

## 2024-06-12 DIAGNOSIS — R11.0 CHEMOTHERAPY-INDUCED NAUSEA: ICD-10-CM

## 2024-06-12 DIAGNOSIS — T45.1X5A CHEMOTHERAPY-INDUCED NAUSEA: ICD-10-CM

## 2024-06-12 DIAGNOSIS — J90 PLEURAL EFFUSION: ICD-10-CM

## 2024-06-12 DIAGNOSIS — C78.7 METASTASES TO THE LIVER (H): ICD-10-CM

## 2024-06-12 DIAGNOSIS — C7B.8 NEUROENDOCRINE CARCINOMA METASTATIC TO LIVER (H): Primary | ICD-10-CM

## 2024-06-12 LAB
ALBUMIN SERPL BCG-MCNC: 3.8 G/DL (ref 3.5–5.2)
ALP SERPL-CCNC: 90 U/L (ref 40–150)
ALT SERPL W P-5'-P-CCNC: 8 U/L (ref 0–50)
ANION GAP SERPL CALCULATED.3IONS-SCNC: 11 MMOL/L (ref 7–15)
AST SERPL W P-5'-P-CCNC: 11 U/L (ref 0–45)
BASOPHILS # BLD AUTO: 0 10E3/UL (ref 0–0.2)
BASOPHILS NFR BLD AUTO: 1 %
BILIRUB SERPL-MCNC: 0.3 MG/DL
BUN SERPL-MCNC: 7.6 MG/DL (ref 6–20)
CALCIUM SERPL-MCNC: 8.8 MG/DL (ref 8.6–10)
CHLORIDE SERPL-SCNC: 102 MMOL/L (ref 98–107)
CREAT SERPL-MCNC: 0.62 MG/DL (ref 0.51–0.95)
DEPRECATED HCO3 PLAS-SCNC: 26 MMOL/L (ref 22–29)
EGFRCR SERPLBLD CKD-EPI 2021: >90 ML/MIN/1.73M2
EOSINOPHIL # BLD AUTO: 0 10E3/UL (ref 0–0.7)
EOSINOPHIL NFR BLD AUTO: 0 %
ERYTHROCYTE [DISTWIDTH] IN BLOOD BY AUTOMATED COUNT: 14.2 % (ref 10–15)
GLUCOSE SERPL-MCNC: 120 MG/DL (ref 70–99)
HCT VFR BLD AUTO: 30.1 % (ref 35–47)
HGB BLD-MCNC: 9.8 G/DL (ref 11.7–15.7)
IMM GRANULOCYTES # BLD: 0 10E3/UL
IMM GRANULOCYTES NFR BLD: 1 %
LYMPHOCYTES # BLD AUTO: 0.6 10E3/UL (ref 0.8–5.3)
LYMPHOCYTES NFR BLD AUTO: 31 %
MAGNESIUM SERPL-MCNC: 2 MG/DL (ref 1.7–2.3)
MCH RBC QN AUTO: 26.3 PG (ref 26.5–33)
MCHC RBC AUTO-ENTMCNC: 32.6 G/DL (ref 31.5–36.5)
MCV RBC AUTO: 81 FL (ref 78–100)
MONOCYTES # BLD AUTO: 0.1 10E3/UL (ref 0–1.3)
MONOCYTES NFR BLD AUTO: 4 %
NEUTROPHILS # BLD AUTO: 1.3 10E3/UL (ref 1.6–8.3)
NEUTROPHILS NFR BLD AUTO: 65 %
NRBC # BLD AUTO: 0 10E3/UL
NRBC BLD AUTO-RTO: 0 /100
PLATELET # BLD AUTO: 200 10E3/UL (ref 150–450)
POTASSIUM SERPL-SCNC: 3.8 MMOL/L (ref 3.4–5.3)
PROT SERPL-MCNC: 7 G/DL (ref 6.4–8.3)
RBC # BLD AUTO: 3.73 10E6/UL (ref 3.8–5.2)
SODIUM SERPL-SCNC: 139 MMOL/L (ref 135–145)
WBC # BLD AUTO: 2 10E3/UL (ref 4–11)

## 2024-06-12 PROCEDURE — 85004 AUTOMATED DIFF WBC COUNT: CPT

## 2024-06-12 PROCEDURE — 99214 OFFICE O/P EST MOD 30 MIN: CPT

## 2024-06-12 PROCEDURE — 83735 ASSAY OF MAGNESIUM: CPT

## 2024-06-12 PROCEDURE — 99417 PROLNG OP E/M EACH 15 MIN: CPT

## 2024-06-12 PROCEDURE — 99215 OFFICE O/P EST HI 40 MIN: CPT

## 2024-06-12 PROCEDURE — G2211 COMPLEX E/M VISIT ADD ON: HCPCS

## 2024-06-12 PROCEDURE — 36591 DRAW BLOOD OFF VENOUS DEVICE: CPT

## 2024-06-12 PROCEDURE — 84155 ASSAY OF PROTEIN SERUM: CPT

## 2024-06-12 RX ORDER — HEPARIN SODIUM (PORCINE) LOCK FLUSH IV SOLN 100 UNIT/ML 100 UNIT/ML
5 SOLUTION INTRAVENOUS
Status: CANCELLED | OUTPATIENT
Start: 2024-06-12

## 2024-06-12 RX ORDER — PROCHLORPERAZINE MALEATE 10 MG
10 TABLET ORAL EVERY 6 HOURS PRN
Qty: 30 TABLET | Refills: 1 | Status: SHIPPED | OUTPATIENT
Start: 2024-06-12 | End: 2024-07-23

## 2024-06-12 RX ORDER — HEPARIN SODIUM (PORCINE) LOCK FLUSH IV SOLN 100 UNIT/ML 100 UNIT/ML
5 SOLUTION INTRAVENOUS
Status: DISCONTINUED | OUTPATIENT
Start: 2024-06-12 | End: 2024-06-12 | Stop reason: HOSPADM

## 2024-06-12 ASSESSMENT — PAIN SCALES - GENERAL: PAINLEVEL: MODERATE PAIN (4)

## 2024-06-12 NOTE — PROGRESS NOTES
"Oncology Rooming Note    June 12, 2024 2:34 PM   Louise Corado is a 59 year old female who presents for:    Chief Complaint   Patient presents with    Oncology Clinic Visit     Returning patient consult: Neuroendocrine carcinoma metastatic to liver follow up, labs results.     Initial Vitals: /74   Pulse 80   Temp 99.1  F (37.3  C) (Tympanic)   Ht 1.6 m (5' 2.99\")   Wt 92.4 kg (203 lb 11.2 oz)   SpO2 98%   BMI 36.09 kg/m   Estimated body mass index is 36.09 kg/m  as calculated from the following:    Height as of this encounter: 1.6 m (5' 2.99\").    Weight as of this encounter: 92.4 kg (203 lb 11.2 oz). Body surface area is 2.03 meters squared.  Moderate Pain (4) Comment: Data Unavailable   No LMP recorded. Patient is postmenopausal.  Allergies reviewed: Yes  Medications reviewed: Yes    Medications: Medication refills not needed today.  Pharmacy name entered into [employer]:    [id number] [place], [place] - [address] [id number] [place], [place] - [address] AT [facility]    Frailty Screening:   Is the patient here for a new oncology consult visit in cancer care? 2. No      Clinical concerns:  RETURN CCSL - labs results.      Debi Jones MA              "

## 2024-06-12 NOTE — LETTER
6/12/2024      Louise Corado  [address] [place] [address]      Dear Colleague,    Thank you for referring your patient, Louise Corado, to the [facility] [place]. Please see a copy of my visit note below.    [facility] Hematology and Oncology Outpatient Progress Note    Patient: Louise Corado  MRN: [id number]  Date of Service: Jun 12, 2024          Reason for Visit    No chief complaint on file.      Cancer Staging   No matching staging information was found for the patient.      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 Nausea was the most prevalent s/e and she felt the Zofran wasn't super helpful. She does have some ongoing pain in her right chest wall, that is managed well with oxycodone and tylenol. We reviewed labs today including CMP which is entirely unremarkable and CBC, which shows a decreased hgb of 9.8, decreased WBC of 2.0 with ANC of 1.3, platelets are normal.   Return on 6/24 for consideration of next cycle of treatment as planned + labs and to review CT scan results.   Will image after cycles 2, 4, and 6.  Will plan for at least 6 cycles.  Next generation sequencing is in progress as is cancerTYPE ID.  Compazine rx sent in to be used alternating with Zofran.   CT scan planned for 6/19.  Thoracentesis to be done 6/21 if possible. Will evaluate CT scan on 6/19 to see if fluid is increasing in the lung and will cancel Thoracentesis if not a significant amount noted.   Continue Oxycodone and Tylenol for pain management.   ______________________________________________________________________________    History of Present Illness/ Interval History    Ms. Louise Corado  is  "a 59 year old female patient who comes in for follow up. She is doing ok. She notes that the second cycle was harder than the first - Nausea has been the biggest side effect. She has ongoing pain  in the chest cavity since starting thoracenteses (has had 3 so far). Has noticed some more SOB and wonders if she is due for another tap off her lung. She noted some constipation following the first cycle. Has taken senna and once she needed a bisacodyl. She has a poor appetite, but can drink well. Takes long acting ms daily, using oxycodone and tylenol for breakthrough.     ECOG performance status   0- Fully active, without restriction      Pain       ROS  A 14 point review of systems was obtained.  Positive findings noted in the history.  The remainder of the review of system is otherwise negative.      Oncology History/Treatment  Diagnosis:    1.  Metastatic neuroendocrine carcinoma, poorly differentiated: Diagnosed April 2024 from a liver biopsy.  Her PET/CT shows malignant involvement of the liver, mediastinal nodes, left upper quadrant omentum, manubrium, and bilateral pleura.  There is some wall thickening and FDG activity in a short segment of the terminal ileum.    Treatment:    5/13/2024 - Carboplatin Etoposide initiated      Physical Exam    /74   Pulse 80   Temp 99.1  F (37.3  C) (Tympanic)   Ht 1.6 m (5' 2.99\")   Wt 92.4 kg (203 lb 11.2 oz)   SpO2 98%   BMI 36.09 kg/m      GENERAL: Alert and oriented to time place and person. Seated comfortably. In no distress.  HEAD: Atraumatic and normocephalic. No alopecia.  EYES: LEV, EOMI. No erythema. No icterus.  ORAL CAVITY: Moist. No mucosal lesion or tonsillar enlargement.  NECK: supple. No thyroid enlargement.  LYMPH NODES: No palpable supraclavicular, cervical, axillary or inguinal lymphadenopathy.  CHEST: clear to auscultation bilaterally. Resonant to percussion throughout bilaterally. Symmetrical breath movements bilaterally.  CVS: RRR  ABDOMEN: " Soft. Not tender. Not distended. No palpable hepatomegaly or splenomegaly. No other mass palpable. Bowel sounds present.  EXTREMITIES: Warm. No peripheral edema.  SKIN: no rash, or bruising or purpura.   NEURO: No gross deficit noted. Non-antalgic gait.      Lab Results    Recent Results (from the past 168 hour(s))   Albumin Random Urine Quantitative with Creat Ratio   Result Value Ref Range    Creatinine Urine mg/dL 105.0 mg/dL    Albumin Urine mg/L 19.3 mg/L    Albumin Urine mg/g Cr 18.38 0.00 - 25.00 mg/g Cr   Glucose   Result Value Ref Range    Glucose 121 (H) 70 - 99 mg/dL    Patient Fasting > 8hrs? Yes    Lipid panel reflex to direct LDL Fasting   Result Value Ref Range    Cholesterol 171 <200 mg/dL    Triglycerides 190 (H) <150 mg/dL    Direct Measure HDL 35 (L) >=50 mg/dL    LDL Cholesterol Calculated 98 <=100 mg/dL    Non HDL Cholesterol 136 (H) <130 mg/dL    Patient Fasting > 8hrs? Yes    Hepatitis B Surface Antibody   Result Value Ref Range    Hepatitis B Surface Antibody Reactive     Hepatitis B Surface Antibody Instrument Value >1,000.00 <8.5 m[IU]/mL   Hepatitis B surface antigen   Result Value Ref Range    Hepatitis B Surface Antigen Nonreactive Nonreactive     I reviewed the above labs today.  Imaging    Echocardiogram Complete    Result Date: 2024  [id number] [id number]  Name: RAYA PARHAM MRN: [id number] : 1965 Study Date: 2024 10:45 AM Age: 59 yrs Gender: Female Patient Location: [facility] Reason For Study: Systolic murmur Ordering Physician: WOLF DENG Referring Physician: WOLF DENG Performed By: AD  BSA: 2.0 m2 Height: 63 in Weight: 212 lb HR: 85 ______________________________________________________________________________ Procedure Complete Echo Adult. ______________________________________________________________________________ Interpretation Summary  Left ventricular size,  wall motion and function are normal. The ejection fraction is 60-65%. Normal right ventricle size and systolic function. No hemodynamically significant valvular abnormalities on 2D or color flow imaging. ______________________________________________________________________________ Left Ventricle Left ventricular size, wall motion and function are normal. The ejection fraction is 60-65%. There is normal left ventricular wall thickness. Left ventricular diastolic function is normal. No regional wall motion abnormalities noted.  Right Ventricle Normal right ventricle size and systolic function.  Atria Normal left atrial size. Right atrial size is normal. There is no color Doppler evidence of an atrial shunt.  Mitral Valve Mitral valve leaflets appear normal. There is no evidence of mitral stenosis or clinically significant mitral regurgitation.  Tricuspid Valve Tricuspid valve leaflets appear normal. There is trace tricuspid regurgitation.  Aortic Valve The aortic valve is not well visualized. Mild aortic valve calcification is present. No aortic regurgitation is present. No hemodynamically significant valvular aortic stenosis.  Pulmonic Valve The pulmonic valve is not well seen, but is grossly normal. This degree of valvular regurgitation is within normal limits.  Vessels The aorta root is normal. Normal size ascending aorta. IVC diameter <2.1 cm collapsing >50% with sniff suggests a normal RA pressure of 3 mmHg.  Pericardium There is no pericardial effusion.  ______________________________________________________________________________ MMode/2D Measurements & Calculations IVSd: 0.99 cm LVIDd: 4.6 cm LVIDs: 2.7 cm LVPWd: 1.4 cm FS: 41.9 %  LV mass(C)d: 197.3 grams LV mass(C)dI: 99.5 grams/m2 Ao root diam: 2.8 cm LA dimension: 4.2 cm asc Aorta Diam: 3.0 cm LA/Ao: 1.5 LVOT diam: 1.8 cm LVOT area: 2.5 cm2 Ao root diam index Ht(cm/m): 1.7 Ao root diam index BSA (cm/m2): 1.4 Asc Ao diam index BSA (cm/m2): 1.5 Asc Ao diam  "index Ht(cm/m): 1.9 EF Biplane: 63.0 % LA Volume Indexed (AL/bp): 29.4 ml/m2  RV Base: 3.4 cm RWT: 0.60 TAPSE: 2.3 cm  Time Measurements MM HR: 85.0 BPM  Doppler Measurements & Calculations MV E max french: 116.0 cm/sec MV A max french: 115.0 cm/sec MV E/A: 1.0 MV max P.9 mmHg MV mean PG: 3.0 mmHg MV V2 VTI: 33.9 cm MVA(VTI): 2.5 cm2 MV dec slope: 463.0 cm/sec2 MV dec time: 0.25 sec Ao V2 max: 200.5 cm/sec Ao max P.0 mmHg Ao V2 mean: 137.0 cm/sec Ao mean P.0 mmHg Ao V2 VTI: 39.9 cm SINGH(I,D): 2.2 cm2 SINGH(V,D): 2.2 cm2 LV V1 max P.1 mmHg LV V1 max: 174.0 cm/sec LV V1 VTI: 33.8 cm SV(LVOT): 86.0 ml SI(LVOT): 43.4 ml/m2 PA acc time: 0.08 sec AV French Ratio (DI): 0.87  SINGH Index (cm2/m2): 1.1 E/E': 10.9 E/E' avg: 10.4 Lateral E/e': 9.8 Medial E/e': 10.9 Peak E' French: 10.6 cm/sec RV S French: 15.8 cm/sec  ______________________________________________________________________________ Report approved by: Zoey Alvarado 2024 12:47 PM          Billing  Total time 60 minutes, to include face to face visit, review of EMR, ordering, documentation and coordination of care on date of service   complexity modifier for longitudinal care.     Signed by: ABDIAZIZ Mendez CNP    Oncology Rooming Note    2024 2:34 PM   Louise Corado is a 59 year old female who presents for:    Chief Complaint   Patient presents with     Oncology Clinic Visit     Returning patient consult: Neuroendocrine carcinoma metastatic to liver follow up, labs results.     Initial Vitals: /74   Pulse 80   Temp 99.1  F (37.3  C) (Tympanic)   Ht 1.6 m (5' 2.99\")   Wt 92.4 kg (203 lb 11.2 oz)   SpO2 98%   BMI 36.09 kg/m   Estimated body mass index is 36.09 kg/m  as calculated from the following:    Height as of this encounter: 1.6 m (5' 2.99\").    Weight as of this encounter: 92.4 kg (203 lb 11.2 oz). Body surface area is 2.03 meters squared.  Moderate Pain (4) Comment: Data Unavailable   No LMP recorded. Patient is " postmenopausal.  Allergies reviewed: Yes  Medications reviewed: Yes    Medications: Medication refills not needed today.  Pharmacy name entered into [facility]:    [id number] - [place], [place] - [address] AT [id number] - [place], [place] - [address] AT [facility]    Frailty Screening:   Is the patient here for a new oncology consult visit in cancer care? 2. No      Clinical concerns:  RETURN CCSL - labs results.      Debi Jones MA                Again, thank you for allowing me to participate in the care of your patient.        Sincerely,        ABDIAZIZ Mendez CNP

## 2024-06-13 DIAGNOSIS — R73.03 PREDIABETES: Primary | ICD-10-CM

## 2024-06-14 ENCOUNTER — DOCUMENTATION ONLY (OUTPATIENT)
Dept: ONCOLOGY | Facility: HOSPITAL | Age: 59
End: 2024-06-14
Payer: COMMERCIAL

## 2024-06-14 NOTE — PROGRESS NOTES
Emailed via fax machine per requested from JANET Coe.        Form Type: FMLA    Care Team Physician: Dr. Man Barreto    Date Email: 06/14/2024    Recipient: Attn: Yann Parks at [email or web address]

## 2024-06-18 ENCOUNTER — MYC REFILL (OUTPATIENT)
Dept: ONCOLOGY | Facility: HOSPITAL | Age: 59
End: 2024-06-18
Payer: COMMERCIAL

## 2024-06-18 DIAGNOSIS — R07.1 PAINFUL RESPIRATION: ICD-10-CM

## 2024-06-18 RX ORDER — OXYCODONE HYDROCHLORIDE 5 MG/1
5-10 TABLET ORAL EVERY 4 HOURS PRN
Qty: 60 TABLET | Refills: 0 | Status: SHIPPED | OUTPATIENT
Start: 2024-06-18 | End: 2024-06-30

## 2024-06-18 NOTE — TELEPHONE ENCOUNTER
Last Written Prescription Date:  6/4/24  Last Fill Quantity: 60,  # refills: 0   Last office visit provider:  6/12/24 with Brenda Pearson NP, follow up on 6/24     Requested Prescriptions   Pending Prescriptions Disp Refills    oxyCODONE (ROXICODONE) 5 MG tablet 60 tablet 0     Sig: Take 1-2 tablets (5-10 mg) by mouth every 4 hours as needed for pain       There is no refill protocol information for this order          Cher Lott RN 06/18/24 8:02 AM

## 2024-06-19 ENCOUNTER — HOSPITAL ENCOUNTER (OUTPATIENT)
Dept: CT IMAGING | Facility: CLINIC | Age: 59
Discharge: HOME | End: 2024-06-19
Attending: INTERNAL MEDICINE | Admitting: INTERNAL MEDICINE
Payer: COMMERCIAL

## 2024-06-19 DIAGNOSIS — C7A.8 NEUROENDOCRINE CARCINOMA METASTATIC TO LIVER (H): ICD-10-CM

## 2024-06-19 DIAGNOSIS — C7B.8 NEUROENDOCRINE CARCINOMA METASTATIC TO LIVER (H): ICD-10-CM

## 2024-06-19 PROCEDURE — 71260 CT THORAX DX C+: CPT

## 2024-06-19 PROCEDURE — 250N000011 HC RX IP 250 OP 636: Mod: JZ | Performed by: INTERNAL MEDICINE

## 2024-06-19 RX ORDER — IOPAMIDOL 755 MG/ML
90 INJECTION, SOLUTION INTRAVASCULAR ONCE
Status: COMPLETED | OUTPATIENT
Start: 2024-06-19 | End: 2024-06-19

## 2024-06-19 RX ADMIN — IOPAMIDOL 90 ML: 755 INJECTION, SOLUTION INTRAVENOUS at 17:24

## 2024-06-21 ENCOUNTER — HOSPITAL ENCOUNTER (OUTPATIENT)
Dept: ULTRASOUND IMAGING | Facility: CLINIC | Age: 59
Discharge: HOME | End: 2024-06-21
Admitting: RADIOLOGY
Payer: COMMERCIAL

## 2024-06-21 ENCOUNTER — PATIENT OUTREACH (OUTPATIENT)
Dept: CARE COORDINATION | Facility: CLINIC | Age: 59
End: 2024-06-21
Payer: COMMERCIAL

## 2024-06-21 DIAGNOSIS — J90 PLEURAL EFFUSION: ICD-10-CM

## 2024-06-21 DIAGNOSIS — C7A.8 NEUROENDOCRINE CARCINOMA METASTATIC TO LIVER (H): ICD-10-CM

## 2024-06-21 DIAGNOSIS — C7B.8 NEUROENDOCRINE CARCINOMA METASTATIC TO LIVER (H): ICD-10-CM

## 2024-06-21 PROCEDURE — 272N000042 US THORACENTESIS

## 2024-06-21 NOTE — PROGRESS NOTES
Social Work - Distress Screen Intervention  [facility]    Identified Concern and Score from Distress Screenin. How concerned are you about your ability to eat? 7     2. How concerned are you about unintended weight loss or your current weight? 3     3. How concerned are you about feeling depressed or very sad?  7     4. How concerned are you about feeling anxious or very scared?  (!) 8     5. Do you struggle with the loss of meaning and mamadou in your life?  Quite a bit     6. How concerned are you about work and home life issues that may be affected by your cancer?  (!) 10     7. How concerned are you about knowing what resources are available to help you?  (!) 10     8. Do you currently have what you would describe as Evangelical or spiritual struggles? Not at all     9. If you want to be contacted by one of our professionals, I can send a message to them right now.  No data recorded     Date of Distress Screen: 24  Data: At time of last visit, patient scored positive on distress screening.  outreached to patient today to follow up on elevated distress and introduce psychosocial services and support.  Intervention/Education provided:  contacted patient by phone to discuss distress screening results.     At time of call Louise endorses that she is at work and unable to speak at present time. Receptive to this clinician sending MyChart with contact information for her to outreach when able to speak.     Follow-up Required:  to remain available for support and await return call from patient    Jessie Tyler, GA, LICSW, OSW-C  Clinical - Adult Oncology  Phone: [phone]  She/Her/Hers  [facility]: Annette CARRILLO  8am-4:30pm  [facility]: KIN Johansen F 8am-4:30pm   Support Groups at [facility]: Social Work Services for Cancer Patients ([email or web address])

## 2024-06-24 ENCOUNTER — MYC REFILL (OUTPATIENT)
Dept: ONCOLOGY | Facility: HOSPITAL | Age: 59
End: 2024-06-24

## 2024-06-24 ENCOUNTER — INFUSION THERAPY VISIT (OUTPATIENT)
Dept: INFUSION THERAPY | Facility: HOSPITAL | Age: 59
End: 2024-06-24
Attending: STUDENT IN AN ORGANIZED HEALTH CARE EDUCATION/TRAINING PROGRAM
Payer: COMMERCIAL

## 2024-06-24 ENCOUNTER — ONCOLOGY VISIT (OUTPATIENT)
Dept: ONCOLOGY | Facility: HOSPITAL | Age: 59
End: 2024-06-24
Attending: STUDENT IN AN ORGANIZED HEALTH CARE EDUCATION/TRAINING PROGRAM
Payer: COMMERCIAL

## 2024-06-24 VITALS
SYSTOLIC BLOOD PRESSURE: 127 MMHG | BODY MASS INDEX: 36.57 KG/M2 | TEMPERATURE: 98.5 F | OXYGEN SATURATION: 97 % | WEIGHT: 206.4 LBS | DIASTOLIC BLOOD PRESSURE: 68 MMHG | HEIGHT: 63 IN | HEART RATE: 88 BPM | RESPIRATION RATE: 16 BRPM

## 2024-06-24 VITALS
HEART RATE: 88 BPM | RESPIRATION RATE: 16 BRPM | SYSTOLIC BLOOD PRESSURE: 127 MMHG | WEIGHT: 206.4 LBS | OXYGEN SATURATION: 97 % | TEMPERATURE: 98.5 F | BODY MASS INDEX: 36.57 KG/M2 | DIASTOLIC BLOOD PRESSURE: 68 MMHG | HEIGHT: 63 IN

## 2024-06-24 DIAGNOSIS — G89.3 CANCER RELATED PAIN: Primary | ICD-10-CM

## 2024-06-24 DIAGNOSIS — C7B.8 NEUROENDOCRINE CARCINOMA METASTATIC TO LIVER (H): ICD-10-CM

## 2024-06-24 DIAGNOSIS — J90 PLEURAL EFFUSION: ICD-10-CM

## 2024-06-24 DIAGNOSIS — C7A.8 NEUROENDOCRINE CARCINOMA METASTATIC TO LIVER (H): ICD-10-CM

## 2024-06-24 DIAGNOSIS — C7A.8 NEUROENDOCRINE CARCINOMA METASTATIC TO LIVER (H): Primary | ICD-10-CM

## 2024-06-24 DIAGNOSIS — C7B.8 NEUROENDOCRINE CARCINOMA METASTATIC TO LIVER (H): Primary | ICD-10-CM

## 2024-06-24 LAB
ALBUMIN SERPL BCG-MCNC: 3.6 G/DL (ref 3.5–5.2)
ALP SERPL-CCNC: 96 U/L (ref 40–150)
ALT SERPL W P-5'-P-CCNC: 6 U/L (ref 0–50)
ANION GAP SERPL CALCULATED.3IONS-SCNC: 10 MMOL/L (ref 7–15)
AST SERPL W P-5'-P-CCNC: 12 U/L (ref 0–45)
BASOPHILS # BLD MANUAL: 0 10E3/UL (ref 0–0.2)
BASOPHILS NFR BLD MANUAL: 0 %
BILIRUB SERPL-MCNC: 0.3 MG/DL
BUN SERPL-MCNC: 8.5 MG/DL (ref 6–20)
CALCIUM SERPL-MCNC: 8.8 MG/DL (ref 8.6–10)
CHLORIDE SERPL-SCNC: 100 MMOL/L (ref 98–107)
CREAT SERPL-MCNC: 0.67 MG/DL (ref 0.51–0.95)
DEPRECATED HCO3 PLAS-SCNC: 27 MMOL/L (ref 22–29)
EGFRCR SERPLBLD CKD-EPI 2021: >90 ML/MIN/1.73M2
ELLIPTOCYTES BLD QL SMEAR: SLIGHT
EOSINOPHIL # BLD MANUAL: 0 10E3/UL (ref 0–0.7)
EOSINOPHIL NFR BLD MANUAL: 0 %
ERYTHROCYTE [DISTWIDTH] IN BLOOD BY AUTOMATED COUNT: 16.3 % (ref 10–15)
GIANT PLATELETS BLD QL SMEAR: SLIGHT
GLUCOSE SERPL-MCNC: 131 MG/DL (ref 70–99)
HCT VFR BLD AUTO: 29.7 % (ref 35–47)
HGB BLD-MCNC: 9.4 G/DL (ref 11.7–15.7)
LYMPHOCYTES # BLD MANUAL: 0.7 10E3/UL (ref 0.8–5.3)
LYMPHOCYTES NFR BLD MANUAL: 21 %
MCH RBC QN AUTO: 25.8 PG (ref 26.5–33)
MCHC RBC AUTO-ENTMCNC: 31.6 G/DL (ref 31.5–36.5)
MCV RBC AUTO: 81 FL (ref 78–100)
METAMYELOCYTES # BLD MANUAL: 0.2 10E3/UL
METAMYELOCYTES NFR BLD MANUAL: 5 %
MONOCYTES # BLD MANUAL: 0.5 10E3/UL (ref 0–1.3)
MONOCYTES NFR BLD MANUAL: 16 %
MYELOCYTES # BLD MANUAL: 0 10E3/UL
MYELOCYTES NFR BLD MANUAL: 1 %
NEUTROPHILS # BLD MANUAL: 1.9 10E3/UL (ref 1.6–8.3)
NEUTROPHILS NFR BLD MANUAL: 57 %
NRBC # BLD AUTO: 0 10E3/UL
NRBC BLD AUTO-RTO: 0 /100
PLAT MORPH BLD: ABNORMAL
PLATELET # BLD AUTO: 411 10E3/UL (ref 150–450)
POLYCHROMASIA BLD QL SMEAR: SLIGHT
POTASSIUM SERPL-SCNC: 4 MMOL/L (ref 3.4–5.3)
PROT SERPL-MCNC: 6.9 G/DL (ref 6.4–8.3)
RBC # BLD AUTO: 3.65 10E6/UL (ref 3.8–5.2)
RBC MORPH BLD: ABNORMAL
SODIUM SERPL-SCNC: 137 MMOL/L (ref 135–145)
WBC # BLD AUTO: 3.3 10E3/UL (ref 4–11)

## 2024-06-24 PROCEDURE — G2211 COMPLEX E/M VISIT ADD ON: HCPCS

## 2024-06-24 PROCEDURE — 85007 BL SMEAR W/DIFF WBC COUNT: CPT | Performed by: INTERNAL MEDICINE

## 2024-06-24 PROCEDURE — 99213 OFFICE O/P EST LOW 20 MIN: CPT

## 2024-06-24 PROCEDURE — 96413 CHEMO IV INFUSION 1 HR: CPT

## 2024-06-24 PROCEDURE — 250N000011 HC RX IP 250 OP 636: Mod: JZ | Performed by: INTERNAL MEDICINE

## 2024-06-24 PROCEDURE — 96367 TX/PROPH/DG ADDL SEQ IV INF: CPT

## 2024-06-24 PROCEDURE — 99215 OFFICE O/P EST HI 40 MIN: CPT

## 2024-06-24 PROCEDURE — 85025 COMPLETE CBC W/AUTO DIFF WBC: CPT | Performed by: INTERNAL MEDICINE

## 2024-06-24 PROCEDURE — 80053 COMPREHEN METABOLIC PANEL: CPT | Performed by: INTERNAL MEDICINE

## 2024-06-24 PROCEDURE — 36591 DRAW BLOOD OFF VENOUS DEVICE: CPT | Performed by: INTERNAL MEDICINE

## 2024-06-24 PROCEDURE — 258N000003 HC RX IP 258 OP 636: Mod: JZ | Performed by: INTERNAL MEDICINE

## 2024-06-24 PROCEDURE — 96417 CHEMO IV INFUS EACH ADDL SEQ: CPT

## 2024-06-24 PROCEDURE — 96375 TX/PRO/DX INJ NEW DRUG ADDON: CPT

## 2024-06-24 RX ORDER — MEPERIDINE HYDROCHLORIDE 50 MG/ML
25 INJECTION INTRAMUSCULAR; INTRAVENOUS; SUBCUTANEOUS EVERY 30 MIN PRN
Status: DISCONTINUED | OUTPATIENT
Start: 2024-06-24 | End: 2024-06-24 | Stop reason: HOSPADM

## 2024-06-24 RX ORDER — ALBUTEROL SULFATE 90 UG/1
1-2 AEROSOL, METERED RESPIRATORY (INHALATION)
Status: DISCONTINUED | OUTPATIENT
Start: 2024-06-24 | End: 2024-06-24 | Stop reason: HOSPADM

## 2024-06-24 RX ORDER — MORPHINE SULFATE 15 MG/1
15 TABLET, FILM COATED, EXTENDED RELEASE ORAL EVERY 12 HOURS
Qty: 60 TABLET | Refills: 0 | Status: SHIPPED | OUTPATIENT
Start: 2024-06-24 | End: 2024-07-25

## 2024-06-24 RX ORDER — METHYLPREDNISOLONE SODIUM SUCCINATE 125 MG/2ML
125 INJECTION, POWDER, LYOPHILIZED, FOR SOLUTION INTRAMUSCULAR; INTRAVENOUS
Status: DISCONTINUED | OUTPATIENT
Start: 2024-06-24 | End: 2024-06-24 | Stop reason: HOSPADM

## 2024-06-24 RX ORDER — EPINEPHRINE 1 MG/ML
0.3 INJECTION, SOLUTION INTRAMUSCULAR; SUBCUTANEOUS EVERY 5 MIN PRN
Status: DISCONTINUED | OUTPATIENT
Start: 2024-06-24 | End: 2024-06-24 | Stop reason: HOSPADM

## 2024-06-24 RX ORDER — ALBUTEROL SULFATE 0.83 MG/ML
2.5 SOLUTION RESPIRATORY (INHALATION)
Status: DISCONTINUED | OUTPATIENT
Start: 2024-06-24 | End: 2024-06-24 | Stop reason: HOSPADM

## 2024-06-24 RX ORDER — HEPARIN SODIUM (PORCINE) LOCK FLUSH IV SOLN 100 UNIT/ML 100 UNIT/ML
5 SOLUTION INTRAVENOUS
Status: DISCONTINUED | OUTPATIENT
Start: 2024-06-24 | End: 2024-06-24 | Stop reason: HOSPADM

## 2024-06-24 RX ORDER — PALONOSETRON 0.05 MG/ML
0.25 INJECTION, SOLUTION INTRAVENOUS ONCE
Status: COMPLETED | OUTPATIENT
Start: 2024-06-24 | End: 2024-06-24

## 2024-06-24 RX ORDER — DIPHENHYDRAMINE HYDROCHLORIDE 50 MG/ML
50 INJECTION INTRAMUSCULAR; INTRAVENOUS
Status: DISCONTINUED | OUTPATIENT
Start: 2024-06-24 | End: 2024-06-24 | Stop reason: HOSPADM

## 2024-06-24 RX ADMIN — PALONOSETRON 0.25 MG: 0.05 INJECTION, SOLUTION INTRAVENOUS at 11:34

## 2024-06-24 RX ADMIN — SODIUM CHLORIDE 250 ML: 9 INJECTION, SOLUTION INTRAVENOUS at 10:56

## 2024-06-24 RX ADMIN — HEPARIN 5 ML: 100 SYRINGE at 13:46

## 2024-06-24 RX ADMIN — ETOPOSIDE 200 MG: 20 INJECTION, SOLUTION INTRAVENOUS at 12:38

## 2024-06-24 RX ADMIN — FOSAPREPITANT: 150 INJECTION, POWDER, LYOPHILIZED, FOR SOLUTION INTRAVENOUS at 11:29

## 2024-06-24 RX ADMIN — CARBOPLATIN 630 MG: 10 INJECTION, SOLUTION INTRAVENOUS at 11:58

## 2024-06-24 ASSESSMENT — PAIN SCALES - GENERAL
PAINLEVEL: MILD PAIN (3)
PAINLEVEL: MILD PAIN (3)

## 2024-06-24 NOTE — PROGRESS NOTES
Infusion Nursing Note:  Louise Corado presents today for D1C3 Chemotherappy.    Patient seen by provider today: Yes: Dr. Barreto   present during visit today: Not Applicable.    Note: Tolerated treatment well today, she offers no complaints.  Discharge to home with .      Intravenous Access:  Implanted Port.    Treatment Conditions:  Results reviewed, labs MET treatment parameters, ok to proceed with treatment.      Post Infusion Assessment:  Patient tolerated infusion without incident.  Blood return noted pre and post infusion.  Site patent and intact, free from redness, edema or discomfort.  No evidence of extravasations.       Discharge Plan:   Patient and/or family verbalized understanding of discharge instructions and all questions answered.      Roslyn Echavarria RN

## 2024-06-24 NOTE — PROGRESS NOTES
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  We reviewed labs today including CMP which is entirely unremarkable and CBC, which shows a stable Hgb of 9.4, decreased WBC of 3.3 with ANC of 1.9, platelets are normal We also reviewed most recent CT scan which shows stable disease after 2 cycles of therapy, no worsening. She is feeling well for and ready for next round of treatment.   Proceed with C3D1 of treatment today.  Will image after cycles 2, 4, and 6.  Will plan for at least 6 cycles.   Compazine to be used alternating with Zofran.   Next CT scan planned to follow C4.  Thoracentesis prn.   Continue MS, Oxycodone and Tylenol for pain management.   ______________________________________________________________________________    History of Present Illness/ Interval History    Ms. Louise Corado  is a 59 year old female patient who comes in for follow up. Her pain has been manageable with long acting MS and oxycodone for breakthrough pain. She notes that she has been taking oxy 1 tab about every 4 hours, even at night. Shortness of breath remains stable, no worse or no better. No cough. Feels ok overall. She does reports that she has concerns about right shoulder drop. She tried some stretching and mobility work without improvement or relief. She notes that she thinks this was present prior to cancer dx. Has not ever seen anyone for this. She did have a thoracentesis last week with 500  "mL removed. Fluid is loculated and the procedure this time around was quite painful.     ECOG performance status   0- Fully active, without restriction      Pain  Pain Score: Mild Pain (3) (with pain meds)  Pain Loc: Other - see comment (ribs and into the back)    ROS  A 14 point review of systems was obtained.  Positive findings noted in the history.  The remainder of the review of system is otherwise negative.      Oncology History/Treatment  Diagnosis:    1.  Metastatic neuroendocrine carcinoma, poorly differentiated: Diagnosed April 2024 from a liver biopsy.  Her PET/CT shows malignant involvement of the liver, mediastinal nodes, left upper quadrant omentum, manubrium, and bilateral pleura.  There is some wall thickening and FDG activity in a short segment of the terminal ileum.    6/19/24 CT CAP: 1.  No significant change in the metastatic tumor burden in the chest, abdomen and pelvis with infiltrative distal ileal mass and adjacent ileocolic adenopathy. The latter is presumed to reflect the primary tumor.  2.  Sliver of ascites in the pelvis is unchanged. 3.  Scattered sclerotic metastases are unchanged. 4.  No bowel obstruction. 5.  Decrease in the size of the loculated right pleural effusion is due to interval thoracentesis.     Treatment:    5/13/2024 - Carboplatin Etoposide initiated      Physical Exam    /68   Pulse 88   Temp 98.5  F (36.9  C)   Resp 16   Ht 1.6 m (5' 3\")   Wt 93.6 kg (206 lb 6.4 oz)   SpO2 97%   BMI 36.56 kg/m      GENERAL: Alert and oriented to time place and person. Seated comfortably. In no distress.  HEAD: Atraumatic and normocephalic. No alopecia.  EYES: LEV, EOMI. No erythema. No icterus.  ORAL CAVITY: Moist. No mucosal lesion or tonsillar enlargement.  NECK: supple. No thyroid enlargement.  LYMPH NODES: No palpable supraclavicular, cervical, axillary or inguinal lymphadenopathy.  CHEST: clear to auscultation bilaterally. Resonant to percussion throughout " bilaterally. Symmetrical breath movements bilaterally.  CVS: RRR  ABDOMEN: Soft. Not tender. Not distended. No palpable hepatomegaly or splenomegaly. No other mass palpable. Bowel sounds present.  EXTREMITIES: Warm. No peripheral edema.  SKIN: no rash, or bruising or purpura.   NEURO: No gross deficit noted. Non-antalgic gait.      Lab Results    Recent Results (from the past 168 hour(s))   Comprehensive metabolic panel   Result Value Ref Range    Sodium 137 135 - 145 mmol/L    Potassium 4.0 3.4 - 5.3 mmol/L    Carbon Dioxide (CO2) 27 22 - 29 mmol/L    Anion Gap 10 7 - 15 mmol/L    Urea Nitrogen 8.5 6.0 - 20.0 mg/dL    Creatinine 0.67 0.51 - 0.95 mg/dL    GFR Estimate >90 >60 mL/min/1.73m2    Calcium 8.8 8.6 - 10.0 mg/dL    Chloride 100 98 - 107 mmol/L    Glucose 131 (H) 70 - 99 mg/dL    Alkaline Phosphatase 96 40 - 150 U/L    AST 12 0 - 45 U/L    ALT 6 0 - 50 U/L    Protein Total 6.9 6.4 - 8.3 g/dL    Albumin 3.6 3.5 - 5.2 g/dL    Bilirubin Total 0.3 <=1.2 mg/dL   CBC with platelets and differential   Result Value Ref Range    WBC Count 3.3 (L) 4.0 - 11.0 10e3/uL    RBC Count 3.65 (L) 3.80 - 5.20 10e6/uL    Hemoglobin 9.4 (L) 11.7 - 15.7 g/dL    Hematocrit 29.7 (L) 35.0 - 47.0 %    MCV 81 78 - 100 fL    MCH 25.8 (L) 26.5 - 33.0 pg    MCHC 31.6 31.5 - 36.5 g/dL    RDW 16.3 (H) 10.0 - 15.0 %    Platelet Count 411 150 - 450 10e3/uL    NRBCs per 100 WBC 0 <1 /100    Absolute NRBCs 0.0 10e3/uL   Manual Differential   Result Value Ref Range    % Neutrophils 57 %    % Lymphocytes 21 %    % Monocytes 16 %    % Eosinophils 0 %    % Basophils 0 %    % Metamyelocytes 5 %    % Myelocytes 1 %    Absolute Neutrophils 1.9 1.6 - 8.3 10e3/uL    Absolute Lymphocytes 0.7 (L) 0.8 - 5.3 10e3/uL    Absolute Monocytes 0.5 0.0 - 1.3 10e3/uL    Absolute Eosinophils 0.0 0.0 - 0.7 10e3/uL    Absolute Basophils 0.0 0.0 - 0.2 10e3/uL    Absolute Metamyelocytes 0.2 (H) <=0.0 10e3/uL    Absolute Myelocytes 0.0 <=0.0 10e3/uL    RBC Morphology  Confirmed RBC Indices     Platelet Assessment (A) Automated Count Confirmed. Platelet morphology is normal.     Automated Count Confirmed. Giant platelets are present.    Elliptocytes Slight (A) None Seen    Polychromasia Slight (A) None Seen    Giant Platelets Slight (A) None Seen       I reviewed the above labs today.  Imaging    US Thoracentesis    Result Date: 6/21/2024  EXAM: 1. RIGHT THORACENTESIS 2. ULTRASOUND GUIDANCE LOCATION: [facility] DATE: 6/21/2024 INDICATION: Pleural effusion. PROCEDURE: Informed consent obtained. Time out performed. The chest was prepped and draped in sterile fashion. 10 mL of 1 % lidocaine was infused into the local soft tissues. Under direct ultrasound guidance, a 5 Romanian catheter system was placed into the pleural effusion. 0.5 liters of clear yellow fluid were removed and sent to lab, if requested. Patient tolerated procedure well. Ultrasound imaging was obtained and placed in the patient's permanent medical record.     IMPRESSION: Status post right ultrasound-guided thoracentesis. Reference CPT Code: 15664    CT Chest/Abdomen/Pelvis w Contrast    Result Date: 6/20/2024  EXAM: CT CHEST/ABDOMEN/PELVIS W CONTRAST LOCATION: [facility] DATE: 6/19/2024 INDICATION: Widely metastatic neuroendocrine tumor diagnosed April 2024 from a liver biopsy. COMPARISON: PET CT 4/17/2024, CTA chest 4/10/2024 TECHNIQUE: CT scan of the chest, abdomen, and pelvis was performed following injection of IV contrast. Multiplanar reformats were obtained. Dose reduction techniques were used. CONTRAST: 90ml Isovue 370 FINDINGS: LUNGS AND PLEURA: Slight decrease in the loculated right pleural effusion which is now moderate in size with adjacent compressive atelectasis.. Heterogeneous nodular pleural thickening along with innumerable, nodules in the subpleural fat. These measure up to 8 mm in short axis (axial image 104) and are unchanged. There are a few,  scattered bilateral pulmonary nodules, heterogeneous in size and shape, little changed. There is an ovoid 1.2 x 0.8 cm nodule in the left upper lobe (axial image 56), unchanged when measured in the same plane. MEDIASTINUM/AXILLAE: Right IJ Port-A-Cath. Bulky mediastinal and right hilar adenopathy again noted. Subcarinal node measures 2.2 cm in short axis and a right paratracheal node measures 1.8 cm in short axis, also little changed when measured in no central pulmonary emboli. Aorta and branches are normal. The same plane. Smaller pericardiophrenic and lower paraesophageal nodes are noted. CORONARY ARTERY CALCIFICATION: None. HEPATOBILIARY: Poorly defined hypodense lesion noted laterally in the right lobe that measures at least 5.6 x 5.6 cm with heterogeneous hyperdense centrally. This appears little changed when compared to the PET/CT. No other visible lesions. PANCREAS: Normal. SPLEEN: Normal. ADRENAL GLANDS: Normal. KIDNEYS/BLADDER: Normal. BOWEL: Heterogeneous, enhancing spiculated distal ileal mass, adjacent thickening of the ileocolic mesentery and nodes again noted (axial images 201-223, coronal images 33-48). Poorly defined irregular mass measures at least 3.1 cm in short axis (axial images 216). Ileocolic nodes superior to it measures 1.8 x 1.4 cm, likely little changed. Poorly defined infiltrative tumor implants within the mesentery in the midabdomen (axial images 204-216, coronal images 21-27) and along the lateral left ileocolic gutter at the iliac crest (axial image 224) are unchanged with largest solid component measuring 1.2 x 2.5 cm. Sliver of ascites in the pelvis is unchanged. No bowel obstruction. LYMPH NODES: Multiple tiny area of caval and left para-aortic nodes are again seen measuring less than a centimeter. VASCULATURE: Mild arterial calcifications. No aneurysm. PELVIC ORGANS: Normal. MUSCULOSKELETAL: Sclerotic metastasis of the manubrium, left side of  T11 vertebral body and the left  posterior iliac wing again noted. No new sites of disease.     IMPRESSION: 1.  No significant change in the metastatic tumor burden in the chest, abdomen and pelvis with infiltrative  distal ileal mass and adjacent ileocolic adenopathy. The latter is presumed to reflect the primary tumor. 2.  Sliver of ascites in the pelvis is unchanged. 3.  Scattered sclerotic metastases are unchanged. 4.  No bowel obstruction. 5.  Decrease in the size of the loculated right pleural effusion is due to interval thoracentesis. 6.  Other noncritical findings as noted above.       Billing  Total time 50 minutes, to include face to face visit, review of EMR, ordering, documentation and coordination of care on date of service   complexity modifier for longitudinal care.     Signed by: ABDIAZIZ Mendez CNP

## 2024-06-24 NOTE — TELEPHONE ENCOUNTER
Last Written Prescription Date:  5/24/24  Last Fill Quantity: 60,  # refills: 0   Last office visit provider:  6/12/24 with Brenda Pearson CNP, follow up 6/24/24     Requested Prescriptions   Pending Prescriptions Disp Refills    morphine (MS CONTIN) 15 MG CR tablet 60 tablet 0     Sig: Take 1 tablet (15 mg) by mouth every 12 hours maximum 2 tablet(s) per day       There is no refill protocol information for this order          Cher Lott RN 06/24/24 8:25 AM

## 2024-06-24 NOTE — PROGRESS NOTES
"Oncology Rooming Note    June 24, 2024 9:44 AM   Louise Corado is a 59 year old female who presents for:    Chief Complaint   Patient presents with    Oncology Clinic Visit       Neuroendocrine carcinoma metastatic to liver       Initial Vitals: /68   Pulse 88   Temp 98.5  F (36.9  C)   Resp 16   Ht 1.6 m (5' 3\")   Wt 93.6 kg (206 lb 6.4 oz)   SpO2 97%   BMI 36.56 kg/m   Estimated body mass index is 36.56 kg/m  as calculated from the following:    Height as of this encounter: 1.6 m (5' 3\").    Weight as of this encounter: 93.6 kg (206 lb 6.4 oz). Body surface area is 2.04 meters squared.  Mild Pain (3) Comment: with pain meds   No LMP recorded. Patient is postmenopausal.  Allergies reviewed: Yes  Medications reviewed: Yes    Medications: MEDICATION REFILLS NEEDED TODAY. Provider was notified.  Pharmacy name entered into [facility]:    [id number] [place], [place] - [address] AT [id number] - [place], [place] - [address] AT [facility]    Frailty Screening:   Is the patient here for a new oncology consult visit in cancer care? 2. No      Clinical concerns:  3 week labs and treatment      Hoa Denny            "

## 2024-06-24 NOTE — LETTER
"6/24/2024      Louise Corado  [address]   [place] [address]      Dear Colleague,    Thank you for referring your patient, Louise Corado, to the [facility]. Please see a copy of my visit note below.    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    Weight as of this encounter: 93.6 kg (206 lb 6.4 oz). Body surface area is 2.04 meters squared.  Mild Pain (3) Comment: with pain meds   No LMP recorded. Patient is postmenopausal.  Allergies reviewed: Yes  Medications reviewed: Yes    Medications: MEDICATION REFILLS NEEDED TODAY. Provider was notified.  Pharmacy name entered into Secure Computing:    [id number] - [place], [place] - [address] AT [id number] - [place], [place] - [address] AT [facility]    Frailty Screening:   Is the patient here for a new oncology consult visit in cancer care? 2. No      Clinical concerns:  3 week labs and treatment      Hoa Denny              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  We reviewed labs today including CMP which is entirely unremarkable and CBC, which shows a stable Hgb of 9.4, decreased WBC of 3.3 with ANC of 1.9, platelets are normal We also reviewed most recent CT scan which shows stable disease after 2 cycles of therapy, no worsening. She is feeling well for and ready for next round of treatment.   Proceed with C3D1 of treatment today.  Will image after cycles 2, 4, and 6.  Will plan for at least 6 cycles.   Compazine to be used alternating with Zofran.   Next CT scan planned to follow C4.  Thoracentesis prn.   Continue MS, Oxycodone and Tylenol for pain management.   ______________________________________________________________________________    History of Present Illness/ Interval History    Ms. Louise Cordao  is a 59 year old female patient who comes in for follow up. Her pain has been manageable with long acting MS and oxycodone for breakthrough pain. She notes that she has been taking oxy 1 tab about every 4 hours, even at night. Shortness of breath remains stable, no worse or no better. No cough. Feels ok overall. She does reports that she has concerns about right shoulder drop. She tried some stretching and mobility work without improvement or relief. She notes that she thinks this was present prior to cancer dx. Has not ever seen anyone for this. She did have a thoracentesis last week with 500 mL removed. Fluid is loculated and the procedure this time around was quite painful.     ECOG performance status   0- Fully active, without restriction      Pain  Pain Score: Mild Pain (3) (with pain meds)  Pain Loc: Other - see comment (ribs and into the back)    ROS  A 14 point review of systems was obtained.  Positive findings noted in the history.  The remainder of the review of system is otherwise negative.      Oncology History/Treatment  Diagnosis:    1.   "Metastatic neuroendocrine carcinoma, poorly differentiated: Diagnosed April 2024 from a liver biopsy.  Her PET/CT shows malignant involvement of the liver, mediastinal nodes, left upper quadrant omentum, manubrium, and bilateral pleura.  There is some wall thickening and FDG activity in a short segment of the terminal ileum.    6/19/24 CT CAP: 1.  No significant change in the metastatic tumor burden in the chest, abdomen and pelvis with infiltrative distal ileal mass and adjacent ileocolic adenopathy. The latter is presumed to reflect the primary tumor.  2.  Sliver of ascites in the pelvis is unchanged. 3.  Scattered sclerotic metastases are unchanged. 4.  No bowel obstruction. 5.  Decrease in the size of the loculated right pleural effusion is due to interval thoracentesis.     Treatment:    5/13/2024 - Carboplatin Etoposide initiated      Physical Exam    /68   Pulse 88   Temp 98.5  F (36.9  C)   Resp 16   Ht 1.6 m (5' 3\")   Wt 93.6 kg (206 lb 6.4 oz)   SpO2 97%   BMI 36.56 kg/m      GENERAL: Alert and oriented to time place and person. Seated comfortably. In no distress.  HEAD: Atraumatic and normocephalic. No alopecia.  EYES: LEV, EOMI. No erythema. No icterus.  ORAL CAVITY: Moist. No mucosal lesion or tonsillar enlargement.  NECK: supple. No thyroid enlargement.  LYMPH NODES: No palpable supraclavicular, cervical, axillary or inguinal lymphadenopathy.  CHEST: clear to auscultation bilaterally. Resonant to percussion throughout bilaterally. Symmetrical breath movements bilaterally.  CVS: RRR  ABDOMEN: Soft. Not tender. Not distended. No palpable hepatomegaly or splenomegaly. No other mass palpable. Bowel sounds present.  EXTREMITIES: Warm. No peripheral edema.  SKIN: no rash, or bruising or purpura.   NEURO: No gross deficit noted. Non-antalgic gait.      Lab Results    Recent Results (from the past 168 hour(s))   Comprehensive metabolic panel   Result Value Ref Range    Sodium 137 135 - 145 " mmol/L    Potassium 4.0 3.4 - 5.3 mmol/L    Carbon Dioxide (CO2) 27 22 - 29 mmol/L    Anion Gap 10 7 - 15 mmol/L    Urea Nitrogen 8.5 6.0 - 20.0 mg/dL    Creatinine 0.67 0.51 - 0.95 mg/dL    GFR Estimate >90 >60 mL/min/1.73m2    Calcium 8.8 8.6 - 10.0 mg/dL    Chloride 100 98 - 107 mmol/L    Glucose 131 (H) 70 - 99 mg/dL    Alkaline Phosphatase 96 40 - 150 U/L    AST 12 0 - 45 U/L    ALT 6 0 - 50 U/L    Protein Total 6.9 6.4 - 8.3 g/dL    Albumin 3.6 3.5 - 5.2 g/dL    Bilirubin Total 0.3 <=1.2 mg/dL   CBC with platelets and differential   Result Value Ref Range    WBC Count 3.3 (L) 4.0 - 11.0 10e3/uL    RBC Count 3.65 (L) 3.80 - 5.20 10e6/uL    Hemoglobin 9.4 (L) 11.7 - 15.7 g/dL    Hematocrit 29.7 (L) 35.0 - 47.0 %    MCV 81 78 - 100 fL    MCH 25.8 (L) 26.5 - 33.0 pg    MCHC 31.6 31.5 - 36.5 g/dL    RDW 16.3 (H) 10.0 - 15.0 %    Platelet Count 411 150 - 450 10e3/uL    NRBCs per 100 WBC 0 <1 /100    Absolute NRBCs 0.0 10e3/uL   Manual Differential   Result Value Ref Range    % Neutrophils 57 %    % Lymphocytes 21 %    % Monocytes 16 %    % Eosinophils 0 %    % Basophils 0 %    % Metamyelocytes 5 %    % Myelocytes 1 %    Absolute Neutrophils 1.9 1.6 - 8.3 10e3/uL    Absolute Lymphocytes 0.7 (L) 0.8 - 5.3 10e3/uL    Absolute Monocytes 0.5 0.0 - 1.3 10e3/uL    Absolute Eosinophils 0.0 0.0 - 0.7 10e3/uL    Absolute Basophils 0.0 0.0 - 0.2 10e3/uL    Absolute Metamyelocytes 0.2 (H) <=0.0 10e3/uL    Absolute Myelocytes 0.0 <=0.0 10e3/uL    RBC Morphology Confirmed RBC Indices     Platelet Assessment (A) Automated Count Confirmed. Platelet morphology is normal.     Automated Count Confirmed. Giant platelets are present.    Elliptocytes Slight (A) None Seen    Polychromasia Slight (A) None Seen    Giant Platelets Slight (A) None Seen       I reviewed the above labs today.  Imaging    US Thoracentesis    Result Date: 6/21/2024  EXAM: 1. RIGHT THORACENTESIS 2. ULTRASOUND GUIDANCE LOCATION: [facility]  DATE: 6/21/2024 INDICATION: Pleural effusion. PROCEDURE: Informed consent obtained. Time out performed. The chest was prepped and draped in sterile fashion. 10 mL of 1 % lidocaine was infused into the local soft tissues. Under direct ultrasound guidance, a 5 Sinhala catheter system was placed into the pleural effusion. 0.5 liters of clear yellow fluid were removed and sent to lab, if requested. Patient tolerated procedure well. Ultrasound imaging was obtained and placed in the patient's permanent medical record.     IMPRESSION: Status post right ultrasound-guided thoracentesis. Reference CPT Code: 77581    CT Chest/Abdomen/Pelvis w Contrast    Result Date: 6/20/2024  EXAM: CT CHEST/ABDOMEN/PELVIS W CONTRAST LOCATION: [facility] DATE: 6/19/2024 INDICATION: Widely metastatic neuroendocrine tumor diagnosed April 2024 from a liver biopsy. COMPARISON: PET CT 4/17/2024, CTA chest 4/10/2024 TECHNIQUE: CT scan of the chest, abdomen, and pelvis was performed following injection of IV contrast. Multiplanar reformats were obtained. Dose reduction techniques were used. CONTRAST: 90ml Isovue 370 FINDINGS: LUNGS AND PLEURA: Slight decrease in the loculated right pleural effusion which is now moderate in size with adjacent compressive atelectasis.. Heterogeneous nodular pleural thickening along with innumerable, nodules in the subpleural fat. These measure up to 8 mm in short axis (axial image 104) and are unchanged. There are a few, scattered bilateral pulmonary nodules, heterogeneous in size and shape, little changed. There is an ovoid 1.2 x 0.8 cm nodule in the left upper lobe (axial image 56), unchanged when measured in the same plane. MEDIASTINUM/AXILLAE: Right IJ Port-A-Cath. Bulky mediastinal and right hilar adenopathy again noted. Subcarinal node measures 2.2 cm in short axis and a right paratracheal node measures 1.8 cm in short axis, also little changed when measured in no central pulmonary  emboli. Aorta and branches are normal. The same plane. Smaller pericardiophrenic and lower paraesophageal nodes are noted. CORONARY ARTERY CALCIFICATION: None. HEPATOBILIARY: Poorly defined hypodense lesion noted laterally in the right lobe that measures at least 5.6 x 5.6 cm with heterogeneous hyperdense centrally. This appears little changed when compared to the PET/CT. No other visible lesions. PANCREAS: Normal. SPLEEN: Normal. ADRENAL GLANDS: Normal. KIDNEYS/BLADDER: Normal. BOWEL: Heterogeneous, enhancing spiculated distal ileal mass, adjacent thickening of the ileocolic mesentery and nodes again noted (axial images 201-223, coronal images 33-48). Poorly defined irregular mass measures at least 3.1 cm in short axis (axial images 216). Ileocolic nodes superior to it measures 1.8 x 1.4 cm, likely little changed. Poorly defined infiltrative tumor implants within the mesentery in the midabdomen (axial images 204-216, coronal images 21-27) and along the lateral left ileocolic gutter at the iliac crest (axial image 224) are unchanged with largest solid component measuring 1.2 x 2.5 cm. Sliver of ascites in the pelvis is unchanged. No bowel obstruction. LYMPH NODES: Multiple tiny area of caval and left para-aortic nodes are again seen measuring less than a centimeter. VASCULATURE: Mild arterial calcifications. No aneurysm. PELVIC ORGANS: Normal. MUSCULOSKELETAL: Sclerotic metastasis of the manubrium, left side of  T11 vertebral body and the left posterior iliac wing again noted. No new sites of disease.     IMPRESSION: 1.  No significant change in the metastatic tumor burden in the chest, abdomen and pelvis with infiltrative  distal ileal mass and adjacent ileocolic adenopathy. The latter is presumed to reflect the primary tumor. 2.  Sliver of ascites in the pelvis is unchanged. 3.  Scattered sclerotic metastases are unchanged. 4.  No bowel obstruction. 5.  Decrease in the size of the loculated right pleural effusion  is due to interval thoracentesis. 6.  Other noncritical findings as noted above.       Billing  Total time 50 minutes, to include face to face visit, review of EMR, ordering, documentation and coordination of care on date of service   complexity modifier for longitudinal care.     Signed by: ABDIAZIZ Mendez CNP      Again, thank you for allowing me to participate in the care of your patient.        Sincerely,        ABDIAZIZ Mendez CNP

## 2024-06-25 ENCOUNTER — INFUSION THERAPY VISIT (OUTPATIENT)
Dept: INFUSION THERAPY | Facility: HOSPITAL | Age: 59
End: 2024-06-25
Attending: STUDENT IN AN ORGANIZED HEALTH CARE EDUCATION/TRAINING PROGRAM
Payer: COMMERCIAL

## 2024-06-25 DIAGNOSIS — C7B.8 NEUROENDOCRINE CARCINOMA METASTATIC TO LIVER (H): Primary | ICD-10-CM

## 2024-06-25 DIAGNOSIS — C7A.8 NEUROENDOCRINE CARCINOMA METASTATIC TO LIVER (H): Primary | ICD-10-CM

## 2024-06-25 PROCEDURE — 96375 TX/PRO/DX INJ NEW DRUG ADDON: CPT

## 2024-06-25 PROCEDURE — 258N000003 HC RX IP 258 OP 636: Mod: JZ | Performed by: INTERNAL MEDICINE

## 2024-06-25 PROCEDURE — 250N000011 HC RX IP 250 OP 636: Performed by: INTERNAL MEDICINE

## 2024-06-25 PROCEDURE — 96413 CHEMO IV INFUSION 1 HR: CPT

## 2024-06-25 RX ORDER — ALBUTEROL SULFATE 90 UG/1
1-2 AEROSOL, METERED RESPIRATORY (INHALATION)
Status: DISCONTINUED | OUTPATIENT
Start: 2024-06-25 | End: 2024-06-25 | Stop reason: HOSPADM

## 2024-06-25 RX ORDER — DIPHENHYDRAMINE HYDROCHLORIDE 50 MG/ML
50 INJECTION INTRAMUSCULAR; INTRAVENOUS
Status: DISCONTINUED | OUTPATIENT
Start: 2024-06-25 | End: 2024-06-25 | Stop reason: HOSPADM

## 2024-06-25 RX ORDER — METHYLPREDNISOLONE SODIUM SUCCINATE 125 MG/2ML
125 INJECTION, POWDER, LYOPHILIZED, FOR SOLUTION INTRAMUSCULAR; INTRAVENOUS
Status: DISCONTINUED | OUTPATIENT
Start: 2024-06-25 | End: 2024-06-25 | Stop reason: HOSPADM

## 2024-06-25 RX ORDER — MEPERIDINE HYDROCHLORIDE 50 MG/ML
25 INJECTION INTRAMUSCULAR; INTRAVENOUS; SUBCUTANEOUS EVERY 30 MIN PRN
Status: DISCONTINUED | OUTPATIENT
Start: 2024-06-25 | End: 2024-06-25 | Stop reason: HOSPADM

## 2024-06-25 RX ORDER — EPINEPHRINE 1 MG/ML
0.3 INJECTION, SOLUTION INTRAMUSCULAR; SUBCUTANEOUS EVERY 5 MIN PRN
Status: DISCONTINUED | OUTPATIENT
Start: 2024-06-25 | End: 2024-06-25 | Stop reason: HOSPADM

## 2024-06-25 RX ORDER — HEPARIN SODIUM (PORCINE) LOCK FLUSH IV SOLN 100 UNIT/ML 100 UNIT/ML
5 SOLUTION INTRAVENOUS
Status: DISCONTINUED | OUTPATIENT
Start: 2024-06-25 | End: 2024-06-25 | Stop reason: HOSPADM

## 2024-06-25 RX ORDER — ALBUTEROL SULFATE 0.83 MG/ML
2.5 SOLUTION RESPIRATORY (INHALATION)
Status: DISCONTINUED | OUTPATIENT
Start: 2024-06-25 | End: 2024-06-25 | Stop reason: HOSPADM

## 2024-06-25 RX ADMIN — SODIUM CHLORIDE 250 ML: 9 INJECTION, SOLUTION INTRAVENOUS at 09:34

## 2024-06-25 RX ADMIN — ETOPOSIDE 200 MG: 20 INJECTION INTRAVENOUS at 10:04

## 2024-06-25 RX ADMIN — HEPARIN 5 ML: 100 SYRINGE at 11:13

## 2024-06-25 RX ADMIN — DEXAMETHASONE SODIUM PHOSPHATE 12 MG: 10 INJECTION, SOLUTION INTRAMUSCULAR; INTRAVENOUS at 09:40

## 2024-06-25 NOTE — PROGRESS NOTES
Infusion Nursing Note:  Louise Corado presents today for D2C3 Chemotherapy.    Patient seen by provider today: No   present during visit today: Not Applicable.    Note: Tolerated treatment today with no complaints.  Discharge to home with sister.      Intravenous Access:PAC    Treatment Conditions:  Results reviewed, labs MET treatment parameters, ok to proceed with treatment.      Post Infusion Assessment:  Patient tolerated infusion without incident.  Blood return noted pre and post infusion.  No evidence of extravasations.  Access discontinued per protocol.       Discharge Plan:   Patient and/or family verbalized understanding of discharge instructions and all questions answered.      Roslyn Echavarria RN

## 2024-06-26 ENCOUNTER — INFUSION THERAPY VISIT (OUTPATIENT)
Dept: INFUSION THERAPY | Facility: HOSPITAL | Age: 59
End: 2024-06-26
Attending: STUDENT IN AN ORGANIZED HEALTH CARE EDUCATION/TRAINING PROGRAM
Payer: COMMERCIAL

## 2024-06-26 VITALS
BODY MASS INDEX: 36.56 KG/M2 | HEART RATE: 85 BPM | RESPIRATION RATE: 16 BRPM | OXYGEN SATURATION: 97 % | HEIGHT: 63 IN | SYSTOLIC BLOOD PRESSURE: 149 MMHG | DIASTOLIC BLOOD PRESSURE: 75 MMHG | TEMPERATURE: 97.3 F

## 2024-06-26 DIAGNOSIS — C7B.8 NEUROENDOCRINE CARCINOMA METASTATIC TO LIVER (H): Primary | ICD-10-CM

## 2024-06-26 DIAGNOSIS — C7A.8 NEUROENDOCRINE CARCINOMA METASTATIC TO LIVER (H): Primary | ICD-10-CM

## 2024-06-26 PROCEDURE — 258N000003 HC RX IP 258 OP 636: Performed by: INTERNAL MEDICINE

## 2024-06-26 PROCEDURE — 250N000011 HC RX IP 250 OP 636: Mod: JZ | Performed by: INTERNAL MEDICINE

## 2024-06-26 PROCEDURE — 96413 CHEMO IV INFUSION 1 HR: CPT

## 2024-06-26 PROCEDURE — 96375 TX/PRO/DX INJ NEW DRUG ADDON: CPT

## 2024-06-26 RX ORDER — DIPHENHYDRAMINE HYDROCHLORIDE 50 MG/ML
50 INJECTION INTRAMUSCULAR; INTRAVENOUS
Status: DISCONTINUED | OUTPATIENT
Start: 2024-06-26 | End: 2024-06-26 | Stop reason: HOSPADM

## 2024-06-26 RX ORDER — MEPERIDINE HYDROCHLORIDE 50 MG/ML
25 INJECTION INTRAMUSCULAR; INTRAVENOUS; SUBCUTANEOUS EVERY 30 MIN PRN
Status: DISCONTINUED | OUTPATIENT
Start: 2024-06-26 | End: 2024-06-26 | Stop reason: HOSPADM

## 2024-06-26 RX ORDER — ALBUTEROL SULFATE 0.83 MG/ML
2.5 SOLUTION RESPIRATORY (INHALATION)
Status: DISCONTINUED | OUTPATIENT
Start: 2024-06-26 | End: 2024-06-26 | Stop reason: HOSPADM

## 2024-06-26 RX ORDER — METHYLPREDNISOLONE SODIUM SUCCINATE 125 MG/2ML
125 INJECTION, POWDER, LYOPHILIZED, FOR SOLUTION INTRAMUSCULAR; INTRAVENOUS
Status: DISCONTINUED | OUTPATIENT
Start: 2024-06-26 | End: 2024-06-26 | Stop reason: HOSPADM

## 2024-06-26 RX ORDER — HEPARIN SODIUM,PORCINE 10 UNIT/ML
5-20 VIAL (ML) INTRAVENOUS DAILY PRN
Status: DISCONTINUED | OUTPATIENT
Start: 2024-06-26 | End: 2024-06-26 | Stop reason: HOSPADM

## 2024-06-26 RX ORDER — ALBUTEROL SULFATE 90 UG/1
1-2 AEROSOL, METERED RESPIRATORY (INHALATION)
Status: DISCONTINUED | OUTPATIENT
Start: 2024-06-26 | End: 2024-06-26 | Stop reason: HOSPADM

## 2024-06-26 RX ORDER — EPINEPHRINE 1 MG/ML
0.3 INJECTION, SOLUTION INTRAMUSCULAR; SUBCUTANEOUS EVERY 5 MIN PRN
Status: DISCONTINUED | OUTPATIENT
Start: 2024-06-26 | End: 2024-06-26 | Stop reason: HOSPADM

## 2024-06-26 RX ORDER — HEPARIN SODIUM (PORCINE) LOCK FLUSH IV SOLN 100 UNIT/ML 100 UNIT/ML
5 SOLUTION INTRAVENOUS
Status: DISCONTINUED | OUTPATIENT
Start: 2024-06-26 | End: 2024-06-26 | Stop reason: HOSPADM

## 2024-06-26 RX ADMIN — ETOPOSIDE 200 MG: 20 INJECTION INTRAVENOUS at 09:21

## 2024-06-26 RX ADMIN — SODIUM CHLORIDE 250 ML: 9 INJECTION, SOLUTION INTRAVENOUS at 08:33

## 2024-06-26 RX ADMIN — DEXAMETHASONE SODIUM PHOSPHATE 12 MG: 10 INJECTION, SOLUTION INTRAMUSCULAR; INTRAVENOUS at 09:01

## 2024-06-26 RX ADMIN — HEPARIN 5 ML: 100 SYRINGE at 10:37

## 2024-06-26 NOTE — PROGRESS NOTES
Infusion Nursing Note:  Louise Corado presents today for D3C3 chemo.    Patient seen by provider today: No   present during visit today: Not Applicable.    Note: Tolerated treatment well today with no complaints.  Discharge to home with sister.      Intravenous Access:  Peripheral IV placed.    Treatment Conditions:  Results reviewed, labs MET treatment parameters, ok to proceed with treatment.      Post Infusion Assessment:  Patient tolerated infusion without incident.  Blood return noted pre and post infusion.  Site patent and intact, free from redness, edema or discomfort.  No evidence of extravasations.  Access discontinued per protocol.       Discharge Plan:   Patient and/or family verbalized understanding of discharge instructions and all questions answered.      Roslyn Echavarria RN

## 2024-06-30 ENCOUNTER — MYC REFILL (OUTPATIENT)
Dept: ONCOLOGY | Facility: HOSPITAL | Age: 59
End: 2024-06-30
Payer: COMMERCIAL

## 2024-06-30 DIAGNOSIS — R07.1 PAINFUL RESPIRATION: ICD-10-CM

## 2024-07-01 ENCOUNTER — MYC REFILL (OUTPATIENT)
Dept: ONCOLOGY | Facility: HOSPITAL | Age: 59
End: 2024-07-01
Payer: COMMERCIAL

## 2024-07-01 DIAGNOSIS — R07.1 PAINFUL RESPIRATION: ICD-10-CM

## 2024-07-01 RX ORDER — OXYCODONE HYDROCHLORIDE 5 MG/1
5-10 TABLET ORAL EVERY 4 HOURS PRN
Qty: 60 TABLET | Refills: 0 | Status: SHIPPED | OUTPATIENT
Start: 2024-07-01 | End: 2024-07-09

## 2024-07-01 RX ORDER — OXYCODONE HYDROCHLORIDE 5 MG/1
5-10 TABLET ORAL EVERY 4 HOURS PRN
Qty: 60 TABLET | Refills: 0 | OUTPATIENT
Start: 2024-07-01

## 2024-07-01 NOTE — TELEPHONE ENCOUNTER
[facility]: Cancer Care                                                                                          Refill request received via [employer] from [facility] for oxycodone. Last refilled by Brenda Pearson CNP on 6/18/24.Quantity #60. No refills.      Last seen by Brenda Pearson CNP on 6/18/24.  Scheduled to see Brenda Pearson CNP on 7/15/24.    Message sent to Brenda Pearson CNP.    Signature:  Meenu Schumacher RN

## 2024-07-01 NOTE — TELEPHONE ENCOUNTER
Refusing refill request, medication was sent to the pharmacy.     Last Written Prescription Date:  7/1/24  Last Fill Quantity: 60,  # refills: 0   Last office visit provider:  6/24/24 with Brenda Pearson CNP     Requested Prescriptions   Pending Prescriptions Disp Refills    oxyCODONE (ROXICODONE) 5 MG tablet 60 tablet 0     Sig: Take 1-2 tablets (5-10 mg) by mouth every 4 hours as needed for pain       There is no refill protocol information for this order          Cher Lott RN 07/01/24 1:50 PM

## 2024-07-09 ENCOUNTER — MYC REFILL (OUTPATIENT)
Dept: ONCOLOGY | Facility: HOSPITAL | Age: 59
End: 2024-07-09
Payer: COMMERCIAL

## 2024-07-09 DIAGNOSIS — R07.1 PAINFUL RESPIRATION: ICD-10-CM

## 2024-07-09 RX ORDER — OXYCODONE HYDROCHLORIDE 5 MG/1
5-10 TABLET ORAL EVERY 4 HOURS PRN
Qty: 60 TABLET | Refills: 0 | Status: SHIPPED | OUTPATIENT
Start: 2024-07-09 | End: 2024-07-20

## 2024-07-12 ENCOUNTER — MYC MEDICAL ADVICE (OUTPATIENT)
Dept: ONCOLOGY | Facility: HOSPITAL | Age: 59
End: 2024-07-12
Payer: COMMERCIAL

## 2024-07-15 ENCOUNTER — ONCOLOGY VISIT (OUTPATIENT)
Dept: ONCOLOGY | Facility: HOSPITAL | Age: 59
End: 2024-07-15
Attending: STUDENT IN AN ORGANIZED HEALTH CARE EDUCATION/TRAINING PROGRAM
Payer: COMMERCIAL

## 2024-07-15 ENCOUNTER — LAB (OUTPATIENT)
Dept: INFUSION THERAPY | Facility: HOSPITAL | Age: 59
End: 2024-07-15
Attending: STUDENT IN AN ORGANIZED HEALTH CARE EDUCATION/TRAINING PROGRAM
Payer: COMMERCIAL

## 2024-07-15 VITALS
WEIGHT: 197 LBS | RESPIRATION RATE: 16 BRPM | HEART RATE: 94 BPM | TEMPERATURE: 97.7 F | BODY MASS INDEX: 34.91 KG/M2 | OXYGEN SATURATION: 93 % | SYSTOLIC BLOOD PRESSURE: 120 MMHG | DIASTOLIC BLOOD PRESSURE: 76 MMHG | HEIGHT: 63 IN

## 2024-07-15 VITALS
WEIGHT: 197 LBS | BODY MASS INDEX: 34.91 KG/M2 | RESPIRATION RATE: 16 BRPM | DIASTOLIC BLOOD PRESSURE: 76 MMHG | TEMPERATURE: 97.7 F | HEIGHT: 63 IN | SYSTOLIC BLOOD PRESSURE: 120 MMHG | OXYGEN SATURATION: 93 % | HEART RATE: 94 BPM

## 2024-07-15 DIAGNOSIS — R11.0 CHEMOTHERAPY-INDUCED NAUSEA: ICD-10-CM

## 2024-07-15 DIAGNOSIS — C7A.8 NEUROENDOCRINE CARCINOMA METASTATIC TO LIVER (H): Primary | ICD-10-CM

## 2024-07-15 DIAGNOSIS — C78.7 METASTASES TO THE LIVER (H): ICD-10-CM

## 2024-07-15 DIAGNOSIS — G89.3 CANCER RELATED PAIN: ICD-10-CM

## 2024-07-15 DIAGNOSIS — C7B.8 NEUROENDOCRINE CARCINOMA METASTATIC TO LIVER (H): Primary | ICD-10-CM

## 2024-07-15 DIAGNOSIS — T45.1X5A CHEMOTHERAPY-INDUCED NAUSEA: ICD-10-CM

## 2024-07-15 DIAGNOSIS — R07.1 PAINFUL RESPIRATION: ICD-10-CM

## 2024-07-15 LAB
ALBUMIN SERPL BCG-MCNC: 3.7 G/DL (ref 3.5–5.2)
ALP SERPL-CCNC: 110 U/L (ref 40–150)
ALT SERPL W P-5'-P-CCNC: 10 U/L (ref 0–50)
ANION GAP SERPL CALCULATED.3IONS-SCNC: 13 MMOL/L (ref 7–15)
AST SERPL W P-5'-P-CCNC: 14 U/L (ref 0–45)
BASOPHILS # BLD AUTO: 0 10E3/UL (ref 0–0.2)
BASOPHILS NFR BLD AUTO: 1 %
BILIRUB SERPL-MCNC: 0.2 MG/DL
BUN SERPL-MCNC: 9.9 MG/DL (ref 6–20)
CALCIUM SERPL-MCNC: 9 MG/DL (ref 8.6–10)
CHLORIDE SERPL-SCNC: 99 MMOL/L (ref 98–107)
CREAT SERPL-MCNC: 0.68 MG/DL (ref 0.51–0.95)
DEPRECATED HCO3 PLAS-SCNC: 25 MMOL/L (ref 22–29)
EGFRCR SERPLBLD CKD-EPI 2021: >90 ML/MIN/1.73M2
EOSINOPHIL # BLD AUTO: 0 10E3/UL (ref 0–0.7)
EOSINOPHIL NFR BLD AUTO: 1 %
ERYTHROCYTE [DISTWIDTH] IN BLOOD BY AUTOMATED COUNT: 18.6 % (ref 10–15)
GLUCOSE SERPL-MCNC: 125 MG/DL (ref 70–99)
HCT VFR BLD AUTO: 30.3 % (ref 35–47)
HGB BLD-MCNC: 9.7 G/DL (ref 11.7–15.7)
IMM GRANULOCYTES # BLD: 0.1 10E3/UL
IMM GRANULOCYTES NFR BLD: 4 %
LYMPHOCYTES # BLD AUTO: 1.1 10E3/UL (ref 0.8–5.3)
LYMPHOCYTES NFR BLD AUTO: 30 %
MCH RBC QN AUTO: 26.2 PG (ref 26.5–33)
MCHC RBC AUTO-ENTMCNC: 32 G/DL (ref 31.5–36.5)
MCV RBC AUTO: 82 FL (ref 78–100)
MONOCYTES # BLD AUTO: 0.6 10E3/UL (ref 0–1.3)
MONOCYTES NFR BLD AUTO: 16 %
NEUTROPHILS # BLD AUTO: 1.9 10E3/UL (ref 1.6–8.3)
NEUTROPHILS NFR BLD AUTO: 49 %
NRBC # BLD AUTO: 0 10E3/UL
NRBC BLD AUTO-RTO: 0 /100
PLATELET # BLD AUTO: 356 10E3/UL (ref 150–450)
POTASSIUM SERPL-SCNC: 4.1 MMOL/L (ref 3.4–5.3)
PROT SERPL-MCNC: 7.1 G/DL (ref 6.4–8.3)
RBC # BLD AUTO: 3.7 10E6/UL (ref 3.8–5.2)
SODIUM SERPL-SCNC: 137 MMOL/L (ref 135–145)
WBC # BLD AUTO: 3.8 10E3/UL (ref 4–11)

## 2024-07-15 PROCEDURE — 85004 AUTOMATED DIFF WBC COUNT: CPT | Performed by: INTERNAL MEDICINE

## 2024-07-15 PROCEDURE — 96367 TX/PROPH/DG ADDL SEQ IV INF: CPT

## 2024-07-15 PROCEDURE — G2211 COMPLEX E/M VISIT ADD ON: HCPCS

## 2024-07-15 PROCEDURE — 250N000011 HC RX IP 250 OP 636: Performed by: INTERNAL MEDICINE

## 2024-07-15 PROCEDURE — 96413 CHEMO IV INFUSION 1 HR: CPT

## 2024-07-15 PROCEDURE — 96417 CHEMO IV INFUS EACH ADDL SEQ: CPT

## 2024-07-15 PROCEDURE — 99215 OFFICE O/P EST HI 40 MIN: CPT

## 2024-07-15 PROCEDURE — 80053 COMPREHEN METABOLIC PANEL: CPT | Performed by: INTERNAL MEDICINE

## 2024-07-15 PROCEDURE — 99417 PROLNG OP E/M EACH 15 MIN: CPT

## 2024-07-15 PROCEDURE — 99213 OFFICE O/P EST LOW 20 MIN: CPT

## 2024-07-15 PROCEDURE — 96375 TX/PRO/DX INJ NEW DRUG ADDON: CPT

## 2024-07-15 PROCEDURE — 36591 DRAW BLOOD OFF VENOUS DEVICE: CPT | Performed by: INTERNAL MEDICINE

## 2024-07-15 PROCEDURE — 258N000003 HC RX IP 258 OP 636: Performed by: INTERNAL MEDICINE

## 2024-07-15 RX ORDER — DIPHENHYDRAMINE HYDROCHLORIDE 50 MG/ML
50 INJECTION INTRAMUSCULAR; INTRAVENOUS
Status: DISCONTINUED | OUTPATIENT
Start: 2024-07-15 | End: 2024-07-15 | Stop reason: HOSPADM

## 2024-07-15 RX ORDER — MEPERIDINE HYDROCHLORIDE 50 MG/ML
25 INJECTION INTRAMUSCULAR; INTRAVENOUS; SUBCUTANEOUS EVERY 30 MIN PRN
Status: DISCONTINUED | OUTPATIENT
Start: 2024-07-15 | End: 2024-07-15 | Stop reason: HOSPADM

## 2024-07-15 RX ORDER — ALBUTEROL SULFATE 90 UG/1
1-2 AEROSOL, METERED RESPIRATORY (INHALATION)
Status: DISCONTINUED | OUTPATIENT
Start: 2024-07-15 | End: 2024-07-15 | Stop reason: HOSPADM

## 2024-07-15 RX ORDER — PALONOSETRON 0.05 MG/ML
0.25 INJECTION, SOLUTION INTRAVENOUS ONCE
Status: COMPLETED | OUTPATIENT
Start: 2024-07-15 | End: 2024-07-15

## 2024-07-15 RX ORDER — METHYLPREDNISOLONE SODIUM SUCCINATE 125 MG/2ML
125 INJECTION, POWDER, LYOPHILIZED, FOR SOLUTION INTRAMUSCULAR; INTRAVENOUS
Status: DISCONTINUED | OUTPATIENT
Start: 2024-07-15 | End: 2024-07-15 | Stop reason: HOSPADM

## 2024-07-15 RX ORDER — ALBUTEROL SULFATE 0.83 MG/ML
2.5 SOLUTION RESPIRATORY (INHALATION)
Status: DISCONTINUED | OUTPATIENT
Start: 2024-07-15 | End: 2024-07-15 | Stop reason: HOSPADM

## 2024-07-15 RX ORDER — HEPARIN SODIUM (PORCINE) LOCK FLUSH IV SOLN 100 UNIT/ML 100 UNIT/ML
5 SOLUTION INTRAVENOUS
Status: DISCONTINUED | OUTPATIENT
Start: 2024-07-15 | End: 2024-07-15 | Stop reason: HOSPADM

## 2024-07-15 RX ORDER — EPINEPHRINE 1 MG/ML
0.3 INJECTION, SOLUTION INTRAMUSCULAR; SUBCUTANEOUS EVERY 5 MIN PRN
Status: DISCONTINUED | OUTPATIENT
Start: 2024-07-15 | End: 2024-07-15 | Stop reason: HOSPADM

## 2024-07-15 RX ADMIN — ETOPOSIDE 200 MG: 20 INJECTION, SOLUTION INTRAVENOUS at 13:01

## 2024-07-15 RX ADMIN — PALONOSETRON 0.25 MG: 0.05 INJECTION, SOLUTION INTRAVENOUS at 11:36

## 2024-07-15 RX ADMIN — SODIUM CHLORIDE 250 ML: 9 INJECTION, SOLUTION INTRAVENOUS at 11:41

## 2024-07-15 RX ADMIN — CARBOPLATIN 610 MG: 10 INJECTION, SOLUTION INTRAVENOUS at 12:25

## 2024-07-15 RX ADMIN — FOSAPREPITANT: 150 INJECTION, POWDER, LYOPHILIZED, FOR SOLUTION INTRAVENOUS at 11:55

## 2024-07-15 ASSESSMENT — PAIN SCALES - GENERAL: PAINLEVEL: MODERATE PAIN (4)

## 2024-07-15 NOTE — PROGRESS NOTES
Infusion Nursing Note:  Louise Corado presents today for D1C4 chemotherapy.    Patient seen by provider today: Yes: Dr. Barreto   present during visit today: Not Applicable.    Note: Tolerated chemo well today. Had no complaints today.  Will return tomorrow.for day 2       Intravenous Access:  Implanted Port.    Treatment Conditions:  Results reviewed, labs MET treatment parameters, ok to proceed with treatment.      Post Infusion Assessment:  Patient tolerated infusion without incident.  Blood return noted pre and post infusion.  Site patent and intact, free from redness, edema or discomfort.  No evidence of extravasations.  Access discontinued per protocol.       Discharge Plan:   Patient and/or family verbalized understanding of discharge instructions and all questions answered.      Roslyn Echavarria RN

## 2024-07-15 NOTE — PROGRESS NOTES
[facility] Hematology and Oncology Outpatient Progress Note    Patient: Louise Corado  MRN: [id number]  Date of Service: Jul 15, 2024          Reason for Visit    Chief Complaint   Patient presents with    Oncology Clinic Visit       Neuroendocrine carcinoma metastatic to liver           Primary Hematologist/Oncologist: Dr. 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 Discussed this with primary oncologist who recommended to add Octreotide as symptoms have been persisting since prior to beginning treatment with carboplatin/etoposide. We also discussed potential dose modifications. Pt agreeable to proceed with octreotide, but would prefer to hold off on dose adjustments to treatment plan, to see how octreotide can help to improve her episodic symptoms. We discussed pain as well, which appears to be managed well, though she can utilize oxycodone more if needed. Discussed referral to palliative care for assistance in managing pain and other cancer related symptoms. After thorough discussion and input from primary oncologist, decision was made to proceed with treatment today, and add in octreotide pending insurance approval. She would like to remain on current dose of chemotherapies and readdress at next visit if still experiencing prolonged side effects. We reviewed labs today including CBC and CMP which are both relatively stable. She has hgb of 9.7 which is stable for her, decrease WBC of 3.8 with normal ANC. Louise is agreeable to proceed with next dose of chemotherapy. If her shortness of breath worsens, we can consider another thoracentesis, though patient is very hesitant at this point as her most recent experience with this was not good.   Proceed with C4D1 of treatment today.  Add Octreotide to help manage  episodic vasomotor type symptoms  Will image after cycles 2, 4, and 6.  Will plan for at least 6 cycles.   Compazine to be used alternating with Zofran. Can also utilize ativan.   Next CT scan planned to follow C4.  Thoracentesis prn, patient informed that she can let us know if she would like to proceed with another lung tap.   Continue MS, Oxycodone and Tylenol for pain management. Referral for palliative care placed for further assistance in managing cancer pain and other symptoms.   Follow-up in 3 weeks for C5 with Dr. Barreto, and to review CT imaging report.       ______________________________________________________________________________    History of Present Illness/ Interval History    Ms. Louise Corado  is a 59 year old female patient who comes in for follow up. Today she notes that her SOB is persistent though she does not want to get another thoracentesis due to the last one being very painful. Since last treatment, she has felt sick the whole time. She reports that the nausea is the worst, and compazine is most helpful, though Zofran maybe doesn't work as well.     She notes that she has been getting a lot of episodes of flushing. These symptoms have been present since prior to treatment. She does note that she is able to breath better during these episodes, when prior she struggled more. She notes that she feels nauseated, shaky, anxious, somewhat short of breath, with increased right chest pain as well as tingly hands. The episode range from mild to intense, and can be triggered by movement, or can occur randomly. Raising right arm can bring this on as well.  Each episode lasts about 10 minutes. Has vomited once. She would like to address these symptoms today.     Pain - mainly in chest. Had initially improved, but seems to be worsening. Does not want another tap. Used to get to a 10/10, but now its less, 7/10.      She also notes that she has been getting intense headaches in the morning. She  tells me that she used to have them in the past. Has not had any for the past few days. This usually lines up with the nausea symptom, and she states that mornings are not easy when this happens.     She is eating and drinking, though notes that drinking is tough, and she sometimes needs to force herself. Doesn't eat much during first few weeks, but 3rd is a bit better.     Additionally she notes that the side effects of treatment have been lasting longer than she has experienced in the past. She thinks that this last cycle did feel a bit worse this time.     Currently she is managing pain with long acting morphine and 1 tab of oxycodone as needed for breakthrough pain. She has not needed to take 2 oxycodone.     ECOG performance status   0- Fully active, without restriction      Pain  Pain Score: Moderate Pain (4) (with pain meds)  Pain Loc: Mid Back    ROS  A 14 point review of systems was obtained.  Positive findings noted in the history.  The remainder of the review of system is otherwise negative.      Oncology History/Treatment  Diagnosis:    1.  Metastatic neuroendocrine carcinoma, poorly differentiated: Diagnosed April 2024 from a liver biopsy.  Her PET/CT shows malignant involvement of the liver, mediastinal nodes, left upper quadrant omentum, manubrium, and bilateral pleura.  There is some wall thickening and FDG activity in a short segment of the terminal ileum.    6/19/24 CT CAP: 1.  No significant change in the metastatic tumor burden in the chest, abdomen and pelvis with infiltrative distal ileal mass and adjacent ileocolic adenopathy. The latter is presumed to reflect the primary tumor.  2.  Sliver of ascites in the pelvis is unchanged. 3.  Scattered sclerotic metastases are unchanged. 4.  No bowel obstruction. 5.  Decrease in the size of the loculated right pleural effusion is due to interval thoracentesis.     Treatment:    5/13/2024 - Carboplatin Etoposide initiated      Physical Exam    /76   " Pulse 94   Temp 97.7  F (36.5  C)   Resp 16   Ht 1.6 m (5' 3\")   Wt 89.4 kg (197 lb)   SpO2 93%   BMI 34.90 kg/m      GENERAL: Alert and oriented to time place and person. Seated comfortably. In no distress.  HEAD: Atraumatic and normocephalic. No alopecia.  EYES: LEV, EOMI. No erythema. No icterus.  ORAL CAVITY: Moist. No mucosal lesion or tonsillar enlargement.  NECK: supple. No thyroid enlargement.  LYMPH NODES: No palpable supraclavicular, cervical, axillary or inguinal lymphadenopathy.  CHEST: clear to auscultation bilaterally. Resonant to percussion throughout bilaterally. Symmetrical breath movements bilaterally.  CVS: RRR  ABDOMEN: Soft. Not tender. Not distended. No palpable hepatomegaly or splenomegaly. No other mass palpable. Bowel sounds present.  EXTREMITIES: Warm. No peripheral edema.  SKIN: no rash, or bruising or purpura.   NEURO: No gross deficit noted. Non-antalgic gait.      Lab Results    Recent Results (from the past 168 hour(s))   Comprehensive metabolic panel   Result Value Ref Range    Sodium 137 135 - 145 mmol/L    Potassium 4.1 3.4 - 5.3 mmol/L    Carbon Dioxide (CO2) 25 22 - 29 mmol/L    Anion Gap 13 7 - 15 mmol/L    Urea Nitrogen 9.9 6.0 - 20.0 mg/dL    Creatinine 0.68 0.51 - 0.95 mg/dL    GFR Estimate >90 >60 mL/min/1.73m2    Calcium 9.0 8.6 - 10.0 mg/dL    Chloride 99 98 - 107 mmol/L    Glucose 125 (H) 70 - 99 mg/dL    Alkaline Phosphatase 110 40 - 150 U/L    AST 14 0 - 45 U/L    ALT 10 0 - 50 U/L    Protein Total 7.1 6.4 - 8.3 g/dL    Albumin 3.7 3.5 - 5.2 g/dL    Bilirubin Total 0.2 <=1.2 mg/dL   CBC with platelets and differential   Result Value Ref Range    WBC Count 3.8 (L) 4.0 - 11.0 10e3/uL    RBC Count 3.70 (L) 3.80 - 5.20 10e6/uL    Hemoglobin 9.7 (L) 11.7 - 15.7 g/dL    Hematocrit 30.3 (L) 35.0 - 47.0 %    MCV 82 78 - 100 fL    MCH 26.2 (L) 26.5 - 33.0 pg    MCHC 32.0 31.5 - 36.5 g/dL    RDW 18.6 (H) 10.0 - 15.0 %    Platelet Count 356 150 - 450 10e3/uL    % " Neutrophils 49 %    % Lymphocytes 30 %    % Monocytes 16 %    % Eosinophils 1 %    % Basophils 1 %    % Immature Granulocytes 4 %    NRBCs per 100 WBC 0 <1 /100    Absolute Neutrophils 1.9 1.6 - 8.3 10e3/uL    Absolute Lymphocytes 1.1 0.8 - 5.3 10e3/uL    Absolute Monocytes 0.6 0.0 - 1.3 10e3/uL    Absolute Eosinophils 0.0 0.0 - 0.7 10e3/uL    Absolute Basophils 0.0 0.0 - 0.2 10e3/uL    Absolute Immature Granulocytes 0.1 <=0.4 10e3/uL    Absolute NRBCs 0.0 10e3/uL         I reviewed the above labs today.  Imaging    US Thoracentesis    Result Date: 6/21/2024  EXAM: 1. RIGHT THORACENTESIS 2. ULTRASOUND GUIDANCE LOCATION: [facility] DATE: 6/21/2024 INDICATION: Pleural effusion. PROCEDURE: Informed consent obtained. Time out performed. The chest was prepped and draped in sterile fashion. 10 mL of 1 % lidocaine was infused into the local soft tissues. Under direct ultrasound guidance, a 5 Slovak catheter system was placed into the pleural effusion. 0.5 liters of clear yellow fluid were removed and sent to lab, if requested. Patient tolerated procedure well. Ultrasound imaging was obtained and placed in the patient's permanent medical record.     IMPRESSION: Status post right ultrasound-guided thoracentesis. Reference CPT Code: 26714    CT Chest/Abdomen/Pelvis w Contrast    Result Date: 6/20/2024  EXAM: CT CHEST/ABDOMEN/PELVIS W CONTRAST LOCATION: [facility] DATE: 6/19/2024 INDICATION: Widely metastatic neuroendocrine tumor diagnosed April 2024 from a liver biopsy. COMPARISON: PET CT 4/17/2024, CTA chest 4/10/2024 TECHNIQUE: CT scan of the chest, abdomen, and pelvis was performed following injection of IV contrast. Multiplanar reformats were obtained. Dose reduction techniques were used. CONTRAST: 90ml Isovue 370 FINDINGS: LUNGS AND PLEURA: Slight decrease in the loculated right pleural effusion which is now moderate in size with adjacent compressive atelectasis..  Heterogeneous nodular pleural thickening along with innumerable, nodules in the subpleural fat. These measure up to 8 mm in short axis (axial image 104) and are unchanged. There are a few, scattered bilateral pulmonary nodules, heterogeneous in size and shape, little changed. There is an ovoid 1.2 x 0.8 cm nodule in the left upper lobe (axial image 56), unchanged when measured in the same plane. MEDIASTINUM/AXILLAE: Right IJ Port-A-Cath. Bulky mediastinal and right hilar adenopathy again noted. Subcarinal node measures 2.2 cm in short axis and a right paratracheal node measures 1.8 cm in short axis, also little changed when measured in no central pulmonary emboli. Aorta and branches are normal. The same plane. Smaller pericardiophrenic and lower paraesophageal nodes are noted. CORONARY ARTERY CALCIFICATION: None. HEPATOBILIARY: Poorly defined hypodense lesion noted laterally in the right lobe that measures at least 5.6 x 5.6 cm with heterogeneous hyperdense centrally. This appears little changed when compared to the PET/CT. No other visible lesions. PANCREAS: Normal. SPLEEN: Normal. ADRENAL GLANDS: Normal. KIDNEYS/BLADDER: Normal. BOWEL: Heterogeneous, enhancing spiculated distal ileal mass, adjacent thickening of the ileocolic mesentery and nodes again noted (axial images 201-223, coronal images 33-48). Poorly defined irregular mass measures at least 3.1 cm in short axis (axial images 216). Ileocolic nodes superior to it measures 1.8 x 1.4 cm, likely little changed. Poorly defined infiltrative tumor implants within the mesentery in the midabdomen (axial images 204-216, coronal images 21-27) and along the lateral left ileocolic gutter at the iliac crest (axial image 224) are unchanged with largest solid component measuring 1.2 x 2.5 cm. Sliver of ascites in the pelvis is unchanged. No bowel obstruction. LYMPH NODES: Multiple tiny area of caval and left para-aortic nodes are again seen measuring less than a  centimeter. VASCULATURE: Mild arterial calcifications. No aneurysm. PELVIC ORGANS: Normal. MUSCULOSKELETAL: Sclerotic metastasis of the manubrium, left side of  T11 vertebral body and the left posterior iliac wing again noted. No new sites of disease.     IMPRESSION: 1.  No significant change in the metastatic tumor burden in the chest, abdomen and pelvis with infiltrative  distal ileal mass and adjacent ileocolic adenopathy. The latter is presumed to reflect the primary tumor. 2.  Sliver of ascites in the pelvis is unchanged. 3.  Scattered sclerotic metastases are unchanged. 4.  No bowel obstruction. 5.  Decrease in the size of the loculated right pleural effusion is due to interval thoracentesis. 6.  Other noncritical findings as noted above.       Billing  Total time 65 minutes, to include face to face visit, review of EMR, ordering, documentation and coordination of care on date of service   complexity modifier for longitudinal care.     Signed by: ABDIAZIZ Mendez CNP

## 2024-07-15 NOTE — PROGRESS NOTES
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    Weight as of this encounter: 89.4 kg (197 lb). Body surface area is 1.99 meters squared.  Moderate Pain (4) Comment: with pain meds   No LMP recorded. Patient is postmenopausal.  Allergies reviewed: Yes  Medications reviewed: Yes    Medications: Medication refills not needed today.  Pharmacy name entered into [facility]:    [id number] [place], [place] - [address] AT [id number] [place], [place] - [address] AT [facility]    Frailty Screening:   Is the patient here for a new oncology consult visit in cancer care? 2. No      Clinical concerns:  3 week labs and treatment      Hoa Denny              "

## 2024-07-15 NOTE — LETTER
7/15/2024      Louise Corado  [address] [place] [address]      Dear Colleague,    Thank you for referring your patient, Louise Corado, to the [facility] CANC[facility]. Please see a copy of my visit note below.    [facility] Outpatient Progress Note    Patient: Louise Corado  MRN: [id number]  Date of Service: Jul 15, 2024          Reason for Visit    Chief Complaint   Patient presents with     Oncology Clinic Visit       Neuroendocrine carcinoma metastatic to liver           Primary Hematologist/Oncologist: Dr. 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 Discussed this with primary oncologist who recommended to add Octreotide as symptoms have been persisting since prior to beginning treatment with carboplatin/etoposide. We also discussed potential dose modifications. Pt agreeable to proceed with octreotide, but would prefer to hold off on dose adjustments to treatment plan, to see how octreotide can help to improve her episodic symptoms. We discussed pain as well, which appears to be managed well, though she can utilize oxycodone more if needed. Discussed referral to palliative care for assistance in managing pain and other cancer related symptoms. After thorough discussion and input from primary oncologist, decision was made to proceed with treatment today, and add in octreotide pending insurance approval. She would like to remain on current dose of chemotherapies and readdress at next visit if still experiencing prolonged side effects. We reviewed labs today including CBC and CMP which are both relatively stable. She has hgb of 9.7 which is stable for her, decrease WBC of 3.8 with normal ANC. Louise is agreeable to proceed with next dose of chemotherapy. If  her shortness of breath worsens, we can consider another thoracentesis, though patient is very hesitant at this point as her most recent experience with this was not good.   Proceed with C4D1 of treatment today.  Add Octreotide to help manage episodic vasomotor type symptoms  Will image after cycles 2, 4, and 6.  Will plan for at least 6 cycles.   Compazine to be used alternating with Zofran. Can also utilize ativan.   Next CT scan planned to follow C4.  Thoracentesis prn, patient informed that she can let us know if she would like to proceed with another lung tap.   Continue MS, Oxycodone and Tylenol for pain management. Referral for palliative care placed for further assistance in managing cancer pain and other symptoms.   Follow-up in 3 weeks for C5 with Dr. Barreto, and to review CT imaging report.       ______________________________________________________________________________    History of Present Illness/ Interval History    Ms. Louise Corado  is a 59 year old female patient who comes in for follow up. Today she notes that her SOB is persistent though she does not want to get another thoracentesis due to the last one being very painful. Since last treatment, she has felt sick the whole time. She reports that the nausea is the worst, and compazine is most helpful, though Zofran maybe doesn't work as well.     She notes that she has been getting a lot of episodes of flushing. These symptoms have been present since prior to treatment. She does note that she is able to breath better during these episodes, when prior she struggled more. She notes that she feels nauseated, shaky, anxious, somewhat short of breath, with increased right chest pain as well as tingly hands. The episode range from mild to intense, and can be triggered by movement, or can occur randomly. Raising right arm can bring this on as well.  Each episode lasts about 10 minutes. Has vomited once. She would like to address these symptoms  today.     Pain - mainly in chest. Had initially improved, but seems to be worsening. Does not want another tap. Used to get to a 10/10, but now its less, 7/10.      She also notes that she has been getting intense headaches in the morning. She tells me that she used to have them in the past. Has not had any for the past few days. This usually lines up with the nausea symptom, and she states that mornings are not easy when this happens.     She is eating and drinking, though notes that drinking is tough, and she sometimes needs to force herself. Doesn't eat much during first few weeks, but 3rd is a bit better.     Additionally she notes that the side effects of treatment have been lasting longer than she has experienced in the past. She thinks that this last cycle did feel a bit worse this time.     Currently she is managing pain with long acting morphine and 1 tab of oxycodone as needed for breakthrough pain. She has not needed to take 2 oxycodone.     ECOG performance status   0- Fully active, without restriction      Pain  Pain Score: Moderate Pain (4) (with pain meds)  Pain Loc: Mid Back    ROS  A 14 point review of systems was obtained.  Positive findings noted in the history.  The remainder of the review of system is otherwise negative.      Oncology History/Treatment  Diagnosis:    1.  Metastatic neuroendocrine carcinoma, poorly differentiated: Diagnosed April 2024 from a liver biopsy.  Her PET/CT shows malignant involvement of the liver, mediastinal nodes, left upper quadrant omentum, manubrium, and bilateral pleura.  There is some wall thickening and FDG activity in a short segment of the terminal ileum.    6/19/24 CT CAP: 1.  No significant change in the metastatic tumor burden in the chest, abdomen and pelvis with infiltrative distal ileal mass and adjacent ileocolic adenopathy. The latter is presumed to reflect the primary tumor.  2.  Sliver of ascites in the pelvis is unchanged. 3.  Scattered sclerotic  "metastases are unchanged. 4.  No bowel obstruction. 5.  Decrease in the size of the loculated right pleural effusion is due to interval thoracentesis.     Treatment:    5/13/2024 - Carboplatin Etoposide initiated      Physical Exam    /76   Pulse 94   Temp 97.7  F (36.5  C)   Resp 16   Ht 1.6 m (5' 3\")   Wt 89.4 kg (197 lb)   SpO2 93%   BMI 34.90 kg/m      GENERAL: Alert and oriented to time place and person. Seated comfortably. In no distress.  HEAD: Atraumatic and normocephalic. No alopecia.  EYES: LEV, EOMI. No erythema. No icterus.  ORAL CAVITY: Moist. No mucosal lesion or tonsillar enlargement.  NECK: supple. No thyroid enlargement.  LYMPH NODES: No palpable supraclavicular, cervical, axillary or inguinal lymphadenopathy.  CHEST: clear to auscultation bilaterally. Resonant to percussion throughout bilaterally. Symmetrical breath movements bilaterally.  CVS: RRR  ABDOMEN: Soft. Not tender. Not distended. No palpable hepatomegaly or splenomegaly. No other mass palpable. Bowel sounds present.  EXTREMITIES: Warm. No peripheral edema.  SKIN: no rash, or bruising or purpura.   NEURO: No gross deficit noted. Non-antalgic gait.      Lab Results    Recent Results (from the past 168 hour(s))   Comprehensive metabolic panel   Result Value Ref Range    Sodium 137 135 - 145 mmol/L    Potassium 4.1 3.4 - 5.3 mmol/L    Carbon Dioxide (CO2) 25 22 - 29 mmol/L    Anion Gap 13 7 - 15 mmol/L    Urea Nitrogen 9.9 6.0 - 20.0 mg/dL    Creatinine 0.68 0.51 - 0.95 mg/dL    GFR Estimate >90 >60 mL/min/1.73m2    Calcium 9.0 8.6 - 10.0 mg/dL    Chloride 99 98 - 107 mmol/L    Glucose 125 (H) 70 - 99 mg/dL    Alkaline Phosphatase 110 40 - 150 U/L    AST 14 0 - 45 U/L    ALT 10 0 - 50 U/L    Protein Total 7.1 6.4 - 8.3 g/dL    Albumin 3.7 3.5 - 5.2 g/dL    Bilirubin Total 0.2 <=1.2 mg/dL   CBC with platelets and differential   Result Value Ref Range    WBC Count 3.8 (L) 4.0 - 11.0 10e3/uL    RBC Count 3.70 (L) 3.80 - 5.20 " 10e6/uL    Hemoglobin 9.7 (L) 11.7 - 15.7 g/dL    Hematocrit 30.3 (L) 35.0 - 47.0 %    MCV 82 78 - 100 fL    MCH 26.2 (L) 26.5 - 33.0 pg    MCHC 32.0 31.5 - 36.5 g/dL    RDW 18.6 (H) 10.0 - 15.0 %    Platelet Count 356 150 - 450 10e3/uL    % Neutrophils 49 %    % Lymphocytes 30 %    % Monocytes 16 %    % Eosinophils 1 %    % Basophils 1 %    % Immature Granulocytes 4 %    NRBCs per 100 WBC 0 <1 /100    Absolute Neutrophils 1.9 1.6 - 8.3 10e3/uL    Absolute Lymphocytes 1.1 0.8 - 5.3 10e3/uL    Absolute Monocytes 0.6 0.0 - 1.3 10e3/uL    Absolute Eosinophils 0.0 0.0 - 0.7 10e3/uL    Absolute Basophils 0.0 0.0 - 0.2 10e3/uL    Absolute Immature Granulocytes 0.1 <=0.4 10e3/uL    Absolute NRBCs 0.0 10e3/uL         I reviewed the above labs today.  Imaging    US Thoracentesis    Result Date: 6/21/2024  EXAM: 1. RIGHT THORACENTESIS 2. ULTRASOUND GUIDANCE LOCATION: [facility] DATE: 6/21/2024 INDICATION: Pleural effusion. PROCEDURE: Informed consent obtained. Time out performed. The chest was prepped and draped in sterile fashion. 10 mL of 1 % lidocaine was infused into the local soft tissues. Under direct ultrasound guidance, a 5 Lebanese catheter system was placed into the pleural effusion. 0.5 liters of clear yellow fluid were removed and sent to lab, if requested. Patient tolerated procedure well. Ultrasound imaging was obtained and placed in the patient's permanent medical record.     IMPRESSION: Status post right ultrasound-guided thoracentesis. Reference CPT Code: 83702    CT Chest/Abdomen/Pelvis w Contrast    Result Date: 6/20/2024  EXAM: CT CHEST/ABDOMEN/PELVIS W CONTRAST LOCATION: [facility] DATE: 6/19/2024 INDICATION: Widely metastatic neuroendocrine tumor diagnosed April 2024 from a liver biopsy. COMPARISON: PET CT 4/17/2024, CTA chest 4/10/2024 TECHNIQUE: CT scan of the chest, abdomen, and pelvis was performed following injection of IV contrast. Multiplanar  reformats were obtained. Dose reduction techniques were used. CONTRAST: 90ml Isovue 370 FINDINGS: LUNGS AND PLEURA: Slight decrease in the loculated right pleural effusion which is now moderate in size with adjacent compressive atelectasis.. Heterogeneous nodular pleural thickening along with innumerable, nodules in the subpleural fat. These measure up to 8 mm in short axis (axial image 104) and are unchanged. There are a few, scattered bilateral pulmonary nodules, heterogeneous in size and shape, little changed. There is an ovoid 1.2 x 0.8 cm nodule in the left upper lobe (axial image 56), unchanged when measured in the same plane. MEDIASTINUM/AXILLAE: Right IJ Port-A-Cath. Bulky mediastinal and right hilar adenopathy again noted. Subcarinal node measures 2.2 cm in short axis and a right paratracheal node measures 1.8 cm in short axis, also little changed when measured in no central pulmonary emboli. Aorta and branches are normal. The same plane. Smaller pericardiophrenic and lower paraesophageal nodes are noted. CORONARY ARTERY CALCIFICATION: None. HEPATOBILIARY: Poorly defined hypodense lesion noted laterally in the right lobe that measures at least 5.6 x 5.6 cm with heterogeneous hyperdense centrally. This appears little changed when compared to the PET/CT. No other visible lesions. PANCREAS: Normal. SPLEEN: Normal. ADRENAL GLANDS: Normal. KIDNEYS/BLADDER: Normal. BOWEL: Heterogeneous, enhancing spiculated distal ileal mass, adjacent thickening of the ileocolic mesentery and nodes again noted (axial images 201-223, coronal images 33-48). Poorly defined irregular mass measures at least 3.1 cm in short axis (axial images 216). Ileocolic nodes superior to it measures 1.8 x 1.4 cm, likely little changed. Poorly defined infiltrative tumor implants within the mesentery in the midabdomen (axial images 204-216, coronal images 21-27) and along the lateral left ileocolic gutter at the iliac crest (axial image 224) are  "unchanged with largest solid component measuring 1.2 x 2.5 cm. Sliver of ascites in the pelvis is unchanged. No bowel obstruction. LYMPH NODES: Multiple tiny area of caval and left para-aortic nodes are again seen measuring less than a centimeter. VASCULATURE: Mild arterial calcifications. No aneurysm. PELVIC ORGANS: Normal. MUSCULOSKELETAL: Sclerotic metastasis of the manubrium, left side of  T11 vertebral body and the left posterior iliac wing again noted. No new sites of disease.     IMPRESSION: 1.  No significant change in the metastatic tumor burden in the chest, abdomen and pelvis with infiltrative  distal ileal mass and adjacent ileocolic adenopathy. The latter is presumed to reflect the primary tumor. 2.  Sliver of ascites in the pelvis is unchanged. 3.  Scattered sclerotic metastases are unchanged. 4.  No bowel obstruction. 5.  Decrease in the size of the loculated right pleural effusion is due to interval thoracentesis. 6.  Other noncritical findings as noted above.       Billing  Total time 65 minutes, to include face to face visit, review of EMR, ordering, documentation and coordination of care on date of service   complexity modifier for longitudinal care.     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    Weight as of this encounter: 89.4 kg (197 lb). Body surface area is 1.99 meters squared.  Moderate Pain (4) Comment: with pain meds   No LMP recorded. Patient is postmenopausal.  Allergies reviewed: " Yes  Medications reviewed: Yes    Medications: Medication refills not needed today.  Pharmacy name entered into [facility]:    [id number] [place], [place] - [address] AT [id number] - [place], [place] - [address] AT [facility]    Frailty Screening:   Is the patient here for a new oncology consult visit in cancer care? 2. No      Clinical concerns:  3 week labs and treatment      Hoa Denny                Again, thank you for allowing me to participate in the care of your patient.        Sincerely,        ABDIAZIZ Mendez CNP

## 2024-07-16 ENCOUNTER — INFUSION THERAPY VISIT (OUTPATIENT)
Dept: INFUSION THERAPY | Facility: HOSPITAL | Age: 59
End: 2024-07-16
Attending: STUDENT IN AN ORGANIZED HEALTH CARE EDUCATION/TRAINING PROGRAM
Payer: COMMERCIAL

## 2024-07-16 ENCOUNTER — PATIENT OUTREACH (OUTPATIENT)
Dept: ONCOLOGY | Facility: HOSPITAL | Age: 59
End: 2024-07-16

## 2024-07-16 VITALS
DIASTOLIC BLOOD PRESSURE: 73 MMHG | TEMPERATURE: 98 F | OXYGEN SATURATION: 95 % | RESPIRATION RATE: 16 BRPM | HEART RATE: 89 BPM | SYSTOLIC BLOOD PRESSURE: 138 MMHG

## 2024-07-16 DIAGNOSIS — C7B.8 NEUROENDOCRINE CARCINOMA METASTATIC TO LIVER (H): Primary | ICD-10-CM

## 2024-07-16 DIAGNOSIS — C7A.8 NEUROENDOCRINE CARCINOMA METASTATIC TO LIVER (H): Primary | ICD-10-CM

## 2024-07-16 PROCEDURE — 96413 CHEMO IV INFUSION 1 HR: CPT

## 2024-07-16 PROCEDURE — 96367 TX/PROPH/DG ADDL SEQ IV INF: CPT

## 2024-07-16 PROCEDURE — 258N000003 HC RX IP 258 OP 636: Performed by: INTERNAL MEDICINE

## 2024-07-16 PROCEDURE — 250N000011 HC RX IP 250 OP 636: Performed by: INTERNAL MEDICINE

## 2024-07-16 PROCEDURE — 96372 THER/PROPH/DIAG INJ SC/IM: CPT | Performed by: INTERNAL MEDICINE

## 2024-07-16 RX ORDER — DIPHENHYDRAMINE HYDROCHLORIDE 50 MG/ML
50 INJECTION INTRAMUSCULAR; INTRAVENOUS
Status: DISCONTINUED | OUTPATIENT
Start: 2024-07-16 | End: 2024-07-16 | Stop reason: HOSPADM

## 2024-07-16 RX ORDER — ALBUTEROL SULFATE 90 UG/1
1-2 AEROSOL, METERED RESPIRATORY (INHALATION)
Status: DISCONTINUED | OUTPATIENT
Start: 2024-07-16 | End: 2024-07-16 | Stop reason: HOSPADM

## 2024-07-16 RX ORDER — METHYLPREDNISOLONE SODIUM SUCCINATE 125 MG/2ML
125 INJECTION, POWDER, LYOPHILIZED, FOR SOLUTION INTRAMUSCULAR; INTRAVENOUS
Status: DISCONTINUED | OUTPATIENT
Start: 2024-07-16 | End: 2024-07-16 | Stop reason: HOSPADM

## 2024-07-16 RX ORDER — HEPARIN SODIUM (PORCINE) LOCK FLUSH IV SOLN 100 UNIT/ML 100 UNIT/ML
5 SOLUTION INTRAVENOUS
Status: DISCONTINUED | OUTPATIENT
Start: 2024-07-16 | End: 2024-07-16 | Stop reason: HOSPADM

## 2024-07-16 RX ORDER — ALBUTEROL SULFATE 0.83 MG/ML
2.5 SOLUTION RESPIRATORY (INHALATION)
Status: DISCONTINUED | OUTPATIENT
Start: 2024-07-16 | End: 2024-07-16 | Stop reason: HOSPADM

## 2024-07-16 RX ORDER — EPINEPHRINE 1 MG/ML
0.3 INJECTION, SOLUTION INTRAMUSCULAR; SUBCUTANEOUS EVERY 5 MIN PRN
Status: DISCONTINUED | OUTPATIENT
Start: 2024-07-16 | End: 2024-07-16 | Stop reason: HOSPADM

## 2024-07-16 RX ORDER — MEPERIDINE HYDROCHLORIDE 50 MG/ML
25 INJECTION INTRAMUSCULAR; INTRAVENOUS; SUBCUTANEOUS EVERY 30 MIN PRN
Status: DISCONTINUED | OUTPATIENT
Start: 2024-07-16 | End: 2024-07-16 | Stop reason: HOSPADM

## 2024-07-16 RX ADMIN — HEPARIN 5 ML: 100 SYRINGE at 12:11

## 2024-07-16 RX ADMIN — DEXAMETHASONE SODIUM PHOSPHATE 12 MG: 10 INJECTION, SOLUTION INTRAMUSCULAR; INTRAVENOUS at 10:17

## 2024-07-16 RX ADMIN — SODIUM CHLORIDE 250 ML: 9 INJECTION, SOLUTION INTRAVENOUS at 09:10

## 2024-07-16 RX ADMIN — Medication 30 MG: at 12:12

## 2024-07-16 RX ADMIN — ETOPOSIDE 200 MG: 20 INJECTION, SOLUTION INTRAVENOUS at 11:02

## 2024-07-16 NOTE — PROGRESS NOTES
Infusion Nursing Note:  Louise Corado presents today for D2C4 Chemo.    Patient seen by provider today: No   present during visit today: Not Applicable.    Note: Tolerated treatment today well, offers no complaints.  Per her request steroid premed infused over 30 minutes today, she states she got very shaky after last infusion when it was run faster..      Intravenous Access:  Labs drawn without difficulty.  Implanted Port.    Treatment Conditions:  Results reviewed, labs MET treatment parameters, ok to proceed with treatment.      Post Infusion Assessment:  Patient tolerated infusion without incident.  Patient tolerated injection without incident.  Blood return noted pre and post infusion.  Site patent and intact, free from redness, edema or discomfort.  No evidence of extravasations.  Access discontinued per protocol.       Discharge Plan:   Patient and/or family verbalized understanding of discharge instructions and all questions answered.      Roslyn Echavarria RN

## 2024-07-16 NOTE — PROGRESS NOTES
[facility]: Cancer Care                                                                                          Met the patient and her sister in the lobby to discuss workplace accommodations paperwork.  Per the patient, the workplace accommodation specialist at her employer is wanting a more formal letter stating her workplace accommodations request.  I stated that we would write a work with accommodations letter in a more formalized letter and get it signed by Dr. Barreto and fax it to the HR workplace accommodation specialist. Workplace accomodation specialist's name is [employer] Dyer and her fax number is [phone]. Phone number is [phone].     Signature:  Carolin Biswas RN

## 2024-07-16 NOTE — PROGRESS NOTES
[facility]: Cancer Care                                                                                          Patient stated that she will be arriving to the clinic to discuss the workplace accommodations request in person. Patient also wanted handicap parking form signed by TESHA Gutierrez. I stated I would let the medical assistant know.    Signature:  Carolin Biswas RN

## 2024-07-17 ENCOUNTER — PATIENT OUTREACH (OUTPATIENT)
Dept: ONCOLOGY | Facility: HOSPITAL | Age: 59
End: 2024-07-17
Payer: COMMERCIAL

## 2024-07-17 ENCOUNTER — INFUSION THERAPY VISIT (OUTPATIENT)
Dept: INFUSION THERAPY | Facility: HOSPITAL | Age: 59
End: 2024-07-17
Attending: STUDENT IN AN ORGANIZED HEALTH CARE EDUCATION/TRAINING PROGRAM
Payer: COMMERCIAL

## 2024-07-17 VITALS
DIASTOLIC BLOOD PRESSURE: 76 MMHG | OXYGEN SATURATION: 94 % | TEMPERATURE: 97.8 F | HEART RATE: 80 BPM | RESPIRATION RATE: 16 BRPM | SYSTOLIC BLOOD PRESSURE: 135 MMHG

## 2024-07-17 DIAGNOSIS — C7B.8 NEUROENDOCRINE CARCINOMA METASTATIC TO LIVER (H): Primary | ICD-10-CM

## 2024-07-17 DIAGNOSIS — C7A.8 NEUROENDOCRINE CARCINOMA METASTATIC TO LIVER (H): Primary | ICD-10-CM

## 2024-07-17 PROCEDURE — 258N000003 HC RX IP 258 OP 636: Performed by: INTERNAL MEDICINE

## 2024-07-17 PROCEDURE — 96367 TX/PROPH/DG ADDL SEQ IV INF: CPT

## 2024-07-17 PROCEDURE — 96413 CHEMO IV INFUSION 1 HR: CPT

## 2024-07-17 PROCEDURE — 250N000011 HC RX IP 250 OP 636: Performed by: INTERNAL MEDICINE

## 2024-07-17 RX ORDER — ALBUTEROL SULFATE 90 UG/1
1-2 AEROSOL, METERED RESPIRATORY (INHALATION)
Status: DISCONTINUED | OUTPATIENT
Start: 2024-07-17 | End: 2024-07-17 | Stop reason: HOSPADM

## 2024-07-17 RX ORDER — HEPARIN SODIUM,PORCINE 10 UNIT/ML
5-20 VIAL (ML) INTRAVENOUS DAILY PRN
Status: CANCELLED | OUTPATIENT
Start: 2024-07-17

## 2024-07-17 RX ORDER — METHYLPREDNISOLONE SODIUM SUCCINATE 125 MG/2ML
125 INJECTION, POWDER, LYOPHILIZED, FOR SOLUTION INTRAMUSCULAR; INTRAVENOUS
Status: DISCONTINUED | OUTPATIENT
Start: 2024-07-17 | End: 2024-07-17 | Stop reason: HOSPADM

## 2024-07-17 RX ORDER — EPINEPHRINE 1 MG/ML
0.3 INJECTION, SOLUTION INTRAMUSCULAR; SUBCUTANEOUS EVERY 5 MIN PRN
Status: DISCONTINUED | OUTPATIENT
Start: 2024-07-17 | End: 2024-07-17 | Stop reason: HOSPADM

## 2024-07-17 RX ORDER — ALBUTEROL SULFATE 0.83 MG/ML
2.5 SOLUTION RESPIRATORY (INHALATION)
Status: DISCONTINUED | OUTPATIENT
Start: 2024-07-17 | End: 2024-07-17 | Stop reason: HOSPADM

## 2024-07-17 RX ORDER — DIPHENHYDRAMINE HYDROCHLORIDE 50 MG/ML
50 INJECTION INTRAMUSCULAR; INTRAVENOUS
Status: DISCONTINUED | OUTPATIENT
Start: 2024-07-17 | End: 2024-07-17 | Stop reason: HOSPADM

## 2024-07-17 RX ORDER — MEPERIDINE HYDROCHLORIDE 50 MG/ML
25 INJECTION INTRAMUSCULAR; INTRAVENOUS; SUBCUTANEOUS EVERY 30 MIN PRN
Status: DISCONTINUED | OUTPATIENT
Start: 2024-07-17 | End: 2024-07-17 | Stop reason: HOSPADM

## 2024-07-17 RX ORDER — HEPARIN SODIUM (PORCINE) LOCK FLUSH IV SOLN 100 UNIT/ML 100 UNIT/ML
5 SOLUTION INTRAVENOUS
Status: DISCONTINUED | OUTPATIENT
Start: 2024-07-17 | End: 2024-07-17 | Stop reason: HOSPADM

## 2024-07-17 RX ORDER — LORAZEPAM 2 MG/ML
0.5 INJECTION INTRAMUSCULAR EVERY 4 HOURS PRN
Status: CANCELLED | OUTPATIENT
Start: 2024-07-17

## 2024-07-17 RX ADMIN — HEPARIN 5 ML: 100 SYRINGE at 11:21

## 2024-07-17 RX ADMIN — SODIUM CHLORIDE 250 ML: 9 INJECTION, SOLUTION INTRAVENOUS at 09:31

## 2024-07-17 RX ADMIN — ETOPOSIDE 200 MG: 20 INJECTION, SOLUTION INTRAVENOUS at 10:16

## 2024-07-17 RX ADMIN — DEXAMETHASONE SODIUM PHOSPHATE 12 MG: 10 INJECTION, SOLUTION INTRAMUSCULAR; INTRAVENOUS at 09:44

## 2024-07-17 NOTE — PROGRESS NOTES
Infusion Nursing Note:  Louise Corado presents today for D3C4 Etoposide.    Patient seen by provider today: No   present during visit today: Not Applicable.    Note: dexamethasone pre med given over 30 minutes.      Intravenous Access:  Implanted Port.    Treatment Conditions:  Lab Results   Component Value Date    HGB 9.7 (L) 07/15/2024    WBC 3.8 (L) 07/15/2024    ANEU 1.9 06/24/2024    ANEUTAUTO 1.9 07/15/2024     07/15/2024        Lab Results   Component Value Date     07/15/2024    POTASSIUM 4.1 07/15/2024    MAG 2.0 06/12/2024    CR 0.68 07/15/2024    ANGELA 9.0 07/15/2024    BILITOTAL 0.2 07/15/2024    ALBUMIN 3.7 07/15/2024    ALT 10 07/15/2024    AST 14 07/15/2024       Results reviewed, labs MET treatment parameters, ok to proceed with treatment.      Post Infusion Assessment:  Patient tolerated infusion without incident.  Blood return noted pre and post infusion.  Site patent and intact, free from redness, edema or discomfort.  Access discontinued per protocol.       Discharge Plan:   Patient and/or family verbalized understanding of discharge instructions and all questions answered.      Kristen Keane RN

## 2024-07-17 NOTE — PROGRESS NOTES
Faxed workplace accomodations letter to [employer] workplace accommodations specialist. Sent patient a mychart updating her.

## 2024-07-20 ENCOUNTER — MYC REFILL (OUTPATIENT)
Dept: ONCOLOGY | Facility: HOSPITAL | Age: 59
End: 2024-07-20
Payer: COMMERCIAL

## 2024-07-20 DIAGNOSIS — R07.1 PAINFUL RESPIRATION: ICD-10-CM

## 2024-07-22 RX ORDER — OXYCODONE HYDROCHLORIDE 5 MG/1
5-10 TABLET ORAL EVERY 4 HOURS PRN
Qty: 60 TABLET | Refills: 0 | Status: SHIPPED | OUTPATIENT
Start: 2024-07-22 | End: 2024-07-25

## 2024-07-22 NOTE — TELEPHONE ENCOUNTER
Patient reports that she is taking around 6 pills per day.    Last Written Prescription Date:  7/9/24  Last Fill Quantity: 60,  # refills: 0   Last office visit provider:  7/15/24 with Brenda Pearson CNP, follow up 8/5/24    Requested Prescriptions   Pending Prescriptions Disp Refills    oxyCODONE (ROXICODONE) 5 MG tablet 60 tablet 0     Sig: Take 1-2 tablets (5-10 mg) by mouth every 4 hours as needed for pain       There is no refill protocol information for this order          Cher Lott RN 07/22/24 12:12 PM

## 2024-07-23 ENCOUNTER — MYC REFILL (OUTPATIENT)
Dept: ONCOLOGY | Facility: HOSPITAL | Age: 59
End: 2024-07-23
Payer: COMMERCIAL

## 2024-07-23 DIAGNOSIS — R11.0 CHEMOTHERAPY-INDUCED NAUSEA: ICD-10-CM

## 2024-07-23 DIAGNOSIS — T45.1X5A CHEMOTHERAPY-INDUCED NAUSEA: ICD-10-CM

## 2024-07-23 RX ORDER — PROCHLORPERAZINE MALEATE 10 MG
10 TABLET ORAL EVERY 6 HOURS PRN
Qty: 30 TABLET | Refills: 1 | Status: ON HOLD | OUTPATIENT
Start: 2024-07-23 | End: 2024-09-06

## 2024-07-23 NOTE — TELEPHONE ENCOUNTER
Last Written Prescription Date:  6/12/24  Last Fill Quantity: 30,  # refills: 1   Last office visit provider:  7/15/24 with Brenda Pearson CNP, follow up 8/5/24     Requested Prescriptions   Pending Prescriptions Disp Refills    prochlorperazine (COMPAZINE) 10 MG tablet 30 tablet 1     Sig: Take 1 tablet (10 mg) by mouth every 6 hours as needed for nausea or vomiting        Antivertigo/Antiemetic Agents Passed - 7/23/2024  1:24 PM        Passed - Medication is active on med list        Passed - Medication indicated for associated diagnosis     The medication is prescribed for one or more of the following conditions:     Motion sickness   Nausea   Vomiting   Vertigo   Chemotherapy-induced nausea and vomiting   Radiation-induced nausea and vomiting,    Nausea and vomiting prophylaxis, migraine          Passed - Recent (12 mo) or future (90 days) visit with authorizing provider's specialty     The patient must have completed an in-person or virtual visit within the past 12 months or has a future visit scheduled within the next 90 days with the authorizing provider s specialty.  Urgent care and e-visits do not quality as an office visit for this protocol.          Passed - Patient is 18 years of age or older             Cher Lott RN 07/23/24 1:43 PM

## 2024-07-25 ENCOUNTER — LAB (OUTPATIENT)
Dept: INFUSION THERAPY | Facility: HOSPITAL | Age: 59
End: 2024-07-25
Payer: COMMERCIAL

## 2024-07-25 ENCOUNTER — APPOINTMENT (OUTPATIENT)
Dept: CT IMAGING | Facility: CLINIC | Age: 59
End: 2024-07-25
Attending: EMERGENCY MEDICINE
Payer: COMMERCIAL

## 2024-07-25 ENCOUNTER — HOSPITAL ENCOUNTER (EMERGENCY)
Facility: CLINIC | Age: 59
Discharge: HOME | End: 2024-07-25
Attending: EMERGENCY MEDICINE | Admitting: EMERGENCY MEDICINE
Payer: COMMERCIAL

## 2024-07-25 ENCOUNTER — TELEPHONE (OUTPATIENT)
Dept: ONCOLOGY | Facility: HOSPITAL | Age: 59
End: 2024-07-25
Payer: COMMERCIAL

## 2024-07-25 VITALS
RESPIRATION RATE: 24 BRPM | SYSTOLIC BLOOD PRESSURE: 154 MMHG | HEART RATE: 90 BPM | DIASTOLIC BLOOD PRESSURE: 70 MMHG | TEMPERATURE: 98.1 F | OXYGEN SATURATION: 94 %

## 2024-07-25 VITALS
DIASTOLIC BLOOD PRESSURE: 73 MMHG | OXYGEN SATURATION: 93 % | TEMPERATURE: 97.9 F | HEART RATE: 89 BPM | RESPIRATION RATE: 20 BRPM | BODY MASS INDEX: 33.63 KG/M2 | HEIGHT: 64 IN | WEIGHT: 197 LBS | SYSTOLIC BLOOD PRESSURE: 132 MMHG

## 2024-07-25 DIAGNOSIS — R39.9 UTI SYMPTOMS: ICD-10-CM

## 2024-07-25 DIAGNOSIS — C7B.8 NEUROENDOCRINE CARCINOMA METASTATIC TO LIVER (H): ICD-10-CM

## 2024-07-25 DIAGNOSIS — G89.3 CANCER RELATED PAIN: ICD-10-CM

## 2024-07-25 DIAGNOSIS — R31.9 URINARY TRACT INFECTION WITH HEMATURIA, SITE UNSPECIFIED: ICD-10-CM

## 2024-07-25 DIAGNOSIS — R10.9 FLANK PAIN: ICD-10-CM

## 2024-07-25 DIAGNOSIS — C7A.8 NEUROENDOCRINE CARCINOMA METASTATIC TO LIVER (H): ICD-10-CM

## 2024-07-25 DIAGNOSIS — R39.9 UTI SYMPTOMS: Primary | ICD-10-CM

## 2024-07-25 DIAGNOSIS — N39.0 URINARY TRACT INFECTION WITH HEMATURIA, SITE UNSPECIFIED: ICD-10-CM

## 2024-07-25 LAB
ALBUMIN SERPL BCG-MCNC: 4 G/DL (ref 3.5–5.2)
ALBUMIN UR-MCNC: 30 MG/DL
ALP SERPL-CCNC: 99 U/L (ref 40–150)
ALT SERPL W P-5'-P-CCNC: 8 U/L (ref 0–50)
ANION GAP SERPL CALCULATED.3IONS-SCNC: 11 MMOL/L (ref 7–15)
APPEARANCE UR: ABNORMAL
AST SERPL W P-5'-P-CCNC: 13 U/L (ref 0–45)
BACTERIA #/AREA URNS HPF: ABNORMAL /HPF
BASOPHILS # BLD AUTO: 0 10E3/UL (ref 0–0.2)
BASOPHILS NFR BLD AUTO: 1 %
BILIRUB SERPL-MCNC: 0.4 MG/DL
BILIRUB UR QL STRIP: NEGATIVE
BUN SERPL-MCNC: 10.3 MG/DL (ref 6–20)
CALCIUM SERPL-MCNC: 9 MG/DL (ref 8.8–10.4)
CHLORIDE SERPL-SCNC: 97 MMOL/L (ref 98–107)
COLOR UR AUTO: YELLOW
CREAT SERPL-MCNC: 0.61 MG/DL (ref 0.51–0.95)
CRP SERPL-MCNC: 60.1 MG/L
EGFRCR SERPLBLD CKD-EPI 2021: >90 ML/MIN/1.73M2
EOSINOPHIL # BLD AUTO: 0 10E3/UL (ref 0–0.7)
EOSINOPHIL NFR BLD AUTO: 1 %
ERYTHROCYTE [DISTWIDTH] IN BLOOD BY AUTOMATED COUNT: 17.6 % (ref 10–15)
GLUCOSE SERPL-MCNC: 176 MG/DL (ref 70–99)
GLUCOSE UR STRIP-MCNC: NEGATIVE MG/DL
HCO3 SERPL-SCNC: 30 MMOL/L (ref 22–29)
HCT VFR BLD AUTO: 27.7 % (ref 35–47)
HGB BLD-MCNC: 8.7 G/DL (ref 11.7–15.7)
HGB UR QL STRIP: ABNORMAL
IMM GRANULOCYTES # BLD: 0 10E3/UL
IMM GRANULOCYTES NFR BLD: 1 %
KETONES UR STRIP-MCNC: NEGATIVE MG/DL
LEUKOCYTE ESTERASE UR QL STRIP: ABNORMAL
LIPASE SERPL-CCNC: 16 U/L (ref 13–60)
LYMPHOCYTES # BLD AUTO: 0.4 10E3/UL (ref 0.8–5.3)
LYMPHOCYTES NFR BLD AUTO: 21 %
MCH RBC QN AUTO: 26.1 PG (ref 26.5–33)
MCHC RBC AUTO-ENTMCNC: 31.4 G/DL (ref 31.5–36.5)
MCV RBC AUTO: 83 FL (ref 78–100)
MONOCYTES # BLD AUTO: 0.2 10E3/UL (ref 0–1.3)
MONOCYTES NFR BLD AUTO: 9 %
MUCOUS THREADS #/AREA URNS LPF: PRESENT /LPF
NEUTROPHILS # BLD AUTO: 1.4 10E3/UL (ref 1.6–8.3)
NEUTROPHILS NFR BLD AUTO: 68 %
NITRATE UR QL: NEGATIVE
NRBC # BLD AUTO: 0 10E3/UL
NRBC BLD AUTO-RTO: 0 /100
PH UR STRIP: 6 [PH] (ref 5–7)
PLATELET # BLD AUTO: 161 10E3/UL (ref 150–450)
POTASSIUM SERPL-SCNC: 3.9 MMOL/L (ref 3.4–5.3)
PROT SERPL-MCNC: 7.5 G/DL (ref 6.4–8.3)
RBC # BLD AUTO: 3.33 10E6/UL (ref 3.8–5.2)
RBC URINE: 112 /HPF
SODIUM SERPL-SCNC: 138 MMOL/L (ref 135–145)
SP GR UR STRIP: 1.03 (ref 1–1.03)
SQUAMOUS EPITHELIAL: 5 /HPF
UROBILINOGEN UR STRIP-MCNC: 2 MG/DL
WBC # BLD AUTO: 2.1 10E3/UL (ref 4–11)
WBC URINE: 95 /HPF

## 2024-07-25 PROCEDURE — 87086 URINE CULTURE/COLONY COUNT: CPT

## 2024-07-25 PROCEDURE — 36415 COLL VENOUS BLD VENIPUNCTURE: CPT | Performed by: EMERGENCY MEDICINE

## 2024-07-25 PROCEDURE — 82040 ASSAY OF SERUM ALBUMIN: CPT | Performed by: EMERGENCY MEDICINE

## 2024-07-25 PROCEDURE — 250N000011 HC RX IP 250 OP 636: Performed by: EMERGENCY MEDICINE

## 2024-07-25 PROCEDURE — 81001 URINALYSIS AUTO W/SCOPE: CPT

## 2024-07-25 PROCEDURE — 71275 CT ANGIOGRAPHY CHEST: CPT

## 2024-07-25 PROCEDURE — 96365 THER/PROPH/DIAG IV INF INIT: CPT | Mod: 59

## 2024-07-25 PROCEDURE — 96375 TX/PRO/DX INJ NEW DRUG ADDON: CPT | Mod: 59

## 2024-07-25 PROCEDURE — 96376 TX/PRO/DX INJ SAME DRUG ADON: CPT

## 2024-07-25 PROCEDURE — 83690 ASSAY OF LIPASE: CPT | Performed by: EMERGENCY MEDICINE

## 2024-07-25 PROCEDURE — 99285 EMERGENCY DEPT VISIT HI MDM: CPT | Mod: 25

## 2024-07-25 PROCEDURE — 85025 COMPLETE CBC W/AUTO DIFF WBC: CPT | Performed by: EMERGENCY MEDICINE

## 2024-07-25 PROCEDURE — 999N000104 CT THORACIC SPINE RECONSTRUCTED

## 2024-07-25 PROCEDURE — 86140 C-REACTIVE PROTEIN: CPT | Performed by: EMERGENCY MEDICINE

## 2024-07-25 RX ORDER — MORPHINE SULFATE 15 MG/1
15 TABLET, FILM COATED, EXTENDED RELEASE ORAL EVERY 12 HOURS
Qty: 60 TABLET | Refills: 0 | Status: SHIPPED | OUTPATIENT
Start: 2024-07-25 | End: 2024-08-22

## 2024-07-25 RX ORDER — IOPAMIDOL 755 MG/ML
90 INJECTION, SOLUTION INTRAVASCULAR ONCE
Status: COMPLETED | OUTPATIENT
Start: 2024-07-25 | End: 2024-07-25

## 2024-07-25 RX ORDER — CEFDINIR 300 MG/1
300 CAPSULE ORAL 2 TIMES DAILY
Qty: 20 CAPSULE | Refills: 0 | Status: SHIPPED | OUTPATIENT
Start: 2024-07-25 | End: 2024-09-04

## 2024-07-25 RX ORDER — CEFTRIAXONE 2 G/1
2 INJECTION, POWDER, FOR SOLUTION INTRAMUSCULAR; INTRAVENOUS ONCE
Status: COMPLETED | OUTPATIENT
Start: 2024-07-25 | End: 2024-07-25

## 2024-07-25 RX ORDER — HYDROMORPHONE HYDROCHLORIDE 2 MG/1
2 TABLET ORAL EVERY 6 HOURS PRN
Qty: 10 TABLET | Refills: 0 | Status: SHIPPED | OUTPATIENT
Start: 2024-07-25 | End: 2024-07-28

## 2024-07-25 RX ORDER — HYDROMORPHONE HYDROCHLORIDE 1 MG/ML
0.5 INJECTION, SOLUTION INTRAMUSCULAR; INTRAVENOUS; SUBCUTANEOUS ONCE
Status: COMPLETED | OUTPATIENT
Start: 2024-07-25 | End: 2024-07-25

## 2024-07-25 RX ADMIN — CEFTRIAXONE SODIUM 2 G: 2 INJECTION, POWDER, FOR SOLUTION INTRAMUSCULAR; INTRAVENOUS at 14:03

## 2024-07-25 RX ADMIN — HYDROMORPHONE HYDROCHLORIDE 0.5 MG: 1 INJECTION, SOLUTION INTRAMUSCULAR; INTRAVENOUS; SUBCUTANEOUS at 13:59

## 2024-07-25 RX ADMIN — IOPAMIDOL 90 ML: 755 INJECTION, SOLUTION INTRAVENOUS at 12:23

## 2024-07-25 RX ADMIN — HYDROMORPHONE HYDROCHLORIDE 1 MG: 1 INJECTION, SOLUTION INTRAMUSCULAR; INTRAVENOUS; SUBCUTANEOUS at 12:09

## 2024-07-25 RX ADMIN — PROCHLORPERAZINE EDISYLATE 10 MG: 5 INJECTION INTRAMUSCULAR; INTRAVENOUS at 12:15

## 2024-07-25 ASSESSMENT — ACTIVITIES OF DAILY LIVING (ADL)
ADLS_ACUITY_SCORE: 35

## 2024-07-25 ASSESSMENT — PAIN SCALES - GENERAL: PAINLEVEL: EXTREME PAIN (8)

## 2024-07-25 ASSESSMENT — COLUMBIA-SUICIDE SEVERITY RATING SCALE - C-SSRS
2. HAVE YOU ACTUALLY HAD ANY THOUGHTS OF KILLING YOURSELF IN THE PAST MONTH?: NO
1. IN THE PAST MONTH, HAVE YOU WISHED YOU WERE DEAD OR WISHED YOU COULD GO TO SLEEP AND NOT WAKE UP?: NO
6. HAVE YOU EVER DONE ANYTHING, STARTED TO DO ANYTHING, OR PREPARED TO DO ANYTHING TO END YOUR LIFE?: NO

## 2024-07-25 NOTE — ED NOTES
Pt c/o left mid back pain for a few days and getting worse, movement makes pain worse and position makes pain worse. Pt states this morning started having dysuria, denies other Sx. Pt has chronic shortness of breath, reports chronic chest pain. Pt skin warm, dry, pink. Pt left mid back area is tender with palpation. Pt has had n/v states nausea is normal with chemo but usually does not vomit. MD evaluating pt.

## 2024-07-25 NOTE — PROGRESS NOTES
"Louise presented with her  today with left mid back pain and N/V. She has had the pain for 2 days and has increased her oxycodone to 10 mg every 4 hours along with MS contin 15 mg q12.she doubled her oxycodone every 4 hours to tolerate the pain. When she \"hiccups\" it causes intense pain. Her VS are normal however RR 24 and when questioned about that she says she is always short of breath due to her cancer. She has a hx of plural effusion with taps. She reports that she has had nausea with lots of dry heaves since her last chemo on 7/15,16,17. She feels like the compazine helps more for her nausea than ondansetron. She denies seeing any blood in her urine but did admit to a burning sensation today while voiding. I asked if the pain is associated with urinating and she replied no. I advised that I would find a spot in clinic and when her UA results are available will speak with our NP. Dr Barreto is primary Onc but is out of the office today.     U/A results assessed by our NP and doesn't substantiate the amount of pain Louise is having so recommendation made to send to ED for assessment of her severe pain. She was transported via w/c accompanied by her . Report given to Dr. Cartagena. KIRT Aponte RN, OCN, CBCN  "

## 2024-07-25 NOTE — ED NOTES
Attempted IV x2 and unable, RN at bedside with US for IV, pt has port but unable to use for CT PE study.

## 2024-07-25 NOTE — ED TRIAGE NOTES
"Pt with neuroendocrine ca with last chemo 1 week ago c/o worse nausea and vomiting since chemo and L mid back pain for past 2 days worse with breathing or hiccups. Seen at clinic today and UA \"positive but not to the degree of pain you are having\" so sent to ER. No fevers.      Triage Assessment (Adult)       Row Name 07/25/24 1124          Triage Assessment    Airway WDL WDL        Respiratory WDL    Respiratory WDL X;rhythm/pattern     Rhythm/Pattern, Respiratory shortness of breath  chronic per patient        Skin Circulation/Temperature WDL    Skin Circulation/Temperature WDL WDL        Cardiac WDL    Cardiac WDL WDL        Peripheral/Neurovascular WDL    Peripheral Neurovascular WDL WDL        Cognitive/Neuro/Behavioral WDL    Cognitive/Neuro/Behavioral WDL WDL                     "

## 2024-07-25 NOTE — TELEPHONE ENCOUNTER
Louise called today with complaints of new left sided flank/back pain. She states this has been going on for 2 days. She rates the pain 7-8 out of 10. Per her report: The pain gets worse when she hiccups or inhales deeply. She has no fever. No history of kidney stones. She did notice some burning yesterday when she urinated. No frequency or urgency. She states her urine looks to have some mucous/bubbles in it but states this is not new. Discussed with Brenda Pearson NP today. She would like Louise to come in for a UA/UC. This has been scheduled for 10:15am today. If the urine is negative, she may need further evaluation in the clinic for urgent care.  Louise verbalized understanding and agrees with this plan.    Argelia Simon RN on 7/25/2024 at 9:15 AM

## 2024-07-25 NOTE — ED PROVIDER NOTES
EMERGENCY DEPARTMENT ENCOUNTER      NAME: Louise Corado  AGE: 59 year old female  YOB: 1965  MRN: [id number]  EVALUATION DATE & TIME: No admission date for patient encounter.    PCP: Kee Mccullough    ED PROVIDER: Win Saleh MD PGY-1      Chief Complaint   Patient presents with    Back Pain         FINAL IMPRESSION:  1. Flank pain    2. Urinary tract infection with hematuria, site unspecified    3. Neuroendocrine carcinoma metastatic to liver (H)    4. Cancer related pain          ED COURSE & MEDICAL DECISION MAKING:    Pertinent Labs & Imaging studies reviewed. (See chart for details)  59 year old female presents to the Emergency Department for evaluation of left sided flank pain.     Differential includes PE, renal colic, AAA, pathologic fracture, pain from cancer, rib fracture, pneumothorax, hydronephrosis, pyelonephritis    Got Compazine and Dilaudid with improvement in symptoms      ED Course as of 07/25/24 1507   Thu Jul 25, 2024   1215 CRP elevated at 60. Lipase WNL. WBC at 2.1. Hg is 8.7   1216 Patient neuroendocrine tumor.  Here with 2 days of severe left upper flank pain worse with hiccups or cough or movement.  Also has had some bladder spasming.   1217 Denies chemotherapy.  No history of kidney stones.  Urine in clinic today suggestive of UTI.  Elevated CRP.  Normal lipase.  CBC shows leukopenia but but only mild neutropenia with white blood cell count of 1.4 neutrophil   1218 Platelet Count: 161   1218 Hemoglobin(!): 8.7   1218 WBC(!): 2.1   1218 ALT: 8   1218 AST: 13   1218 Bilirubin Total: 0.4   1218 GFR Estimate: >90   1218 Lipase: 16   1218 Dilaudid for pain.  Patient reports Compazine helps.  Compazine ordered   1218 differential includes pathologic fracture, PE, kidney stone, pyelonephritis,     Patient has had 2 days of nausea, vomiting, left sided flank pain and burning with urination. She was able to tolerate oral intake. UA showed RBC and leukocytes. Upon arrival, she  was afebrile. A CMP, lipase, CRP, CBC, Chest CT, CT Abdomen, and CT Thoracic Spine were ordered. Following Urine Cx. Dilaudid for pain control and compazine for nausea. CRP was elevated at 60. HG at 8.7.    CT imaging does not show any obstructing kidney stones or pulmonary emboli or pathologic fractures or rib fracture or AAA    Stable metastatic cancer    Pains improved with Dilaudid.    Discussed with pharmacy who recommended continue morphine, stop oxycodone, start Dilaudid    Given ceftriaxone for possible UTI.  Patient has symptoms suggestive of UTI.  Will send home with Omnicef, Zofran, refill her morphine, and start Dilaudid    Patient to follow-up with her oncology team or primary care doctor    At the conclusion of the encounter I discussed the results of all of the tests and the disposition. The questions were answered. The patient or family acknowledged understanding and was agreeable with the care plan.     0 minutes of critical care time     MEDICATIONS GIVEN IN THE EMERGENCY:  Medications   HYDROmorphone (DILAUDID) injection 1 mg (1 mg Intravenous $Given 7/25/24 1209)   prochlorperazine (COMPAZINE) injection 10 mg (10 mg Intravenous $Given 7/25/24 1215)   iopamidol (ISOVUE-370) solution 90 mL (90 mLs Intravenous $Given 7/25/24 1223)   HYDROmorphone (PF) (DILAUDID) injection 0.5 mg (0.5 mg Intravenous $Given 7/25/24 1359)   cefTRIAXone (ROCEPHIN) 2 g vial to attach to  ml bag for ADULTS or NS 50 ml bag for PEDS (0 g Intravenous Stopped 7/25/24 1452)       NEW PRESCRIPTIONS STARTED AT TODAY'S ER VISIT  New Prescriptions    CEFDINIR (OMNICEF) 300 MG CAPSULE    Take 1 capsule (300 mg) by mouth 2 times daily    HYDROMORPHONE (DILAUDID) 2 MG TABLET    Take 1 tablet (2 mg) by mouth every 6 hours as needed for pain    NALOXONE (NARCAN) 4 MG/0.1ML NASAL SPRAY    Spray 1 spray (4 mg) into one nostril alternating nostrils as needed for opioid reversal every 2-3 minutes until assistance arrives           =================================================================    HPI    Patient information was obtained from: patient     Use of : N/A         Louise Corado is a 59 year old female with a pertinent history of neuroendocrine cancer who presents to this ED on 7/25/24 for evaluation of left sided flank pain and burning with urination.     She has new left sided flank/back pain for the last 2 days. The pain is constant and has not changed in location since its onset. It is worse when she takes a deep breath. She is also nauseous and has vomited multiple times a day for the last 2 days. She said she is often nauseas due to chemotherapy, but the vomiting is new. She has been vomiting up most of the food she eats and feeling fatigued. She normally does not drink a lot of liquids but says she is able to drink some water and Gatorade. Denies hematemesis. Upon arrival, she was afebrile. Her last chemo session was 1 week ago.     reports some dysuria        PAST MEDICAL HISTORY:  No past medical history on file.    PAST SURGICAL HISTORY:  Past Surgical History:   Procedure Laterality Date    IR CHEST PORT PLACEMENT > 5 YRS OF AGE  4/23/2024           CURRENT MEDICATIONS:    cefdinir (OMNICEF) 300 MG capsule  HYDROmorphone (DILAUDID) 2 MG tablet  morphine (MS CONTIN) 15 MG CR tablet  naloxone (NARCAN) 4 MG/0.1ML nasal spray  acetaminophen (TYLENOL) 325 MG tablet  DOCUSATE SODIUM PO  lidocaine-prilocaine (EMLA) 2.5-2.5 % external cream  LORazepam (ATIVAN) 0.5 MG tablet  metFORMIN (GLUCOPHAGE XR) 500 MG 24 hr tablet  ondansetron (ZOFRAN) 8 MG tablet  prochlorperazine (COMPAZINE) 10 MG tablet        ALLERGIES:  No Known Allergies    FAMILY HISTORY:  Family History   Problem Relation Age of Onset    Breast Cancer Mother 70.00    Breast Cancer Sister 53.00       SOCIAL HISTORY:   Social History     Socioeconomic History    Marital status:    Tobacco Use    Smoking status: Never     Passive exposure:  "Never    Smokeless tobacco: Never   Vaping Use    Vaping status: Never Used     Social Determinants of Health     Financial Resource Strain: Low Risk  (4/30/2024)    Financial Resource Strain     Within the past 12 months, have you or your family members you live with been unable to get utilities (heat, electricity) when it was really needed?: No   Food Insecurity: Low Risk  (4/30/2024)    Food Insecurity     Within the past 12 months, did you worry that your food would run out before you got money to buy more?: No     Within the past 12 months, did the food you bought just not last and you didn t have money to get more?: No   Transportation Needs: Low Risk  (4/30/2024)    Transportation Needs     Within the past 12 months, has lack of transportation kept you from medical appointments, getting your medicines, non-medical meetings or appointments, work, or from getting things that you need?: No   Interpersonal Safety: Low Risk  (4/30/2024)    Interpersonal Safety     Do you feel physically and emotionally safe where you currently live?: Yes     Within the past 12 months, have you been hit, slapped, kicked or otherwise physically hurt by someone?: No     Within the past 12 months, have you been humiliated or emotionally abused in other ways by your partner or ex-partner?: No   Housing Stability: Low Risk  (4/30/2024)    Housing Stability     Do you have housing? : Yes     Are you worried about losing your housing?: No       VITALS:  /76   Pulse 87   Temp 97.9  F (36.6  C) (Oral)   Resp 20   Ht 1.613 m (5' 3.5\")   Wt 89.4 kg (197 lb)   SpO2 93%   BMI 34.35 kg/m      PHYSICAL EXAM    Constitutional: Well developed, Well nourished,   HENT: Normocephalic, Atraumatic, Bilateral external ears normal  Neck-  Normal range of motion, No stridor.    Eyes: EOMI, Conjunctiva normal, No discharge.   Respiratory: Normal breath sounds, No respiratory distress, No wheezing, Speaks full sentences easily. No cough.  "   Cardiovascular: Normal heart rate, Regular rhythm,  No murmurs  GI: No excessive obesity. Bowel sounds normal, Soft, No tenderness, No masses, + left flank tenderness . No rebound or guarding.    Musculoskeletal: 2+ DP pulses. No edema.  No cyanosis, No clubbing.   Integument: Warm, Dry, No erythema, No rash. No petechiae.  Neurologic: Alert & oriented x 3, Normal motor function, Normal sensory function, No focal deficits noted.   Psychiatric: Affect normal, Judgment normal, Mood normal. Cooperative.      LAB:  All pertinent labs reviewed and interpreted.  Results for orders placed or performed during the hospital encounter of 07/25/24   CT Chest (PE) Abdomen Pelvis w Contrast    Impression    IMPRESSION:  1.  No pulmonary artery embolism.  2.  No acute findings in the abdomen or pelvis. No hydronephrosis, urinary tract stone or inflammatory process.  3.  Stable metastatic neuroendocrine tumor with stable masslike wall thickening the terminal ileum with adjacent mike metastases, stable hepatic, pulmonary/pleural, thoracic mike metastases, osseous metastases as well as stable left lower quadrant   carcinomatosis.  4.  Stable small-to-moderate right pleural effusion with adjacent consolidation likely reflecting atelectasis. Trace left pleural effusion.   CT Thoracic Spine Reconstructed    Impression    IMPRESSION:  1.  No acute fracture or posttraumatic malalignment in the thoracic spine.    2.  No significant spinal stenosis or foraminal narrowing at any thoracic level.    3.  Sclerotic metastases in the axial skeleton as before, including T6 and T11. Right hilar and mediastinal mass. Right pleural effusion. Hepatic metastases.     Comprehensive metabolic panel   Result Value Ref Range    Sodium 138 135 - 145 mmol/L    Potassium 3.9 3.4 - 5.3 mmol/L    Carbon Dioxide (CO2) 30 (H) 22 - 29 mmol/L    Anion Gap 11 7 - 15 mmol/L    Urea Nitrogen 10.3 6.0 - 20.0 mg/dL    Creatinine 0.61 0.51 - 0.95 mg/dL    GFR  Estimate >90 >60 mL/min/1.73m2    Calcium 9.0 8.8 - 10.4 mg/dL    Chloride 97 (L) 98 - 107 mmol/L    Glucose 176 (H) 70 - 99 mg/dL    Alkaline Phosphatase 99 40 - 150 U/L    AST 13 0 - 45 U/L    ALT 8 0 - 50 U/L    Protein Total 7.5 6.4 - 8.3 g/dL    Albumin 4.0 3.5 - 5.2 g/dL    Bilirubin Total 0.4 <=1.2 mg/dL   Result Value Ref Range    Lipase 16 13 - 60 U/L   Result Value Ref Range    CRP Inflammation 60.10 (H) <5.00 mg/L   CBC with platelets and differential   Result Value Ref Range    WBC Count 2.1 (L) 4.0 - 11.0 10e3/uL    RBC Count 3.33 (L) 3.80 - 5.20 10e6/uL    Hemoglobin 8.7 (L) 11.7 - 15.7 g/dL    Hematocrit 27.7 (L) 35.0 - 47.0 %    MCV 83 78 - 100 fL    MCH 26.1 (L) 26.5 - 33.0 pg    MCHC 31.4 (L) 31.5 - 36.5 g/dL    RDW 17.6 (H) 10.0 - 15.0 %    Platelet Count 161 150 - 450 10e3/uL    % Neutrophils 68 %    % Lymphocytes 21 %    % Monocytes 9 %    % Eosinophils 1 %    % Basophils 1 %    % Immature Granulocytes 1 %    NRBCs per 100 WBC 0 <1 /100    Absolute Neutrophils 1.4 (L) 1.6 - 8.3 10e3/uL    Absolute Lymphocytes 0.4 (L) 0.8 - 5.3 10e3/uL    Absolute Monocytes 0.2 0.0 - 1.3 10e3/uL    Absolute Eosinophils 0.0 0.0 - 0.7 10e3/uL    Absolute Basophils 0.0 0.0 - 0.2 10e3/uL    Absolute Immature Granulocytes 0.0 <=0.4 10e3/uL    Absolute NRBCs 0.0 10e3/uL       RADIOLOGY:  Reviewed all pertinent imaging. Please see official radiology report.  CT Thoracic Spine Reconstructed   Final Result   IMPRESSION:   1.  No acute fracture or posttraumatic malalignment in the thoracic spine.      2.  No significant spinal stenosis or foraminal narrowing at any thoracic level.      3.  Sclerotic metastases in the axial skeleton as before, including T6 and T11. Right hilar and mediastinal mass. Right pleural effusion. Hepatic metastases.         CT Chest (PE) Abdomen Pelvis w Contrast   Final Result   IMPRESSION:   1.  No pulmonary artery embolism.   2.  No acute findings in the abdomen or pelvis. No hydronephrosis,  urinary tract stone or inflammatory process.   3.  Stable metastatic neuroendocrine tumor with stable masslike wall thickening the terminal ileum with adjacent mike metastases, stable hepatic, pulmonary/pleural, thoracic mike metastases, osseous metastases as well as stable left lower quadrant    carcinomatosis.   4.  Stable small-to-moderate right pleural effusion with adjacent consolidation likely reflecting atelectasis. Trace left pleural effusion.                Win Saleh MD   [facility] EMERGENCY ROOM  [facility] [facility] [address] [phone]       Win Saleh MD  07/25/24 5507

## 2024-07-25 NOTE — DISCHARGE INSTRUCTIONS
As we discussed I spoke with pharmacy and they want you to continue your 15 mg of morphine twice a day.  We can have you stop oxycodone and start Dilaudid.  You can use Dilaudid 2 mg every 4 hours as needed.  I also want to start Omnicef which is an antibiotic twice a day for 10 days.  I am recommending you  a Narcan package in case you accidentally overdose you can have somebody give this medicine to you and call 911    Follow-up with your cancer team or your primary care doctor for recheck next week    Return to the ER for worsening symptom

## 2024-07-25 NOTE — ED NOTES
Expected Patient Referral to ED  11:15 AM    Referring Clinic/Provider:  Cancer care    Reason for referral/Clinical facts:  Left back pain  UA done in clinic  Severe pain  Wheel chairing over    Recommendations provided:  Send to ED for further evaluation    Caller was informed that this institution does possess the capabilities and/or resources to provide for patient and should be transferred to our facility.    Discussed that if direct admit is sought and any hurdles are encountered, this ED would be happy to see the patient and evaluate.    Informed caller that recommendations provided are recommendations based only on the facts provided and that they responsible to accept or reject the advice, or to seek a formal in person consultation as needed and that this ED will see/treat patient should they arrive.      David Day MD  [facility] EMERGENCY ROOM  [address] [facility] [address] [address] [phone]       David Day MD  07/25/24 4266

## 2024-07-26 ENCOUNTER — TELEPHONE (OUTPATIENT)
Dept: ONCOLOGY | Facility: HOSPITAL | Age: 59
End: 2024-07-26
Payer: COMMERCIAL

## 2024-07-26 NOTE — TELEPHONE ENCOUNTER
Addended by: HEAVEN GUSTAFSON on: 8/26/2021 12:40 PM     Modules accepted: Orders     Called pt to see how she was feeling today as she was in ER yesterday. Pt states she is not feeling much better today but just started oral antibiotics. Encouraged pt to stay well hydrated and if becomes febrile she should call clinic. Pt sees MD 8/5 will check with NP to see if she wants to see her sooner. Pt appreciative of call.

## 2024-07-27 LAB — BACTERIA UR CULT: NORMAL

## 2024-08-01 ENCOUNTER — HOSPITAL ENCOUNTER (OUTPATIENT)
Dept: PET IMAGING | Facility: HOSPITAL | Age: 59
Discharge: HOME | End: 2024-08-01
Attending: INTERNAL MEDICINE | Admitting: INTERNAL MEDICINE
Payer: COMMERCIAL

## 2024-08-01 DIAGNOSIS — C7B.8 NEUROENDOCRINE CARCINOMA METASTATIC TO LIVER (H): ICD-10-CM

## 2024-08-01 DIAGNOSIS — C7A.8 NEUROENDOCRINE CARCINOMA METASTATIC TO LIVER (H): ICD-10-CM

## 2024-08-01 LAB — GLUCOSE BLDC GLUCOMTR-MCNC: 125 MG/DL (ref 70–99)

## 2024-08-01 PROCEDURE — 78816 PET IMAGE W/CT FULL BODY: CPT | Mod: PS

## 2024-08-01 PROCEDURE — A9552 F18 FDG: HCPCS | Performed by: INTERNAL MEDICINE

## 2024-08-01 PROCEDURE — 343N000001 HC RX 343: Performed by: INTERNAL MEDICINE

## 2024-08-01 PROCEDURE — 82962 GLUCOSE BLOOD TEST: CPT

## 2024-08-01 RX ORDER — FLUDEOXYGLUCOSE F 18 200 MCI/ML
9-18 INJECTION, SOLUTION INTRAVENOUS ONCE
Status: COMPLETED | OUTPATIENT
Start: 2024-08-01 | End: 2024-08-01

## 2024-08-01 RX ADMIN — FLUDEOXYGLUCOSE F 18 11.54 MILLICURIE: 200 INJECTION, SOLUTION INTRAVENOUS at 08:51

## 2024-08-02 ENCOUNTER — MYC REFILL (OUTPATIENT)
Dept: ONCOLOGY | Facility: HOSPITAL | Age: 59
End: 2024-08-02
Payer: COMMERCIAL

## 2024-08-02 ENCOUNTER — TELEPHONE (OUTPATIENT)
Dept: ONCOLOGY | Facility: HOSPITAL | Age: 59
End: 2024-08-02
Payer: COMMERCIAL

## 2024-08-02 DIAGNOSIS — C7B.8 NEUROENDOCRINE CARCINOMA METASTATIC TO LIVER (H): Primary | ICD-10-CM

## 2024-08-02 DIAGNOSIS — R07.1 PAINFUL RESPIRATION: ICD-10-CM

## 2024-08-02 DIAGNOSIS — C7A.8 NEUROENDOCRINE CARCINOMA METASTATIC TO LIVER (H): Primary | ICD-10-CM

## 2024-08-02 RX ORDER — OXYCODONE HYDROCHLORIDE 5 MG/1
5-10 TABLET ORAL EVERY 4 HOURS PRN
Qty: 60 TABLET | Refills: 0 | OUTPATIENT
Start: 2024-08-02

## 2024-08-02 RX ORDER — OXYCODONE HYDROCHLORIDE 5 MG/1
5-10 TABLET ORAL EVERY 4 HOURS PRN
Qty: 60 TABLET | Refills: 0 | Status: SHIPPED | OUTPATIENT
Start: 2024-08-02 | End: 2024-08-12

## 2024-08-02 NOTE — TELEPHONE ENCOUNTER
I received a voice message from Louise today. She is requesting a refill of oxycodone. I called her back to let her know this was sent in today. She has no other questions or concerns at this time.     Argelia Simon RN on 8/2/2024 at 2:33 PM

## 2024-08-02 NOTE — TELEPHONE ENCOUNTER
"Patient send [employer] message stating    \"I am in need of Oxycodone for my pain.  The ER Provider added in Hydrocodone in the ER when I was in the ER for a UTI.  He said it was stronger and to use this in place of the Oxycodone until the Hydrocodone ran out then to resume Oxycodone and to follow up with my provider.  I see you this Monday 8/5.  I currently have 2 days left of Oxycodone left.  Dilaudid ran out a long while ago.  I found the Oxycodone to be much more effective in my opinion and would rather take Oxycodone than Hydrocodone.  Please re-order Oxycodone (I no longer see this on my med list) and discontinue Hydrocodone.       Thank you,       Louise Corado\"      This RN writer does not see hydrocodone in chart review. ER prescribed hydromorphone 7/25/24. Refill request sent to Brenda Pearson CNP, for consideration.     Cris Sandoval RN  08/02/24  8:16 AM       Oxycodone  Directions: Take 1-2 tablets (5-10 mg) by mouth every 4 hours as needed for pain.  sent from [employer] request     Last Written Prescription Date:  7/22/24, filled by Brenda Pearson CNP  Last Fill Quantity: 60,  # refills: 0   Last office visit provider:  7/15/24 with Brenda Pearson CNP  Next visit scheduled on 8/5/24 with Dr. Estevan Sandoval RN   08/02/24 8:16 AM    "

## 2024-08-05 ENCOUNTER — ONCOLOGY VISIT (OUTPATIENT)
Dept: ONCOLOGY | Facility: HOSPITAL | Age: 59
End: 2024-08-05
Attending: INTERNAL MEDICINE
Payer: COMMERCIAL

## 2024-08-05 ENCOUNTER — INFUSION THERAPY VISIT (OUTPATIENT)
Dept: INFUSION THERAPY | Facility: HOSPITAL | Age: 59
End: 2024-08-05
Attending: INTERNAL MEDICINE
Payer: COMMERCIAL

## 2024-08-05 VITALS
HEIGHT: 63 IN | BODY MASS INDEX: 33.84 KG/M2 | HEART RATE: 76 BPM | SYSTOLIC BLOOD PRESSURE: 135 MMHG | OXYGEN SATURATION: 99 % | DIASTOLIC BLOOD PRESSURE: 88 MMHG | WEIGHT: 191 LBS | RESPIRATION RATE: 18 BRPM | TEMPERATURE: 97.7 F

## 2024-08-05 DIAGNOSIS — F41.9 ANXIETY: ICD-10-CM

## 2024-08-05 DIAGNOSIS — C7B.8 NEUROENDOCRINE CARCINOMA METASTATIC TO LIVER (H): Primary | ICD-10-CM

## 2024-08-05 DIAGNOSIS — C7A.8 NEUROENDOCRINE CARCINOMA METASTATIC TO LIVER (H): Primary | ICD-10-CM

## 2024-08-05 DIAGNOSIS — G89.3 CANCER RELATED PAIN: ICD-10-CM

## 2024-08-05 DIAGNOSIS — C78.7 METASTASES TO THE LIVER (H): ICD-10-CM

## 2024-08-05 LAB
ALBUMIN SERPL BCG-MCNC: 3.6 G/DL (ref 3.5–5.2)
ALP SERPL-CCNC: 98 U/L (ref 40–150)
ALT SERPL W P-5'-P-CCNC: 6 U/L (ref 0–50)
ANION GAP SERPL CALCULATED.3IONS-SCNC: 13 MMOL/L (ref 7–15)
AST SERPL W P-5'-P-CCNC: 13 U/L (ref 0–45)
BASOPHILS # BLD AUTO: 0 10E3/UL (ref 0–0.2)
BASOPHILS NFR BLD AUTO: 1 %
BILIRUB SERPL-MCNC: 0.3 MG/DL
BUN SERPL-MCNC: 9.7 MG/DL (ref 6–20)
CALCIUM SERPL-MCNC: 8.6 MG/DL (ref 8.8–10.4)
CHLORIDE SERPL-SCNC: 99 MMOL/L (ref 98–107)
CREAT SERPL-MCNC: 0.65 MG/DL (ref 0.51–0.95)
EGFRCR SERPLBLD CKD-EPI 2021: >90 ML/MIN/1.73M2
EOSINOPHIL # BLD AUTO: 0 10E3/UL (ref 0–0.7)
EOSINOPHIL NFR BLD AUTO: 1 %
ERYTHROCYTE [DISTWIDTH] IN BLOOD BY AUTOMATED COUNT: 20.5 % (ref 10–15)
GLUCOSE SERPL-MCNC: 195 MG/DL (ref 70–99)
HCO3 SERPL-SCNC: 29 MMOL/L (ref 22–29)
HCT VFR BLD AUTO: 27.7 % (ref 35–47)
HGB BLD-MCNC: 8.9 G/DL (ref 11.7–15.7)
IMM GRANULOCYTES # BLD: 0.1 10E3/UL
IMM GRANULOCYTES NFR BLD: 4 %
LYMPHOCYTES # BLD AUTO: 0.6 10E3/UL (ref 0.8–5.3)
LYMPHOCYTES NFR BLD AUTO: 30 %
MCH RBC QN AUTO: 27.1 PG (ref 26.5–33)
MCHC RBC AUTO-ENTMCNC: 32.1 G/DL (ref 31.5–36.5)
MCV RBC AUTO: 85 FL (ref 78–100)
MONOCYTES # BLD AUTO: 0.4 10E3/UL (ref 0–1.3)
MONOCYTES NFR BLD AUTO: 18 %
NEUTROPHILS # BLD AUTO: 1 10E3/UL (ref 1.6–8.3)
NEUTROPHILS NFR BLD AUTO: 46 %
NRBC # BLD AUTO: 0 10E3/UL
NRBC BLD AUTO-RTO: 0 /100
PLATELET # BLD AUTO: 239 10E3/UL (ref 150–450)
POTASSIUM SERPL-SCNC: 4.4 MMOL/L (ref 3.4–5.3)
PROT SERPL-MCNC: 6.7 G/DL (ref 6.4–8.3)
RBC # BLD AUTO: 3.28 10E6/UL (ref 3.8–5.2)
SODIUM SERPL-SCNC: 141 MMOL/L (ref 135–145)
WBC # BLD AUTO: 2.1 10E3/UL (ref 4–11)

## 2024-08-05 PROCEDURE — 250N000011 HC RX IP 250 OP 636: Performed by: INTERNAL MEDICINE

## 2024-08-05 PROCEDURE — 82247 BILIRUBIN TOTAL: CPT

## 2024-08-05 PROCEDURE — 258N000003 HC RX IP 258 OP 636: Performed by: INTERNAL MEDICINE

## 2024-08-05 PROCEDURE — 96413 CHEMO IV INFUSION 1 HR: CPT

## 2024-08-05 PROCEDURE — 99213 OFFICE O/P EST LOW 20 MIN: CPT | Performed by: INTERNAL MEDICINE

## 2024-08-05 PROCEDURE — 99214 OFFICE O/P EST MOD 30 MIN: CPT | Performed by: INTERNAL MEDICINE

## 2024-08-05 PROCEDURE — 96367 TX/PROPH/DG ADDL SEQ IV INF: CPT

## 2024-08-05 PROCEDURE — 96375 TX/PRO/DX INJ NEW DRUG ADDON: CPT

## 2024-08-05 PROCEDURE — 85041 AUTOMATED RBC COUNT: CPT

## 2024-08-05 PROCEDURE — 96417 CHEMO IV INFUS EACH ADDL SEQ: CPT

## 2024-08-05 PROCEDURE — 36591 DRAW BLOOD OFF VENOUS DEVICE: CPT

## 2024-08-05 RX ORDER — PALONOSETRON 0.05 MG/ML
0.25 INJECTION, SOLUTION INTRAVENOUS ONCE
Status: COMPLETED | OUTPATIENT
Start: 2024-08-05 | End: 2024-08-05

## 2024-08-05 RX ORDER — HEPARIN SODIUM (PORCINE) LOCK FLUSH IV SOLN 100 UNIT/ML 100 UNIT/ML
5 SOLUTION INTRAVENOUS
Status: DISCONTINUED | OUTPATIENT
Start: 2024-08-05 | End: 2024-08-05 | Stop reason: HOSPADM

## 2024-08-05 RX ORDER — MEPERIDINE HYDROCHLORIDE 50 MG/ML
25 INJECTION INTRAMUSCULAR; INTRAVENOUS; SUBCUTANEOUS EVERY 30 MIN PRN
Status: CANCELLED | OUTPATIENT
Start: 2024-08-07

## 2024-08-05 RX ORDER — DEXAMETHASONE 4 MG/1
4 TABLET ORAL
Qty: 10 TABLET | Refills: 3 | Status: SHIPPED | OUTPATIENT
Start: 2024-08-05 | End: 2024-09-04

## 2024-08-05 RX ORDER — HEPARIN SODIUM (PORCINE) LOCK FLUSH IV SOLN 100 UNIT/ML 100 UNIT/ML
5 SOLUTION INTRAVENOUS
Status: CANCELLED | OUTPATIENT
Start: 2024-08-05

## 2024-08-05 RX ORDER — EPINEPHRINE 1 MG/ML
0.3 INJECTION, SOLUTION INTRAMUSCULAR; SUBCUTANEOUS EVERY 5 MIN PRN
Status: CANCELLED | OUTPATIENT
Start: 2024-08-05

## 2024-08-05 RX ORDER — ALBUTEROL SULFATE 0.83 MG/ML
2.5 SOLUTION RESPIRATORY (INHALATION)
Status: CANCELLED | OUTPATIENT
Start: 2024-08-06

## 2024-08-05 RX ORDER — HEPARIN SODIUM (PORCINE) LOCK FLUSH IV SOLN 100 UNIT/ML 100 UNIT/ML
5 SOLUTION INTRAVENOUS
Status: CANCELLED | OUTPATIENT
Start: 2024-08-07

## 2024-08-05 RX ORDER — ALBUTEROL SULFATE 0.83 MG/ML
2.5 SOLUTION RESPIRATORY (INHALATION)
Status: CANCELLED | OUTPATIENT
Start: 2024-08-07

## 2024-08-05 RX ORDER — METHYLPREDNISOLONE SODIUM SUCCINATE 125 MG/2ML
125 INJECTION, POWDER, LYOPHILIZED, FOR SOLUTION INTRAMUSCULAR; INTRAVENOUS
Status: CANCELLED | OUTPATIENT
Start: 2024-08-05

## 2024-08-05 RX ORDER — HEPARIN SODIUM,PORCINE 10 UNIT/ML
5-20 VIAL (ML) INTRAVENOUS DAILY PRN
Status: CANCELLED | OUTPATIENT
Start: 2024-08-05

## 2024-08-05 RX ORDER — METHYLPREDNISOLONE SODIUM SUCCINATE 125 MG/2ML
125 INJECTION, POWDER, LYOPHILIZED, FOR SOLUTION INTRAMUSCULAR; INTRAVENOUS
Status: CANCELLED
Start: 2024-08-06

## 2024-08-05 RX ORDER — LORAZEPAM 2 MG/ML
0.5 INJECTION INTRAMUSCULAR EVERY 4 HOURS PRN
Status: CANCELLED | OUTPATIENT
Start: 2024-08-06

## 2024-08-05 RX ORDER — ALBUTEROL SULFATE 90 UG/1
1-2 AEROSOL, METERED RESPIRATORY (INHALATION)
Status: CANCELLED
Start: 2024-08-06

## 2024-08-05 RX ORDER — CITALOPRAM HYDROBROMIDE 20 MG/1
20 TABLET ORAL AT BEDTIME
Qty: 30 TABLET | Refills: 3 | Status: SHIPPED | OUTPATIENT
Start: 2024-08-05 | End: 2024-08-12

## 2024-08-05 RX ORDER — CITALOPRAM HYDROBROMIDE 20 MG/1
20 TABLET ORAL DAILY
Qty: 30 TABLET | Refills: 1 | Status: SHIPPED | OUTPATIENT
Start: 2024-08-05 | End: 2024-08-12

## 2024-08-05 RX ORDER — HEPARIN SODIUM (PORCINE) LOCK FLUSH IV SOLN 100 UNIT/ML 100 UNIT/ML
5 SOLUTION INTRAVENOUS
Status: CANCELLED | OUTPATIENT
Start: 2024-08-06

## 2024-08-05 RX ORDER — PALONOSETRON 0.05 MG/ML
0.25 INJECTION, SOLUTION INTRAVENOUS ONCE
Status: CANCELLED | OUTPATIENT
Start: 2024-08-05

## 2024-08-05 RX ORDER — DIPHENHYDRAMINE HYDROCHLORIDE 50 MG/ML
50 INJECTION INTRAMUSCULAR; INTRAVENOUS
Status: CANCELLED | OUTPATIENT
Start: 2024-08-05

## 2024-08-05 RX ORDER — EPINEPHRINE 1 MG/ML
0.3 INJECTION, SOLUTION INTRAMUSCULAR; SUBCUTANEOUS EVERY 5 MIN PRN
Status: CANCELLED | OUTPATIENT
Start: 2024-08-07

## 2024-08-05 RX ORDER — LORAZEPAM 2 MG/ML
0.5 INJECTION INTRAMUSCULAR EVERY 4 HOURS PRN
Status: CANCELLED | OUTPATIENT
Start: 2024-08-05

## 2024-08-05 RX ORDER — HEPARIN SODIUM,PORCINE 10 UNIT/ML
5-20 VIAL (ML) INTRAVENOUS DAILY PRN
Status: CANCELLED | OUTPATIENT
Start: 2024-08-07

## 2024-08-05 RX ORDER — ALBUTEROL SULFATE 90 UG/1
1-2 AEROSOL, METERED RESPIRATORY (INHALATION)
Status: CANCELLED | OUTPATIENT
Start: 2024-08-05

## 2024-08-05 RX ORDER — LORAZEPAM 2 MG/ML
0.5 INJECTION INTRAMUSCULAR EVERY 4 HOURS PRN
Status: CANCELLED | OUTPATIENT
Start: 2024-08-07

## 2024-08-05 RX ORDER — DIPHENHYDRAMINE HYDROCHLORIDE 50 MG/ML
50 INJECTION INTRAMUSCULAR; INTRAVENOUS
Status: CANCELLED
Start: 2024-08-06

## 2024-08-05 RX ORDER — HEPARIN SODIUM (PORCINE) LOCK FLUSH IV SOLN 100 UNIT/ML 100 UNIT/ML
300-600 SOLUTION INTRAVENOUS
Status: COMPLETED | OUTPATIENT
Start: 2024-08-05 | End: 2024-08-06

## 2024-08-05 RX ORDER — ALBUTEROL SULFATE 90 UG/1
1-2 AEROSOL, METERED RESPIRATORY (INHALATION)
Status: CANCELLED
Start: 2024-08-07

## 2024-08-05 RX ORDER — EPINEPHRINE 1 MG/ML
0.3 INJECTION, SOLUTION INTRAMUSCULAR; SUBCUTANEOUS EVERY 5 MIN PRN
Status: CANCELLED | OUTPATIENT
Start: 2024-08-06

## 2024-08-05 RX ORDER — MEPERIDINE HYDROCHLORIDE 50 MG/ML
25 INJECTION INTRAMUSCULAR; INTRAVENOUS; SUBCUTANEOUS EVERY 30 MIN PRN
Status: CANCELLED | OUTPATIENT
Start: 2024-08-05

## 2024-08-05 RX ORDER — DIPHENHYDRAMINE HYDROCHLORIDE 50 MG/ML
50 INJECTION INTRAMUSCULAR; INTRAVENOUS
Status: CANCELLED
Start: 2024-08-07

## 2024-08-05 RX ORDER — MEPERIDINE HYDROCHLORIDE 50 MG/ML
25 INJECTION INTRAMUSCULAR; INTRAVENOUS; SUBCUTANEOUS EVERY 30 MIN PRN
Status: CANCELLED | OUTPATIENT
Start: 2024-08-06

## 2024-08-05 RX ORDER — HEPARIN SODIUM,PORCINE 10 UNIT/ML
5-20 VIAL (ML) INTRAVENOUS DAILY PRN
Status: CANCELLED | OUTPATIENT
Start: 2024-08-06

## 2024-08-05 RX ORDER — METHYLPREDNISOLONE SODIUM SUCCINATE 125 MG/2ML
125 INJECTION, POWDER, LYOPHILIZED, FOR SOLUTION INTRAMUSCULAR; INTRAVENOUS
Status: CANCELLED
Start: 2024-08-07

## 2024-08-05 RX ORDER — ALBUTEROL SULFATE 0.83 MG/ML
2.5 SOLUTION RESPIRATORY (INHALATION)
Status: CANCELLED | OUTPATIENT
Start: 2024-08-05

## 2024-08-05 RX ADMIN — HEPARIN 5 ML: 100 SYRINGE at 16:19

## 2024-08-05 RX ADMIN — FOSAPREPITANT: 150 INJECTION, POWDER, LYOPHILIZED, FOR SOLUTION INTRAVENOUS at 12:17

## 2024-08-05 RX ADMIN — ETOPOSIDE 155 MG: 20 INJECTION, SOLUTION INTRAVENOUS at 13:09

## 2024-08-05 RX ADMIN — PALONOSETRON 0.25 MG: 0.05 INJECTION, SOLUTION INTRAVENOUS at 11:59

## 2024-08-05 RX ADMIN — CARBOPLATIN 625 MG: 10 INJECTION, SOLUTION INTRAVENOUS at 12:40

## 2024-08-05 ASSESSMENT — PAIN SCALES - GENERAL: PAINLEVEL: MILD PAIN (2)

## 2024-08-05 NOTE — LETTER
"8/5/2024      Louise Corado  [address] [place] [address]      Dear Colleague,    Thank you for referring your patient, Louise Corado, to the [facility]. Please see a copy of my visit note below.    Oncology Rooming Note    August 5, 2024 10:19 AM   Louise Corado is a 59 year old female who presents for:    Chief Complaint   Patient presents with     Oncology Clinic Visit     Neuroendocrine carcinoma metastatic to liver      Initial Vitals: /88 (BP Location: Left arm, Patient Position: Sitting, Cuff Size: Adult Regular)   Pulse 76   Temp 97.7  F (36.5  C) (Tympanic)   Resp 18   Ht 1.6 m (5' 2.99\")   Wt 86.6 kg (191 lb)   SpO2 99%   BMI 33.84 kg/m   Estimated body mass index is 33.84 kg/m  as calculated from the following:    Height as of this encounter: 1.6 m (5' 2.99\").    Weight as of this encounter: 86.6 kg (191 lb). Body surface area is 1.96 meters squared.  Mild Pain (2) Comment: Data Unavailable   No LMP recorded. Patient is postmenopausal.  Allergies reviewed: Yes  Medications reviewed: Yes    Medications: Medication refills not needed today.  Pharmacy name entered into [employer]: [id number] - [place], [place] - [address] AT [facility]    Frailty Screening:   Is the patient here for a new oncology consult visit in cancer care? 2. No      Clinical concerns:   Multiple concerns.   Nausea daily.   Vomiting 2-3x daily. Taking anti-nausea meds. Reported Dexamethasone causes jitteriness.       Katia Bains MA              [facility]ology [facility] Oncology Progress Note    Patient: Louise Corado  MRN: [id number]  Date of Service: Aug 5, 2024        Assessment and Plan:    1.  Metastatic neuroendocrine carcinoma: She will be starting cycle 5 of chemotherapy today.  She had a PET scan on August 1.  I personally reviewed the images and shared them with Louise.  Overall she has had a partial response.  Mostly improvement.  " Some areas are stable.  I wonder if this areas that have not changed much or lower grade disease.  We will continue with 4 more cycles and then reimage.  She is having a fair amount of nausea from her treatments ongoing to dose reduce the etoposide by 25%.    2.  Nausea: She has been having a fair amount of nausea with her treatment.  Therefore we will dose reduce the etoposide by 25%.  She will take daily dexamethasone for a week or so after treatment.  If this does not work at asked her to call us and then we will try some Ativan.  She will also continue Zofran and Compazine.    2.  Pleural effusion: She still has a right-sided effusion but it is improved.  Follow clinically and tap as needed.      3. Anxiety: Lorazepam seems to be helping.  Anxiety seems rather persistent though.  Therefore were going to start citalopram 20 mg nightly and titrate up as needed.     4.  Pain:   Mostly at the right anterior chest.  She is taking MS Contin 15 every 12 and oxycodone 10 mg as needed.      ECOG Performance  0    Diagnosis:    1.  Metastatic neuroendocrine carcinoma, poorly differentiated: Diagnosed April 2024 from a liver biopsy.  Her PET/CT shows malignant involvement of the liver, mediastinal nodes, left upper quadrant omentum, manubrium, and bilateral pleura.  There is some wall thickening and FDG activity in a short segment of the terminal ileum.  EGD: Occasional gastritis.  No mass.  Colonoscopy: Mass of terminal ileum.    Treatment:    Carboplatin and etoposide initiated May 13, 2024.    Interim History:    Louise returns today for a follow-up visit.      Daily nausea. Movement. Not eating much. No pain or cramping. Taste is off.  A lt of fatigue.    Compazine and zofran, +/-.            Review of Systems:    As above in the history.     Review of Systems otherwise Negative for:  General: chills, fever or night sweats  Psychological: anxiety or depression  Ophthalmic: blurry vision, double vision or loss of  "vision, vision change  ENT: epistaxis, oral lesions, hearing changes  Hematological and Lymphatic: bleeding, bruising, jaundice, swollen lymph nodes  Endocrine: hot flashes, unexpected weight changes  Respiratory: cough, hemoptysis, orthopnea  Cardiovascular: edema, palpitations or PND  Gastrointestinal: blood in stools, change in bowel habits, constipation, diarrhea or nausea/vomiting  Genito-Urinary: change in urinary stream, incontinence, frequency/urgency  Musculoskeletal: joint pain, stiffness, swelling, muscle pain  Neurological: dizziness, headaches, numbness/tingling  Dermatological: lumps and rash    Physical Exam:    /88 (BP Location: Left arm, Patient Position: Sitting, Cuff Size: Adult Regular)   Pulse 76   Temp 97.7  F (36.5  C) (Tympanic)   Resp 18   Ht 1.6 m (5' 2.99\")   Wt 86.6 kg (191 lb)   SpO2 99%   BMI 33.84 kg/m      General: patient appears stated age of 59 year old. Nontoxic and in no distress.   HEENT: Head: atraumatic, normocephalic. Sclerae anicteric.  Chest:  Normal respiratory effort  Cardiac:  No edema.   Abdomen: abdomen is non-distended  Extremities: normal tone and muscle bulk.  Skin: no lesions or rash on visible skin. Warm and dry.   CNS: alert and oriented. Grossly non-focal.   Psychiatric: normal mood and affect.     Lab Results:    Recent Results (from the past 168 hour(s))   Glucose by meter   Result Value Ref Range    GLUCOSE BY METER POCT 125 (H) 70 - 99 mg/dL   Comprehensive metabolic panel   Result Value Ref Range    Sodium 141 135 - 145 mmol/L    Potassium 4.4 3.4 - 5.3 mmol/L    Carbon Dioxide (CO2) 29 22 - 29 mmol/L    Anion Gap 13 7 - 15 mmol/L    Urea Nitrogen 9.7 6.0 - 20.0 mg/dL    Creatinine 0.65 0.51 - 0.95 mg/dL    GFR Estimate >90 >60 mL/min/1.73m2    Calcium 8.6 (L) 8.8 - 10.4 mg/dL    Chloride 99 98 - 107 mmol/L    Glucose 195 (H) 70 - 99 mg/dL    Alkaline Phosphatase 98 40 - 150 U/L    AST 13 0 - 45 U/L    ALT 6 0 - 50 U/L    Protein Total 6.7 6.4 " - 8.3 g/dL    Albumin 3.6 3.5 - 5.2 g/dL    Bilirubin Total 0.3 <=1.2 mg/dL   CBC with platelets and differential   Result Value Ref Range    WBC Count 2.1 (L) 4.0 - 11.0 10e3/uL    RBC Count 3.28 (L) 3.80 - 5.20 10e6/uL    Hemoglobin 8.9 (L) 11.7 - 15.7 g/dL    Hematocrit 27.7 (L) 35.0 - 47.0 %    MCV 85 78 - 100 fL    MCH 27.1 26.5 - 33.0 pg    MCHC 32.1 31.5 - 36.5 g/dL    RDW 20.5 (H) 10.0 - 15.0 %    Platelet Count 239 150 - 450 10e3/uL    % Neutrophils 46 %    % Lymphocytes 30 %    % Monocytes 18 %    % Eosinophils 1 %    % Basophils 1 %    % Immature Granulocytes 4 %    NRBCs per 100 WBC 0 <1 /100    Absolute Neutrophils 1.0 (L) 1.6 - 8.3 10e3/uL    Absolute Lymphocytes 0.6 (L) 0.8 - 5.3 10e3/uL    Absolute Monocytes 0.4 0.0 - 1.3 10e3/uL    Absolute Eosinophils 0.0 0.0 - 0.7 10e3/uL    Absolute Basophils 0.0 0.0 - 0.2 10e3/uL    Absolute Immature Granulocytes 0.1 <=0.4 10e3/uL    Absolute NRBCs 0.0 10e3/uL       Imaging:    PET Oncology Whole Body    Result Date: 8/1/2024  EXAM: PET ONCOLOGY WHOLE BODY LOCATION: [facility] DATE: 8/1/2024 INDICATION: Surgical treatment strategy for restaging poorly differentiated metastatic neuroendocrine carcinoma involving the liver COMPARISON: PET/CT from 4/17/2024 and CT from 7/25/2024 CONTRAST: None TECHNIQUE: Serum glucose level 125  mg/dL. One hour post intravenous administration of 11.5  mCi F-18 FDG, PET imaging was performed from the skull vertex to feet, utilizing attenuation correction with concurrent axial CT and PET/CT image fusion. Dose reduction techniques were used. PET/CT FINDINGS:  Scattered sclerotic skeletal lesions have decreased in FDG activity, for example in the manubrium (SUVmax 5.0 to 3.8. Stable markedly FDG avid (SUVmax 8.5, previously 8.9) circumferential wall thickening in the terminal ileum with adjacent FDG avid right lower quadrant mesenteric lymphadenopathy. Persistent FDG avid soft tissue nodularity in the left  upper quadrant greater omentum. Uniform mild inflammatory FDG activity about the periphery of a partially loculated right pleural effusion. Inflammatory FDG activity associated with injection sites in the right buttock. All other FDG activity is normal. Liver lesions and thoracic lymphadenopathy are no longer FDG avid in excess of background soft tissue levels. CT FINDINGS: Areas of fat deposition in the wall of the ascending colon. Short segment intussusception near the hepatic flexure.  Passive atelectasis in the right lung. Right IJ Port-A-Cath tip in the right atrium. Hypoattenuation of blood pool relative to myocardium, suggesting anemia. Duodenal diverticulum. Mild calcified atherosclerosis. No coronary artery calcifications.  Benign hyperostosis frontalis interna. Scattered non-FDG avid sclerotic skeletal lesions. Mild degenerative change in the spine.     IMPRESSION: Partial response. Liver lesions and thoracic lymph node metastases are no longer FDG avid. Skeletal metastases have improved. Unchanged presumed primary tumor in the terminal ileum with adjacent mesenteric lymphadenopathy and peritoneal carcinomatosis.     CT Chest (PE) Abdomen Pelvis w Contrast    Result Date: 7/25/2024  EXAM: CT CHEST PE ABDOMEN PELVIS W CONTRAST LOCATION: [facility] DATE: 7/25/2024 INDICATION: left flank pain, cancer history, left flank pain, blood in urine COMPARISON: CT exams 6/19/2024, 4/10/2024 and 2/26/2014, PET/CT 4/17/2024 TECHNIQUE: CT chest pulmonary angiogram and routine CT abdomen pelvis with IV contrast. Arterial phase through the chest and venous phase through the abdomen and pelvis. Multiplanar reformats and MIP reconstructions were performed. Dose reduction techniques were used. CONTRAST: 90ml Isovue 370 FINDINGS: ANGIOGRAM CHEST: Pulmonary arteries are normal caliber and negative for pulmonary emboli. Thoracic aorta is negative for dissection. No CT evidence of right heart strain.  LUNGS AND PLEURA: Stable small-to-moderate right pleural effusion with adjacent consolidation likely reflecting atelectasis. Trace left pleural effusion. Mild bronchial wall thickening. Stable bilateral pulmonary nodules including multiple subpleural nodules. A dominant left upper lobe subpleural nodule measures a stable 7 mm image 106 series 5. MEDIASTINUM/AXILLAE: Right IJ port catheter. Mediastinal and hilar lymphadenopathy. A dominant right paratracheal node measures a stable 1.6 cm short axis. A subcarinal node measures a stable 2.1 cm short axis. Stable prominent right cardiophrenic recess  lymph nodes. Borderline cardiomegaly. No pericardial effusion. CORONARY ARTERY CALCIFICATION: None. HEPATOBILIARY: Multiple hepatic metastases with a dominant 6.2 cm peripherally enhancing upper right hepatic lobe lesion which is stable in size. Additional smaller hepatic metastases better seen on the initial postcontrast series 4. These are not as well seen on the previous exam likely due to timing of the contrast bolus. Normal gallbladder and bile ducts. PANCREAS: Normal. SPLEEN: Normal. ADRENAL GLANDS: Normal. KIDNEYS/BLADDER: Small right renal cortical cysts. No follow-up is indicated. No hydronephrosis or hydroureter. Normal bladder. BOWEL: Normal stomach and proximal small bowel. Masslike wall thickening of the distal ileum within an adjacent spiculated mesenteric lesion measuring 2.3 x 2.4 cm which is stable since the prior exam. Stable adjacent abnormal lymph nodes described below. Stable left lower quadrant carcinomatosis measuring up to 2.4 cm. Trace pelvic free fluid. No free air. LYMPH NODES: Stable lymphadenopathy including a dominant right lower quadrant mesenteric lymph node measuring 1.2 cm short axis. VASCULATURE: Mild aortoiliac atherosclerosis. Patent central SMA and SMV. PELVIC ORGANS: Normal pelvic organs. Trace pelvic free fluid. MUSCULOSKELETAL: Stable sclerotic lesions in the left posterior iliac  bone, left side of the T11 vertebral body and sternal manubrium.     IMPRESSION: 1.  No pulmonary artery embolism. 2.  No acute findings in the abdomen or pelvis. No hydronephrosis, urinary tract stone or inflammatory process. 3.  Stable metastatic neuroendocrine tumor with stable masslike wall thickening the terminal ileum with adjacent mike metastases, stable hepatic, pulmonary/pleural, thoracic mike metastases, osseous metastases as well as stable left lower quadrant carcinomatosis. 4.  Stable small-to-moderate right pleural effusion with adjacent consolidation likely reflecting atelectasis. Trace left pleural effusion.    CT Thoracic Spine Reconstructed    Result Date: 7/25/2024  EXAM: CT THORACIC SPINE RECONSTRUCTED LOCATION: [facility] DATE: 7/25/2024 INDICATION: Cancer, back pain. COMPARISON: Body CT 6/19/2024 and PET evaluation 4/17/2024. TECHNIQUE: Dedicated axial, sagittal, and coronal images of the thoracic spine were generated utilizing CT chest source data and are separately reviewed. Dose reduction techniques were used. FINDINGS: VERTEBRA: Twelve thoracic vertebral body segments with satisfactory thoracic height/alignment. No thoracic level fracture or posttraumatic subluxation. Sclerotic metastases including T6 and T11 as on prior examination 6/19/2024. No pathologic compressions. No pathologic compression fracture at this level. History of malignancy reported. Articular pillars in the thoracic spine are intact without facet joint space widening or disruption. No displaced rib fractures or lytic/destructive changes of the ribs are noted. CANAL/FORAMINA: No severe spinal stenosis or severe foraminal narrowing at any thoracic level. PARASPINAL: No disc herniation. No interspace widening. Leftward convexity thoracic curve apex T9. Right hilar and mediastinal mass. Indeterminate moderate right pleural effusion. Low-density changes involve the majority of the hepatic parenchyma  with multifocal hyperenhancing masses in the right and left lobes worrisome for hepatic metastasis. This is described on body CT source data set. Renal contours and upper retroperitoneal structures including the adrenal glands are otherwise satisfactory. Cardiac enlargement. No pericardial effusions. Thyroid is satisfactory. Right central venous catheter.     IMPRESSION: 1.  No acute fracture or posttraumatic malalignment in the thoracic spine. 2.  No significant spinal stenosis or foraminal narrowing at any thoracic level. 3.  Sclerotic metastases in the axial skeleton as before, including T6 and T11. Right hilar and mediastinal mass. Right pleural effusion. Hepatic metastases.        Signed by: Man Barreto MD  +      Again, thank you for allowing me to participate in the care of your patient.        Sincerely,        Man Barreto MD

## 2024-08-05 NOTE — PROGRESS NOTES
Infusion Nursing Note:  Louise Corado presents today for Carbo and Etoposide.    Patient seen by provider today: Yes: Estevan   present during visit today: Not Applicable.    Note: Her dose of Etoposide and dex have been decreased this week as she was having difficulty with nausea much worse than previous weeks.      Intravenous Access:  Labs drawn without difficulty.  Implanted Port.    Treatment Conditions:  Lab Results   Component Value Date    HGB 8.9 (L) 08/05/2024    WBC 2.1 (L) 08/05/2024    ANEU 1.9 06/24/2024    ANEUTAUTO 1.0 (L) 08/05/2024     08/05/2024        Lab Results   Component Value Date     08/05/2024    POTASSIUM 4.4 08/05/2024    MAG 2.0 06/12/2024    CR 0.65 08/05/2024    ANGELA 8.6 (L) 08/05/2024    BILITOTAL 0.3 08/05/2024    ALBUMIN 3.6 08/05/2024    ALT 6 08/05/2024    AST 13 08/05/2024       Results reviewed, labs MET treatment parameters, ok to proceed with treatment.      Post Infusion Assessment:  Patient tolerated infusion without incident.       Discharge Plan:   Patient discharged in stable condition accompanied by: .  Departure Mode: Ambulatory.      MARCELINO RITCHIE RN

## 2024-08-05 NOTE — PROGRESS NOTES
"Oncology Rooming Note    August 5, 2024 10:19 AM   Louise Corado is a 59 year old female who presents for:    Chief Complaint   Patient presents with    Oncology Clinic Visit     Neuroendocrine carcinoma metastatic to liver      Initial Vitals: /88 (BP Location: Left arm, Patient Position: Sitting, Cuff Size: Adult Regular)   Pulse 76   Temp 97.7  F (36.5  C) (Tympanic)   Resp 18   Ht 1.6 m (5' 2.99\")   Wt 86.6 kg (191 lb)   SpO2 99%   BMI 33.84 kg/m   Estimated body mass index is 33.84 kg/m  as calculated from the following:    Height as of this encounter: 1.6 m (5' 2.99\").    Weight as of this encounter: 86.6 kg (191 lb). Body surface area is 1.96 meters squared.  Mild Pain (2) Comment: Data Unavailable   No LMP recorded. Patient is postmenopausal.  Allergies reviewed: Yes  Medications reviewed: Yes    Medications: Medication refills not needed today.  Pharmacy name entered into [employer]: [id number] [address] AT [facility]    Frailty Screening:   Is the patient here for a new oncology consult visit in cancer care? 2. No      Clinical concerns:   Multiple concerns.   Nausea daily.   Vomiting 2-3x daily. Taking anti-nausea meds. Reported Dexamethasone causes jitteriness.       Katia Bains MA            "

## 2024-08-05 NOTE — PROGRESS NOTES
[facility] Hematology and Oncology Progress Note    Patient: Louise Corado  MRN: [id number]  Date of Service: Aug 5, 2024        Assessment and Plan:    1.  Metastatic neuroendocrine carcinoma: She will be starting cycle 5 of chemotherapy today.  She had a PET scan on August 1.  I personally reviewed the images and shared them with Louise.  Overall she has had a partial response.  Mostly improvement.  Some areas are stable.  I wonder if this areas that have not changed much or lower grade disease.  We will continue with 4 more cycles and then reimage.  She is having a fair amount of nausea from her treatments ongoing to dose reduce the etoposide by 25%.    2.  Nausea: She has been having a fair amount of nausea with her treatment.  Therefore we will dose reduce the etoposide by 25%.  She will take daily dexamethasone for a week or so after treatment.  If this does not work at asked her to call us and then we will try some Ativan.  She will also continue Zofran and Compazine.    2.  Pleural effusion: She still has a right-sided effusion but it is improved.  Follow clinically and tap as needed.      3. Anxiety: Lorazepam seems to be helping.  Anxiety seems rather persistent though.  Therefore were going to start citalopram 20 mg nightly and titrate up as needed.     4.  Pain:   Mostly at the right anterior chest.  She is taking MS Contin 15 every 12 and oxycodone 10 mg as needed.      ECOG Performance  0    Diagnosis:    1.  Metastatic neuroendocrine carcinoma, poorly differentiated:  Diagnosed April 2024 from a liver biopsy.  Her PET/CT shows malignant involvement of the liver, mediastinal nodes, left upper quadrant omentum, manubrium, and bilateral pleura.  There is some wall thickening and FDG activity in a short segment of the terminal ileum. Liver biopsy shows WHO, NET G2.  "                                                                                                                                                                                                                                                                                                                                                                                                                                                                                              EGD: Occasional gastritis.  No mass.  Colonoscopy: Mass of terminal ileum.    NGS:  PDL1=0%, MDI-stable, TMB low,   CancerTYPE ID:  GI carcinoid 96%    Treatment:    Carboplatin and etoposide initiated May 13, 2024.    Interim History:    Louise returns today for a follow-up visit.      Daily nausea. Movement. Not eating much. No pain or cramping. Taste is off.  A lt of fatigue.    Compazine and zofran, +/-.            Review of Systems:    As above in the history.     Review of Systems otherwise Negative for:  General: chills, fever or night sweats  Psychological: anxiety or depression  Ophthalmic: blurry vision, double vision or loss of vision, vision change  ENT: epistaxis, oral lesions, hearing changes  Hematological and Lymphatic: bleeding, bruising, jaundice, swollen lymph nodes  Endocrine: hot flashes, unexpected weight changes  Respiratory: cough, hemoptysis, orthopnea  Cardiovascular: edema, palpitations or PND  Gastrointestinal: blood in stools, change in bowel habits, constipation, diarrhea or nausea/vomiting  Genito-Urinary: change in urinary stream, incontinence, frequency/urgency  Musculoskeletal: joint pain, stiffness, swelling, muscle pain  Neurological: dizziness, headaches, numbness/tingling  Dermatological: lumps and rash    Physical Exam:    /88 (BP Location: Left arm, Patient Position: Sitting, Cuff Size: Adult Regular)   Pulse 76   Temp 97.7  F (36.5  C) (Tympanic)   Resp 18   Ht 1.6 m (5' 2.99\")   Wt 86.6 kg (191 " lb)   SpO2 99%   BMI 33.84 kg/m      General: patient appears stated age of 59 year old. Nontoxic and in no distress.   HEENT: Head: atraumatic, normocephalic. Sclerae anicteric.  Chest:  Normal respiratory effort  Cardiac:  No edema.   Abdomen: abdomen is non-distended  Extremities: normal tone and muscle bulk.  Skin: no lesions or rash on visible skin. Warm and dry.   CNS: alert and oriented. Grossly non-focal.   Psychiatric: normal mood and affect.     Lab Results:    Recent Results (from the past 168 hour(s))   Glucose by meter   Result Value Ref Range    GLUCOSE BY METER POCT 125 (H) 70 - 99 mg/dL   Comprehensive metabolic panel   Result Value Ref Range    Sodium 141 135 - 145 mmol/L    Potassium 4.4 3.4 - 5.3 mmol/L    Carbon Dioxide (CO2) 29 22 - 29 mmol/L    Anion Gap 13 7 - 15 mmol/L    Urea Nitrogen 9.7 6.0 - 20.0 mg/dL    Creatinine 0.65 0.51 - 0.95 mg/dL    GFR Estimate >90 >60 mL/min/1.73m2    Calcium 8.6 (L) 8.8 - 10.4 mg/dL    Chloride 99 98 - 107 mmol/L    Glucose 195 (H) 70 - 99 mg/dL    Alkaline Phosphatase 98 40 - 150 U/L    AST 13 0 - 45 U/L    ALT 6 0 - 50 U/L    Protein Total 6.7 6.4 - 8.3 g/dL    Albumin 3.6 3.5 - 5.2 g/dL    Bilirubin Total 0.3 <=1.2 mg/dL   CBC with platelets and differential   Result Value Ref Range    WBC Count 2.1 (L) 4.0 - 11.0 10e3/uL    RBC Count 3.28 (L) 3.80 - 5.20 10e6/uL    Hemoglobin 8.9 (L) 11.7 - 15.7 g/dL    Hematocrit 27.7 (L) 35.0 - 47.0 %    MCV 85 78 - 100 fL    MCH 27.1 26.5 - 33.0 pg    MCHC 32.1 31.5 - 36.5 g/dL    RDW 20.5 (H) 10.0 - 15.0 %    Platelet Count 239 150 - 450 10e3/uL    % Neutrophils 46 %    % Lymphocytes 30 %    % Monocytes 18 %    % Eosinophils 1 %    % Basophils 1 %    % Immature Granulocytes 4 %    NRBCs per 100 WBC 0 <1 /100    Absolute Neutrophils 1.0 (L) 1.6 - 8.3 10e3/uL    Absolute Lymphocytes 0.6 (L) 0.8 - 5.3 10e3/uL    Absolute Monocytes 0.4 0.0 - 1.3 10e3/uL    Absolute Eosinophils 0.0 0.0 - 0.7 10e3/uL    Absolute Basophils  0.0 0.0 - 0.2 10e3/uL    Absolute Immature Granulocytes 0.1 <=0.4 10e3/uL    Absolute NRBCs 0.0 10e3/uL       Imaging:    PET Oncology Whole Body    Result Date: 8/1/2024  EXAM: PET ONCOLOGY WHOLE BODY LOCATION: [facility] DATE: 8/1/2024 INDICATION: Surgical treatment strategy for restaging poorly differentiated metastatic neuroendocrine carcinoma involving the liver COMPARISON: PET/CT from 4/17/2024 and CT from 7/25/2024 CONTRAST: None TECHNIQUE: Serum glucose level 125  mg/dL. One hour post intravenous administration of 11.5  mCi F-18 FDG, PET imaging was performed from the skull vertex to feet, utilizing attenuation correction with concurrent axial CT and PET/CT image fusion. Dose reduction techniques were used. PET/CT FINDINGS:  Scattered sclerotic skeletal lesions have decreased in FDG activity, for example in the manubrium (SUVmax 5.0 to 3.8. Stable markedly FDG avid (SUVmax 8.5, previously 8.9) circumferential wall thickening in the terminal ileum with adjacent FDG avid right lower quadrant mesenteric lymphadenopathy. Persistent FDG avid soft tissue nodularity in the left upper quadrant greater omentum. Uniform mild inflammatory FDG activity about the periphery of a partially loculated right pleural effusion. Inflammatory FDG activity associated with injection sites in the right buttock. All other FDG activity is normal. Liver lesions and thoracic lymphadenopathy are no longer FDG avid in excess of background soft tissue levels. CT FINDINGS: Areas of fat deposition in the wall of the ascending colon. Short segment intussusception near the hepatic flexure.  Passive atelectasis in the right lung. Right IJ Port-A-Cath tip in the right atrium. Hypoattenuation of blood pool relative to myocardium, suggesting anemia. Duodenal diverticulum. Mild calcified atherosclerosis. No coronary artery calcifications.  Benign hyperostosis frontalis interna. Scattered non-FDG avid sclerotic skeletal  lesions. Mild degenerative change in the spine.     IMPRESSION: Partial response. Liver lesions and thoracic lymph node metastases are no longer FDG avid. Skeletal metastases have improved. Unchanged presumed primary tumor in the terminal ileum with adjacent mesenteric lymphadenopathy and peritoneal carcinomatosis.     CT Chest (PE) Abdomen Pelvis w Contrast    Result Date: 7/25/2024  EXAM: CT CHEST PE ABDOMEN PELVIS W CONTRAST LOCATION: [facility] DATE: 7/25/2024 INDICATION: left flank pain, cancer history, left flank pain, blood in urine COMPARISON: CT exams 6/19/2024, 4/10/2024 and 2/26/2014, PET/CT 4/17/2024 TECHNIQUE: CT chest pulmonary angiogram and routine CT abdomen pelvis with IV contrast. Arterial phase through the chest and venous phase through the abdomen and pelvis. Multiplanar reformats and MIP reconstructions were performed. Dose reduction techniques were used. CONTRAST: 90ml Isovue 370 FINDINGS: ANGIOGRAM CHEST: Pulmonary arteries are normal caliber and negative for pulmonary emboli. Thoracic aorta is negative for dissection. No CT evidence of right heart strain. LUNGS AND PLEURA: Stable small-to-moderate right pleural effusion with adjacent consolidation likely reflecting atelectasis. Trace left pleural effusion. Mild bronchial wall thickening. Stable bilateral pulmonary nodules including multiple subpleural nodules. A dominant left upper lobe subpleural nodule measures a stable 7 mm image 106 series 5. MEDIASTINUM/AXILLAE: Right IJ port catheter. Mediastinal and hilar lymphadenopathy. A dominant right paratracheal node measures a stable 1.6 cm short axis. A subcarinal node measures a stable 2.1 cm short axis. Stable prominent right cardiophrenic recess  lymph nodes. Borderline cardiomegaly. No pericardial effusion. CORONARY ARTERY CALCIFICATION: None. HEPATOBILIARY: Multiple hepatic metastases with a dominant 6.2 cm peripherally enhancing upper right hepatic lobe lesion  which is stable in size. Additional smaller hepatic metastases better seen on the initial postcontrast series 4. These are not as well seen on the previous exam likely due to timing of the contrast bolus. Normal gallbladder and bile ducts. PANCREAS: Normal. SPLEEN: Normal. ADRENAL GLANDS: Normal. KIDNEYS/BLADDER: Small right renal cortical cysts. No follow-up is indicated. No hydronephrosis or hydroureter. Normal bladder. BOWEL: Normal stomach and proximal small bowel. Masslike wall thickening of the distal ileum within an adjacent spiculated mesenteric lesion measuring 2.3 x 2.4 cm which is stable since the prior exam. Stable adjacent abnormal lymph nodes described below. Stable left lower quadrant carcinomatosis measuring up to 2.4 cm. Trace pelvic free fluid. No free air. LYMPH NODES: Stable lymphadenopathy including a dominant right lower quadrant mesenteric lymph node measuring 1.2 cm short axis. VASCULATURE: Mild aortoiliac atherosclerosis. Patent central SMA and SMV. PELVIC ORGANS: Normal pelvic organs. Trace pelvic free fluid. MUSCULOSKELETAL: Stable sclerotic lesions in the left posterior iliac bone, left side of the T11 vertebral body and sternal manubrium.     IMPRESSION: 1.  No pulmonary artery embolism. 2.  No acute findings in the abdomen or pelvis. No hydronephrosis, urinary tract stone or inflammatory process. 3.  Stable metastatic neuroendocrine tumor with stable masslike wall thickening the terminal ileum with adjacent mike metastases, stable hepatic, pulmonary/pleural, thoracic mike metastases, osseous metastases as well as stable left lower quadrant carcinomatosis. 4.  Stable small-to-moderate right pleural effusion with adjacent consolidation likely reflecting atelectasis. Trace left pleural effusion.    CT Thoracic Spine Reconstructed    Result Date: 7/25/2024  EXAM: CT THORACIC SPINE RECONSTRUCTED LOCATION: [facility] DATE: 7/25/2024 INDICATION: Cancer, back pain.  COMPARISON: Body CT 6/19/2024 and PET evaluation 4/17/2024. TECHNIQUE: Dedicated axial, sagittal, and coronal images of the thoracic spine were generated utilizing CT chest source data and are separately reviewed. Dose reduction techniques were used. FINDINGS: VERTEBRA: Twelve thoracic vertebral body segments with satisfactory thoracic height/alignment. No thoracic level fracture or posttraumatic subluxation. Sclerotic metastases including T6 and T11 as on prior examination 6/19/2024. No pathologic compressions. No pathologic compression fracture at this level. History of malignancy reported. Articular pillars in the thoracic spine are intact without facet joint space widening or disruption. No displaced rib fractures or lytic/destructive changes of the ribs are noted. CANAL/FORAMINA: No severe spinal stenosis or severe foraminal narrowing at any thoracic level. PARASPINAL: No disc herniation. No interspace widening. Leftward convexity thoracic curve apex T9. Right hilar and mediastinal mass. Indeterminate moderate right pleural effusion. Low-density changes involve the majority of the hepatic parenchyma with multifocal hyperenhancing masses in the right and left lobes worrisome for hepatic metastasis. This is described on body CT source data set. Renal contours and upper retroperitoneal structures including the adrenal glands are otherwise satisfactory. Cardiac enlargement. No pericardial effusions. Thyroid is satisfactory. Right central venous catheter.     IMPRESSION: 1.  No acute fracture or posttraumatic malalignment in the thoracic spine. 2.  No significant spinal stenosis or foraminal narrowing at any thoracic level. 3.  Sclerotic metastases in the axial skeleton as before, including T6 and T11. Right hilar and mediastinal mass. Right pleural effusion. Hepatic metastases.        Signed by: Man Barreto MD  +

## 2024-08-06 ENCOUNTER — INFUSION THERAPY VISIT (OUTPATIENT)
Dept: INFUSION THERAPY | Facility: HOSPITAL | Age: 59
End: 2024-08-06
Attending: INTERNAL MEDICINE
Payer: COMMERCIAL

## 2024-08-06 ENCOUNTER — PATIENT OUTREACH (OUTPATIENT)
Dept: ONCOLOGY | Facility: HOSPITAL | Age: 59
End: 2024-08-06
Payer: COMMERCIAL

## 2024-08-06 VITALS
TEMPERATURE: 97.7 F | RESPIRATION RATE: 18 BRPM | DIASTOLIC BLOOD PRESSURE: 70 MMHG | OXYGEN SATURATION: 94 % | HEART RATE: 80 BPM | SYSTOLIC BLOOD PRESSURE: 145 MMHG

## 2024-08-06 DIAGNOSIS — C7B.8 NEUROENDOCRINE CARCINOMA METASTATIC TO LIVER (H): Primary | ICD-10-CM

## 2024-08-06 DIAGNOSIS — C7A.8 NEUROENDOCRINE CARCINOMA METASTATIC TO LIVER (H): Primary | ICD-10-CM

## 2024-08-06 PROCEDURE — 258N000003 HC RX IP 258 OP 636: Performed by: INTERNAL MEDICINE

## 2024-08-06 PROCEDURE — 96413 CHEMO IV INFUSION 1 HR: CPT

## 2024-08-06 PROCEDURE — 250N000011 HC RX IP 250 OP 636: Performed by: INTERNAL MEDICINE

## 2024-08-06 PROCEDURE — 96367 TX/PROPH/DG ADDL SEQ IV INF: CPT

## 2024-08-06 RX ORDER — HEPARIN SODIUM (PORCINE) LOCK FLUSH IV SOLN 100 UNIT/ML 100 UNIT/ML
5 SOLUTION INTRAVENOUS
Status: DISCONTINUED | OUTPATIENT
Start: 2024-08-06 | End: 2024-08-06 | Stop reason: HOSPADM

## 2024-08-06 RX ORDER — EPINEPHRINE 1 MG/ML
0.3 INJECTION, SOLUTION INTRAMUSCULAR; SUBCUTANEOUS EVERY 5 MIN PRN
Status: DISCONTINUED | OUTPATIENT
Start: 2024-08-06 | End: 2024-08-06 | Stop reason: HOSPADM

## 2024-08-06 RX ORDER — ALBUTEROL SULFATE 90 UG/1
1-2 AEROSOL, METERED RESPIRATORY (INHALATION)
Status: DISCONTINUED | OUTPATIENT
Start: 2024-08-06 | End: 2024-08-06 | Stop reason: HOSPADM

## 2024-08-06 RX ORDER — ALBUTEROL SULFATE 0.83 MG/ML
2.5 SOLUTION RESPIRATORY (INHALATION)
Status: DISCONTINUED | OUTPATIENT
Start: 2024-08-06 | End: 2024-08-06 | Stop reason: HOSPADM

## 2024-08-06 RX ORDER — METHYLPREDNISOLONE SODIUM SUCCINATE 125 MG/2ML
125 INJECTION, POWDER, LYOPHILIZED, FOR SOLUTION INTRAMUSCULAR; INTRAVENOUS
Status: DISCONTINUED | OUTPATIENT
Start: 2024-08-06 | End: 2024-08-06 | Stop reason: HOSPADM

## 2024-08-06 RX ORDER — MEPERIDINE HYDROCHLORIDE 50 MG/ML
25 INJECTION INTRAMUSCULAR; INTRAVENOUS; SUBCUTANEOUS EVERY 30 MIN PRN
Status: DISCONTINUED | OUTPATIENT
Start: 2024-08-06 | End: 2024-08-06 | Stop reason: HOSPADM

## 2024-08-06 RX ORDER — DIPHENHYDRAMINE HYDROCHLORIDE 50 MG/ML
50 INJECTION INTRAMUSCULAR; INTRAVENOUS
Status: DISCONTINUED | OUTPATIENT
Start: 2024-08-06 | End: 2024-08-06 | Stop reason: HOSPADM

## 2024-08-06 RX ADMIN — ETOPOSIDE 155 MG: 20 INJECTION, SOLUTION INTRAVENOUS at 09:33

## 2024-08-06 RX ADMIN — DEXAMETHASONE SODIUM PHOSPHATE 8 MG: 10 INJECTION, SOLUTION INTRAMUSCULAR; INTRAVENOUS at 08:53

## 2024-08-06 RX ADMIN — HEPARIN 500 UNITS: 100 SYRINGE at 10:39

## 2024-08-06 RX ADMIN — SODIUM CHLORIDE 250 ML: 9 INJECTION, SOLUTION INTRAVENOUS at 08:24

## 2024-08-06 NOTE — PROGRESS NOTES
[facility]: Cancer Care                                                                                              Situation: Patient chart reviewed by care coordinator.        Plan/Recommendations: Patient saw Dr. Barreto in clinic on 8/5.  Follow-up: Patient is to return to the clinic in 3 weeks for provider visit, labs, chemoinfusion.  Patient is due for her day 1 cycle 6 on 8/26, but will most likely have a lab and provider appointment on 8/23 due to provider availability.    Signature:  Carolin Biswas RN

## 2024-08-06 NOTE — PROGRESS NOTES
Infusion Nursing Note:  Louise Corado presents today for Chemo D2C5.    Patient seen by provider today: No   present during visit today: Not Applicable.    Note: Tolerated treatment well today, offers no complaints.  Discharge to home with sister.      Intravenous Access:  Implanted Port.    Treatment Conditions:  Results reviewed, labs MET treatment parameters, ok to proceed with treatment.      Post Infusion Assessment:  Patient tolerated infusion without incident.  Blood return noted pre and post infusion.  Site patent and intact, free from redness, edema or discomfort.  No evidence of extravasations.  Access discontinued per protocol.       Discharge Plan:   Patient and/or family verbalized understanding of discharge instructions and all questions answered.      Roslyn Echavarria, RN I

## 2024-08-06 NOTE — PROGRESS NOTES
[facility]: Cancer Care                                                                                          Met with patient and her sister to discuss paperwork.  Per Louise, she is planning to go on a medical FCI from her position and she needs Dr. Barreto and the care team fill out and complete  medical documentation requirements for her medical FCI.  Louise and her sister thoroughly explained what medical documentation requirements will be for her medical FCI paperwork and stated that once it has been filled out and signed by Dr. Barreto, we need to give the paperwork to Louise in a sealed envelope since she will be the one to submit her medical FCI application to her employer.    Signature:  Carolin Biswas RN

## 2024-08-07 ENCOUNTER — INFUSION THERAPY VISIT (OUTPATIENT)
Dept: INFUSION THERAPY | Facility: HOSPITAL | Age: 59
End: 2024-08-07
Attending: INTERNAL MEDICINE
Payer: COMMERCIAL

## 2024-08-07 VITALS
RESPIRATION RATE: 16 BRPM | DIASTOLIC BLOOD PRESSURE: 71 MMHG | OXYGEN SATURATION: 96 % | HEART RATE: 71 BPM | TEMPERATURE: 97.9 F | SYSTOLIC BLOOD PRESSURE: 120 MMHG

## 2024-08-07 DIAGNOSIS — C7A.8 NEUROENDOCRINE CARCINOMA METASTATIC TO LIVER (H): Primary | ICD-10-CM

## 2024-08-07 DIAGNOSIS — C7B.8 NEUROENDOCRINE CARCINOMA METASTATIC TO LIVER (H): Primary | ICD-10-CM

## 2024-08-07 PROCEDURE — 96367 TX/PROPH/DG ADDL SEQ IV INF: CPT

## 2024-08-07 PROCEDURE — 96413 CHEMO IV INFUSION 1 HR: CPT

## 2024-08-07 PROCEDURE — 250N000011 HC RX IP 250 OP 636: Performed by: INTERNAL MEDICINE

## 2024-08-07 PROCEDURE — 258N000003 HC RX IP 258 OP 636: Performed by: INTERNAL MEDICINE

## 2024-08-07 RX ORDER — EPINEPHRINE 1 MG/ML
0.3 INJECTION, SOLUTION INTRAMUSCULAR; SUBCUTANEOUS EVERY 5 MIN PRN
Status: DISCONTINUED | OUTPATIENT
Start: 2024-08-07 | End: 2024-08-07 | Stop reason: HOSPADM

## 2024-08-07 RX ORDER — METHYLPREDNISOLONE SODIUM SUCCINATE 125 MG/2ML
125 INJECTION, POWDER, LYOPHILIZED, FOR SOLUTION INTRAMUSCULAR; INTRAVENOUS
Status: DISCONTINUED | OUTPATIENT
Start: 2024-08-07 | End: 2024-08-07 | Stop reason: HOSPADM

## 2024-08-07 RX ORDER — HEPARIN SODIUM (PORCINE) LOCK FLUSH IV SOLN 100 UNIT/ML 100 UNIT/ML
5 SOLUTION INTRAVENOUS
Status: DISCONTINUED | OUTPATIENT
Start: 2024-08-07 | End: 2024-08-07 | Stop reason: HOSPADM

## 2024-08-07 RX ORDER — ALBUTEROL SULFATE 0.83 MG/ML
2.5 SOLUTION RESPIRATORY (INHALATION)
Status: DISCONTINUED | OUTPATIENT
Start: 2024-08-07 | End: 2024-08-07 | Stop reason: HOSPADM

## 2024-08-07 RX ORDER — ALBUTEROL SULFATE 90 UG/1
1-2 AEROSOL, METERED RESPIRATORY (INHALATION)
Status: DISCONTINUED | OUTPATIENT
Start: 2024-08-07 | End: 2024-08-07 | Stop reason: HOSPADM

## 2024-08-07 RX ORDER — DIPHENHYDRAMINE HYDROCHLORIDE 50 MG/ML
50 INJECTION INTRAMUSCULAR; INTRAVENOUS
Status: DISCONTINUED | OUTPATIENT
Start: 2024-08-07 | End: 2024-08-07 | Stop reason: HOSPADM

## 2024-08-07 RX ORDER — MEPERIDINE HYDROCHLORIDE 50 MG/ML
25 INJECTION INTRAMUSCULAR; INTRAVENOUS; SUBCUTANEOUS EVERY 30 MIN PRN
Status: DISCONTINUED | OUTPATIENT
Start: 2024-08-07 | End: 2024-08-07 | Stop reason: HOSPADM

## 2024-08-07 RX ADMIN — HEPARIN 5 ML: 100 SYRINGE at 10:38

## 2024-08-07 RX ADMIN — ETOPOSIDE 155 MG: 20 INJECTION, SOLUTION INTRAVENOUS at 09:34

## 2024-08-07 RX ADMIN — SODIUM CHLORIDE 250 ML: 9 INJECTION, SOLUTION INTRAVENOUS at 08:30

## 2024-08-07 RX ADMIN — DEXAMETHASONE SODIUM PHOSPHATE 8 MG: 10 INJECTION, SOLUTION INTRAMUSCULAR; INTRAVENOUS at 08:43

## 2024-08-07 NOTE — PROGRESS NOTES
Infusion Nursing Note:  Louise Corado presents today for D3C5 Etoposide.    Patient seen by provider today: No   present during visit today: Not Applicable.    Note: pt given dexamethasone 8mg iv as pre med. Pt continues to have nausea, she says ativan has helped. Pt states her intake is much decreased. I gave pt Ensure complete samples and coupons and educated about importance of keeping her protein up. .      Intravenous Access:  Implanted Port.    Treatment Conditions:  Lab Results   Component Value Date    HGB 8.9 (L) 08/05/2024    WBC 2.1 (L) 08/05/2024    ANEU 1.9 06/24/2024    ANEUTAUTO 1.0 (L) 08/05/2024     08/05/2024        Lab Results   Component Value Date     08/05/2024    POTASSIUM 4.4 08/05/2024    MAG 2.0 06/12/2024    CR 0.65 08/05/2024    ANGELA 8.6 (L) 08/05/2024    BILITOTAL 0.3 08/05/2024    ALBUMIN 3.6 08/05/2024    ALT 6 08/05/2024    AST 13 08/05/2024       Results reviewed, labs MET treatment parameters, ok to proceed with treatment.      Post Infusion Assessment:  Patient tolerated infusion without incident.  Blood return noted pre and post infusion.  Site patent and intact, free from redness, edema or discomfort.  Access discontinued per protocol.       Discharge Plan:   Patient and/or family verbalized understanding of discharge instructions and all questions answered.      Kristen Keane RN

## 2024-08-12 ENCOUNTER — PATIENT OUTREACH (OUTPATIENT)
Dept: ONCOLOGY | Facility: HOSPITAL | Age: 59
End: 2024-08-12
Payer: COMMERCIAL

## 2024-08-12 ENCOUNTER — MYC REFILL (OUTPATIENT)
Dept: ONCOLOGY | Facility: HOSPITAL | Age: 59
End: 2024-08-12
Payer: COMMERCIAL

## 2024-08-12 DIAGNOSIS — R07.1 PAINFUL RESPIRATION: ICD-10-CM

## 2024-08-12 RX ORDER — OXYCODONE HYDROCHLORIDE 5 MG/1
5-10 TABLET ORAL EVERY 4 HOURS PRN
Qty: 60 TABLET | Refills: 0 | Status: SHIPPED | OUTPATIENT
Start: 2024-08-12 | End: 2024-08-22

## 2024-08-12 NOTE — PROGRESS NOTES
[facility]: Cancer Care                                                                                            Call from Louise on nurse triage line. Louise asks for Dr. Estevan Coe' RNCC, to give her a call back she has questions about a physicians statement and medication. Message sent to Carolin Biswas, RNCC,  to follow up with patient as requested.     Signature:  Cris Sandoval RN

## 2024-08-12 NOTE — PROGRESS NOTES
[facility]: Cancer Care                                                                                          Sent a [employer] message:    Ozzy Gil,    Hope you're doing well. I have the letter signed by Dr. Barreto and ready for you to . I will make a copy as well for you since you said you needed a copy. For your medical records, since they were asking for a large amount of records, I had to send a request to our medical records department to have them mail you all of your medical records that they need. I did put a due date of Friday August 16th to give them enough time to gather all of the documents and mail them to you. I also put in the request that you will need two copies of each document in your medical records.     Is there a good time today for you or your sister to  the letter? I think it will be easier to give everything to you and have you put it in a sealed envelope since our medical records department will be mailing you your records.     If it was just a few documents that they needed, I would've been able to print it myself, but since it is a huge amount that they are requesting, I had to send a request to medical records per our policy. Thank you for understanding and being flexible. Look forward to hearing from you.     Signature:  Carolin Biswas RN

## 2024-08-12 NOTE — TELEPHONE ENCOUNTER
Oxycodone  Directions: Take 1-2 tablets (5-10 mg) by mouth every 4 hours as needed for pain   sent from E Prescribing interface     Last Written Prescription Date:  8/2/24, filled by Brenda Pearson CNP  Last Fill Quantity: 60,  # refills: 0   Last office visit provider:  8/5/24 with Dr. Barreto  Next visit scheduled on 8/23/24        Cris Sandoval RN   08/12/24 8:54 AM

## 2024-08-12 NOTE — PROGRESS NOTES
[facility]: Cancer Care                                                                                          Called patient to check in on her after the triage RN receive a voicemail from patient requesting me to call her as soon as possible. Upon calling the patient, I explained to her my [employer] message that I sent to her regarding sending a request to our medical records department and then the completion of the physician letter signed by Dr. Barreto. Patient said either her or her sister could come today 8/12 to  the letter. Two copies will be provided to the patient. Another copy was sent to medical records to upload it to her chart. Patient then stated that she has been having a lot of nausea despite taking new anti-nausea medications, still dealing with pain despite being maxed out on oxycodone, and then being hesitant to take citalopram due to it containing serotonin and her being on octreotide. I stated I would talk to Dr. Barreto about her [employer] message and phone conversation and then follow up with her. Patient verbalized understanding.     Signature:  Carolin Biswas RN

## 2024-08-12 NOTE — PROGRESS NOTES
[facility]: Cancer Care                                                                                          Called Louise to let her know that per Dr. Barreto she is to increase her oxycodone to 15 to 20 mg every 4 hours as needed and to increase her MS Contin to 30 mg every 12 hours.  Patient confirmed that she was taking 0.5 mg Ativan and that made her way too sleepy so she refuses to go up to 1 mg of Ativan.  Patient stated that her pain is more of a concern than her nausea.  I stated that Dr. Barreto reduced the dose of her next carboplatin on 8/26.  I also called to let her know that her and her sister could  her medical halfway provider letter. Patient verbalized understanding.    I met with patient's sister in the lobby to give her the original letter and a copy for the patient for her medical halfway application.    Signature:  Carolin Biswas RN

## 2024-08-13 ENCOUNTER — PATIENT OUTREACH (OUTPATIENT)
Dept: ONCOLOGY | Facility: HOSPITAL | Age: 59
End: 2024-08-13
Payer: COMMERCIAL

## 2024-08-13 NOTE — PROGRESS NOTES
[facility]: Cancer Care                                                                                          Called patient because she was wanting clarity on her pain medications.  I reiterated that per Dr. Barreto, the patient is to increase her oxycodone dose to 15 to 20 mg every 4 hours as needed, and to increase her MS Contin to 30 mg every 12 hours.  I explained to her that in the new refill prescription that will be sent once she needs a new refill, it will reflect the new doses.  Patient and sister asked what stage of cancer she was in for her Social Security disability paperwork.  Patient also wanted to see if she could move her follow-up appointment with Dr. Barreto on 8/23 to an earlier time that day.  I stated that unfortunately there is no other availability except for that time slot.  Patient verbalized understanding.  I stated that I would asked Dr. Barreto about what stage she is and and follow-up with her.    Signature:  Carolin Biwsas RN

## 2024-08-14 ENCOUNTER — PATIENT OUTREACH (OUTPATIENT)
Dept: ONCOLOGY | Facility: HOSPITAL | Age: 59
End: 2024-08-14
Payer: COMMERCIAL

## 2024-08-14 NOTE — PROGRESS NOTES
[facility]: Cancer Care                                                                                          Tried to call the medical records request department to inquire on the status of the patient's medical record request that I sent to HIM on 8/9/2024. Had to leave a call back number.     Signature:  Carolin Biswas RN

## 2024-08-14 NOTE — PROGRESS NOTES
[facility]: Cancer Care                                                                                          Called patient to let her know that per Dr. Barreto, the patient is in stage 4 cancer. Patient verbalized understanding.     Signature:  Carolin Biswas RN

## 2024-08-15 ENCOUNTER — INFUSION THERAPY VISIT (OUTPATIENT)
Dept: INFUSION THERAPY | Facility: HOSPITAL | Age: 59
End: 2024-08-15
Attending: INTERNAL MEDICINE
Payer: COMMERCIAL

## 2024-08-15 ENCOUNTER — PATIENT OUTREACH (OUTPATIENT)
Dept: ONCOLOGY | Facility: HOSPITAL | Age: 59
End: 2024-08-15

## 2024-08-15 VITALS
DIASTOLIC BLOOD PRESSURE: 53 MMHG | TEMPERATURE: 98.1 F | RESPIRATION RATE: 18 BRPM | HEART RATE: 93 BPM | SYSTOLIC BLOOD PRESSURE: 111 MMHG | OXYGEN SATURATION: 92 %

## 2024-08-15 DIAGNOSIS — C7A.8 NEUROENDOCRINE CARCINOMA METASTATIC TO LIVER (H): ICD-10-CM

## 2024-08-15 DIAGNOSIS — C7A.8 NEUROENDOCRINE CARCINOMA METASTATIC TO LIVER (H): Primary | ICD-10-CM

## 2024-08-15 DIAGNOSIS — C7B.8 NEUROENDOCRINE CARCINOMA METASTATIC TO LIVER (H): Primary | ICD-10-CM

## 2024-08-15 DIAGNOSIS — R39.9 UTI SYMPTOMS: Primary | ICD-10-CM

## 2024-08-15 DIAGNOSIS — C7B.8 NEUROENDOCRINE CARCINOMA METASTATIC TO LIVER (H): ICD-10-CM

## 2024-08-15 LAB
ALBUMIN SERPL BCG-MCNC: 4 G/DL (ref 3.5–5.2)
ALBUMIN UR-MCNC: 30 MG/DL
ALP SERPL-CCNC: 104 U/L (ref 40–150)
ALT SERPL W P-5'-P-CCNC: 12 U/L (ref 0–50)
ANION GAP SERPL CALCULATED.3IONS-SCNC: 11 MMOL/L (ref 7–15)
APPEARANCE UR: ABNORMAL
AST SERPL W P-5'-P-CCNC: 17 U/L (ref 0–45)
BACTERIA #/AREA URNS HPF: ABNORMAL /HPF
BASOPHILS # BLD AUTO: 0 10E3/UL (ref 0–0.2)
BASOPHILS NFR BLD AUTO: 1 %
BILIRUB SERPL-MCNC: 0.4 MG/DL
BILIRUB UR QL STRIP: ABNORMAL
BUN SERPL-MCNC: 9 MG/DL (ref 6–20)
CALCIUM SERPL-MCNC: 9.3 MG/DL (ref 8.8–10.4)
CHLORIDE SERPL-SCNC: 97 MMOL/L (ref 98–107)
COLOR UR AUTO: YELLOW
CREAT SERPL-MCNC: 0.58 MG/DL (ref 0.51–0.95)
EGFRCR SERPLBLD CKD-EPI 2021: >90 ML/MIN/1.73M2
EOSINOPHIL # BLD AUTO: 0 10E3/UL (ref 0–0.7)
EOSINOPHIL NFR BLD AUTO: 0 %
ERYTHROCYTE [DISTWIDTH] IN BLOOD BY AUTOMATED COUNT: 19.1 % (ref 10–15)
GLUCOSE SERPL-MCNC: 191 MG/DL (ref 70–99)
GLUCOSE UR STRIP-MCNC: NEGATIVE MG/DL
HCO3 SERPL-SCNC: 29 MMOL/L (ref 22–29)
HCT VFR BLD AUTO: 26.4 % (ref 35–47)
HGB BLD-MCNC: 8.4 G/DL (ref 11.7–15.7)
HGB UR QL STRIP: ABNORMAL
IMM GRANULOCYTES # BLD: 0 10E3/UL
IMM GRANULOCYTES NFR BLD: 0 %
KETONES UR STRIP-MCNC: NEGATIVE MG/DL
LEUKOCYTE ESTERASE UR QL STRIP: ABNORMAL
LYMPHOCYTES # BLD AUTO: 0.7 10E3/UL (ref 0.8–5.3)
LYMPHOCYTES NFR BLD AUTO: 39 %
MCH RBC QN AUTO: 26.7 PG (ref 26.5–33)
MCHC RBC AUTO-ENTMCNC: 31.8 G/DL (ref 31.5–36.5)
MCV RBC AUTO: 84 FL (ref 78–100)
MONOCYTES # BLD AUTO: 0.1 10E3/UL (ref 0–1.3)
MONOCYTES NFR BLD AUTO: 7 %
MUCOUS THREADS #/AREA URNS LPF: PRESENT /LPF
NEUTROPHILS # BLD AUTO: 0.9 10E3/UL (ref 1.6–8.3)
NEUTROPHILS NFR BLD AUTO: 54 %
NITRATE UR QL: NEGATIVE
NRBC # BLD AUTO: 0 10E3/UL
NRBC BLD AUTO-RTO: 0 /100
PH UR STRIP: 6 [PH] (ref 5–7)
PLATELET # BLD AUTO: 103 10E3/UL (ref 150–450)
POTASSIUM SERPL-SCNC: 4 MMOL/L (ref 3.4–5.3)
PROT SERPL-MCNC: 7.2 G/DL (ref 6.4–8.3)
RBC # BLD AUTO: 3.15 10E6/UL (ref 3.8–5.2)
RBC URINE: 21 /HPF
SODIUM SERPL-SCNC: 137 MMOL/L (ref 135–145)
SP GR UR STRIP: 1.03 (ref 1–1.03)
SQUAMOUS EPITHELIAL: 5 /HPF
UROBILINOGEN UR STRIP-MCNC: <2 MG/DL
WBC # BLD AUTO: 1.7 10E3/UL (ref 4–11)
WBC URINE: 108 /HPF

## 2024-08-15 PROCEDURE — 85041 AUTOMATED RBC COUNT: CPT

## 2024-08-15 PROCEDURE — 87086 URINE CULTURE/COLONY COUNT: CPT

## 2024-08-15 PROCEDURE — 36415 COLL VENOUS BLD VENIPUNCTURE: CPT

## 2024-08-15 PROCEDURE — 84155 ASSAY OF PROTEIN SERUM: CPT

## 2024-08-15 PROCEDURE — 81001 URINALYSIS AUTO W/SCOPE: CPT

## 2024-08-15 PROCEDURE — 250N000011 HC RX IP 250 OP 636: Performed by: INTERNAL MEDICINE

## 2024-08-15 PROCEDURE — 96372 THER/PROPH/DIAG INJ SC/IM: CPT | Performed by: INTERNAL MEDICINE

## 2024-08-15 PROCEDURE — 82374 ASSAY BLOOD CARBON DIOXIDE: CPT

## 2024-08-15 RX ADMIN — OCTREOTIDE ACETATE 30 MG: KIT at 15:18

## 2024-08-15 NOTE — PROGRESS NOTES
[facility]: Cancer Care                                                                                          Called patient again but it went to voicemail. Tried to leave a detailed message about needing to sign a new KELLIE and my colleagues being aware of the urgent situation. In the voice message I sent, I apologized for the inconvenience and stated that we would put STAT on the medical records request form (per the instruction of the medical records staff member from [facility] that I spoke to on the phone earlier). I believe the voice message cut midway through without me knowing, so the patient may not have received the entire voice message. I will let my colleagues know and have them potentially call the patient tomorrow 8/16/24.     Signature:  Carolin Biswas RN

## 2024-08-15 NOTE — PROGRESS NOTES
[facility]: Cancer Care                                                                                          Called patient but she had asked me to call her later due to her getting her octreotide injection and not feeling well.    Signature:  Carolin Biswas RN   tried cannabis to alleviate symptoms

## 2024-08-16 ENCOUNTER — DOCUMENTATION ONLY (OUTPATIENT)
Dept: ONCOLOGY | Facility: HOSPITAL | Age: 59
End: 2024-08-16
Payer: COMMERCIAL

## 2024-08-16 LAB — BACTERIA UR CULT: NORMAL

## 2024-08-16 NOTE — PROGRESS NOTES
[facility]: Cancer Care                                                                                          Patient comes into clinic this afternoon to sign a new KELLIE.  She is in need of 3 copies of her personal medical records.  Medical records request faxed to HIM, [phone].  STAT was written on the cover sheet.      Signature:  Shannon Barbosa RN Care Coordinator  [facility]  Cancer [facility]

## 2024-08-20 ENCOUNTER — LAB (OUTPATIENT)
Dept: INFUSION THERAPY | Facility: HOSPITAL | Age: 59
End: 2024-08-20
Attending: STUDENT IN AN ORGANIZED HEALTH CARE EDUCATION/TRAINING PROGRAM
Payer: COMMERCIAL

## 2024-08-20 ENCOUNTER — OFFICE VISIT (OUTPATIENT)
Dept: SURGERY | Facility: HOSPITAL | Age: 59
End: 2024-08-20
Payer: COMMERCIAL

## 2024-08-20 VITALS
OXYGEN SATURATION: 96 % | SYSTOLIC BLOOD PRESSURE: 124 MMHG | BODY MASS INDEX: 33.4 KG/M2 | DIASTOLIC BLOOD PRESSURE: 59 MMHG | RESPIRATION RATE: 18 BRPM | HEART RATE: 97 BPM | HEIGHT: 62 IN | WEIGHT: 181.5 LBS

## 2024-08-20 DIAGNOSIS — R31.29 MICROSCOPIC HEMATURIA: ICD-10-CM

## 2024-08-20 DIAGNOSIS — R31.29 MICROSCOPIC HEMATURIA: Primary | ICD-10-CM

## 2024-08-20 DIAGNOSIS — R30.0 DYSURIA: ICD-10-CM

## 2024-08-20 PROCEDURE — 88112 CYTOPATH CELL ENHANCE TECH: CPT | Mod: TC

## 2024-08-20 PROCEDURE — 88112 CYTOPATH CELL ENHANCE TECH: CPT | Mod: 26 | Performed by: PATHOLOGY

## 2024-08-20 PROCEDURE — 99213 OFFICE O/P EST LOW 20 MIN: CPT | Performed by: STUDENT IN AN ORGANIZED HEALTH CARE EDUCATION/TRAINING PROGRAM

## 2024-08-20 PROCEDURE — 88120 CYTP URNE 3-5 PROBES EA SPEC: CPT | Mod: 26 | Performed by: PATHOLOGY

## 2024-08-20 PROCEDURE — 88120 CYTP URNE 3-5 PROBES EA SPEC: CPT | Mod: TC

## 2024-08-20 ASSESSMENT — PAIN SCALES - GENERAL: PAINLEVEL: MILD PAIN (3)

## 2024-08-20 NOTE — PATIENT INSTRUCTIONS
Continue AZO  In view of constipation does not want to start anticholinergics  Plan for a cystoscopy

## 2024-08-20 NOTE — LETTER
8/20/2024      Louise Corado  [address] [place] [address]      Dear Colleague,    Thank you for referring your patient, Louise Corado, to the [facility]. Please see a copy of my visit note below.          Chief Complaint:    dysuria  Microscopic hematuria           Consult or Referral:     Mr. Louise Corado is a 59 year old female seen at the request of  No ref. provider found.         History of Present Illness:     Louise Corado is a 59 year old female being seen for dysuria and microscopic hematuria.  Duration of problem: Few months  Previous treatments: Treated for UTI without significant benefit   accompanied by her sister  Reviewed previous notes from Dr. Barreto    Louise is currently being treated for metastatic neuroendocrine carcinoma  She is currently undergoing chemotherapy andImaging is suggestive of peritoneal carcinomatosis  She complains of frequency and dysuria  Some of it has been largely reduced by taking Azo but she has been treated for possible urinary tract infection without significant benefit in her symptoms twice already  Her urine evaluation has shown microscopic hematuria with culture showing normal urogenital lala             Past Medical History:   No past medical history on file.         Past Surgical History:     Past Surgical History:   Procedure Laterality Date     IR CHEST PORT PLACEMENT > 5 YRS OF AGE  4/23/2024            Medications     Current Outpatient Medications   Medication Sig Dispense Refill     acetaminophen (TYLENOL) 325 MG tablet Take 325-650 mg by mouth every 6 hours as needed for mild pain       DOCUSATE SODIUM PO Take 1 capsule by mouth 3 times daily as needed for constipation       lidocaine-prilocaine (EMLA) 2.5-2.5 % external cream Apply topically as needed for moderate pain 30 g 1     LORazepam (ATIVAN) 0.5 MG tablet Take 1-2 tablets (0.5-1 mg) by mouth every 6 hours as needed for anxiety or sleep 30 tablet 1      metFORMIN (GLUCOPHAGE XR) 500 MG 24 hr tablet Take 1 tablet (500 mg) by mouth daily (with dinner) 90 tablet 0     morphine (MS CONTIN) 15 MG CR tablet Take 1 tablet (15 mg) by mouth every 12 hours maximum 2 tablet(s) per day 60 tablet 0     naloxone (NARCAN) 4 MG/0.1ML nasal spray Spray 1 spray (4 mg) into one nostril alternating nostrils as needed for opioid reversal every 2-3 minutes until assistance arrives 2 each 0     ondansetron (ZOFRAN) 8 MG tablet Take 1 tablet (8 mg) by mouth every 6 hours as needed for nausea (vomiting) 30 tablet 2     oxyCODONE (ROXICODONE) 5 MG tablet Take 1-2 tablets (5-10 mg) by mouth every 4 hours as needed for pain 60 tablet 0     prochlorperazine (COMPAZINE) 10 MG tablet Take 1 tablet (10 mg) by mouth every 6 hours as needed for nausea or vomiting 30 tablet 1     cefdinir (OMNICEF) 300 MG capsule Take 1 capsule (300 mg) by mouth 2 times daily (Patient not taking: Reported on 8/20/2024) 20 capsule 0     dexAMETHasone (DECADRON) 4 MG tablet Take 1 tablet (4 mg) by mouth daily (with breakfast) (Patient not taking: Reported on 8/20/2024) 10 tablet 3     Current Facility-Administered Medications   Medication Dose Route Frequency Provider Last Rate Last Admin     sodium chloride (PF) 0.9% PF flush 10-20 mL  10-20 mL Intracatheter q1 min prn Man Barreto MD         sodium chloride (PF) 0.9% PF flush 10-20 mL  10-20 mL Intracatheter q1 min prn Man Barreto MD   10 mL at 08/07/24 1038            Family History:     Family History   Problem Relation Age of Onset     Breast Cancer Mother 70.00     Breast Cancer Sister 53.00            Social History:     Social History     Socioeconomic History     Marital status:      Spouse name: Not on file     Number of children: Not on file     Years of education: Not on file     Highest education level: Not on file   Occupational History     Not on file   Tobacco Use     Smoking status: Never     Passive exposure: Never     Smokeless  tobacco: Never   Vaping Use     Vaping status: Never Used   Substance and Sexual Activity     Alcohol use: Not on file     Drug use: Not on file     Sexual activity: Not on file   Other Topics Concern     Not on file   Social History Narrative     Not on file     Social Determinants of Health     Financial Resource Strain: Low Risk  (4/30/2024)    Financial Resource Strain      Within the past 12 months, have you or your family members you live with been unable to get utilities (heat, electricity) when it was really needed?: No   Food Insecurity: Low Risk  (4/30/2024)    Food Insecurity      Within the past 12 months, did you worry that your food would run out before you got money to buy more?: No      Within the past 12 months, did the food you bought just not last and you didn t have money to get more?: No   Transportation Needs: Low Risk  (4/30/2024)    Transportation Needs      Within the past 12 months, has lack of transportation kept you from medical appointments, getting your medicines, non-medical meetings or appointments, work, or from getting things that you need?: No   Physical Activity: Not on file   Stress: Not on file   Social Connections: Not on file   Interpersonal Safety: Low Risk  (4/30/2024)    Interpersonal Safety      Do you feel physically and emotionally safe where you currently live?: Yes      Within the past 12 months, have you been hit, slapped, kicked or otherwise physically hurt by someone?: No      Within the past 12 months, have you been humiliated or emotionally abused in other ways by your partner or ex-partner?: No   Housing Stability: Low Risk  (4/30/2024)    Housing Stability      Do you have housing? : Yes      Are you worried about losing your housing?: No            Allergies:   Patient has no known allergies.         Review of Systems:  From intake questionnaire     Skin: negative  Eyes: negative  Ears/Nose/Throat: negative  Respiratory: No shortness of breath, dyspnea on  "exertion, cough, or hemoptysis  Cardiovascular: No chest pain or palpitations  Gastrointestinal: negative; no nausea/vomiting, constipation or diarrhea  Genitourinary: as per HPI  Musculoskeletal: negative  Neurologic: negative  Psychiatric: negative  Hematologic/Lymphatic/Immunologic: negative  Endocrine: negative         Physical Exam:     Patient is a 59 year old  female   Vitals: Blood pressure 124/59, pulse 97, resp. rate 18, height 1.575 m (5' 2\"), weight 82.3 kg (181 lb 8 oz), SpO2 96%.  Constitutional: Body mass index is 33.2 kg/m .  Alert, no acute distress, oriented, conversant  Eyes: no scleral icterus; extraocular muscles intact, moist conjunctivae  Neck: trachea midline, no thyromegaly  Ears/nose/mouth: throat/mouth:normal, good dentition  Respiratory: no respiratory distress, or pursed lip breathing  Cardiovascular: pulses strong and intact; no obvious jugular venous distension present  Gastrointestinal: soft, nontender, no organomegaly or masses,   Lymphatics: No inguinal adenopathy  Musculoskeletal: extremities normal, no peripheral edema  Skin: no suspicious lesions or rashes  Neuro: Alert, oriented, speech and mentation normal  Psych: affect and mood normal, alert and oriented to person, place and time  Gait: Normal  : deferred      Labs and Pathology:    The following labs were reviewed by me and discussed with the patient:  UA: Abnormal:   Component      Latest Ref Rng 7/25/2024  10:16 AM 8/15/2024  2:53 PM   Color Urine      Colorless, Straw, Light Yellow, Yellow  Yellow  Yellow    Appearance Urine      Clear  Turbid !  Turbid !    Glucose Urine      Negative mg/dL Negative  Negative    Bilirubin Urine      Negative  Negative  0.5 mg/dL !    Ketones Urine      Negative mg/dL Negative  Negative    Specific Gravity Urine      1.001 - 1.030  1.029  1.028    Blood Urine      Negative  0.5 mg/dL !  0.1 mg/dL !    pH Urine      5.0 - 7.0  6.0  6.0    Protein Albumin Urine      Negative mg/dL 30 !  " 30 !    Urobilinogen mg/dL      <2.0 mg/dL 2.0 !  <2.0    Nitrite Urine      Negative  Negative  Negative    Leukocyte Esterase Urine      Negative  500 Gali/uL !  500 Gali/uL !    Bacteria Urine      None Seen /HPF Few !  Few !    Mucus Urine      None Seen /LPF Present !  Present !    RBC Urine      <=2 / (H)  21 (H)    WBC Urine      <=5 /HPF 95 (H)  108 (H)    Squamous Epithelial /HPF Urine      <=1 /HPF 5 (H)  5 (H)       Legend:  ! Abnormal  (H) High  Significant for   Lab Results   Component Value Date    CR 0.58 08/15/2024    CR 0.65 08/05/2024    CR 0.61 07/25/2024    CR 0.68 07/15/2024    CR 0.67 06/24/2024    CR 0.62 06/12/2024    CR 0.70 06/03/2024    CR 0.60 05/13/2024    CR 0.68 04/11/2024    CR 0.77 05/02/2018       Culture <10,000 CFU/mL Mixture of Urogenital Jenifer           Resulting Agency: [facility]             Imaging:    The following imaging exams were independently viewed and interpreted by me and discussed with patient:  EXAM: PET ONCOLOGY WHOLE BODY  LOCATION: [facility]  DATE: 8/1/2024     INDICATION: Surgical treatment strategy for restaging poorly differentiated metastatic neuroendocrine carcinoma involving the liver   COMPARISON: PET/CT from 4/17/2024 and CT from 7/25/2024   CONTRAST: None   TECHNIQUE: Serum glucose level 125  mg/dL. One hour post intravenous administration of 11.5  mCi F-18 FDG, PET imaging was performed from the skull vertex to feet, utilizing attenuation correction with concurrent axial CT and PET/CT image fusion. Dose   reduction techniques were used.     PET/CT FINDINGS:  Scattered sclerotic skeletal lesions have decreased in FDG activity, for example in the manubrium (SUVmax 5.0 to 3.8. Stable markedly FDG avid (SUVmax 8.5, previously 8.9) circumferential wall thickening in the terminal ileum with   adjacent FDG avid right lower quadrant mesenteric lymphadenopathy. Persistent FDG avid soft tissue nodularity in the left upper quadrant  greater omentum. Uniform mild inflammatory FDG activity about the periphery of a partially loculated right pleural   effusion. Inflammatory FDG activity associated with injection sites in the right buttock. All other FDG activity is normal. Liver lesions and thoracic lymphadenopathy are no longer FDG avid in excess of background soft tissue levels.      CT FINDINGS: Areas of fat deposition in the wall of the ascending colon. Short segment intussusception near the hepatic flexure.  Passive atelectasis in the right lung. Right IJ Port-A-Cath tip in the right atrium. Hypoattenuation of blood pool relative   to myocardium, suggesting anemia. Duodenal diverticulum. Mild calcified atherosclerosis. No coronary artery calcifications.  Benign hyperostosis frontalis interna. Scattered non-FDG avid sclerotic skeletal lesions. Mild degenerative change in the spine.                                                                      IMPRESSION:     Partial response. Liver lesions and thoracic lymph node metastases are no longer FDG avid. Skeletal metastases have improved. Unchanged presumed primary tumor in the terminal ileum with adjacent mesenteric lymphadenopathy and peritoneal carcinomatosis.              Assessment and Plan:     Microscopic hematuria  Dysuria  I discussed with the Louise about the significant symptoms that she is experiencing and the role of medications including anticholinergics  We also discussed about further evaluating her for the microscopic hematuria with cytology and cystoscopy  Her imaging evaluation has been largely unremarkable for the upper urinary tract and the bladder was not completely evaluated on the PET scan or the prior CT scans as it was completely collapsed  I think it would be reasonable to do a cystoscopy to rule out any bladder lesions and also perform a urinary cytology and FISH test  She seems to be agreeable  She wants to do the cystoscopy a little out from her chemotherapy as it  appears that she often gets cytopenic after chemotherapy and concerns for infection are bothersome for her after cystoscopy  I think that is a reasonable option to wait a couple weeks at least after her next heme-onc to set up her for a cystoscopy  She can take dose of Ativan before the cystoscopy as she appears to be anxious about the procedure  She understands that she needs a  for the same  - Cytology, non-gynecologic; Future            Plan:  Schedule for cystoscopy cytology be sent    Orders  Orders Placed This Encounter   Procedures     Cytology, non-gynecologic       Marco A Nguyễn MD  [facility]      ==========================    Additional Billing and Coding Information:  Review of external notes as documented above   Review of the result(s) of each unique test - urinalysis, urine culture, PET/CT                15 minutes spent by me on the date of the encounter doing chart review, review of test results, interpretation of tests, patient visit, documentation, and discussion with family     ==========================      Again, thank you for allowing me to participate in the care of your patient.        Sincerely,        Marco A Nguyễn MD

## 2024-08-20 NOTE — NURSING NOTE
"Urology Rooming Note    August 20, 2024 12:56 PM   Louise Corado is a 59 year old female who presents for:    Chief Complaint   Patient presents with    New Patient     Urinary frequency and hematuria      Initial Vitals: /59   Pulse 97   Resp 18   Ht 1.575 m (5' 2\")   Wt 82.3 kg (181 lb 8 oz)   SpO2 96%   BMI 33.20 kg/m   Estimated body mass index is 33.2 kg/m  as calculated from the following:    Height as of this encounter: 1.575 m (5' 2\").    Weight as of this encounter: 82.3 kg (181 lb 8 oz). Body surface area is 1.9 meters squared.  Mild Pain (3) Comment: Data Unavailable     Allergies reviewed: Yes  Medications reviewed: Yes    Medications: Medication refills not needed today.  Pharmacy name entered into [facility]: [id number] - [place], [place] - [address] [facility] [address]   "

## 2024-08-20 NOTE — PROGRESS NOTES
Chief Complaint:    dysuria  Microscopic hematuria           Consult or Referral:     Mr. Louise Corado is a 59 year old female seen at the request of Dr. Shaikh ref. provider found.         History of Present Illness:     Louise Corado is a 59 year old female being seen for dysuria and microscopic hematuria.  Duration of problem: Few months  Previous treatments: Treated for UTI without significant benefit   accompanied by her sister  Reviewed previous notes from Dr. Barreto    Louise is currently being treated for metastatic neuroendocrine carcinoma  She is currently undergoing chemotherapy andImaging is suggestive of peritoneal carcinomatosis  She complains of frequency and dysuria  Some of it has been largely reduced by taking Azo but she has been treated for possible urinary tract infection without significant benefit in her symptoms twice already  Her urine evaluation has shown microscopic hematuria with culture showing normal urogenital lala             Past Medical History:   No past medical history on file.         Past Surgical History:     Past Surgical History:   Procedure Laterality Date    IR CHEST PORT PLACEMENT > 5 YRS OF AGE  4/23/2024            Medications     Current Outpatient Medications   Medication Sig Dispense Refill    acetaminophen (TYLENOL) 325 MG tablet Take 325-650 mg by mouth every 6 hours as needed for mild pain      DOCUSATE SODIUM PO Take 1 capsule by mouth 3 times daily as needed for constipation      lidocaine-prilocaine (EMLA) 2.5-2.5 % external cream Apply topically as needed for moderate pain 30 g 1    LORazepam (ATIVAN) 0.5 MG tablet Take 1-2 tablets (0.5-1 mg) by mouth every 6 hours as needed for anxiety or sleep 30 tablet 1    metFORMIN (GLUCOPHAGE XR) 500 MG 24 hr tablet Take 1 tablet (500 mg) by mouth daily (with dinner) 90 tablet 0    morphine (MS CONTIN) 15 MG CR tablet Take 1 tablet (15 mg) by mouth every 12 hours maximum 2 tablet(s) per day 60 tablet 0     naloxone (NARCAN) 4 MG/0.1ML nasal spray Spray 1 spray (4 mg) into one nostril alternating nostrils as needed for opioid reversal every 2-3 minutes until assistance arrives 2 each 0    ondansetron (ZOFRAN) 8 MG tablet Take 1 tablet (8 mg) by mouth every 6 hours as needed for nausea (vomiting) 30 tablet 2    oxyCODONE (ROXICODONE) 5 MG tablet Take 1-2 tablets (5-10 mg) by mouth every 4 hours as needed for pain 60 tablet 0    prochlorperazine (COMPAZINE) 10 MG tablet Take 1 tablet (10 mg) by mouth every 6 hours as needed for nausea or vomiting 30 tablet 1    cefdinir (OMNICEF) 300 MG capsule Take 1 capsule (300 mg) by mouth 2 times daily (Patient not taking: Reported on 8/20/2024) 20 capsule 0    dexAMETHasone (DECADRON) 4 MG tablet Take 1 tablet (4 mg) by mouth daily (with breakfast) (Patient not taking: Reported on 8/20/2024) 10 tablet 3     Current Facility-Administered Medications   Medication Dose Route Frequency Provider Last Rate Last Admin    sodium chloride (PF) 0.9% PF flush 10-20 mL  10-20 mL Intracatheter q1 min prn Man Barreto MD        sodium chloride (PF) 0.9% PF flush 10-20 mL  10-20 mL Intracatheter q1 min prn Man Barreto MD   10 mL at 08/07/24 1038            Family History:     Family History   Problem Relation Age of Onset    Breast Cancer Mother 70.00    Breast Cancer Sister 53.00            Social History:     Social History     Socioeconomic History    Marital status:      Spouse name: Not on file    Number of children: Not on file    Years of education: Not on file    Highest education level: Not on file   Occupational History    Not on file   Tobacco Use    Smoking status: Never     Passive exposure: Never    Smokeless tobacco: Never   Vaping Use    Vaping status: Never Used   Substance and Sexual Activity    Alcohol use: Not on file    Drug use: Not on file    Sexual activity: Not on file   Other Topics Concern    Not on file   Social History Narrative    Not on file      Social Determinants of Health     Financial Resource Strain: Low Risk  (4/30/2024)    Financial Resource Strain     Within the past 12 months, have you or your family members you live with been unable to get utilities (heat, electricity) when it was really needed?: No   Food Insecurity: Low Risk  (4/30/2024)    Food Insecurity     Within the past 12 months, did you worry that your food would run out before you got money to buy more?: No     Within the past 12 months, did the food you bought just not last and you didn t have money to get more?: No   Transportation Needs: Low Risk  (4/30/2024)    Transportation Needs     Within the past 12 months, has lack of transportation kept you from medical appointments, getting your medicines, non-medical meetings or appointments, work, or from getting things that you need?: No   Physical Activity: Not on file   Stress: Not on file   Social Connections: Not on file   Interpersonal Safety: Low Risk  (4/30/2024)    Interpersonal Safety     Do you feel physically and emotionally safe where you currently live?: Yes     Within the past 12 months, have you been hit, slapped, kicked or otherwise physically hurt by someone?: No     Within the past 12 months, have you been humiliated or emotionally abused in other ways by your partner or ex-partner?: No   Housing Stability: Low Risk  (4/30/2024)    Housing Stability     Do you have housing? : Yes     Are you worried about losing your housing?: No            Allergies:   Patient has no known allergies.         Review of Systems:  From intake questionnaire     Skin: negative  Eyes: negative  Ears/Nose/Throat: negative  Respiratory: No shortness of breath, dyspnea on exertion, cough, or hemoptysis  Cardiovascular: No chest pain or palpitations  Gastrointestinal: negative; no nausea/vomiting, constipation or diarrhea  Genitourinary: as per HPI  Musculoskeletal: negative  Neurologic: negative  Psychiatric:  "negative  Hematologic/Lymphatic/Immunologic: negative  Endocrine: negative         Physical Exam:     Patient is a 59 year old  female   Vitals: Blood pressure 124/59, pulse 97, resp. rate 18, height 1.575 m (5' 2\"), weight 82.3 kg (181 lb 8 oz), SpO2 96%.  Constitutional: Body mass index is 33.2 kg/m .  Alert, no acute distress, oriented, conversant  Eyes: no scleral icterus; extraocular muscles intact, moist conjunctivae  Neck: trachea midline, no thyromegaly  Ears/nose/mouth: throat/mouth:normal, good dentition  Respiratory: no respiratory distress, or pursed lip breathing  Cardiovascular: pulses strong and intact; no obvious jugular venous distension present  Gastrointestinal: soft, nontender, no organomegaly or masses,   Lymphatics: No inguinal adenopathy  Musculoskeletal: extremities normal, no peripheral edema  Skin: no suspicious lesions or rashes  Neuro: Alert, oriented, speech and mentation normal  Psych: affect and mood normal, alert and oriented to person, place and time  Gait: Normal  : deferred      Labs and Pathology:    The following labs were reviewed by me and discussed with the patient:  UA: Abnormal:   Component      Latest Ref Rng 7/25/2024  10:16 AM 8/15/2024  2:53 PM   Color Urine      Colorless, Straw, Light Yellow, Yellow  Yellow  Yellow    Appearance Urine      Clear  Turbid !  Turbid !    Glucose Urine      Negative mg/dL Negative  Negative    Bilirubin Urine      Negative  Negative  0.5 mg/dL !    Ketones Urine      Negative mg/dL Negative  Negative    Specific Gravity Urine      1.001 - 1.030  1.029  1.028    Blood Urine      Negative  0.5 mg/dL !  0.1 mg/dL !    pH Urine      5.0 - 7.0  6.0  6.0    Protein Albumin Urine      Negative mg/dL 30 !  30 !    Urobilinogen mg/dL      <2.0 mg/dL 2.0 !  <2.0    Nitrite Urine      Negative  Negative  Negative    Leukocyte Esterase Urine      Negative  500 Gali/uL !  500 Gali/uL !    Bacteria Urine      None Seen /HPF Few !  Few !    Mucus Urine   "    None Seen /LPF Present !  Present !    RBC Urine      <=2 / (H)  21 (H)    WBC Urine      <=5 /HPF 95 (H)  108 (H)    Squamous Epithelial /HPF Urine      <=1 /HPF 5 (H)  5 (H)       Legend:  ! Abnormal  (H) High  Significant for   Lab Results   Component Value Date    CR 0.58 08/15/2024    CR 0.65 08/05/2024    CR 0.61 07/25/2024    CR 0.68 07/15/2024    CR 0.67 06/24/2024    CR 0.62 06/12/2024    CR 0.70 06/03/2024    CR 0.60 05/13/2024    CR 0.68 04/11/2024    CR 0.77 05/02/2018       Culture <10,000 CFU/mL Mixture of Urogenital Jenifer           Resulting Agency: [facility]             Imaging:    The following imaging exams were independently viewed and interpreted by me and discussed with patient:  EXAM: PET ONCOLOGY WHOLE BODY  LOCATION: [facility]  DATE: 8/1/2024     INDICATION: Surgical treatment strategy for restaging poorly differentiated metastatic neuroendocrine carcinoma involving the liver   COMPARISON: PET/CT from 4/17/2024 and CT from 7/25/2024   CONTRAST: None   TECHNIQUE: Serum glucose level 125  mg/dL. One hour post intravenous administration of 11.5  mCi F-18 FDG, PET imaging was performed from the skull vertex to feet, utilizing attenuation correction with concurrent axial CT and PET/CT image fusion. Dose   reduction techniques were used.     PET/CT FINDINGS:  Scattered sclerotic skeletal lesions have decreased in FDG activity, for example in the manubrium (SUVmax 5.0 to 3.8. Stable markedly FDG avid (SUVmax 8.5, previously 8.9) circumferential wall thickening in the terminal ileum with   adjacent FDG avid right lower quadrant mesenteric lymphadenopathy. Persistent FDG avid soft tissue nodularity in the left upper quadrant greater omentum. Uniform mild inflammatory FDG activity about the periphery of a partially loculated right pleural   effusion. Inflammatory FDG activity associated with injection sites in the right buttock. All other FDG activity is normal. Liver  lesions and thoracic lymphadenopathy are no longer FDG avid in excess of background soft tissue levels.      CT FINDINGS: Areas of fat deposition in the wall of the ascending colon. Short segment intussusception near the hepatic flexure.  Passive atelectasis in the right lung. Right IJ Port-A-Cath tip in the right atrium. Hypoattenuation of blood pool relative   to myocardium, suggesting anemia. Duodenal diverticulum. Mild calcified atherosclerosis. No coronary artery calcifications.  Benign hyperostosis frontalis interna. Scattered non-FDG avid sclerotic skeletal lesions. Mild degenerative change in the spine.                                                                      IMPRESSION:     Partial response. Liver lesions and thoracic lymph node metastases are no longer FDG avid. Skeletal metastases have improved. Unchanged presumed primary tumor in the terminal ileum with adjacent mesenteric lymphadenopathy and peritoneal carcinomatosis.              Assessment and Plan:     Microscopic hematuria  Dysuria  I discussed with the Louise about the significant symptoms that she is experiencing and the role of medications including anticholinergics  We also discussed about further evaluating her for the microscopic hematuria with cytology and cystoscopy  Her imaging evaluation has been largely unremarkable for the upper urinary tract and the bladder was not completely evaluated on the PET scan or the prior CT scans as it was completely collapsed  I think it would be reasonable to do a cystoscopy to rule out any bladder lesions and also perform a urinary cytology and FISH test  She seems to be agreeable  She wants to do the cystoscopy a little out from her chemotherapy as it appears that she often gets cytopenic after chemotherapy and concerns for infection are bothersome for her after cystoscopy  I think that is a reasonable option to wait a couple weeks at least after her next heme-onc to set up her for a  cystoscopy  She can take dose of Ativan before the cystoscopy as she appears to be anxious about the procedure  She understands that she needs a  for the same  - Cytology, non-gynecologic; Future            Plan:  Schedule for cystoscopy cytology be sent    Orders  Orders Placed This Encounter   Procedures    Cytology, non-gynecologic       Marco A Nguyễn MD  [facility]      ==========================    Additional Billing and Coding Information:  Review of external notes as documented above   Review of the result(s) of each unique test - urinalysis, urine culture, PET/CT                15 minutes spent by me on the date of the encounter doing chart review, review of test results, interpretation of tests, patient visit, documentation, and discussion with family     ==========================

## 2024-08-22 ENCOUNTER — MYC REFILL (OUTPATIENT)
Dept: ONCOLOGY | Facility: HOSPITAL | Age: 59
End: 2024-08-22
Payer: COMMERCIAL

## 2024-08-22 DIAGNOSIS — C7B.8 NEUROENDOCRINE CARCINOMA METASTATIC TO LIVER (H): ICD-10-CM

## 2024-08-22 DIAGNOSIS — R07.1 PAINFUL RESPIRATION: ICD-10-CM

## 2024-08-22 DIAGNOSIS — G89.3 CANCER RELATED PAIN: ICD-10-CM

## 2024-08-22 DIAGNOSIS — C7A.8 NEUROENDOCRINE CARCINOMA METASTATIC TO LIVER (H): ICD-10-CM

## 2024-08-22 RX ORDER — OXYCODONE HYDROCHLORIDE 5 MG/1
5-10 TABLET ORAL EVERY 4 HOURS PRN
Qty: 60 TABLET | Refills: 0 | Status: SHIPPED | OUTPATIENT
Start: 2024-08-22 | End: 2024-08-23

## 2024-08-22 RX ORDER — MORPHINE SULFATE 15 MG/1
15 TABLET, FILM COATED, EXTENDED RELEASE ORAL EVERY 12 HOURS
Qty: 60 TABLET | Refills: 0 | Status: ON HOLD | OUTPATIENT
Start: 2024-08-22 | End: 2024-09-06

## 2024-08-22 NOTE — TELEPHONE ENCOUNTER
Louise called requesting a refill of morphine. Last refilled: 7/25/24, #60, 0 refills.     Argelia Simon RN on 8/22/2024 at 12:06 PM

## 2024-08-22 NOTE — TELEPHONE ENCOUNTER
Last Written Prescription Date:  8/12/24  Last Fill Quantity: 60,  # refills: 0   Last office visit provider:  8/5/24 with Dr. Barreto, follow up in clinic 8/23/24     Requested Prescriptions   Pending Prescriptions Disp Refills    oxyCODONE (ROXICODONE) 5 MG tablet 60 tablet 0     Sig: Take 1-2 tablets (5-10 mg) by mouth every 4 hours as needed for pain.       There is no refill protocol information for this order          Cher Lott RN 08/22/24 12:04 PM

## 2024-08-23 ENCOUNTER — LAB (OUTPATIENT)
Dept: INFUSION THERAPY | Facility: HOSPITAL | Age: 59
End: 2024-08-23
Attending: INTERNAL MEDICINE
Payer: COMMERCIAL

## 2024-08-23 ENCOUNTER — ONCOLOGY VISIT (OUTPATIENT)
Dept: ONCOLOGY | Facility: HOSPITAL | Age: 59
End: 2024-08-23
Attending: INTERNAL MEDICINE
Payer: COMMERCIAL

## 2024-08-23 VITALS
TEMPERATURE: 99.1 F | OXYGEN SATURATION: 99 % | DIASTOLIC BLOOD PRESSURE: 52 MMHG | HEIGHT: 62 IN | SYSTOLIC BLOOD PRESSURE: 113 MMHG | BODY MASS INDEX: 33.27 KG/M2 | HEART RATE: 92 BPM | WEIGHT: 180.8 LBS | RESPIRATION RATE: 16 BRPM

## 2024-08-23 DIAGNOSIS — C7A.8 NEUROENDOCRINE CARCINOMA METASTATIC TO LIVER (H): Primary | ICD-10-CM

## 2024-08-23 DIAGNOSIS — C7B.8 NEUROENDOCRINE CARCINOMA METASTATIC TO LIVER (H): Primary | ICD-10-CM

## 2024-08-23 DIAGNOSIS — R07.1 PAINFUL RESPIRATION: ICD-10-CM

## 2024-08-23 DIAGNOSIS — T45.1X5A CHEMOTHERAPY-INDUCED NAUSEA: ICD-10-CM

## 2024-08-23 DIAGNOSIS — R11.0 CHEMOTHERAPY-INDUCED NAUSEA: ICD-10-CM

## 2024-08-23 LAB
ALBUMIN SERPL BCG-MCNC: 3.7 G/DL (ref 3.5–5.2)
ALP SERPL-CCNC: 109 U/L (ref 40–150)
ALT SERPL W P-5'-P-CCNC: 10 U/L (ref 0–50)
ANION GAP SERPL CALCULATED.3IONS-SCNC: 12 MMOL/L (ref 7–15)
AST SERPL W P-5'-P-CCNC: 15 U/L (ref 0–45)
BASOPHILS # BLD MANUAL: 0 10E3/UL (ref 0–0.2)
BASOPHILS NFR BLD MANUAL: 0 %
BILIRUB SERPL-MCNC: 0.4 MG/DL
BUN SERPL-MCNC: 12.1 MG/DL (ref 6–20)
CALCIUM SERPL-MCNC: 8.7 MG/DL (ref 8.8–10.4)
CHLORIDE SERPL-SCNC: 96 MMOL/L (ref 98–107)
CREAT SERPL-MCNC: 0.7 MG/DL (ref 0.51–0.95)
EGFRCR SERPLBLD CKD-EPI 2021: >90 ML/MIN/1.73M2
EOSINOPHIL # BLD MANUAL: 0 10E3/UL (ref 0–0.7)
EOSINOPHIL NFR BLD MANUAL: 2 %
ERYTHROCYTE [DISTWIDTH] IN BLOOD BY AUTOMATED COUNT: 20.4 % (ref 10–15)
GLUCOSE SERPL-MCNC: 201 MG/DL (ref 70–99)
HCO3 SERPL-SCNC: 28 MMOL/L (ref 22–29)
HCT VFR BLD AUTO: 26.2 % (ref 35–47)
HGB BLD-MCNC: 8.3 G/DL (ref 11.7–15.7)
LYMPHOCYTES # BLD MANUAL: 0.6 10E3/UL (ref 0.8–5.3)
LYMPHOCYTES NFR BLD MANUAL: 36 %
MAGNESIUM SERPL-MCNC: 1.8 MG/DL (ref 1.7–2.3)
MCH RBC QN AUTO: 27.1 PG (ref 26.5–33)
MCHC RBC AUTO-ENTMCNC: 31.7 G/DL (ref 31.5–36.5)
MCV RBC AUTO: 86 FL (ref 78–100)
METAMYELOCYTES # BLD MANUAL: 0.1 10E3/UL
METAMYELOCYTES NFR BLD MANUAL: 5 %
MONOCYTES # BLD MANUAL: 0.3 10E3/UL (ref 0–1.3)
MONOCYTES NFR BLD MANUAL: 18 %
MYELOCYTES # BLD MANUAL: 0 10E3/UL
MYELOCYTES NFR BLD MANUAL: 1 %
NEUTROPHILS # BLD MANUAL: 0.6 10E3/UL (ref 1.6–8.3)
NEUTROPHILS NFR BLD MANUAL: 38 %
NRBC # BLD AUTO: 0 10E3/UL
NRBC BLD AUTO-RTO: 2 /100
PATH REPORT.COMMENTS IMP SPEC: NORMAL
PATH REPORT.COMMENTS IMP SPEC: NORMAL
PATH REPORT.FINAL DX SPEC: NORMAL
PATH REPORT.GROSS SPEC: NORMAL
PATH REPORT.MICROSCOPIC SPEC OTHER STN: NORMAL
PLAT MORPH BLD: ABNORMAL
PLATELET # BLD AUTO: 130 10E3/UL (ref 150–450)
POLYCHROMASIA BLD QL SMEAR: SLIGHT
POTASSIUM SERPL-SCNC: 4 MMOL/L (ref 3.4–5.3)
PROT SERPL-MCNC: 6.8 G/DL (ref 6.4–8.3)
RBC # BLD AUTO: 3.06 10E6/UL (ref 3.8–5.2)
RBC MORPH BLD: ABNORMAL
SODIUM SERPL-SCNC: 136 MMOL/L (ref 135–145)
TSH SERPL DL<=0.005 MIU/L-ACNC: 1.51 UIU/ML (ref 0.3–4.2)
VIT B12 SERPL-MCNC: 397 PG/ML (ref 232–1245)
WBC # BLD AUTO: 1.6 10E3/UL (ref 4–11)

## 2024-08-23 PROCEDURE — 83735 ASSAY OF MAGNESIUM: CPT

## 2024-08-23 PROCEDURE — 84443 ASSAY THYROID STIM HORMONE: CPT

## 2024-08-23 PROCEDURE — 36591 DRAW BLOOD OFF VENOUS DEVICE: CPT | Performed by: INTERNAL MEDICINE

## 2024-08-23 PROCEDURE — 85027 COMPLETE CBC AUTOMATED: CPT | Performed by: INTERNAL MEDICINE

## 2024-08-23 PROCEDURE — G2211 COMPLEX E/M VISIT ADD ON: HCPCS | Performed by: INTERNAL MEDICINE

## 2024-08-23 PROCEDURE — 99215 OFFICE O/P EST HI 40 MIN: CPT | Performed by: INTERNAL MEDICINE

## 2024-08-23 PROCEDURE — 85007 BL SMEAR W/DIFF WBC COUNT: CPT | Performed by: INTERNAL MEDICINE

## 2024-08-23 PROCEDURE — 82247 BILIRUBIN TOTAL: CPT | Performed by: INTERNAL MEDICINE

## 2024-08-23 PROCEDURE — 99213 OFFICE O/P EST LOW 20 MIN: CPT | Performed by: INTERNAL MEDICINE

## 2024-08-23 PROCEDURE — 86900 BLOOD TYPING SEROLOGIC ABO: CPT

## 2024-08-23 PROCEDURE — 82607 VITAMIN B-12: CPT

## 2024-08-23 RX ORDER — OXYCODONE HYDROCHLORIDE 5 MG/1
15-20 TABLET ORAL EVERY 4 HOURS PRN
Qty: 90 TABLET | Refills: 0 | Status: SHIPPED | OUTPATIENT
Start: 2024-08-23 | End: 2024-09-17

## 2024-08-23 RX ORDER — PROMETHAZINE HYDROCHLORIDE 12.5 MG/1
12.5-25 TABLET ORAL EVERY 6 HOURS PRN
Qty: 30 TABLET | Refills: 1 | Status: SHIPPED | OUTPATIENT
Start: 2024-08-23

## 2024-08-23 NOTE — PROGRESS NOTES
"Oncology Rooming Note    August 23, 2024 3:05 PM   Louise Corado is a 59 year old female who presents for:    Chief Complaint   Patient presents with    Oncology Clinic Visit     Initial Vitals: /52   Pulse 92   Temp 99.1  F (37.3  C)   Resp 16   Ht 1.575 m (5' 2\")   Wt 82 kg (180 lb 12.8 oz)   SpO2 99%   BMI 33.07 kg/m   Estimated body mass index is 33.07 kg/m  as calculated from the following:    Height as of this encounter: 1.575 m (5' 2\").    Weight as of this encounter: 82 kg (180 lb 12.8 oz). Body surface area is 1.89 meters squared.  Data Unavailable Comment: Data Unavailable   No LMP recorded. Patient is postmenopausal.  Allergies reviewed: Yes  Medications reviewed: Yes    Medications: MEDICATION REFILLS NEEDED TODAY. Provider was notified.  Pharmacy name entered into [facility]:    [id number] [place], [place] - [address] AT [facility] [place] [id number] [facility]    Frailty Screening:   Is the patient here for a new oncology consult visit in cancer care? 2. No      Clinical concerns: Dizziness, pain      Carolin Biswas RN             "

## 2024-08-23 NOTE — PROGRESS NOTES
[facility]    Patient: Louise Corado  MRN: [id number]  Date of Service: Aug 23, 2024        Assessment and Plan:    1.  Metastatic neuroendocrine carcinoma: Cycle 6-day 1 will be in 3 days.  Overall she has been having a very difficult time with chemotherapy.  Her performance status has declined significantly.  After some discussion, we decided to omit cycle 6 of her chemotherapy.  I do think the benefit would outweigh potential risk.  Instead she will receive hydration, Red cell transfusion and will give her a dose of Emend for her ongoing nausea.  Getting a PET scan around September 13 with clinical follow-up a few days after that.  Clinically, I do not think she has any evidence of progressive disease.    2.  Nausea: She has been having a fair amount of nausea with her treatment.  Nothing is helping so far.  I am going to give her a prescription for Phenergan today. We will give her a dose of Emend when she returns for fluid in a few days.  She did not get relief from prednisone, lorazepam or Compazine.  She is using Zofran as well as needed.     3.  Pleural effusion: She still has a right-sided effusion but it is improved.  Follow clinically and tap as needed.      4.  Anxiety: Lorazepam seems to be helping.  Anxiety seems rather persistent though.  Citalopram 20 mg nightly has recently been started.    5.  Pain:  Mostly at the right anterior chest.  She is taking MS Contin 15 every 12 and oxycodone 10 mg as needed.      Medical decision Making:  I spent 41 minutes in the care of this patient today, which included time necessary for preparation for the visit, face to face time with the patient, communication of recommendations to the care team, and documentation time.  Today's visit was centered around a cancer diagnosis in the setting of additional medical comorbidities. Complex medical decision making was required. The risk of additional morbidity without further  treatment is high.   High-risk medications were managed: Chemotherapy-induced nausea    ECOG Performance  1    Diagnosis:    1.  Metastatic neuroendocrine carcinoma, poorly differentiated:  Diagnosed April 2024 from a liver biopsy.  Her PET/CT shows malignant involvement of the liver, mediastinal nodes, left upper quadrant omentum, manubrium, and bilateral pleura.  There is some wall thickening and FDG activity in a short segment of the terminal ileum. Liver biopsy shows WHO, NET G2.     EGD 4/25/24: Occasional gastritis.  No mass.  Colonoscopy 4/25/24: Mass of terminal ileum.    NGS:  PDL1=0%, MDI-stable, TMB low,   CancerTYPE ID:  GI carcinoid 96%    Treatment:    Carboplatin and etoposide initiated May 13, 2024.  25% etopside dose reduction starting with cycle 5 secondary to nausea    Interim History:    Louise returns today for a follow-up visit.  She has completed 5 cycles of chemotherapy.  She continues to have significant nausea.  Decreased appetite and oral intake.  Has some peripheral neuropathy in the hands and feet as well.    Review of Systems:    As above in the history.     Review of Systems otherwise Negative for:  General: chills, fever or night sweats  Psychological: depression  Ophthalmic: blurry vision, double vision or loss of vision, vision change  ENT: epistaxis, oral lesions, hearing changes  Hematological and Lymphatic: bleeding, bruising, jaundice, swollen lymph nodes  Endocrine: hot flashes, unexpected weight changes  Respiratory: cough, hemoptysis, orthopnea  Cardiovascular: edema, palpitations or PND  Gastrointestinal: blood in stools, change in bowel habits, constipation  Genito-Urinary: change in urinary stream, incontinence, frequency/urgency  Musculoskeletal: joint swelling, muscle pain  Neurological: dizziness, headaches, numbness/tingling  Dermatological: lumps and rash    Physical Exam:    There were no vitals taken for this visit.    General: patient appears stated age of 59 year  old. Nontoxic and in no distress.   HEENT: Head: atraumatic, normocephalic. Sclerae anicteric.  Chest:  Normal respiratory effort  Cardiac:  No edema.   Abdomen: abdomen is non-distended  Extremities: normal tone and muscle bulk.  Skin: no lesions or rash on visible skin. Warm and dry.   CNS: alert and oriented. Grossly non-focal.   Psychiatric: normal mood and affect.     Lab Results:    No results found for this or any previous visit (from the past 168 hour(s)).    Imaging:    PET Oncology Whole Body    Result Date: 8/1/2024  EXAM: PET ONCOLOGY WHOLE BODY LOCATION: [facility] DATE: 8/1/2024 INDICATION: Surgical treatment strategy for restaging poorly differentiated metastatic neuroendocrine carcinoma involving the liver COMPARISON: PET/CT from 4/17/2024 and CT from 7/25/2024 CONTRAST: None TECHNIQUE: Serum glucose level 125  mg/dL. One hour post intravenous administration of 11.5  mCi F-18 FDG, PET imaging was performed from the skull vertex to feet, utilizing attenuation correction with concurrent axial CT and PET/CT image fusion. Dose reduction techniques were used. PET/CT FINDINGS:  Scattered sclerotic skeletal lesions have decreased in FDG activity, for example in the manubrium (SUVmax 5.0 to 3.8. Stable markedly FDG avid (SUVmax 8.5, previously 8.9) circumferential wall thickening in the terminal ileum with adjacent FDG avid right lower quadrant mesenteric lymphadenopathy. Persistent FDG avid soft tissue nodularity in the left upper quadrant greater omentum. Uniform mild inflammatory FDG activity about the periphery of a partially loculated right pleural effusion. Inflammatory FDG activity associated with injection sites in the right buttock. All other FDG activity is normal. Liver lesions and thoracic lymphadenopathy are no longer FDG avid in excess of background soft tissue levels. CT FINDINGS: Areas of fat deposition in the wall of the ascending colon. Short segment intussusception  near the hepatic flexure.  Passive atelectasis in the right lung. Right IJ Port-A-Cath tip in the right atrium. Hypoattenuation of blood pool relative to myocardium, suggesting anemia. Duodenal diverticulum. Mild calcified atherosclerosis. No coronary artery calcifications.  Benign hyperostosis frontalis interna. Scattered non-FDG avid sclerotic skeletal lesions. Mild degenerative change in the spine.     IMPRESSION: Partial response. Liver lesions and thoracic lymph node metastases are no longer FDG avid. Skeletal metastases have improved. Unchanged presumed primary tumor in the terminal ileum with adjacent mesenteric lymphadenopathy and peritoneal carcinomatosis.     CT Chest (PE) Abdomen Pelvis w Contrast    Result Date: 7/25/2024  EXAM: CT CHEST PE ABDOMEN PELVIS W CONTRAST LOCATION: [facility] DATE: 7/25/2024 INDICATION: left flank pain, cancer history, left flank pain, blood in urine COMPARISON: CT exams 6/19/2024, 4/10/2024 and 2/26/2014, PET/CT 4/17/2024 TECHNIQUE: CT chest pulmonary angiogram and routine CT abdomen pelvis with IV contrast. Arterial phase through the chest and venous phase through the abdomen and pelvis. Multiplanar reformats and MIP reconstructions were performed. Dose reduction techniques were used. CONTRAST: 90ml Isovue 370 FINDINGS: ANGIOGRAM CHEST: Pulmonary arteries are normal caliber and negative for pulmonary emboli. Thoracic aorta is negative for dissection. No CT evidence of right heart strain. LUNGS AND PLEURA: Stable small-to-moderate right pleural effusion with adjacent consolidation likely reflecting atelectasis. Trace left pleural effusion. Mild bronchial wall thickening. Stable bilateral pulmonary nodules including multiple subpleural nodules. A dominant left upper lobe subpleural nodule measures a stable 7 mm image 106 series 5. MEDIASTINUM/AXILLAE: Right IJ port catheter. Mediastinal and hilar lymphadenopathy. A dominant right paratracheal node  measures a stable 1.6 cm short axis. A subcarinal node measures a stable 2.1 cm short axis. Stable prominent right cardiophrenic recess  lymph nodes. Borderline cardiomegaly. No pericardial effusion. CORONARY ARTERY CALCIFICATION: None. HEPATOBILIARY: Multiple hepatic metastases with a dominant 6.2 cm peripherally enhancing upper right hepatic lobe lesion which is stable in size. Additional smaller hepatic metastases better seen on the initial postcontrast series 4. These are not as well seen on the previous exam likely due to timing of the contrast bolus. Normal gallbladder and bile ducts. PANCREAS: Normal. SPLEEN: Normal. ADRENAL GLANDS: Normal. KIDNEYS/BLADDER: Small right renal cortical cysts. No follow-up is indicated. No hydronephrosis or hydroureter. Normal bladder. BOWEL: Normal stomach and proximal small bowel. Masslike wall thickening of the distal ileum within an adjacent spiculated mesenteric lesion measuring 2.3 x 2.4 cm which is stable since the prior exam. Stable adjacent abnormal lymph nodes described below. Stable left lower quadrant carcinomatosis measuring up to 2.4 cm. Trace pelvic free fluid. No free air. LYMPH NODES: Stable lymphadenopathy including a dominant right lower quadrant mesenteric lymph node measuring 1.2 cm short axis. VASCULATURE: Mild aortoiliac atherosclerosis. Patent central SMA and SMV. PELVIC ORGANS: Normal pelvic organs. Trace pelvic free fluid. MUSCULOSKELETAL: Stable sclerotic lesions in the left posterior iliac bone, left side of the T11 vertebral body and sternal manubrium.     IMPRESSION: 1.  No pulmonary artery embolism. 2.  No acute findings in the abdomen or pelvis. No hydronephrosis, urinary tract stone or inflammatory process. 3.  Stable metastatic neuroendocrine tumor with stable masslike wall thickening the terminal ileum with adjacent mike metastases, stable hepatic, pulmonary/pleural, thoracic mike metastases, osseous metastases as well as stable left lower  quadrant carcinomatosis. 4.  Stable small-to-moderate right pleural effusion with adjacent consolidation likely reflecting atelectasis. Trace left pleural effusion.    CT Thoracic Spine Reconstructed    Result Date: 7/25/2024  EXAM: CT THORACIC SPINE RECONSTRUCTED LOCATION: [facility] DATE: 7/25/2024 INDICATION: Cancer, back pain. COMPARISON: Body CT 6/19/2024 and PET evaluation 4/17/2024. TECHNIQUE: Dedicated axial, sagittal, and coronal images of the thoracic spine were generated utilizing CT chest source data and are separately reviewed. Dose reduction techniques were used. FINDINGS: VERTEBRA: Twelve thoracic vertebral body segments with satisfactory thoracic height/alignment. No thoracic level fracture or posttraumatic subluxation. Sclerotic metastases including T6 and T11 as on prior examination 6/19/2024. No pathologic compressions. No pathologic compression fracture at this level. History of malignancy reported. Articular pillars in the thoracic spine are intact without facet joint space widening or disruption. No displaced rib fractures or lytic/destructive changes of the ribs are noted. CANAL/FORAMINA: No severe spinal stenosis or severe foraminal narrowing at any thoracic level. PARASPINAL: No disc herniation. No interspace widening. Leftward convexity thoracic curve apex T9. Right hilar and mediastinal mass. Indeterminate moderate right pleural effusion. Low-density changes involve the majority of the hepatic parenchyma with multifocal hyperenhancing masses in the right and left lobes worrisome for hepatic metastasis. This is described on body CT source data set. Renal contours and upper retroperitoneal structures including the adrenal glands are otherwise satisfactory. Cardiac enlargement. No pericardial effusions. Thyroid is satisfactory. Right central venous catheter.     IMPRESSION: 1.  No acute fracture or posttraumatic malalignment in the thoracic spine. 2.  No significant  spinal stenosis or foraminal narrowing at any thoracic level. 3.  Sclerotic metastases in the axial skeleton as before, including T6 and T11. Right hilar and mediastinal mass. Right pleural effusion. Hepatic metastases.        Signed by: Man Barreto MD  +

## 2024-08-23 NOTE — LETTER
8/23/2024      Louise Corado  [address] [place] [address]      Dear Colleague,    Thank you for referring your patient, Louise Corado, to the [facility] CANCER [facility]. Please see a copy of my visit note below.    [facility] Hematology and Oncology Progress Note    Patient: Louise Corado  MRN: [id number]  Date of Service: Aug 23, 2024        Assessment and Plan:    1.  Metastatic neuroendocrine carcinoma: Cycle 6-day 1 will be in 3 days.  Overall she has been having a very difficult time with chemotherapy.  Her performance status has declined significantly.  After some discussion, we decided to omit cycle 6 of her chemotherapy.  I do think the benefit would outweigh potential risk.  Instead she will receive hydration, Red cell transfusion and will give her a dose of Emend for her ongoing nausea.  Getting a PET scan around September 13 with clinical follow-up a few days after that.  Clinically, I do not think she has any evidence of progressive disease.    2.  Nausea: She has been having a fair amount of nausea with her treatment.  Nothing is helping so far.  I am going to give her a prescription for Phenergan today. We will give her a dose of Emend when she returns for fluid in a few days.  She did not get relief from prednisone, lorazepam or Compazine.  She is using Zofran as well as needed.     3.  Pleural effusion: She still has a right-sided effusion but it is improved.  Follow clinically and tap as needed.      4.  Anxiety: Lorazepam seems to be helping.  Anxiety seems rather persistent though.  Citalopram 20 mg nightly has recently been started.    5.  Pain:  Mostly at the right anterior chest.  She is taking MS Contin 15 every 12 and oxycodone 10 mg as needed.      Medical decision Making:  I spent 41 minutes in the care of this patient today, which included time necessary for preparation for the visit, face to face time with the patient, communication of recommendations  to the care team, and documentation time.  Today's visit was centered around a cancer diagnosis in the setting of additional medical comorbidities. Complex medical decision making was required. The risk of additional morbidity without further treatment is high.   High-risk medications were managed: Chemotherapy-induced nausea    ECOG Performance  1    Diagnosis:    1.  Metastatic neuroendocrine carcinoma, poorly differentiated:  Diagnosed April 2024 from a liver biopsy.  Her PET/CT shows malignant involvement of the liver, mediastinal nodes, left upper quadrant omentum, manubrium, and bilateral pleura.  There is some wall thickening and FDG activity in a short segment of the terminal ileum. Liver biopsy shows WHO, NET G2.     EGD 4/25/24: Occasional gastritis.  No mass.  Colonoscopy 4/25/24: Mass of terminal ileum.    NGS:  PDL1=0%, MDI-stable, TMB low,   CancerTYPE ID:  GI carcinoid 96%    Treatment:    Carboplatin and etoposide initiated May 13, 2024.  25% etopside dose reduction starting with cycle 5 secondary to nausea    Interim History:    Louise returns today for a follow-up visit.  She has completed 5 cycles of chemotherapy.  She continues to have significant nausea.  Decreased appetite and oral intake.  Has some peripheral neuropathy in the hands and feet as well.    Review of Systems:    As above in the history.     Review of Systems otherwise Negative for:  General: chills, fever or night sweats  Psychological: depression  Ophthalmic: blurry vision, double vision or loss of vision, vision change  ENT: epistaxis, oral lesions, hearing changes  Hematological and Lymphatic: bleeding, bruising, jaundice, swollen lymph nodes  Endocrine: hot flashes, unexpected weight changes  Respiratory: cough, hemoptysis, orthopnea  Cardiovascular: edema, palpitations or PND  Gastrointestinal: blood in stools, change in bowel habits, constipation  Genito-Urinary: change in urinary stream, incontinence,  frequency/urgency  Musculoskeletal: joint swelling, muscle pain  Neurological: dizziness, headaches, numbness/tingling  Dermatological: lumps and rash    Physical Exam:    There were no vitals taken for this visit.    General: patient appears stated age of 59 year old. Nontoxic and in no distress.   HEENT: Head: atraumatic, normocephalic. Sclerae anicteric.  Chest:  Normal respiratory effort  Cardiac:  No edema.   Abdomen: abdomen is non-distended  Extremities: normal tone and muscle bulk.  Skin: no lesions or rash on visible skin. Warm and dry.   CNS: alert and oriented. Grossly non-focal.   Psychiatric: normal mood and affect.     Lab Results:    No results found for this or any previous visit (from the past 168 hour(s)).    Imaging:    PET Oncology Whole Body    Result Date: 8/1/2024  EXAM: PET ONCOLOGY WHOLE BODY LOCATION: [facility] DATE: 8/1/2024 INDICATION: Surgical treatment strategy for restaging poorly differentiated metastatic neuroendocrine carcinoma involving the liver COMPARISON: PET/CT from 4/17/2024 and CT from 7/25/2024 CONTRAST: None TECHNIQUE: Serum glucose level 125  mg/dL. One hour post intravenous administration of 11.5  mCi F-18 FDG, PET imaging was performed from the skull vertex to feet, utilizing attenuation correction with concurrent axial CT and PET/CT image fusion. Dose reduction techniques were used. PET/CT FINDINGS:  Scattered sclerotic skeletal lesions have decreased in FDG activity, for example in the manubrium (SUVmax 5.0 to 3.8. Stable markedly FDG avid (SUVmax 8.5, previously 8.9) circumferential wall thickening in the terminal ileum with adjacent FDG avid right lower quadrant mesenteric lymphadenopathy. Persistent FDG avid soft tissue nodularity in the left upper quadrant greater omentum. Uniform mild inflammatory FDG activity about the periphery of a partially loculated right pleural effusion. Inflammatory FDG activity associated with injection sites in  the right buttock. All other FDG activity is normal. Liver lesions and thoracic lymphadenopathy are no longer FDG avid in excess of background soft tissue levels. CT FINDINGS: Areas of fat deposition in the wall of the ascending colon. Short segment intussusception near the hepatic flexure.  Passive atelectasis in the right lung. Right IJ Port-A-Cath tip in the right atrium. Hypoattenuation of blood pool relative to myocardium, suggesting anemia. Duodenal diverticulum. Mild calcified atherosclerosis. No coronary artery calcifications.  Benign hyperostosis frontalis interna. Scattered non-FDG avid sclerotic skeletal lesions. Mild degenerative change in the spine.     IMPRESSION: Partial response. Liver lesions and thoracic lymph node metastases are no longer FDG avid. Skeletal metastases have improved. Unchanged presumed primary tumor in the terminal ileum with adjacent mesenteric lymphadenopathy and peritoneal carcinomatosis.     CT Chest (PE) Abdomen Pelvis w Contrast    Result Date: 7/25/2024  EXAM: CT CHEST PE ABDOMEN PELVIS W CONTRAST LOCATION: [facility] DATE: 7/25/2024 INDICATION: left flank pain, cancer history, left flank pain, blood in urine COMPARISON: CT exams 6/19/2024, 4/10/2024 and 2/26/2014, PET/CT 4/17/2024 TECHNIQUE: CT chest pulmonary angiogram and routine CT abdomen pelvis with IV contrast. Arterial phase through the chest and venous phase through the abdomen and pelvis. Multiplanar reformats and MIP reconstructions were performed. Dose reduction techniques were used. CONTRAST: 90ml Isovue 370 FINDINGS: ANGIOGRAM CHEST: Pulmonary arteries are normal caliber and negative for pulmonary emboli. Thoracic aorta is negative for dissection. No CT evidence of right heart strain. LUNGS AND PLEURA: Stable small-to-moderate right pleural effusion with adjacent consolidation likely reflecting atelectasis. Trace left pleural effusion. Mild bronchial wall thickening. Stable bilateral  pulmonary nodules including multiple subpleural nodules. A dominant left upper lobe subpleural nodule measures a stable 7 mm image 106 series 5. MEDIASTINUM/AXILLAE: Right IJ port catheter. Mediastinal and hilar lymphadenopathy. A dominant right paratracheal node measures a stable 1.6 cm short axis. A subcarinal node measures a stable 2.1 cm short axis. Stable prominent right cardiophrenic recess  lymph nodes. Borderline cardiomegaly. No pericardial effusion. CORONARY ARTERY CALCIFICATION: None. HEPATOBILIARY: Multiple hepatic metastases with a dominant 6.2 cm peripherally enhancing upper right hepatic lobe lesion which is stable in size. Additional smaller hepatic metastases better seen on the initial postcontrast series 4. These are not as well seen on the previous exam likely due to timing of the contrast bolus. Normal gallbladder and bile ducts. PANCREAS: Normal. SPLEEN: Normal. ADRENAL GLANDS: Normal. KIDNEYS/BLADDER: Small right renal cortical cysts. No follow-up is indicated. No hydronephrosis or hydroureter. Normal bladder. BOWEL: Normal stomach and proximal small bowel. Masslike wall thickening of the distal ileum within an adjacent spiculated mesenteric lesion measuring 2.3 x 2.4 cm which is stable since the prior exam. Stable adjacent abnormal lymph nodes described below. Stable left lower quadrant carcinomatosis measuring up to 2.4 cm. Trace pelvic free fluid. No free air. LYMPH NODES: Stable lymphadenopathy including a dominant right lower quadrant mesenteric lymph node measuring 1.2 cm short axis. VASCULATURE: Mild aortoiliac atherosclerosis. Patent central SMA and SMV. PELVIC ORGANS: Normal pelvic organs. Trace pelvic free fluid. MUSCULOSKELETAL: Stable sclerotic lesions in the left posterior iliac bone, left side of the T11 vertebral body and sternal manubrium.     IMPRESSION: 1.  No pulmonary artery embolism. 2.  No acute findings in the abdomen or pelvis. No hydronephrosis, urinary tract stone or  inflammatory process. 3.  Stable metastatic neuroendocrine tumor with stable masslike wall thickening the terminal ileum with adjacent mike metastases, stable hepatic, pulmonary/pleural, thoracic mike metastases, osseous metastases as well as stable left lower quadrant carcinomatosis. 4.  Stable small-to-moderate right pleural effusion with adjacent consolidation likely reflecting atelectasis. Trace left pleural effusion.    CT Thoracic Spine Reconstructed    Result Date: 7/25/2024  EXAM: CT THORACIC SPINE RECONSTRUCTED LOCATION: [facility] DATE: 7/25/2024 INDICATION: Cancer, back pain. COMPARISON: Body CT 6/19/2024 and PET evaluation 4/17/2024. TECHNIQUE: Dedicated axial, sagittal, and coronal images of the thoracic spine were generated utilizing CT chest source data and are separately reviewed. Dose reduction techniques were used. FINDINGS: VERTEBRA: Twelve thoracic vertebral body segments with satisfactory thoracic height/alignment. No thoracic level fracture or posttraumatic subluxation. Sclerotic metastases including T6 and T11 as on prior examination 6/19/2024. No pathologic compressions. No pathologic compression fracture at this level. History of malignancy reported. Articular pillars in the thoracic spine are intact without facet joint space widening or disruption. No displaced rib fractures or lytic/destructive changes of the ribs are noted. CANAL/FORAMINA: No severe spinal stenosis or severe foraminal narrowing at any thoracic level. PARASPINAL: No disc herniation. No interspace widening. Leftward convexity thoracic curve apex T9. Right hilar and mediastinal mass. Indeterminate moderate right pleural effusion. Low-density changes involve the majority of the hepatic parenchyma with multifocal hyperenhancing masses in the right and left lobes worrisome for hepatic metastasis. This is described on body CT source data set. Renal contours and upper retroperitoneal structures  "including the adrenal glands are otherwise satisfactory. Cardiac enlargement. No pericardial effusions. Thyroid is satisfactory. Right central venous catheter.     IMPRESSION: 1.  No acute fracture or posttraumatic malalignment in the thoracic spine. 2.  No significant spinal stenosis or foraminal narrowing at any thoracic level. 3.  Sclerotic metastases in the axial skeleton as before, including T6 and T11. Right hilar and mediastinal mass. Right pleural effusion. Hepatic metastases.        Signed by: Man Barreto MD  +    Oncology Rooming Note    August 23, 2024 3:05 PM   Louise Corado is a 59 year old female who presents for:    Chief Complaint   Patient presents with     Oncology Clinic Visit     Initial Vitals: /52   Pulse 92   Temp 99.1  F (37.3  C)   Resp 16   Ht 1.575 m (5' 2\")   Wt 82 kg (180 lb 12.8 oz)   SpO2 99%   BMI 33.07 kg/m   Estimated body mass index is 33.07 kg/m  as calculated from the following:    Height as of this encounter: 1.575 m (5' 2\").    Weight as of this encounter: 82 kg (180 lb 12.8 oz). Body surface area is 1.89 meters squared.  Data Unavailable Comment: Data Unavailable   No LMP recorded. Patient is postmenopausal.  Allergies reviewed: Yes  Medications reviewed: Yes    Medications: MEDICATION REFILLS NEEDED TODAY. Provider was notified.  Pharmacy name entered into [facility]:    [id number] [place], [place] - [address] AT [facility] [address]    Frailty Screening:   Is the patient here for a new oncology consult visit in cancer care? 2. No      Clinical concerns: Dizziness, pain      Carolin Biswas RN                 Again, thank you for allowing me to participate in the care of your patient.        Sincerely,        Man Barreto MD  "

## 2024-08-25 LAB
ABO/RH(D): NORMAL
ANTIBODY SCREEN: NEGATIVE
SPECIMEN EXPIRATION DATE: NORMAL

## 2024-08-26 ENCOUNTER — INFUSION THERAPY VISIT (OUTPATIENT)
Dept: INFUSION THERAPY | Facility: HOSPITAL | Age: 59
End: 2024-08-26
Attending: INTERNAL MEDICINE
Payer: COMMERCIAL

## 2024-08-26 VITALS
RESPIRATION RATE: 16 BRPM | SYSTOLIC BLOOD PRESSURE: 127 MMHG | TEMPERATURE: 98.1 F | HEART RATE: 65 BPM | DIASTOLIC BLOOD PRESSURE: 79 MMHG | OXYGEN SATURATION: 95 %

## 2024-08-26 DIAGNOSIS — R31.29 MICROSCOPIC HEMATURIA: ICD-10-CM

## 2024-08-26 DIAGNOSIS — C7B.8 NEUROENDOCRINE CARCINOMA METASTATIC TO LIVER (H): Primary | ICD-10-CM

## 2024-08-26 DIAGNOSIS — C7A.8 NEUROENDOCRINE CARCINOMA METASTATIC TO LIVER (H): Primary | ICD-10-CM

## 2024-08-26 LAB
BLD PROD TYP BPU: NORMAL
BLOOD COMPONENT TYPE: NORMAL
CODING SYSTEM: NORMAL
CROSSMATCH: NORMAL
ISSUE DATE AND TIME: NORMAL
UNIT ABO/RH: NORMAL
UNIT NUMBER: NORMAL
UNIT STATUS: NORMAL
UNIT TYPE ISBT: 6200

## 2024-08-26 PROCEDURE — 86923 COMPATIBILITY TEST ELECTRIC: CPT | Performed by: INTERNAL MEDICINE

## 2024-08-26 PROCEDURE — 96365 THER/PROPH/DIAG IV INF INIT: CPT

## 2024-08-26 PROCEDURE — 258N000003 HC RX IP 258 OP 636: Performed by: INTERNAL MEDICINE

## 2024-08-26 PROCEDURE — P9016 RBC LEUKOCYTES REDUCED: HCPCS | Performed by: INTERNAL MEDICINE

## 2024-08-26 PROCEDURE — 36430 TRANSFUSION BLD/BLD COMPNT: CPT

## 2024-08-26 PROCEDURE — 250N000011 HC RX IP 250 OP 636: Performed by: INTERNAL MEDICINE

## 2024-08-26 RX ORDER — HEPARIN SODIUM (PORCINE) LOCK FLUSH IV SOLN 100 UNIT/ML 100 UNIT/ML
5 SOLUTION INTRAVENOUS
Status: DISCONTINUED | OUTPATIENT
Start: 2024-08-26 | End: 2024-08-26 | Stop reason: HOSPADM

## 2024-08-26 RX ORDER — HEPARIN SODIUM,PORCINE 10 UNIT/ML
5-20 VIAL (ML) INTRAVENOUS DAILY PRN
Status: CANCELLED | OUTPATIENT
Start: 2024-08-26

## 2024-08-26 RX ORDER — HEPARIN SODIUM (PORCINE) LOCK FLUSH IV SOLN 100 UNIT/ML 100 UNIT/ML
5 SOLUTION INTRAVENOUS
OUTPATIENT
Start: 2024-08-26

## 2024-08-26 RX ORDER — HEPARIN SODIUM (PORCINE) LOCK FLUSH IV SOLN 100 UNIT/ML 100 UNIT/ML
5 SOLUTION INTRAVENOUS
Status: CANCELLED | OUTPATIENT
Start: 2024-08-26

## 2024-08-26 RX ORDER — HEPARIN SODIUM,PORCINE 10 UNIT/ML
5-20 VIAL (ML) INTRAVENOUS DAILY PRN
Status: DISCONTINUED | OUTPATIENT
Start: 2024-08-26 | End: 2024-08-26 | Stop reason: HOSPADM

## 2024-08-26 RX ORDER — DIPHENHYDRAMINE HYDROCHLORIDE 50 MG/ML
50 INJECTION INTRAMUSCULAR; INTRAVENOUS
Start: 2024-08-26

## 2024-08-26 RX ORDER — EPINEPHRINE 1 MG/ML
0.3 INJECTION, SOLUTION INTRAMUSCULAR; SUBCUTANEOUS EVERY 5 MIN PRN
OUTPATIENT
Start: 2024-08-26

## 2024-08-26 RX ORDER — HEPARIN SODIUM,PORCINE 10 UNIT/ML
5-20 VIAL (ML) INTRAVENOUS DAILY PRN
OUTPATIENT
Start: 2024-08-26

## 2024-08-26 RX ORDER — EPINEPHRINE 1 MG/ML
0.3 INJECTION, SOLUTION INTRAMUSCULAR; SUBCUTANEOUS EVERY 5 MIN PRN
Status: CANCELLED | OUTPATIENT
Start: 2024-08-26

## 2024-08-26 RX ORDER — DIPHENHYDRAMINE HYDROCHLORIDE 50 MG/ML
50 INJECTION INTRAMUSCULAR; INTRAVENOUS
Status: CANCELLED
Start: 2024-08-26

## 2024-08-26 RX ADMIN — FOSAPREPITANT 150 MG: 150 INJECTION, POWDER, LYOPHILIZED, FOR SOLUTION INTRAVENOUS at 09:48

## 2024-08-26 RX ADMIN — SODIUM CHLORIDE 250 ML: 9 INJECTION, SOLUTION INTRAVENOUS at 09:30

## 2024-08-26 RX ADMIN — HEPARIN 5 ML: 100 SYRINGE at 13:07

## 2024-08-26 NOTE — PROGRESS NOTES
Infusion Nursing Note:  Louise Corado presents today for Blood transfusion and fluids.    Patient seen by provider today: No   present during visit today: Not Applicable.    Note: Tolerated infusion well today, offers no complaints.      Intravenous Access:  Implanted Port.    Treatment Conditions:  Results reviewed, labs MET treatment parameters, ok to proceed with treatment.      Post Infusion Assessment:  Patient tolerated infusion without incident.  Blood return noted pre and post infusion.  Site patent and intact, free from redness, edema or discomfort.  No evidence of extravasations.  Access discontinued per protocol.       Discharge Plan:   Patient and/or family verbalized understanding of discharge instructions and all questions answered.      Roslyn Echavarria RN

## 2024-08-28 ENCOUNTER — PATIENT OUTREACH (OUTPATIENT)
Dept: ONCOLOGY | Facility: HOSPITAL | Age: 59
End: 2024-08-28
Payer: COMMERCIAL

## 2024-08-28 ENCOUNTER — INFUSION THERAPY VISIT (OUTPATIENT)
Dept: INFUSION THERAPY | Facility: HOSPITAL | Age: 59
End: 2024-08-28
Attending: INTERNAL MEDICINE
Payer: COMMERCIAL

## 2024-08-28 VITALS
HEART RATE: 65 BPM | TEMPERATURE: 98.3 F | RESPIRATION RATE: 18 BRPM | SYSTOLIC BLOOD PRESSURE: 94 MMHG | OXYGEN SATURATION: 97 % | DIASTOLIC BLOOD PRESSURE: 55 MMHG

## 2024-08-28 DIAGNOSIS — C7B.8 NEUROENDOCRINE CARCINOMA METASTATIC TO LIVER (H): Primary | ICD-10-CM

## 2024-08-28 DIAGNOSIS — C7A.8 NEUROENDOCRINE CARCINOMA METASTATIC TO LIVER (H): Primary | ICD-10-CM

## 2024-08-28 PROCEDURE — 96360 HYDRATION IV INFUSION INIT: CPT

## 2024-08-28 PROCEDURE — 258N000003 HC RX IP 258 OP 636: Performed by: INTERNAL MEDICINE

## 2024-08-28 RX ORDER — HEPARIN SODIUM (PORCINE) LOCK FLUSH IV SOLN 100 UNIT/ML 100 UNIT/ML
5 SOLUTION INTRAVENOUS
Status: CANCELLED | OUTPATIENT
Start: 2024-08-28

## 2024-08-28 RX ADMIN — SODIUM CHLORIDE 1000 ML: 9 INJECTION, SOLUTION INTRAVENOUS at 12:17

## 2024-08-28 NOTE — PROGRESS NOTES
[facility]: Cancer Care                                                                                          Called patient to let her know that medical records mailed out the copies of her medical records to her home address on 8/27. The patient stated that medical records had already mailed just one copy but hopefully the other two copies were mailed out yesterday. I asked patient how she was feeling and she said she feels a lot better and does not feel dizzy anymore.     Signature:  Carolin Biswas RN

## 2024-08-28 NOTE — PROGRESS NOTES
Infusion Nursing Note:  Louise Corado presents today for IV fluids.    Patient seen by provider today: No   present during visit today: Not Applicable.    Note: Louise comes in today for IV fluids. I went over the plan of care and she verbalized understanding. Louise said that she is feeling pretty good. Louise also did not want to use her port. She wanted an IV instead. PIV placed with great blood return but could not fully advance. Pt did not want me to place another IV because we had great blood return and was not hurting. IV fluids started but 1/2 way through the IV did infiltrate and the IV had to be restarted. Second IV had great blood return throughout. The rest of the fluids were given and then the IV was removed and covered with gauze and coban. Louise left ambulatory to the [facility] in stable condition.      Intravenous Access:  Peripheral IV placed x2.    Treatment Conditions:  Not Applicable.      Post Infusion Assessment:  Patient tolerated infusion without incident.  Blood return noted pre and post infusion.  Site patent and intact, free from redness, edema or discomfort.  No evidence of extravasations.  Access discontinued per protocol.       Discharge Plan:   [facility]    Patient discharged in stable condition.  Departure Mode: Ambulatory.      KUSUM PERDUE RN

## 2024-08-29 LAB
PATH REPORT.COMMENTS IMP SPEC: NORMAL
PATH REPORT.FINAL DX SPEC: NORMAL
PATH REPORT.GROSS SPEC: NORMAL
PATH REPORT.MICROSCOPIC SPEC OTHER STN: NORMAL
PATH REPORT.RELEVANT HX SPEC: NORMAL

## 2024-09-04 ENCOUNTER — APPOINTMENT (OUTPATIENT)
Dept: CT IMAGING | Facility: CLINIC | Age: 59
End: 2024-09-04
Attending: STUDENT IN AN ORGANIZED HEALTH CARE EDUCATION/TRAINING PROGRAM
Payer: COMMERCIAL

## 2024-09-04 ENCOUNTER — TELEPHONE (OUTPATIENT)
Dept: ONCOLOGY | Facility: HOSPITAL | Age: 59
End: 2024-09-04
Payer: COMMERCIAL

## 2024-09-04 ENCOUNTER — HOSPITAL ENCOUNTER (OUTPATIENT)
Facility: CLINIC | Age: 59
Setting detail: OBSERVATION
Discharge: HOME | End: 2024-09-06
Attending: STUDENT IN AN ORGANIZED HEALTH CARE EDUCATION/TRAINING PROGRAM | Admitting: STUDENT IN AN ORGANIZED HEALTH CARE EDUCATION/TRAINING PROGRAM
Payer: COMMERCIAL

## 2024-09-04 DIAGNOSIS — C7A.8 NEUROENDOCRINE CARCINOMA METASTATIC TO LIVER (H): Primary | ICD-10-CM

## 2024-09-04 DIAGNOSIS — F43.22 ADJUSTMENT DISORDER WITH ANXIOUS MOOD: ICD-10-CM

## 2024-09-04 DIAGNOSIS — R11.2 INTRACTABLE NAUSEA AND VOMITING: ICD-10-CM

## 2024-09-04 DIAGNOSIS — C7B.8 NEUROENDOCRINE CARCINOMA METASTATIC TO LIVER (H): Primary | ICD-10-CM

## 2024-09-04 DIAGNOSIS — K59.00 CONSTIPATION, UNSPECIFIED CONSTIPATION TYPE: ICD-10-CM

## 2024-09-04 DIAGNOSIS — R11.2 NAUSEA AND VOMITING, UNSPECIFIED VOMITING TYPE: ICD-10-CM

## 2024-09-04 LAB
ALBUMIN SERPL BCG-MCNC: 3.9 G/DL (ref 3.5–5.2)
ALBUMIN UR-MCNC: 30 MG/DL
ALP SERPL-CCNC: 106 U/L (ref 40–150)
ALT SERPL W P-5'-P-CCNC: 6 U/L (ref 0–50)
ANION GAP SERPL CALCULATED.3IONS-SCNC: 16 MMOL/L (ref 7–15)
APPEARANCE UR: ABNORMAL
AST SERPL W P-5'-P-CCNC: 14 U/L (ref 0–45)
ATRIAL RATE - MUSE: 64 BPM
BASOPHILS # BLD AUTO: 0 10E3/UL (ref 0–0.2)
BASOPHILS NFR BLD AUTO: 0 %
BILIRUB DIRECT SERPL-MCNC: <0.2 MG/DL (ref 0–0.3)
BILIRUB SERPL-MCNC: 0.4 MG/DL
BILIRUB UR QL STRIP: NEGATIVE
BUN SERPL-MCNC: 8.4 MG/DL (ref 6–20)
CALCIUM SERPL-MCNC: 8.9 MG/DL (ref 8.8–10.4)
CHLORIDE SERPL-SCNC: 97 MMOL/L (ref 98–107)
COLOR UR AUTO: YELLOW
CREAT SERPL-MCNC: 0.56 MG/DL (ref 0.51–0.95)
DIASTOLIC BLOOD PRESSURE - MUSE: NORMAL MMHG
EGFRCR SERPLBLD CKD-EPI 2021: >90 ML/MIN/1.73M2
EOSINOPHIL # BLD AUTO: 0 10E3/UL (ref 0–0.7)
EOSINOPHIL NFR BLD AUTO: 0 %
ERYTHROCYTE [DISTWIDTH] IN BLOOD BY AUTOMATED COUNT: 19.7 % (ref 10–15)
GLUCOSE SERPL-MCNC: 145 MG/DL (ref 70–99)
GLUCOSE UR STRIP-MCNC: NEGATIVE MG/DL
HCO3 SERPL-SCNC: 26 MMOL/L (ref 22–29)
HCT VFR BLD AUTO: 35.4 % (ref 35–47)
HGB BLD-MCNC: 11.5 G/DL (ref 11.7–15.7)
HGB UR QL STRIP: NEGATIVE
HYALINE CASTS: 2 /LPF
IMM GRANULOCYTES # BLD: 0 10E3/UL
IMM GRANULOCYTES NFR BLD: 1 %
INTERPRETATION ECG - MUSE: NORMAL
KETONES UR STRIP-MCNC: 40 MG/DL
LEUKOCYTE ESTERASE UR QL STRIP: ABNORMAL
LIPASE SERPL-CCNC: 19 U/L (ref 13–60)
LYMPHOCYTES # BLD AUTO: 0.6 10E3/UL (ref 0.8–5.3)
LYMPHOCYTES NFR BLD AUTO: 9 %
MCH RBC QN AUTO: 28.5 PG (ref 26.5–33)
MCHC RBC AUTO-ENTMCNC: 32.5 G/DL (ref 31.5–36.5)
MCV RBC AUTO: 88 FL (ref 78–100)
MONOCYTES # BLD AUTO: 0.5 10E3/UL (ref 0–1.3)
MONOCYTES NFR BLD AUTO: 8 %
MUCOUS THREADS #/AREA URNS LPF: PRESENT /LPF
NEUTROPHILS # BLD AUTO: 5.2 10E3/UL (ref 1.6–8.3)
NEUTROPHILS NFR BLD AUTO: 83 %
NITRATE UR QL: NEGATIVE
NRBC # BLD AUTO: 0 10E3/UL
NRBC BLD AUTO-RTO: 0 /100
P AXIS - MUSE: 14 DEGREES
PH UR STRIP: 6.5 [PH] (ref 5–7)
PLATELET # BLD AUTO: 326 10E3/UL (ref 150–450)
POTASSIUM SERPL-SCNC: 4.5 MMOL/L (ref 3.4–5.3)
PR INTERVAL - MUSE: 146 MS
PROT SERPL-MCNC: 7.4 G/DL (ref 6.4–8.3)
QRS DURATION - MUSE: 80 MS
QT - MUSE: 418 MS
QTC - MUSE: 431 MS
R AXIS - MUSE: -16 DEGREES
RBC # BLD AUTO: 4.04 10E6/UL (ref 3.8–5.2)
RBC URINE: 3 /HPF
SODIUM SERPL-SCNC: 139 MMOL/L (ref 135–145)
SP GR UR STRIP: 1.02 (ref 1–1.03)
SQUAMOUS EPITHELIAL: 15 /HPF
SYSTOLIC BLOOD PRESSURE - MUSE: NORMAL MMHG
T AXIS - MUSE: 6 DEGREES
TROPONIN T SERPL HS-MCNC: 8 NG/L
UROBILINOGEN UR STRIP-MCNC: <2 MG/DL
VENTRICULAR RATE- MUSE: 64 BPM
WBC # BLD AUTO: 6.3 10E3/UL (ref 4–11)
WBC URINE: 21 /HPF

## 2024-09-04 PROCEDURE — 96376 TX/PRO/DX INJ SAME DRUG ADON: CPT

## 2024-09-04 PROCEDURE — G0378 HOSPITAL OBSERVATION PER HR: HCPCS

## 2024-09-04 PROCEDURE — 96375 TX/PRO/DX INJ NEW DRUG ADDON: CPT

## 2024-09-04 PROCEDURE — 258N000003 HC RX IP 258 OP 636

## 2024-09-04 PROCEDURE — 250N000011 HC RX IP 250 OP 636

## 2024-09-04 PROCEDURE — 96374 THER/PROPH/DIAG INJ IV PUSH: CPT

## 2024-09-04 PROCEDURE — 93005 ELECTROCARDIOGRAM TRACING: CPT

## 2024-09-04 PROCEDURE — 93005 ELECTROCARDIOGRAM TRACING: CPT | Performed by: STUDENT IN AN ORGANIZED HEALTH CARE EDUCATION/TRAINING PROGRAM

## 2024-09-04 PROCEDURE — 250N000011 HC RX IP 250 OP 636: Performed by: STUDENT IN AN ORGANIZED HEALTH CARE EDUCATION/TRAINING PROGRAM

## 2024-09-04 PROCEDURE — 83690 ASSAY OF LIPASE: CPT | Performed by: STUDENT IN AN ORGANIZED HEALTH CARE EDUCATION/TRAINING PROGRAM

## 2024-09-04 PROCEDURE — 82565 ASSAY OF CREATININE: CPT | Performed by: STUDENT IN AN ORGANIZED HEALTH CARE EDUCATION/TRAINING PROGRAM

## 2024-09-04 PROCEDURE — 87086 URINE CULTURE/COLONY COUNT: CPT | Performed by: STUDENT IN AN ORGANIZED HEALTH CARE EDUCATION/TRAINING PROGRAM

## 2024-09-04 PROCEDURE — 74177 CT ABD & PELVIS W/CONTRAST: CPT

## 2024-09-04 PROCEDURE — 99222 1ST HOSP IP/OBS MODERATE 55: CPT | Mod: GC

## 2024-09-04 PROCEDURE — 96372 THER/PROPH/DIAG INJ SC/IM: CPT

## 2024-09-04 PROCEDURE — 81001 URINALYSIS AUTO W/SCOPE: CPT | Performed by: STUDENT IN AN ORGANIZED HEALTH CARE EDUCATION/TRAINING PROGRAM

## 2024-09-04 PROCEDURE — 96361 HYDRATE IV INFUSION ADD-ON: CPT

## 2024-09-04 PROCEDURE — 36415 COLL VENOUS BLD VENIPUNCTURE: CPT | Performed by: STUDENT IN AN ORGANIZED HEALTH CARE EDUCATION/TRAINING PROGRAM

## 2024-09-04 PROCEDURE — 84484 ASSAY OF TROPONIN QUANT: CPT | Performed by: STUDENT IN AN ORGANIZED HEALTH CARE EDUCATION/TRAINING PROGRAM

## 2024-09-04 PROCEDURE — 85049 AUTOMATED PLATELET COUNT: CPT | Performed by: STUDENT IN AN ORGANIZED HEALTH CARE EDUCATION/TRAINING PROGRAM

## 2024-09-04 PROCEDURE — 99285 EMERGENCY DEPT VISIT HI MDM: CPT | Mod: 25

## 2024-09-04 PROCEDURE — 258N000003 HC RX IP 258 OP 636: Performed by: FAMILY MEDICINE

## 2024-09-04 PROCEDURE — 258N000003 HC RX IP 258 OP 636: Performed by: STUDENT IN AN ORGANIZED HEALTH CARE EDUCATION/TRAINING PROGRAM

## 2024-09-04 PROCEDURE — 250N000011 HC RX IP 250 OP 636: Performed by: FAMILY MEDICINE

## 2024-09-04 PROCEDURE — 82040 ASSAY OF SERUM ALBUMIN: CPT | Performed by: STUDENT IN AN ORGANIZED HEALTH CARE EDUCATION/TRAINING PROGRAM

## 2024-09-04 PROCEDURE — 82374 ASSAY BLOOD CARBON DIOXIDE: CPT | Performed by: STUDENT IN AN ORGANIZED HEALTH CARE EDUCATION/TRAINING PROGRAM

## 2024-09-04 RX ORDER — SIMETHICONE 125 MG
125 TABLET,CHEWABLE ORAL 4 TIMES DAILY PRN
COMMUNITY

## 2024-09-04 RX ORDER — ONDANSETRON 2 MG/ML
4 INJECTION INTRAMUSCULAR; INTRAVENOUS ONCE
Status: COMPLETED | OUTPATIENT
Start: 2024-09-04 | End: 2024-09-04

## 2024-09-04 RX ORDER — HYDROMORPHONE HYDROCHLORIDE 1 MG/ML
0.5 INJECTION, SOLUTION INTRAMUSCULAR; INTRAVENOUS; SUBCUTANEOUS EVERY 30 MIN PRN
Status: COMPLETED | OUTPATIENT
Start: 2024-09-04 | End: 2024-09-04

## 2024-09-04 RX ORDER — AMOXICILLIN 250 MG
1 CAPSULE ORAL 2 TIMES DAILY PRN
Status: DISCONTINUED | OUTPATIENT
Start: 2024-09-04 | End: 2024-09-06 | Stop reason: HOSPADM

## 2024-09-04 RX ORDER — ONDANSETRON 4 MG/1
4 TABLET, ORALLY DISINTEGRATING ORAL EVERY 6 HOURS PRN
Status: DISCONTINUED | OUTPATIENT
Start: 2024-09-04 | End: 2024-09-06 | Stop reason: HOSPADM

## 2024-09-04 RX ORDER — PROCHLORPERAZINE 25 MG
25 SUPPOSITORY, RECTAL RECTAL EVERY 12 HOURS PRN
Status: DISCONTINUED | OUTPATIENT
Start: 2024-09-04 | End: 2024-09-06 | Stop reason: HOSPADM

## 2024-09-04 RX ORDER — HYDROMORPHONE HYDROCHLORIDE 1 MG/ML
0.5 INJECTION, SOLUTION INTRAMUSCULAR; INTRAVENOUS; SUBCUTANEOUS
Status: DISCONTINUED | OUTPATIENT
Start: 2024-09-04 | End: 2024-09-05

## 2024-09-04 RX ORDER — IOPAMIDOL 755 MG/ML
90 INJECTION, SOLUTION INTRAVASCULAR ONCE
Status: COMPLETED | OUTPATIENT
Start: 2024-09-04 | End: 2024-09-04

## 2024-09-04 RX ORDER — HYDROMORPHONE HYDROCHLORIDE 1 MG/ML
0.3 INJECTION, SOLUTION INTRAMUSCULAR; INTRAVENOUS; SUBCUTANEOUS
Status: DISCONTINUED | OUTPATIENT
Start: 2024-09-04 | End: 2024-09-05

## 2024-09-04 RX ORDER — POLYETHYLENE GLYCOL 3350 17 G/17G
17 POWDER, FOR SOLUTION ORAL 2 TIMES DAILY PRN
Status: DISCONTINUED | OUTPATIENT
Start: 2024-09-04 | End: 2024-09-06 | Stop reason: HOSPADM

## 2024-09-04 RX ORDER — NALOXONE HYDROCHLORIDE 0.4 MG/ML
0.4 INJECTION, SOLUTION INTRAMUSCULAR; INTRAVENOUS; SUBCUTANEOUS
Status: DISCONTINUED | OUTPATIENT
Start: 2024-09-04 | End: 2024-09-06 | Stop reason: HOSPADM

## 2024-09-04 RX ORDER — ENOXAPARIN SODIUM 100 MG/ML
40 INJECTION SUBCUTANEOUS EVERY 24 HOURS
Status: DISCONTINUED | OUTPATIENT
Start: 2024-09-04 | End: 2024-09-06 | Stop reason: HOSPADM

## 2024-09-04 RX ORDER — ONDANSETRON 2 MG/ML
4 INJECTION INTRAMUSCULAR; INTRAVENOUS EVERY 6 HOURS PRN
Status: DISCONTINUED | OUTPATIENT
Start: 2024-09-04 | End: 2024-09-06 | Stop reason: HOSPADM

## 2024-09-04 RX ORDER — ONDANSETRON 2 MG/ML
4 INJECTION INTRAMUSCULAR; INTRAVENOUS EVERY 30 MIN PRN
Status: DISCONTINUED | OUTPATIENT
Start: 2024-09-04 | End: 2024-09-04

## 2024-09-04 RX ORDER — ACETAMINOPHEN 325 MG/1
325-650 TABLET ORAL EVERY 6 HOURS PRN
Status: DISCONTINUED | OUTPATIENT
Start: 2024-09-04 | End: 2024-09-06 | Stop reason: HOSPADM

## 2024-09-04 RX ORDER — LORAZEPAM 0.5 MG/1
.5-1 TABLET ORAL EVERY 6 HOURS PRN
Status: DISCONTINUED | OUTPATIENT
Start: 2024-09-04 | End: 2024-09-06 | Stop reason: HOSPADM

## 2024-09-04 RX ORDER — NALOXONE HYDROCHLORIDE 0.4 MG/ML
0.2 INJECTION, SOLUTION INTRAMUSCULAR; INTRAVENOUS; SUBCUTANEOUS
Status: DISCONTINUED | OUTPATIENT
Start: 2024-09-04 | End: 2024-09-06 | Stop reason: HOSPADM

## 2024-09-04 RX ORDER — PROCHLORPERAZINE MALEATE 10 MG
10 TABLET ORAL EVERY 6 HOURS PRN
Status: DISCONTINUED | OUTPATIENT
Start: 2024-09-04 | End: 2024-09-06 | Stop reason: HOSPADM

## 2024-09-04 RX ORDER — PROMETHAZINE HYDROCHLORIDE 12.5 MG/1
12.5-25 TABLET ORAL EVERY 6 HOURS PRN
Status: DISCONTINUED | OUTPATIENT
Start: 2024-09-04 | End: 2024-09-06 | Stop reason: HOSPADM

## 2024-09-04 RX ORDER — AMOXICILLIN 250 MG
2 CAPSULE ORAL 2 TIMES DAILY PRN
Status: DISCONTINUED | OUTPATIENT
Start: 2024-09-04 | End: 2024-09-06 | Stop reason: HOSPADM

## 2024-09-04 RX ORDER — SODIUM CHLORIDE 9 MG/ML
INJECTION, SOLUTION INTRAVENOUS CONTINUOUS
Status: DISCONTINUED | OUTPATIENT
Start: 2024-09-04 | End: 2024-09-06

## 2024-09-04 RX ORDER — SIMETHICONE 125 MG
125 TABLET,CHEWABLE ORAL 4 TIMES DAILY PRN
Status: DISCONTINUED | OUTPATIENT
Start: 2024-09-04 | End: 2024-09-06 | Stop reason: HOSPADM

## 2024-09-04 RX ORDER — LIDOCAINE/PRILOCAINE 2.5 %-2.5%
CREAM (GRAM) TOPICAL DAILY PRN
Status: DISCONTINUED | OUTPATIENT
Start: 2024-09-04 | End: 2024-09-06 | Stop reason: HOSPADM

## 2024-09-04 RX ADMIN — HYDROMORPHONE HYDROCHLORIDE 0.5 MG: 1 INJECTION, SOLUTION INTRAMUSCULAR; INTRAVENOUS; SUBCUTANEOUS at 14:23

## 2024-09-04 RX ADMIN — SODIUM CHLORIDE: 9 INJECTION, SOLUTION INTRAVENOUS at 19:15

## 2024-09-04 RX ADMIN — HYDROMORPHONE HYDROCHLORIDE 0.3 MG: 1 INJECTION, SOLUTION INTRAMUSCULAR; INTRAVENOUS; SUBCUTANEOUS at 21:39

## 2024-09-04 RX ADMIN — HYDROMORPHONE HYDROCHLORIDE 1 MG: 1 INJECTION, SOLUTION INTRAMUSCULAR; INTRAVENOUS; SUBCUTANEOUS at 12:14

## 2024-09-04 RX ADMIN — ONDANSETRON 4 MG: 2 INJECTION INTRAMUSCULAR; INTRAVENOUS at 12:39

## 2024-09-04 RX ADMIN — ENOXAPARIN SODIUM 40 MG: 100 INJECTION SUBCUTANEOUS at 19:14

## 2024-09-04 RX ADMIN — HYDROMORPHONE HYDROCHLORIDE 0.5 MG: 1 INJECTION, SOLUTION INTRAMUSCULAR; INTRAVENOUS; SUBCUTANEOUS at 17:15

## 2024-09-04 RX ADMIN — IOPAMIDOL 90 ML: 755 INJECTION, SOLUTION INTRAVENOUS at 13:05

## 2024-09-04 RX ADMIN — HYDROMORPHONE HYDROCHLORIDE 0.5 MG: 1 INJECTION, SOLUTION INTRAMUSCULAR; INTRAVENOUS; SUBCUTANEOUS at 15:16

## 2024-09-04 RX ADMIN — SODIUM CHLORIDE 500 ML: 9 INJECTION, SOLUTION INTRAVENOUS at 13:37

## 2024-09-04 RX ADMIN — ONDANSETRON 4 MG: 2 INJECTION INTRAMUSCULAR; INTRAVENOUS at 21:44

## 2024-09-04 RX ADMIN — PROMETHAZINE HYDROCHLORIDE 12.5 MG: 25 INJECTION INTRAMUSCULAR; INTRAVENOUS at 17:17

## 2024-09-04 RX ADMIN — ONDANSETRON 4 MG: 2 INJECTION INTRAMUSCULAR; INTRAVENOUS at 14:39

## 2024-09-04 ASSESSMENT — ACTIVITIES OF DAILY LIVING (ADL)
ADLS_ACUITY_SCORE: 35

## 2024-09-04 ASSESSMENT — COLUMBIA-SUICIDE SEVERITY RATING SCALE - C-SSRS
6. HAVE YOU EVER DONE ANYTHING, STARTED TO DO ANYTHING, OR PREPARED TO DO ANYTHING TO END YOUR LIFE?: NO
2. HAVE YOU ACTUALLY HAD ANY THOUGHTS OF KILLING YOURSELF IN THE PAST MONTH?: NO
1. IN THE PAST MONTH, HAVE YOU WISHED YOU WERE DEAD OR WISHED YOU COULD GO TO SLEEP AND NOT WAKE UP?: NO

## 2024-09-04 NOTE — MEDICATION SCRIBE - ADMISSION MEDICATION HISTORY
Medication Scribe Admission Medication History    Admission medication history is complete. The information provided in this note is only as accurate as the sources available at the time of the update.    Information Source(s): Patient, Clinic records, and CareEverywhere/SureScripts via in-person    Pertinent Information: Patient reports the only medication taken today so far was 30 mg of morphine CR around 1100. Recent increase of morphine dose on 8/12 from 15 mg BID to 30 mg BID. When in need of a refill, the new SIG will reflect the higher dose.     Changes made to PTA medication list:  Added:   Simethicone 125 mg  Deleted:   Cefdinir 300 mg, done 8/4  Dexamethasone 4 mg, done 8/14  Changed:   Morphine 15 mg CR, 1 x BID --> 2 x BID     Allergies reviewed with patient and updates made in EHR: yes    Medication History Completed By: Scotty Morrison 9/4/2024 4:57 PM    Current Facility-Administered Medications for the 9/4/24 encounter (Hospital Encounter)   Medication    sodium chloride (PF) 0.9% PF flush 10-20 mL    sodium chloride (PF) 0.9% PF flush 10-20 mL     PTA Med List   Medication Sig Note Last Dose    acetaminophen (TYLENOL) 325 MG tablet Take 325-650 mg by mouth every 6 hours as needed for mild pain  More than a month at PRN    DOCUSATE SODIUM PO Take 1 capsule by mouth 3 times daily as needed for constipation  9/3/2024 at HS    lidocaine-prilocaine (EMLA) 2.5-2.5 % external cream Apply topically as needed for moderate pain  Past Week at PRN port access    LORazepam (ATIVAN) 0.5 MG tablet Take 1-2 tablets (0.5-1 mg) by mouth every 6 hours as needed for anxiety or sleep  Past Week at 9/2 HS    metFORMIN (GLUCOPHAGE XR) 500 MG 24 hr tablet Take 1 tablet (500 mg) by mouth daily (with dinner)  9/3/2024 at PM    morphine (MS CONTIN) 15 MG CR tablet Take 1 tablet (15 mg) by mouth every 12 hours. maximum 2 tablet(s) per day 9/4/2024: Increased to 30 mg BID on 8/12/2024 9/4/2024 at 1100; 30 mg    naloxone  (NARCAN) 4 MG/0.1ML nasal spray Spray 1 spray (4 mg) into one nostril alternating nostrils as needed for opioid reversal every 2-3 minutes until assistance arrives  never    ondansetron (ZOFRAN) 8 MG tablet Take 1 tablet (8 mg) by mouth every 6 hours as needed for nausea (vomiting)  Past Month at PRN, not for several days    oxyCODONE (ROXICODONE) 5 MG tablet Take 3-4 tablets (15-20 mg) by mouth every 4 hours as needed for pain.  9/3/2024 at PM; 15 mg    prochlorperazine (COMPAZINE) 10 MG tablet Take 1 tablet (10 mg) by mouth every 6 hours as needed for nausea or vomiting  More than a month at PRN    promethazine (PHENERGAN) 12.5 MG tablet Take 1-2 tablets (12.5-25 mg) by mouth every 6 hours as needed for nausea.  New Rx, has not taken any yet    simethicone (MYLICON) 125 MG chewable tablet Take 125 mg by mouth 4 times daily as needed for intestinal gas.  9/4/2024 at AM

## 2024-09-04 NOTE — ED NOTES
"ED SIGNOUT  Date/Time:9/4/2024 3:41 PM    ED Course/MDM:    3:30 PM  Signout accepted from Dr. Patel.  Prior records were reviewed.  Labs, x-ray, CT, EKG from this visit are reviewed.  Patient with peritoneal carcinomatosis comes in with abdominal pain, nausea, and vomiting.  Radiology interpretation of CT with ileitis but no obstruction, extensive metastatic disease, labs independently interpreted by me with questionable UTI although quite contaminated, normal CBC, normal hepatic panel, normal basic panel other than slightly elevated anion gap.  Patient given additional nausea medicine and anticipate discharge if pain and nausea are well-controlled  3:44 PM patient tolerated oral intake and stable for discharge  4:02 PM prior to discharge, patient did vomit.  Will plan to admit.  4:28 PM patient rechecked, agreeable to plan for admission, currently comfortable.      I discussed with patient the utility, limitations and findings of the exam/interventions/studies done during this visit as well as the list of differential diagnosis and symptoms to monitor/return to ER for.  Additional verbal discharge instructions were provided.     DIAGNOSIS  1. 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 Nausea and vomiting, unspecified vomiting type    3. Intractable nausea and vomiting        New Prescriptions    No medications on file       HPI    Briefly, this is a 59-year-old female with peritoneal carcinomatosis and metastatic neuroendocrine cancer on chemotherapy presents with abdominal pain, nausea, and vomiting    Physical Exam:  BP (!) 142/77   Pulse 67   Temp 97.8  F (36.6  C) (Oral)   Resp 12   Ht 1.6 m (5' 3\")   Wt 77.1 kg (170 lb)   SpO2 93%   BMI 30.11 kg/m    Physical Exam  Vitals and nursing note reviewed.   Constitutional:       Appearance: Normal appearance.   HENT:      Head: Normocephalic and atraumatic.      Right Ear: External ear normal.      Left Ear: External ear normal.      Nose: Nose " normal.   Eyes:      Extraocular Movements: Extraocular movements intact.      Conjunctiva/sclera: Conjunctivae normal.   Pulmonary:      Effort: Pulmonary effort is normal.   Musculoskeletal:         General: Normal range of motion.      Cervical back: Normal range of motion.      Right lower leg: No edema.      Left lower leg: No edema.   Skin:     General: Skin is warm and dry.   Neurological:      General: No focal deficit present.      Mental Status: She is alert and oriented to person, place, and time. Mental status is at baseline.   Psychiatric:         Mood and Affect: Mood normal.         Behavior: Behavior normal.         Thought Content: Thought content normal.          Results for orders placed or performed during the hospital encounter of 09/04/24   CT Abdomen Pelvis w Contrast    Impression    IMPRESSION:     1.  Ileitis involving a long segment of the distal ileum, including the portion of the terminal ileum involved by the central mesenteric mass. No evidence of resulting bowel obstruction.    2.  Extensive metastatic disease including mesenteric mike metastases, peritoneal carcinomatosis, liver metastases, and bone metastases have not significantly changed since 8/1/2024.     3.  Small amount of ascites predominantly within the pelvis.    4.  Unchanged small to moderate right pleural effusion with an adjacent consolidation, likely atelectasis.    Basic metabolic panel   Result Value Ref Range    Sodium 139 135 - 145 mmol/L    Potassium 4.5 3.4 - 5.3 mmol/L    Chloride 97 (L) 98 - 107 mmol/L    Carbon Dioxide (CO2) 26 22 - 29 mmol/L    Anion Gap 16 (H) 7 - 15 mmol/L    Urea Nitrogen 8.4 6.0 - 20.0 mg/dL    Creatinine 0.56 0.51 - 0.95 mg/dL    GFR Estimate >90 >60 mL/min/1.73m2    Calcium 8.9 8.8 - 10.4 mg/dL    Glucose 145 (H) 70 - 99 mg/dL   Hepatic function panel   Result Value Ref Range    Protein Total 7.4 6.4 - 8.3 g/dL    Albumin 3.9 3.5 - 5.2 g/dL    Bilirubin Total 0.4 <=1.2 mg/dL     Alkaline Phosphatase 106 40 - 150 U/L    AST 14 0 - 45 U/L    ALT 6 0 - 50 U/L    Bilirubin Direct <0.20 0.00 - 0.30 mg/dL   UA with Microscopic reflex to Culture    Specimen: Urine, Clean Catch   Result Value Ref Range    Color Urine Yellow Colorless, Straw, Light Yellow, Yellow    Appearance Urine Turbid (A) Clear    Glucose Urine Negative Negative mg/dL    Bilirubin Urine Negative Negative    Ketones Urine 40 (A) Negative mg/dL    Specific Gravity Urine 1.024 1.001 - 1.030    Blood Urine Negative Negative    pH Urine 6.5 5.0 - 7.0    Protein Albumin Urine 30 (A) Negative mg/dL    Urobilinogen Urine <2.0 <2.0 mg/dL    Nitrite Urine Negative Negative    Leukocyte Esterase Urine 250 Gali/uL (A) Negative    Mucus Urine Present (A) None Seen /LPF    RBC Urine 3 (H) <=2 /HPF    WBC Urine 21 (H) <=5 /HPF    Squamous Epithelials Urine 15 (H) <=1 /HPF    Hyaline Casts Urine 2 <=2 /LPF   CBC with platelets and differential   Result Value Ref Range    WBC Count 6.3 4.0 - 11.0 10e3/uL    RBC Count 4.04 3.80 - 5.20 10e6/uL    Hemoglobin 11.5 (L) 11.7 - 15.7 g/dL    Hematocrit 35.4 35.0 - 47.0 %    MCV 88 78 - 100 fL    MCH 28.5 26.5 - 33.0 pg    MCHC 32.5 31.5 - 36.5 g/dL    RDW 19.7 (H) 10.0 - 15.0 %    Platelet Count 326 150 - 450 10e3/uL    % Neutrophils 83 %    % Lymphocytes 9 %    % Monocytes 8 %    % Eosinophils 0 %    % Basophils 0 %    % Immature Granulocytes 1 %    NRBCs per 100 WBC 0 <1 /100    Absolute Neutrophils 5.2 1.6 - 8.3 10e3/uL    Absolute Lymphocytes 0.6 (L) 0.8 - 5.3 10e3/uL    Absolute Monocytes 0.5 0.0 - 1.3 10e3/uL    Absolute Eosinophils 0.0 0.0 - 0.7 10e3/uL    Absolute Basophils 0.0 0.0 - 0.2 10e3/uL    Absolute Immature Granulocytes 0.0 <=0.4 10e3/uL    Absolute NRBCs 0.0 10e3/uL   Result Value Ref Range    Lipase 19 13 - 60 U/L   Result Value Ref Range    Troponin T, High Sensitivity 8 <=14 ng/L   ECG 12-LEAD WITH MUSE (LHE)   Result Value Ref Range    Systolic Blood Pressure  mmHg    Diastolic  Blood Pressure  mmHg    Ventricular Rate 64 BPM    Atrial Rate 64 BPM    GA Interval 146 ms    QRS Duration 80 ms     ms    QTc 431 ms    P Axis 14 degrees    R AXIS -16 degrees    T Axis 6 degrees    Interpretation ECG       Sinus rhythm  Normal ECG  No previous ECGs available  Confirmed by SEE ED PROVIDER NOTE FOR, ECG INTERPRETATION ([id number]),  Annmarie Smith ([id number]) on 9/4/2024 1:28:21 PM         CT Abdomen Pelvis w Contrast    Result Date: 9/4/2024  EXAM: CT ABDOMEN PELVIS W CONTRAST LOCATION: [facility] DATE: 9/4/2024 INDICATION: Lower abdominal tenderness, nausea. COMPARISON: PET/CT 8/1/2024. CT chest abdomen and pelvis 7/25/2024. TECHNIQUE: CT scan of the abdomen and pelvis was performed following injection of IV contrast. Multiplanar reformats were obtained. Dose reduction techniques were used. CONTRAST: 90 ml Isovue 370 FINDINGS: LOWER CHEST: Small to moderate right pleural effusion with an adjacent right lower lobe consolidation which likely reflects atelectasis is unchanged. Scattered prominent paraesophageal and cardiophrenic lymph nodes are also unchanged. HEPATOBILIARY: Three hepatic metastases have not significantly changed in size since the recent PET/CT; the dominant mass is within hepatic segment 5/6, contains a central hypodense area, and measures up to 7.5 cm. No definite new hepatic masses. No calcified gallstones or biliary ductal dilation. PANCREAS: Normal. SPLEEN: Normal. ADRENAL GLANDS: Normal. KIDNEYS/BLADDER: Tiny right upper pole renal cyst, which does not require follow-up. No urinary calculi or hydronephrosis. Normal bladder. BOWEL: Stomach is normal. Tiny duodenal diverticulum. Jejunum is normal in caliber. Marked wall thickening of a long segment of the distal ileum with engorgement of the surrounding vasa recta and edema within the central mesentery, compatible with ileitis. A spiculated 2.8 x 2.5 cm mesenteric mass along the serosal surface of  the terminal ileum has not significantly changed when measured similarly (3/#141). No bowel dilation or signs of obstruction. No pneumatosis or portal venous gas. Scattered noninflamed distal colonic diverticuli. LYMPH NODES: Mildly enlarged 1.2 cm right lower quadrant mesenteric node is unchanged. No newly enlarged lymph nodes. PERITONEUM/RETROPERITONEUM: Confluent irregular peritoneal nodules within the left lower quadrant (for example, 3/#120) and within the central mesentery (for example, 3/#127) have not significantly changed. Small amount of simple ascites within the pelvis.  VASCULATURE: Patent portal, splenic, and superior mesenteric veins. Mild aortic atherosclerosis. No abdominal aortic aneurysm. PELVIC ORGANS: Normal. MUSCULOSKELETAL: Unchanged sclerotic lesions within the left posterior iliac bone and left T11 vertebral body. Small nodules within the right gluteal subcutaneous fat are also unchanged (for example, 3/#170). No new or destructive bone lesions.     IMPRESSION: 1.  Ileitis involving a long segment of the distal ileum, including the portion of the terminal ileum involved by the central mesenteric mass. No evidence of resulting bowel obstruction. 2.  Extensive metastatic disease including mesenteric mike metastases, peritoneal carcinomatosis, liver metastases, and bone metastases have not significantly changed since 8/1/2024. 3.  Small amount of ascites predominantly within the pelvis. 4.  Unchanged small to moderate right pleural effusion with an adjacent consolidation, likely atelectasis.       Espinoza Ambriz M.D.  [facility]     Espinoza Ambriz MD  09/04/24 1944

## 2024-09-04 NOTE — DISCHARGE INSTRUCTIONS
Keep your appointment with Dr Neil on Tuesday for palliative care.    Your last dose of the octreotide was given August 15, 2024.      Bowel regimen:  Recommend using bisacodyl once daily AND use miralax one dose daily.  Goal is to have soft easy to pass bowel movement daily.  After 2-3 days, may increase bisacodyl to twice daily OR miralax to twice daily.    For nausea: trial of the following:  Ondansetron 4mg twice daily (7am, 3pm)  Use phenergan at noon  Use olanzapine at bedtime.    Keep pain medications as they are.

## 2024-09-04 NOTE — H&P
[facility]    History and Physical - Hospitalist Service       Date of Admission:  9/4/2024    Assessment & Plan      Louise Corado is a 59 year old female admitted on 9/4/2024. She has a history of neuroendocrine carcinoma with metastases to liver, status post recent chemotherapy course and is admitted for inability to tolerate p.o. with nausea and vomiting and abdominal pain.    ED course:  -CT showed ileitis but no obstruction, with the presence of extensive metastatic disease  -UA was turbid in appearance, with 250 leuk esterase and squamous epithelial cells present  -Urine culture pending  -EKG showing normal sinus rhythm with a ventricular rate of 64, QTc 431.  No ST segment elevations or depressions in contiguous leads.    Neuroendocrine carcinoma metastatic to liver  Nausea and vomiting  Decreased p.o. intake  Abdominal pain  -[place] to observation  -Zofran as needed for nausea  -Compazine as needed for nausea  -Advance diet as tolerated  -Pain control with as needed hydromorphone IV  -Continue PTA Tylenol as needed  -Continue PTA Emla cream  -Hold PTA morphine   -Hold PTA oxycodone   -Utilizing as needed IV hydromorphone as above for pain control, will attempt to return to p.o. prior to discharge  -naloxone as needed for opioid reversal    Constipation  -Continue PTA simethicone  -As needed senna docusate  -As needed MiraLAX    Anxiety  -continue PTA lorazepam   -continuous o2 monitoring    DM2  -Most recent A1c 6.8, controlled with metformin  -Low sliding scale insulin  -Premeal and bedtime insulin checks    Leukocyte esterase in urine analysis  -250 leukocyte esterase were present in urine analysis, with turbid appearance noted, squamous epithelial cells present.  Given nitrite negative and that she has no significant symptoms of overt urinary tract infection, will hold off initiating antibiotics.  Of note recent urine culture from 8/15/2024 was similar in appearance  "with a greater extent of leukocyte esterase positive and culture at that time grew less than 10,000 CFU per milliliter mixture of urogenital lala.        Observation Goals: -diagnostic tests and consults completed and resulted, -vital signs normal or at patient baseline, -tolerating oral intake to maintain hydration, -adequate pain control on oral analgesics, Nurse to notify provider when observation goals have been met and patient is ready for discharge.  Diet: Advance Diet as Tolerated: Clear Liquid Diet  DVT Prophylaxis: Enoxaparin (Lovenox) SQ  T[place]oco Catheter: Not present  Fluids:  ml/hr  Lines:          Cardiac Monitoring: None  Code Status: Full Code    Clinically Significant Risk Factors Present on Admission                         # DMII: A1C = N/A within past 6 months    # Obesity: Estimated body mass index is 30.11 kg/m  as calculated from the following:    Height as of this encounter: 1.6 m (5' 3\").    Weight as of this encounter: 77.1 kg (170 lb).                Disposition Plan      Expected Discharge Date: 09/05/2024                Precepted with Dr. Ramin Burks MD who agrees with assessment and plan as outlined above. Patient will be seen in AM by MD Ramirez Iglesias,   Hospitalist [facility]  Securely message with [employer] (more info)  Text page via [facility] Paging/Directory   ______________________________________________________________________    Chief Complaint   Nausea, vomiting, abdominal pain    History is obtained from the patient    History of Present Illness   Louise Corado is a 59 year old female who presents with a 5-day history of decreased p.o. intake, nausea, and abdominal pain.  She has a history of metastatic neuroendocrine cancer and recently received a course of chemotherapy.  She denies headache, fever, chills, shortness of breath, chest pain, change in bowel or bladder pattern.  She reports some lower abdominal " pain as well as some generalized abdominal pain.  She denies any lower extremity swelling.  She reports no additional concerns at this time.      Past Medical History    Past Medical History:   Diagnosis Date    Metastatic malignant neuroendocrine tumor to liver (H)        Past Surgical History   Past Surgical History:   Procedure Laterality Date    IR CHEST PORT PLACEMENT > 5 YRS OF AGE  4/23/2024       Prior to Admission Medications   Prior to Admission Medications   Prescriptions Last Dose Informant Patient Reported? Taking?   DOCUSATE SODIUM PO 9/3/2024 at HS  Yes Yes   Sig: Take 1 capsule by mouth 3 times daily as needed for constipation   LORazepam (ATIVAN) 0.5 MG tablet Past Week at 9/2 HS  No Yes   Sig: Take 1-2 tablets (0.5-1 mg) by mouth every 6 hours as needed for anxiety or sleep   acetaminophen (TYLENOL) 325 MG tablet More than a month at PRN  Yes Yes   Sig: Take 325-650 mg by mouth every 6 hours as needed for mild pain   lidocaine-prilocaine (EMLA) 2.5-2.5 % external cream Past Week at PRN port access  No Yes   Sig: Apply topically as needed for moderate pain   metFORMIN (GLUCOPHAGE XR) 500 MG 24 hr tablet 9/3/2024 at PM  No Yes   Sig: Take 1 tablet (500 mg) by mouth daily (with dinner)   morphine (MS CONTIN) 15 MG CR tablet 9/4/2024 at 1100; 30 mg  No Yes   Sig: Take 1 tablet (15 mg) by mouth every 12 hours. maximum 2 tablet(s) per day   naloxone (NARCAN) 4 MG/0.1ML nasal spray never  No Yes   Sig: Spray 1 spray (4 mg) into one nostril alternating nostrils as needed for opioid reversal every 2-3 minutes until assistance arrives   ondansetron (ZOFRAN) 8 MG tablet Past Month at PRN, not for several days  No Yes   Sig: Take 1 tablet (8 mg) by mouth every 6 hours as needed for nausea (vomiting)   oxyCODONE (ROXICODONE) 5 MG tablet 9/3/2024 at PM; 15 mg  No Yes   Sig: Take 3-4 tablets (15-20 mg) by mouth every 4 hours as needed for pain.   prochlorperazine (COMPAZINE) 10 MG tablet More than a month at PRN   No Yes   Sig: Take 1 tablet (10 mg) by mouth every 6 hours as needed for nausea or vomiting   promethazine (PHENERGAN) 12.5 MG tablet New Rx, has not taken any yet  No Yes   Sig: Take 1-2 tablets (12.5-25 mg) by mouth every 6 hours as needed for nausea.   simethicone (MYLICON) 125 MG chewable tablet 9/4/2024 at AM  Yes Yes   Sig: Take 125 mg by mouth 4 times daily as needed for intestinal gas.      Facility-Administered Medications Last Administration Doses Remaining   sodium chloride (PF) 0.9% PF flush 10-20 mL None recorded 2   sodium chloride (PF) 0.9% PF flush 10-20 mL 8/7/2024 10:38 AM 1           Review of Systems    The 10 point Review of Systems is negative other than noted in the HPI or here.   ROS reviewed, see HPI for pertinent positives and negatives.  Ramirez Martin, DO      Social History   I have reviewed this patient's social history and updated it with pertinent information if needed.  Social History     Tobacco Use    Smoking status: Never     Passive exposure: Never    Smokeless tobacco: Never   Vaping Use    Vaping status: Never Used         Family History   I have reviewed this patient's family history and updated it with pertinent information if needed.  Family History   Problem Relation Age of Onset    Breast Cancer Mother 70.00    Breast Cancer Sister 53.00         Allergies   No Known Allergies     Physical Exam   Vital Signs: Temp: 97.8  F (36.6  C) Temp src: Oral BP: (!) 142/77 Pulse: 67   Resp: 12 SpO2: 93 % O2 Device: None (Room air)    Weight: 170 lbs 0 oz    Physical Exam  Constitutional:       General: She is not in acute distress.     Appearance: She is not toxic-appearing or diaphoretic.      Comments: Fatigued appearing   HENT:      Head: Normocephalic and atraumatic.      Right Ear: External ear normal.      Left Ear: External ear normal.      Nose: No congestion or rhinorrhea.      Mouth/Throat:      Comments: Tachy mucous membranes   Eyes:      General:         Right eye: No  discharge.         Left eye: No discharge.      Conjunctiva/sclera: Conjunctivae normal.   Cardiovascular:      Rate and Rhythm: Normal rate and regular rhythm.      Heart sounds: No murmur heard.     No friction rub. No gallop.   Pulmonary:      Effort: Pulmonary effort is normal. No respiratory distress.      Breath sounds: Normal breath sounds. No wheezing or rales.   Abdominal:      General: There is distension.      Tenderness: There is abdominal tenderness. There is no guarding or rebound.      Comments: Tenderness to palpation over all four quadrants most notable in the lower abdominal quadrants   Neurological:      Mental Status: She is alert.           Medical Decision Making     Please see A&P for additional details of medical decision making.      Data     I have personally reviewed the following data over the past 24 hrs:    6.3  \   11.5 (L)   / 326     139 97 (L) 8.4 /  145 (H)   4.5 26 0.56 \     ALT: 6 AST: 14 AP: 106 TBILI: 0.4   ALB: 3.9 TOT PROTEIN: 7.4 LIPASE: 19     Trop: 8 BNP: N/A       Imaging results reviewed over the past 24 hrs:   Recent Results (from the past 24 hour(s))   CT Abdomen Pelvis w Contrast    Narrative    EXAM: CT ABDOMEN PELVIS W CONTRAST  LOCATION: [facility]  DATE: 9/4/2024    INDICATION: Lower abdominal tenderness, nausea.   COMPARISON: PET/CT 8/1/2024. CT chest abdomen and pelvis 7/25/2024.  TECHNIQUE: CT scan of the abdomen and pelvis was performed following injection of IV contrast. Multiplanar reformats were obtained. Dose reduction techniques were used.  CONTRAST: 90 ml Isovue 370    FINDINGS:     LOWER CHEST: Small to moderate right pleural effusion with an adjacent right lower lobe consolidation which likely reflects atelectasis is unchanged. Scattered prominent paraesophageal and cardiophrenic lymph nodes are also unchanged.     HEPATOBILIARY: Three hepatic metastases have not significantly changed in size since the recent PET/CT; the  dominant mass is within hepatic segment 5/6, contains a central hypodense area, and measures up to 7.5 cm. No definite new hepatic masses. No   calcified gallstones or biliary ductal dilation.     PANCREAS: Normal.    SPLEEN: Normal.    ADRENAL GLANDS: Normal.    KIDNEYS/BLADDER: Tiny right upper pole renal cyst, which does not require follow-up. No urinary calculi or hydronephrosis. Normal bladder.     BOWEL: Stomach is normal. Tiny duodenal diverticulum. Jejunum is normal in caliber. Marked wall thickening of a long segment of the distal ileum with engorgement of the surrounding vasa recta and edema within the central mesentery, compatible with   ileitis. A spiculated 2.8 x 2.5 cm mesenteric mass along the serosal surface of the terminal ileum has not significantly changed when measured similarly (3/#141). No bowel dilation or signs of obstruction. No pneumatosis or portal venous gas. Scattered   noninflamed distal colonic diverticuli.     LYMPH NODES: Mildly enlarged 1.2 cm right lower quadrant mesenteric node is unchanged. No newly enlarged lymph nodes.     PERITONEUM/RETROPERITONEUM: Confluent irregular peritoneal nodules within the left lower quadrant (for example, 3/#120) and within the central mesentery (for example, 3/#127) have not significantly changed. Small amount of simple ascites within the   pelvis.      VASCULATURE: Patent portal, splenic, and superior mesenteric veins. Mild aortic atherosclerosis. No abdominal aortic aneurysm.     PELVIC ORGANS: Normal.    MUSCULOSKELETAL: Unchanged sclerotic lesions within the left posterior iliac bone and left T11 vertebral body. Small nodules within the right gluteal subcutaneous fat are also unchanged (for example, 3/#170). No new or destructive bone lesions.       Impression    IMPRESSION:     1.  Ileitis involving a long segment of the distal ileum, including the portion of the terminal ileum involved by the central mesenteric mass. No evidence of resulting  bowel obstruction.    2.  Extensive metastatic disease including mesenteric mike metastases, peritoneal carcinomatosis, liver metastases, and bone metastases have not significantly changed since 8/1/2024.     3.  Small amount of ascites predominantly within the pelvis.    4.  Unchanged small to moderate right pleural effusion with an adjacent consolidation, likely atelectasis.

## 2024-09-04 NOTE — TELEPHONE ENCOUNTER
Per Dr. Estevan Zepeda for her to go to the ER for assessment. I called Louise back with this information. She agrees with this plan given she cannot find relief at this time. I let Louise know I would send an FYI to JAMES Coe to keep an eye on the outcome of the ER visit.     Argelia Simon RN on 9/4/2024 at 10:09 AM

## 2024-09-04 NOTE — TELEPHONE ENCOUNTER
"I received a phone call today from Louise.  She is calling to report that she has been dealing with abdominal pain since Friday.  She states the pain is in her mid to lower abdomen.  She describes it as \"constant pressure\" and rates the pain a 6-7 out of 10.  She states the pain gets worse after eating.  She reports that she is passing some stool and gas.  She was tempted to go to the emergency room because she \"just wants to know what this is.\"  She is not running a fever at this time.  I let her know that I would discuss this with Dr. Barreto and give her a call back.  She can be reached at [phone]. Secure chat sent to Dr. Barreto. Waiting to hear back.     Argelia Simon RN on 9/4/2024 at 8:51 AM    "

## 2024-09-04 NOTE — ED PROVIDER NOTES
EMERGENCY DEPARTMENT ENCOUNTER      NAME: Louise Corado  AGE: 59 year old female  YOB: 1965  MRN: [id number]  EVALUATION DATE & TIME: 9/4/2024 11:05 AM    PCP: Kee Mccullough    ED PROVIDER: Raheem Patel MD      Chief Complaint   Patient presents with    Abdominal Pain    Nausea & Vomiting         FINAL IMPRESSION:  No diagnosis found.      ED COURSE & MEDICAL DECISION MAKING:    Pertinent Labs & Imaging studies reviewed. (See chart for details)  59 year old female presents to the Emergency Department for evaluation of abdominal pain, decreased appetite and nausea.    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  Has been having softer than usual stools over the past 5 days or so but no blood in the stools.  Has had somewhat diffuse abdominal tenderness but is worse in the lower abdomen.  States that she has had some chronic right-sided chest pain ever since she had a pleurocentesis done a few months ago but this is actually improved compared to usual.  She does note intermittent feeling shortness of breath as well.  No fevers at home.  A few weeks ago had some dysuria but this is actually improved since and denies any hematuria.  No headaches or neck pain.  Next  Exam patient is chronically ill-appearing but in no acute distress.  Hypertensive but otherwise hemodynamically stable saturating well on room air.  Heart is regular rate and rhythm and lungs are clear without any wheezes or rails but slightly diminished at the right base.  Abdomen is moderately distended with some tenderness in the suprapubic and right and left lower quadrant,    Initial differential includes was not limited to small bowel obstruction, progression of metastatic disease,  urinary tract infection.  Also considering atypical ACS elevated troponin EKG along with blood work and a CT on pelvis to evaluate for possible fraction or other acute intra-abdominal process.   1236 Labs reviewed interpret myself.  CBC does not show any significant leukocytosis.  Hemoglobin 11.5 which is improved from 12 days ago.  No thrombocytopenia.  BMP does not show any significant lecture light derangements or renal impairment.  LFTs within normal limits.  Urinalysis likely contaminated as it shows 15 squamous cells 21 white cells but no nitrites.  Not strongly suggestive of urinary tract infection.   1315 EKG shows a sinus rhythm at 64 beats a minute, normal axis, normal NV, QRS and QTc durations, no ST elevations or acute ischemic T wave changes.  No prior EKGs available for comparison.   1508 CT abdomen/pelvis shows ileitis along the long segment of the distal ileum involving the portion of her ileal mass as well.  No signs of bowel obstruction or perforation.    Upon repeat evaluation patient has had significant improvement in her pain.  Still has some ongoing nausea and will redose antiemetics.  Will attempt p.o. challenge.  Patient is able to tolerate oral intake without any recurrent vomiting I think she will likely be safe for discharge home with close follow-up with her primary care doctor and oncologist.  If she fails p.o. challenge despite several doses of antiemetics likely require admission for ongoing symptomatic management and IV hydration.     12:16 PM I met and evaluated the patient.       Medical Decision Making  Obtained supplemental history:Supplemental history obtained?: No  Reviewed external records: External records reviewed?: Inpatient Record: Oncology RN triage line 9/4/2024  Care impacted by chronic illness:Cancer/Chemotherapy  Care significantly affected by social determinants of health:Access to Medical Care  Did you consider but not order tests?: Work up considered but not  performed and documented in chart, if applicable  Did you interpret images independently?: Independent interpretation of ECG and images noted in documentation, when applicable.  Consultation discussion with other provider:Did you involve another provider (consultant, , pharmacy, etc.)?: No  Admission considered. Patient was signed out to the oncoming physician, disposition pending.              At the conclusion of the encounter I discussed the results of all of the tests and the disposition. The questions were answered. The patient or family acknowledged understanding and was agreeable with the care plan.     0 minutes of critical care time     MEDICATIONS GIVEN IN THE EMERGENCY:  Medications   HYDROmorphone (PF) (DILAUDID) injection 0.5 mg (0.5 mg Intravenous $Given 9/4/24 1516)   ondansetron (ZOFRAN) injection 4 mg (4 mg Intravenous $Given 9/4/24 1439)   HYDROmorphone (DILAUDID) injection 1 mg (1 mg Intravenous $Given 9/4/24 1214)   ondansetron (ZOFRAN) injection 4 mg (4 mg Intravenous $Given 9/4/24 1239)   iopamidol (ISOVUE-370) solution 90 mL (90 mLs Intravenous $Given 9/4/24 1305)   sodium chloride 0.9% BOLUS 500 mL (0 mLs Intravenous Stopped 9/4/24 1410)       NEW PRESCRIPTIONS STARTED AT TODAY'S ER VISIT  New Prescriptions    No medications on file          =================================================================    HPI    Patient information was obtained from: patient    Use of : N/A         Louise Corado is a 59 year old female with a pertinent history of Neuroendocrine carcinoma metastatic to liver undergoing chemotherapy, morbid obesity, who presents to this ED by private vehicle for evaluation of abdominal pain.    Per chart review:  -9/4/2024 (today): Patient called [facility] RN for mid to lower abdominal pain that is a 6-7/10. She was advised to go to the ED for further evaluation.    Patient reports persistent lower abdominal pain with associating  "distention and non-bloody loose stools since Friday (8/30/2024). Describes the pain as pressure and \"diffuse achiness.\" Also associates decrease in appetite and liquid intake and some shortness of breath. She endorses history of neuroendocrine carcinoma and undergoes chemotherapy. Last infusion was 8/7/2024. Typically gets infusions every 3 weeks but did not undergo infusion on 8/26 due to low hemoglobin and got a blood transfusion instead. Felt better after the blood infusion. Does take stool softeners docusate. She is on 30 mg of morphine PO BID.    She otherwise denies associating fever, dysuria, and hematuria. She has some right sided anterior chest pain at baseline since her thoracentesis in April 2024 which has much improved since then. Notes only a twinge occasionally which remains unchanged. Of note, she has an upcoming cystoscopy on 9/11/2024. There were no other concerns/complaints at this time.      REVIEW OF SYSTEMS   Refer to [facility]    PAST MEDICAL HISTORY:  History reviewed. No pertinent past medical history.    PAST SURGICAL HISTORY:  Past Surgical History:   Procedure Laterality Date    IR CHEST PORT PLACEMENT > 5 YRS OF AGE  4/23/2024           CURRENT MEDICATIONS:    acetaminophen (TYLENOL) 325 MG tablet  cefdinir (OMNICEF) 300 MG capsule  dexAMETHasone (DECADRON) 4 MG tablet  DOCUSATE SODIUM PO  lidocaine-prilocaine (EMLA) 2.5-2.5 % external cream  LORazepam (ATIVAN) 0.5 MG tablet  metFORMIN (GLUCOPHAGE XR) 500 MG 24 hr tablet  morphine (MS CONTIN) 15 MG CR tablet  naloxone (NARCAN) 4 MG/0.1ML nasal spray  ondansetron (ZOFRAN) 8 MG tablet  oxyCODONE (ROXICODONE) 5 MG tablet  prochlorperazine (COMPAZINE) 10 MG tablet  promethazine (PHENERGAN) 12.5 MG tablet        ALLERGIES:  No Known Allergies    FAMILY HISTORY:  Family History   Problem Relation Age of Onset    Breast Cancer Mother 70.00    Breast Cancer Sister 53.00       SOCIAL HISTORY:   Social History     Socioeconomic History    Marital " "status:    Tobacco Use    Smoking status: Never     Passive exposure: Never    Smokeless tobacco: Never   Vaping Use    Vaping status: Never Used     Social Determinants of Health     Financial Resource Strain: Low Risk  (4/30/2024)    Financial Resource Strain     Within the past 12 months, have you or your family members you live with been unable to get utilities (heat, electricity) when it was really needed?: No   Food Insecurity: Low Risk  (4/30/2024)    Food Insecurity     Within the past 12 months, did you worry that your food would run out before you got money to buy more?: No     Within the past 12 months, did the food you bought just not last and you didn t have money to get more?: No   Transportation Needs: Low Risk  (4/30/2024)    Transportation Needs     Within the past 12 months, has lack of transportation kept you from medical appointments, getting your medicines, non-medical meetings or appointments, work, or from getting things that you need?: No   Interpersonal Safety: Low Risk  (4/30/2024)    Interpersonal Safety     Do you feel physically and emotionally safe where you currently live?: Yes     Within the past 12 months, have you been hit, slapped, kicked or otherwise physically hurt by someone?: No     Within the past 12 months, have you been humiliated or emotionally abused in other ways by your partner or ex-partner?: No   Housing Stability: Low Risk  (4/30/2024)    Housing Stability     Do you have housing? : Yes     Are you worried about losing your housing?: No       VITALS:  /79   Pulse 69   Temp 97.8  F (36.6  C) (Oral)   Resp 13   Ht 1.6 m (5' 3\")   Wt 77.1 kg (170 lb)   SpO2 94%   BMI 30.11 kg/m      PHYSICAL EXAM    Constitutional: Well developed, Well nourished, NAD,  HENT: Normocephalic, Atraumatic,  mucous membranes moist,   Neck- trachea midline, No stridor.   Eyes:EOMI, Conjunctiva normal, No discharge.   Respiratory: Minimally diminished breath sounds at right " base, No respiratory distress, No wheezing.    Cardiovascular: Normal heart rate, Regular rhythm,  No murmurs,   Abdominal: Soft, moderately distended abdomen. Lower abdominal tenderness and RLQ tenderness, No rebound or guarding.     Musculoskeletal: no deformity or malalignment   Integument: Warm, Dry, No erythema,    Neurologic: Alert & oriented x 3   Psychiatric: Affect normal, Cooperative.      LAB:  All pertinent labs reviewed and interpreted.  Results for orders placed or performed during the hospital encounter of 09/04/24   CT Abdomen Pelvis w Contrast    Impression    IMPRESSION:     1.  Ileitis involving a long segment of the distal ileum, including the portion of the terminal ileum involved by the central mesenteric mass. No evidence of resulting bowel obstruction.    2.  Extensive metastatic disease including mesenteric mike metastases, peritoneal carcinomatosis, liver metastases, and bone metastases have not significantly changed since 8/1/2024.     3.  Small amount of ascites predominantly within the pelvis.    4.  Unchanged small to moderate right pleural effusion with an adjacent consolidation, likely atelectasis.    Basic metabolic panel   Result Value Ref Range    Sodium 139 135 - 145 mmol/L    Potassium 4.5 3.4 - 5.3 mmol/L    Chloride 97 (L) 98 - 107 mmol/L    Carbon Dioxide (CO2) 26 22 - 29 mmol/L    Anion Gap 16 (H) 7 - 15 mmol/L    Urea Nitrogen 8.4 6.0 - 20.0 mg/dL    Creatinine 0.56 0.51 - 0.95 mg/dL    GFR Estimate >90 >60 mL/min/1.73m2    Calcium 8.9 8.8 - 10.4 mg/dL    Glucose 145 (H) 70 - 99 mg/dL   Hepatic function panel   Result Value Ref Range    Protein Total 7.4 6.4 - 8.3 g/dL    Albumin 3.9 3.5 - 5.2 g/dL    Bilirubin Total 0.4 <=1.2 mg/dL    Alkaline Phosphatase 106 40 - 150 U/L    AST 14 0 - 45 U/L    ALT 6 0 - 50 U/L    Bilirubin Direct <0.20 0.00 - 0.30 mg/dL   UA with Microscopic reflex to Culture    Specimen: Urine, Clean Catch   Result Value Ref Range    Color Urine Yellow  Colorless, Straw, Light Yellow, Yellow    Appearance Urine Turbid (A) Clear    Glucose Urine Negative Negative mg/dL    Bilirubin Urine Negative Negative    Ketones Urine 40 (A) Negative mg/dL    Specific Gravity Urine 1.024 1.001 - 1.030    Blood Urine Negative Negative    pH Urine 6.5 5.0 - 7.0    Protein Albumin Urine 30 (A) Negative mg/dL    Urobilinogen Urine <2.0 <2.0 mg/dL    Nitrite Urine Negative Negative    Leukocyte Esterase Urine 250 Gali/uL (A) Negative    Mucus Urine Present (A) None Seen /LPF    RBC Urine 3 (H) <=2 /HPF    WBC Urine 21 (H) <=5 /HPF    Squamous Epithelials Urine 15 (H) <=1 /HPF    Hyaline Casts Urine 2 <=2 /LPF   CBC with platelets and differential   Result Value Ref Range    WBC Count 6.3 4.0 - 11.0 10e3/uL    RBC Count 4.04 3.80 - 5.20 10e6/uL    Hemoglobin 11.5 (L) 11.7 - 15.7 g/dL    Hematocrit 35.4 35.0 - 47.0 %    MCV 88 78 - 100 fL    MCH 28.5 26.5 - 33.0 pg    MCHC 32.5 31.5 - 36.5 g/dL    RDW 19.7 (H) 10.0 - 15.0 %    Platelet Count 326 150 - 450 10e3/uL    % Neutrophils 83 %    % Lymphocytes 9 %    % Monocytes 8 %    % Eosinophils 0 %    % Basophils 0 %    % Immature Granulocytes 1 %    NRBCs per 100 WBC 0 <1 /100    Absolute Neutrophils 5.2 1.6 - 8.3 10e3/uL    Absolute Lymphocytes 0.6 (L) 0.8 - 5.3 10e3/uL    Absolute Monocytes 0.5 0.0 - 1.3 10e3/uL    Absolute Eosinophils 0.0 0.0 - 0.7 10e3/uL    Absolute Basophils 0.0 0.0 - 0.2 10e3/uL    Absolute Immature Granulocytes 0.0 <=0.4 10e3/uL    Absolute NRBCs 0.0 10e3/uL   Result Value Ref Range    Lipase 19 13 - 60 U/L   Result Value Ref Range    Troponin T, High Sensitivity 8 <=14 ng/L   ECG 12-LEAD WITH MUSE (LHE)   Result Value Ref Range    Systolic Blood Pressure  mmHg    Diastolic Blood Pressure  mmHg    Ventricular Rate 64 BPM    Atrial Rate 64 BPM    WY Interval 146 ms    QRS Duration 80 ms     ms    QTc 431 ms    P Axis 14 degrees    R AXIS -16 degrees    T Axis 6 degrees    Interpretation ECG       Sinus  rhythm  Normal ECG  No previous ECGs available  Confirmed by SEE ED PROVIDER NOTE FOR, ECG INTERPRETATION ([id number]),  Annmarie Smith ([id number]) on 9/4/2024 1:28:21 PM         RADIOLOGY:  Reviewed all pertinent imaging. Please see official radiology report.  CT Abdomen Pelvis w Contrast   Final Result   IMPRESSION:       1.  Ileitis involving a long segment of the distal ileum, including the portion of the terminal ileum involved by the central mesenteric mass. No evidence of resulting bowel obstruction.      2.  Extensive metastatic disease including mesenteric mike metastases, peritoneal carcinomatosis, liver metastases, and bone metastases have not significantly changed since 8/1/2024.       3.  Small amount of ascites predominantly within the pelvis.      4.  Unchanged small to moderate right pleural effusion with an adjacent consolidation, likely atelectasis.           EKG:    Performed at:     Impression: EKG shows a sinus rhythm at 64 beats a minute, normal axis, normal ID, QRS and QTc durations, no ST elevations or acute ischemic T wave changes.  No prior EKGs available for comparison.        I have independently reviewed and interpreted the EKG(s) documented above.      I, Ellie Ho, am serving as a scribe to document services personally performed by Raheem Patel MD based on my observation and the provider's statements to me. I, Raheem Patel MD, attest that Ellie Ho is acting in a scribe capacity, has observed my performance of the services and has documented them in accordance with my direction.    Raheem Patel MD  [facility] EMERGENCY ROOM  [address] [facility] [id number] [phone]      Raheem Patel MD  09/04/24 8617

## 2024-09-04 NOTE — ED TRIAGE NOTES
Pt reports right sided abdominal pain x 5 days, +N/V. Pt reports loose stools. Pt currently being treated for metastatic neuroendocrine cancer. A/O in triage, ABC intact.      Triage Assessment (Adult)       Row Name 09/04/24 1043          Triage Assessment    Airway WDL WDL        Respiratory WDL    Respiratory WDL WDL        Skin Circulation/Temperature WDL    Skin Circulation/Temperature WDL WDL        Cardiac WDL    Cardiac WDL WDL        Peripheral/Neurovascular WDL    Peripheral Neurovascular WDL WDL        Cognitive/Neuro/Behavioral WDL    Cognitive/Neuro/Behavioral WDL WDL

## 2024-09-05 LAB
BACTERIA UR CULT: NORMAL
GLUCOSE BLDC GLUCOMTR-MCNC: 111 MG/DL (ref 70–99)
GLUCOSE BLDC GLUCOMTR-MCNC: 120 MG/DL (ref 70–99)
GLUCOSE BLDC GLUCOMTR-MCNC: 131 MG/DL (ref 70–99)
GLUCOSE BLDC GLUCOMTR-MCNC: 136 MG/DL (ref 70–99)

## 2024-09-05 PROCEDURE — G0378 HOSPITAL OBSERVATION PER HR: HCPCS

## 2024-09-05 PROCEDURE — 82962 GLUCOSE BLOOD TEST: CPT

## 2024-09-05 PROCEDURE — 250N000013 HC RX MED GY IP 250 OP 250 PS 637

## 2024-09-05 PROCEDURE — 258N000003 HC RX IP 258 OP 636

## 2024-09-05 PROCEDURE — 250N000011 HC RX IP 250 OP 636

## 2024-09-05 PROCEDURE — 96372 THER/PROPH/DIAG INJ SC/IM: CPT

## 2024-09-05 PROCEDURE — 96375 TX/PRO/DX INJ NEW DRUG ADDON: CPT

## 2024-09-05 PROCEDURE — 96361 HYDRATE IV INFUSION ADD-ON: CPT

## 2024-09-05 PROCEDURE — 96376 TX/PRO/DX INJ SAME DRUG ADON: CPT

## 2024-09-05 RX ORDER — NICOTINE POLACRILEX 4 MG
15-30 LOZENGE BUCCAL
Status: DISCONTINUED | OUTPATIENT
Start: 2024-09-05 | End: 2024-09-06 | Stop reason: HOSPADM

## 2024-09-05 RX ORDER — PROCHLORPERAZINE MALEATE 10 MG
10 TABLET ORAL EVERY 6 HOURS PRN
Status: DISCONTINUED | OUTPATIENT
Start: 2024-09-05 | End: 2024-09-06 | Stop reason: HOSPADM

## 2024-09-05 RX ORDER — DEXTROSE MONOHYDRATE 25 G/50ML
25-50 INJECTION, SOLUTION INTRAVENOUS
Status: DISCONTINUED | OUTPATIENT
Start: 2024-09-05 | End: 2024-09-06 | Stop reason: HOSPADM

## 2024-09-05 RX ORDER — OXYCODONE HYDROCHLORIDE 5 MG/1
15-20 TABLET ORAL EVERY 4 HOURS PRN
Status: DISCONTINUED | OUTPATIENT
Start: 2024-09-05 | End: 2024-09-06 | Stop reason: HOSPADM

## 2024-09-05 RX ADMIN — ONDANSETRON 4 MG: 2 INJECTION INTRAMUSCULAR; INTRAVENOUS at 04:29

## 2024-09-05 RX ADMIN — OXYCODONE HYDROCHLORIDE 15 MG: 5 TABLET ORAL at 22:21

## 2024-09-05 RX ADMIN — SODIUM CHLORIDE: 9 INJECTION, SOLUTION INTRAVENOUS at 14:31

## 2024-09-05 RX ADMIN — PROMETHAZINE HYDROCHLORIDE 12.5 MG: 12.5 TABLET ORAL at 10:02

## 2024-09-05 RX ADMIN — HYDROMORPHONE HYDROCHLORIDE 0.3 MG: 1 INJECTION, SOLUTION INTRAMUSCULAR; INTRAVENOUS; SUBCUTANEOUS at 04:28

## 2024-09-05 RX ADMIN — ENOXAPARIN SODIUM 40 MG: 100 INJECTION SUBCUTANEOUS at 19:51

## 2024-09-05 RX ADMIN — ONDANSETRON 4 MG: 2 INJECTION INTRAMUSCULAR; INTRAVENOUS at 13:44

## 2024-09-05 RX ADMIN — PROCHLORPERAZINE EDISYLATE 10 MG: 5 INJECTION INTRAMUSCULAR; INTRAVENOUS at 00:11

## 2024-09-05 RX ADMIN — SENNOSIDES AND DOCUSATE SODIUM 2 TABLET: 50; 8.6 TABLET ORAL at 22:25

## 2024-09-05 RX ADMIN — OXYCODONE HYDROCHLORIDE 15 MG: 5 TABLET ORAL at 16:48

## 2024-09-05 RX ADMIN — ACETAMINOPHEN 650 MG: 325 TABLET ORAL at 13:40

## 2024-09-05 RX ADMIN — SODIUM CHLORIDE: 9 INJECTION, SOLUTION INTRAVENOUS at 04:28

## 2024-09-05 RX ADMIN — SODIUM CHLORIDE: 9 INJECTION, SOLUTION INTRAVENOUS at 23:56

## 2024-09-05 ASSESSMENT — ACTIVITIES OF DAILY LIVING (ADL)
ADLS_ACUITY_SCORE: 37
ADLS_ACUITY_SCORE: 35
ADLS_ACUITY_SCORE: 37
ADLS_ACUITY_SCORE: 37
ADLS_ACUITY_SCORE: 25
ADLS_ACUITY_SCORE: 38
ADLS_ACUITY_SCORE: 37
ADLS_ACUITY_SCORE: 37
ADLS_ACUITY_SCORE: 38
ADLS_ACUITY_SCORE: 38
ADLS_ACUITY_SCORE: 37
ADLS_ACUITY_SCORE: 38
ADLS_ACUITY_SCORE: 37
ADLS_ACUITY_SCORE: 38
ADLS_ACUITY_SCORE: 37
ADLS_ACUITY_SCORE: 38
ADLS_ACUITY_SCORE: 37
ADLS_ACUITY_SCORE: 38
ADLS_ACUITY_SCORE: 37
ADLS_ACUITY_SCORE: 37
ADLS_ACUITY_SCORE: 38

## 2024-09-05 NOTE — PLAN OF CARE
Pt A&Ox4. VSS on RA. Pain rated 6/10, prn dilaudid utilized with good effect. Utilized compazine and Zofran for nausea. R AC infusing NS @100mL/hr. Complaint of SOB, dizziness, and lightheadedness with extended periods of exertion. Clear liquid diet, to be advanced today. Pt states no appetite tonight. SBA. Discharge pending on ability to tolerate solids.   Problem: Adult Inpatient Plan of Care  Goal: Optimal Comfort and Wellbeing  Outcome: Progressing  Intervention: Monitor Pain and Promote Comfort  Recent Flowsheet Documentation  Taken 9/5/2024 0005 by Shelli Lehman RN  Pain Management Interventions: rest     Problem: Pain Acute  Goal: Optimal Pain Control and Function  Outcome: Progressing  Intervention: Develop Pain Management Plan  Recent Flowsheet Documentation  Taken 9/5/2024 0005 by Shelli Lehman RN  Pain Management Interventions: rest  Intervention: Prevent or Manage Pain  Recent Flowsheet Documentation  Taken 9/5/2024 0005 by Shelli Lehman RN  Medication Review/Management: medications reviewed   Goal Outcome Evaluation:

## 2024-09-05 NOTE — PROGRESS NOTES
[facility]    Progress Note - Hospitalist Service       Date of Admission:  9/4/2024    Assessment & Plan   Louise Corado is a 59 year old female admitted on 9/4/2024. 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    Neuroendocrine carcinoma metastatic to liver  Nausea and vomiting  Decreased p.o. intake  Abdominal pain  -Admit to observation  -Zofran as needed for nausea  -Compazine as needed for nausea  -Advance diet as tolerated  -Continue PTA Tylenol as needed  -Continue PTA Emla cream  -Hold PTA morphine   - Resume PTA oxycodone  -naloxone as needed for opioid reversal     Constipation  -Continue PTA simethicone  -As needed senna docusate  -As needed MiraLAX     Anxiety  -continue PTA lorazepam   -continuous o2 monitoring     DM2  -Most recent A1c 6.8, controlled with metformin  -Low sliding scale insulin  -Premeal and bedtime insulin checks     Leukocyte esterase in urine analysis  -250 leukocyte esterase were present in urine analysis, with turbid appearance noted, squamous epithelial cells present.  Given nitrite negative and that she has no significant symptoms of overt urinary tract infection, will hold off initiating antibiotics.  Of note recent urine culture from 8/15/2024 was similar in appearance with a greater extent of leukocyte esterase positive and culture at that time grew less than 10,000 CFU per milliliter mixture of urogenital lala.          Observation Goals: -diagnostic tests and consults completed and resulted, -vital signs normal or at patient baseline, -tolerating oral intake to maintain hydration, -adequate pain control on oral analgesics, Nurse to notify provider when observation goals have been met and patient is ready for discharge.  Diet: Advance Diet as Tolerated: Clear Liquid Diet    DVT Prophylaxis:  "Enoxaparin (Lovenox) SQ  Tinoco Catheter: Not present  Fluids:  ml/hr  Lines: PRESENT             Cardiac Monitoring: None  Code Status: Full Code      Clinically Significant Risk Factors Present on Admission                         # DMII: A1C = N/A within past 6 months    # Obesity: Estimated body mass index is 30.11 kg/m  as calculated from the following:    Height as of this encounter: 1.6 m (5' 3\").    Weight as of this encounter: 77.1 kg (170 lb).                    Disposition Plan      Expected Discharge Date: 09/05/2024                The patient's care was discussed with the Attending Physician, Dr. Silva Lenz .    Elias Currie MD  Hospitalist Service  [facility]  Securely message with [employer] (more info)  Text page via "[employer] Paging/Directory   ______________________________________________________________________    Interval History   No acute event overnight.  Patient is sitting in the bed.  Pain is well-controlled and she rated at 2 out of 10.  She had jello for breakfast this morning.    Physical Exam   Vital Signs: Temp: 98.9  F (37.2  C) Temp src: Oral BP: (!) 165/81 Pulse: 88   Resp: 14 SpO2: 94 % O2 Device: None (Room air)    Weight: 170 lbs 0 oz    Constitutional: awake, alert, cooperative, no apparent distress, and appears stated age  ENT: Normocephalic, without obvious abnormality, atraumatic, sinuses nontender on palpation, external ears without lesions, oral pharynx with moist mucous membranes, tonsils without erythema or exudates, gums normal and good dentition.  Respiratory: No increased work of breathing, good air exchange, clear to auscultation bilaterally, no crackles or wheezing  Cardiovascular: Normal apical impulse, regular rate and rhythm, normal S1 and S2, no S3 or S4, and no murmur noted  GI: No scars, normal bowel sounds, soft, non-distended, diffuse tenderness on abdominal suppression at epigastrium and the left lower quadrant no masses " palpated, no hepatosplenomegally  Musculoskeletal: There is no redness, warmth, or swelling of the joints.  Full range of motion noted.  Motor strength is 5 out of 5 all extremities bilaterally.  Tone is normal.  Neuropsychiatric: General: normal, calm, and normal eye contact  Level of consciousness: alert / normal  Affect: normal  Orientation: oriented to self, place, time and situation    Medical Decision Making       Please see A&P for additional details of medical decision making.      Data     I have personally reviewed the following data over the past 24 hrs:    6.3  \   11.5 (L)   / 326     139 97 (L) 8.4 /  120 (H)   4.5 26 0.56 \     ALT: 6 AST: 14 AP: 106 TBILI: 0.4   ALB: 3.9 TOT PROTEIN: 7.4 LIPASE: 19     Trop: 8 BNP: N/A       Imaging results reviewed over the past 24 hrs:   Recent Results (from the past 24 hour(s))   CT Abdomen Pelvis w Contrast    Narrative    EXAM: CT ABDOMEN PELVIS W CONTRAST  LOCATION: [facility]  DATE: 9/4/2024    INDICATION: Lower abdominal tenderness, nausea.   COMPARISON: PET/CT 8/1/2024. CT chest abdomen and pelvis 7/25/2024.  TECHNIQUE: CT scan of the abdomen and pelvis was performed following injection of IV contrast. Multiplanar reformats were obtained. Dose reduction techniques were used.  CONTRAST: 90 ml Isovue 370    FINDINGS:     LOWER CHEST: Small to moderate right pleural effusion with an adjacent right lower lobe consolidation which likely reflects atelectasis is unchanged. Scattered prominent paraesophageal and cardiophrenic lymph nodes are also unchanged.     HEPATOBILIARY: Three hepatic metastases have not significantly changed in size since the recent PET/CT; the dominant mass is within hepatic segment 5/6, contains a central hypodense area, and measures up to 7.5 cm. No definite new hepatic masses. No   calcified gallstones or biliary ductal dilation.     PANCREAS: Normal.    SPLEEN: Normal.    ADRENAL GLANDS:  Normal.    KIDNEYS/BLADDER: Tiny right upper pole renal cyst, which does not require follow-up. No urinary calculi or hydronephrosis. Normal bladder.     BOWEL: Stomach is normal. Tiny duodenal diverticulum. Jejunum is normal in caliber. Marked wall thickening of a long segment of the distal ileum with engorgement of the surrounding vasa recta and edema within the central mesentery, compatible with   ileitis. A spiculated 2.8 x 2.5 cm mesenteric mass along the serosal surface of the terminal ileum has not significantly changed when measured similarly (3/#141). No bowel dilation or signs of obstruction. No pneumatosis or portal venous gas. Scattered   noninflamed distal colonic diverticuli.     LYMPH NODES: Mildly enlarged 1.2 cm right lower quadrant mesenteric node is unchanged. No newly enlarged lymph nodes.     PERITONEUM/RETROPERITONEUM: Confluent irregular peritoneal nodules within the left lower quadrant (for example, 3/#120) and within the central mesentery (for example, 3/#127) have not significantly changed. Small amount of simple ascites within the   pelvis.      VASCULATURE: Patent portal, splenic, and superior mesenteric veins. Mild aortic atherosclerosis. No abdominal aortic aneurysm.     PELVIC ORGANS: Normal.    MUSCULOSKELETAL: Unchanged sclerotic lesions within the left posterior iliac bone and left T11 vertebral body. Small nodules within the right gluteal subcutaneous fat are also unchanged (for example, 3/#170). No new or destructive bone lesions.       Impression    IMPRESSION:     1.  Ileitis involving a long segment of the distal ileum, including the portion of the terminal ileum involved by the central mesenteric mass. No evidence of resulting bowel obstruction.    2.  Extensive metastatic disease including mesenteric mike metastases, peritoneal carcinomatosis, liver metastases, and bone metastases have not significantly changed since 8/1/2024.     3.  Small amount of ascites predominantly  within the pelvis.    4.  Unchanged small to moderate right pleural effusion with an adjacent consolidation, likely atelectasis.

## 2024-09-05 NOTE — ED NOTES
"Patient tolerating clear liquid breakfast, eating 10-25% of meal. Patient reporting a \"twinge of nausea\" but still wanting to advance diet. Given pudding that patient tolerated without new nausea, patient eat 1/3 of pudding. Patient requesting to advance to bland diet for lunch. PRN phenergan given to help avoid any new nausea. Patient agrees to call if she starts having any nausea or vomiting. Call light within reach.  "

## 2024-09-05 NOTE — PLAN OF CARE
Problem: Pain Acute  Goal: Optimal Pain Control and Function  Outcome: Progressing  Intervention: Develop Pain Management Plan  Recent Flowsheet Documentation  Taken 9/4/2024 2226 by Adrianna Hicks RN  Pain Management Interventions: heat applied  Taken 9/4/2024 2121 by Adrianna Hicks RN  Pain Management Interventions: medication (see MAR)  Intervention: Prevent or Manage Pain  Recent Flowsheet Documentation  Taken 9/4/2024 2151 by Adrianna Hicks RN  Medication Review/Management: medications reviewed     Problem: Adult Inpatient Plan of Care  Goal: Absence of Hospital-Acquired Illness or Injury  Intervention: Prevent Infection  Recent Flowsheet Documentation  Taken 9/4/2024 2151 by Adrianna Hicks RN  Infection Prevention:   rest/sleep promoted   single patient room provided   hand hygiene promoted        Pt. A/Ox4 and able to make needs known. Pain has been controlled with PRN dilaudid. Intermittent nausea controlled with PRN zofran. No emesis observed this shift but pt. Reports emesis episode this afternoon. Pt. On clear liquid diet to advance as tolerated. Pt. Independent. VSS. Calls appropriately. Discharge pending.

## 2024-09-05 NOTE — PLAN OF CARE
Goal Outcome Evaluation:  Pt A &Ox4. Ambulating SBA, does get short of breath and dyspnea with exertion. IVF NS @100/hr. Pt wanted to change her clothes and stay in personal clothes. 2 nurse skin assessment complete with Clari GONZALES. Noted birthmark R knee, L thigh bruise, and dry peely skin on low back. C/o pain in abd and back. Low fiber diet. Mild nausea after ambulating. Alarms on due to pt ligthheaded and SOB w ambulating. Call light in reach. Family at bedside.

## 2024-09-06 VITALS
HEART RATE: 69 BPM | HEIGHT: 63 IN | RESPIRATION RATE: 18 BRPM | DIASTOLIC BLOOD PRESSURE: 69 MMHG | OXYGEN SATURATION: 94 % | SYSTOLIC BLOOD PRESSURE: 131 MMHG | TEMPERATURE: 98.4 F | WEIGHT: 181.8 LBS | BODY MASS INDEX: 32.21 KG/M2

## 2024-09-06 LAB
GLUCOSE BLDC GLUCOMTR-MCNC: 118 MG/DL (ref 70–99)
GLUCOSE BLDC GLUCOMTR-MCNC: 145 MG/DL (ref 70–99)

## 2024-09-06 PROCEDURE — 99418 PROLNG IP/OBS E/M EA 15 MIN: CPT | Performed by: FAMILY MEDICINE

## 2024-09-06 PROCEDURE — 250N000013 HC RX MED GY IP 250 OP 250 PS 637: Performed by: FAMILY MEDICINE

## 2024-09-06 PROCEDURE — 96361 HYDRATE IV INFUSION ADD-ON: CPT

## 2024-09-06 PROCEDURE — 250N000011 HC RX IP 250 OP 636

## 2024-09-06 PROCEDURE — 250N000012 HC RX MED GY IP 250 OP 636 PS 637

## 2024-09-06 PROCEDURE — 99223 1ST HOSP IP/OBS HIGH 75: CPT | Performed by: FAMILY MEDICINE

## 2024-09-06 PROCEDURE — 96376 TX/PRO/DX INJ SAME DRUG ADON: CPT

## 2024-09-06 PROCEDURE — 258N000003 HC RX IP 258 OP 636

## 2024-09-06 PROCEDURE — 99238 HOSP IP/OBS DSCHRG MGMT 30/<: CPT | Mod: GC

## 2024-09-06 PROCEDURE — 250N000013 HC RX MED GY IP 250 OP 250 PS 637: Performed by: STUDENT IN AN ORGANIZED HEALTH CARE EDUCATION/TRAINING PROGRAM

## 2024-09-06 PROCEDURE — 82962 GLUCOSE BLOOD TEST: CPT

## 2024-09-06 PROCEDURE — 250N000013 HC RX MED GY IP 250 OP 250 PS 637

## 2024-09-06 PROCEDURE — G0378 HOSPITAL OBSERVATION PER HR: HCPCS

## 2024-09-06 RX ORDER — BISACODYL 5 MG
5 TABLET, DELAYED RELEASE (ENTERIC COATED) ORAL DAILY
Status: DISCONTINUED | OUTPATIENT
Start: 2024-09-06 | End: 2024-09-06 | Stop reason: HOSPADM

## 2024-09-06 RX ORDER — ONDANSETRON 4 MG/1
4 TABLET, ORALLY DISINTEGRATING ORAL EVERY 12 HOURS
Status: DISCONTINUED | OUTPATIENT
Start: 2024-09-06 | End: 2024-09-06 | Stop reason: HOSPADM

## 2024-09-06 RX ORDER — MORPHINE SULFATE 15 MG/1
30 TABLET, FILM COATED, EXTENDED RELEASE ORAL EVERY 12 HOURS
Status: DISCONTINUED | OUTPATIENT
Start: 2024-09-06 | End: 2024-09-06 | Stop reason: HOSPADM

## 2024-09-06 RX ORDER — MORPHINE SULFATE 15 MG/1
30 TABLET, FILM COATED, EXTENDED RELEASE ORAL EVERY 12 HOURS
Status: DISCONTINUED | OUTPATIENT
Start: 2024-09-06 | End: 2024-09-06

## 2024-09-06 RX ORDER — MORPHINE SULFATE 15 MG/1
15 TABLET, FILM COATED, EXTENDED RELEASE ORAL ONCE
Status: DISCONTINUED | OUTPATIENT
Start: 2024-09-06 | End: 2024-09-06

## 2024-09-06 RX ORDER — POLYETHYLENE GLYCOL 3350 17 G/17G
17 POWDER, FOR SOLUTION ORAL DAILY
Qty: 510 G | Refills: 0 | Status: SHIPPED | OUTPATIENT
Start: 2024-09-06

## 2024-09-06 RX ORDER — ONDANSETRON 4 MG/1
4 TABLET, ORALLY DISINTEGRATING ORAL EVERY 12 HOURS
Qty: 60 TABLET | Refills: 0 | Status: SHIPPED | OUTPATIENT
Start: 2024-09-06 | End: 2024-10-06

## 2024-09-06 RX ORDER — MORPHINE SULFATE 15 MG/1
15 TABLET, FILM COATED, EXTENDED RELEASE ORAL EVERY 12 HOURS
Status: DISCONTINUED | OUTPATIENT
Start: 2024-09-06 | End: 2024-09-06

## 2024-09-06 RX ORDER — PROMETHAZINE HYDROCHLORIDE 12.5 MG/1
12.5 TABLET ORAL DAILY
Status: DISCONTINUED | OUTPATIENT
Start: 2024-09-07 | End: 2024-09-06 | Stop reason: HOSPADM

## 2024-09-06 RX ORDER — MORPHINE SULFATE 30 MG/1
30 TABLET, FILM COATED, EXTENDED RELEASE ORAL EVERY 12 HOURS
Qty: 10 TABLET | Refills: 0 | Status: SHIPPED | OUTPATIENT
Start: 2024-09-06 | End: 2024-09-11

## 2024-09-06 RX ORDER — OLANZAPINE 2.5 MG/1
2.5 TABLET, FILM COATED ORAL AT BEDTIME
Status: DISCONTINUED | OUTPATIENT
Start: 2024-09-06 | End: 2024-09-06 | Stop reason: HOSPADM

## 2024-09-06 RX ORDER — PROCHLORPERAZINE MALEATE 10 MG
10 TABLET ORAL EVERY 6 HOURS PRN
Qty: 30 TABLET | Refills: 0 | Status: SHIPPED | OUTPATIENT
Start: 2024-09-06

## 2024-09-06 RX ORDER — OLANZAPINE 2.5 MG/1
2.5 TABLET, FILM COATED ORAL AT BEDTIME
Qty: 30 TABLET | Refills: 0 | Status: SHIPPED | OUTPATIENT
Start: 2024-09-06

## 2024-09-06 RX ORDER — BISACODYL 5 MG
5 TABLET, DELAYED RELEASE (ENTERIC COATED) ORAL DAILY
Qty: 30 TABLET | Refills: 0 | Status: SHIPPED | OUTPATIENT
Start: 2024-09-06

## 2024-09-06 RX ORDER — POLYETHYLENE GLYCOL 3350 17 G/17G
17 POWDER, FOR SOLUTION ORAL DAILY
Status: DISCONTINUED | OUTPATIENT
Start: 2024-09-06 | End: 2024-09-06 | Stop reason: HOSPADM

## 2024-09-06 RX ADMIN — SODIUM CHLORIDE: 9 INJECTION, SOLUTION INTRAVENOUS at 09:49

## 2024-09-06 RX ADMIN — MORPHINE SULFATE 30 MG: 15 TABLET, FILM COATED, EXTENDED RELEASE ORAL at 10:12

## 2024-09-06 RX ADMIN — ONDANSETRON 4 MG: 4 TABLET, ORALLY DISINTEGRATING ORAL at 02:59

## 2024-09-06 RX ADMIN — PROCHLORPERAZINE EDISYLATE 10 MG: 5 INJECTION INTRAMUSCULAR; INTRAVENOUS at 05:46

## 2024-09-06 RX ADMIN — OXYCODONE HYDROCHLORIDE 15 MG: 5 TABLET ORAL at 11:49

## 2024-09-06 RX ADMIN — OXYCODONE HYDROCHLORIDE 15 MG: 5 TABLET ORAL at 05:47

## 2024-09-06 RX ADMIN — OXYCODONE HYDROCHLORIDE 15 MG: 5 TABLET ORAL at 17:05

## 2024-09-06 RX ADMIN — INSULIN ASPART 1 UNITS: 100 INJECTION, SOLUTION INTRAVENOUS; SUBCUTANEOUS at 08:18

## 2024-09-06 RX ADMIN — POLYETHYLENE GLYCOL 3350 17 G: 17 POWDER, FOR SOLUTION ORAL at 17:05

## 2024-09-06 ASSESSMENT — ACTIVITIES OF DAILY LIVING (ADL)
ADLS_ACUITY_SCORE: 25

## 2024-09-06 NOTE — PROGRESS NOTES
"PRIMARY DIAGNOSIS: \"GENERIC\" NURSING  OUTPATIENT/OBSERVATION GOALS TO BE MET BEFORE DISCHARGE:  ADLs back to baseline: No    Activity and level of assistance: Up with standby assistance.    Pain status: Improved-controlled with oral pain medications.     Return to near baseline physical activity: No     Discharge Planner Nurse   Safe discharge environment identified: Yes  Barriers to discharge: Yes       Entered by: Liliana Crowder RN 09/06/2024 4:05 AM     Patient is alert and oriented, given antiemetic for nausea, IVF infusing at 100 mLs/hr, SBA with transfers, cooperative with cares.   Please review provider order for any additional goals.   Nurse to notify provider when observation goals have been met and patient is ready for discharge.  "

## 2024-09-06 NOTE — PLAN OF CARE
Goal Outcome Evaluation:       Pt A &oX4. C/o pain in abdomen radiating to back. Managed with scheduled medications and PRNs. IV SL. ACHS glucose checks. Low fiber diet. SBA when ambulating. Awaiting discharge .

## 2024-09-06 NOTE — PLAN OF CARE
Problem: Pain Acute  Goal: Optimal Pain Control and Function  Intervention: Develop Pain Management Plan  Recent Flowsheet Documentation  Taken 9/5/2024 1648 by Hermilo Dowling RN  Pain Management Interventions: medication (see MAR)  Intervention: Prevent or Manage Pain  Recent Flowsheet Documentation  Taken 9/5/2024 1700 by Hermilo Dowling, RN  Medication Review/Management: medications reviewed   Goal Outcome Evaluation:       Continues to c/o abdominal pain, 6/10. Pain decreases to 2/10 with oxycodone 15 mg PO PRN. Tolerating regular diet without c/o nausea. Ambulating in room with SBA. Alert and oriented x 4, pleasant and cooperative.

## 2024-09-06 NOTE — PROGRESS NOTES
Palliative provider, Dr. Hunt, mentioned that Louise would like to be anointed. Following that, I visited with Louise and her sister.  I introduced myself and the role of Spiritual Care in holistic health care. Louise is experiencing side effects from cancer and chemo. She is aware and understands her illness.    I provided support through brief conversation, and acknowledgement that life is not fair. Her sister became tearful. Fr. Pineda typically visits patients on Wednesdays. Louise will have discharged by then. She does not have a paris from whom to call a . I suggested that they consider creating their own blessing ritual in the absence of a .    Spiritual Care is available as needed or requested. [employer] priests are on call for EOL only.    LORENA Adrian.  Palliative Care Team

## 2024-09-06 NOTE — PROGRESS NOTES
[facility]    Progress Note - Hospitalist Service       Date of Admission:  9/4/2024    Assessment & Plan   Louise Croado is a 59 year old female admitted on 9/4/2024. 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    Neuroendocrine carcinoma metastatic to liver  Nausea and vomiting  Decreased p.o. intake  Abdominal pain  Patient experience at nausea and abdominal pain after  today.  Will consult palliative care for more input regarding pain management.  -Admit to observation  -Consult palliative care  -Zofran as needed for nausea  -Compazine as needed for nausea  -Advance diet as tolerated  -Continue PTA Tylenol as needed  -Continue PTA Emla cream  - PTA morphine   - Resume PTA oxycodone  -naloxone as needed for opioid reversal     Constipation  -Continue PTA simethicone  -As needed senna docusate  -As needed MiraLAX     Anxiety  -continue PTA lorazepam   -continuous o2 monitoring     DM2  -Most recent A1c 6.8, controlled with metformin  -Low sliding scale insulin  -Premeal and bedtime insulin checks     Leukocyte esterase in urine analysis  -250 leukocyte esterase were present in urine analysis, with turbid appearance noted, squamous epithelial cells present.  Given nitrite negative and that she has no significant symptoms of overt urinary tract infection, will hold off initiating antibiotics.  Of note recent urine culture from 8/15/2024 was similar in appearance with a greater extent of leukocyte esterase positive and culture at that time grew less than 10,000 CFU per milliliter mixture of urogenital lala.          Observation Goals: -diagnostic tests and consults completed and resulted, -vital signs normal or at patient baseline, -tolerating oral intake to maintain hydration, -adequate pain control on oral analgesics, Nurse to notify  "provider when observation goals have been met and patient is ready for discharge.  Diet: Advance Diet as Tolerated: Low Fiber    DVT Prophylaxis: Enoxaparin (Lovenox) SQ  Tinoco Catheter: Not present  Fluids:  ml/hr  Lines: PRESENT             Cardiac Monitoring: None  Code Status: Full Code      Clinically Significant Risk Factors Present on Admission                         # DMII: A1C = N/A within past 6 months    # Obesity: Estimated body mass index is 32.2 kg/m  as calculated from the following:    Height as of this encounter: 1.6 m (5' 3\").    Weight as of this encounter: 82.5 kg (181 lb 12.8 oz).                    Disposition Plan      Expected Discharge Date: 09/06/2024        Discharge Comments: diet advance no nausea        The patient's care was discussed with the Attending Physician, Dr. Silva Lenz .    Elias Currie MD PGY1  Hospitalist Service  [facility]  Securely message with [email or web address] (more info)  Text page via [employer] Paging/Directory   ______________________________________________________________________    Interval History   No acute event overnight.  Patient is sitting in recliner.  Pain is well-controlled and she rated at 2 out of 10.  She had small  breakfast this morning.  She is worried about  breakthrough pain.    Physical Exam   Vital Signs: Temp: 98.6  F (37  C) Temp src: Oral BP: (!) 144/70 Pulse: 68   Resp: 14 SpO2: 94 % O2 Device: None (Room air)    Weight: 181 lbs 12.8 oz    Constitutional: awake, alert, cooperative, no apparent distress, and appears stated age  ENT: Normocephalic, without obvious abnormality, atraumatic, sinuses nontender on palpation, external ears without lesions, oral pharynx with moist mucous membranes, tonsils without erythema or exudates, gums normal and good dentition.  Respiratory: No increased work of breathing, good air exchange, clear to auscultation bilaterally, no crackles or wheezing  Cardiovascular: Normal apical " impulse, regular rate and rhythm, normal S1 and S2, no S3 or S4, and no murmur noted  GI: No scars, normal bowel sounds, soft, non-distended, diffuse tenderness on abdominal suppression at epigastrium and the left lower quadrant no masses palpated, no hepatosplenomegally  Musculoskeletal: There is no redness, warmth, or swelling of the joints.  Full range of motion noted.  Motor strength is 5 out of 5 all extremities bilaterally.  Tone is normal.  Neuropsychiatric: General: normal, calm, and normal eye contact  Level of consciousness: alert / normal  Affect: normal  Orientation: oriented to self, place, time and situation    Medical Decision Making       Please see A&P for additional details of medical decision making.      Data         Imaging results reviewed over the past 24 hrs:   No results found for this or any previous visit (from the past 24 hour(s)).

## 2024-09-06 NOTE — DISCHARGE SUMMARY
"[facility]  Discharge Summary - Medicine & Pediatrics       Date of Admission:  9/4/2024  Date of Discharge:  9/6/2024  6:42 PM  Discharging Provider: Dr. 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    Weight as of this encounter: 82.5 kg (181 lb 12.8 oz).       Follow-ups Needed After Discharge   Follow-up Appointments     Follow-up and recommended labs and tests       Follow up with primary care provider, Kee Mccullough, within 7 days for   hospital follow- up.    Follow up as scheduled with palliative on 9/10/2024.   Follow up as scheduled with oncology.            Unresulted Labs Ordered in the Past 30 Days of this Admission       No orders found from 8/5/2024 to 9/5/2024.          Discharge Disposition   Discharged to home  Condition at discharge: Stable    Hospital Course   Louise Corado is a 59 year old female admitted on 9/4/2024. She has a history of neuroendocrine carcinoma with metastases to liver, s/p recent chemotherapy course and is admitted for inability to tolerate p.o with nausea, vomiting and abdominal pain.  Workup was negative for infectious causes of abdominal pain, CT abdomen revealed terminal ileitis as well as numerous " neuroendocrine carcinoma metastases, which is likely the cause of her ongoing abdominal pain.  Pain was worse with movement.  Palliative was involved for pain and nausea management.  Appreciate assistance.  She was transitioned to oral long-acting MS Contin as well as as needed oxycodone for breakthrough pain in addition to nonpharmacological pain management as well as Tylenol.  She was started on Dulcolax for constipation in addition to MiraLAX.  Recommend continuing octreotide injections as outpatient.  Additionally Zofran, promethazine, and Zyprexa were scheduled.  Patient had relief in her pain and nausea with this and was able to tolerate some more movement as well as a diet.  She felt that she was ready for discharge to home with this plan.  She has close follow-up with palliative as well as her oncologist.      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  Terminal ileitis   She was discharged to home with the following plan for pain, nausea, vomiting:  - MS Continun 30 mg q 12 hours  - Oxycodone 15-20 mg q 4 hr PRN for breakthrough pain  - Scheduled tylenol  - Ice, heat PRN  - Octreotide   - Promethazine qday at noon  -Zofran 4 mg q12 hours scheduled   - Zyprexa 2.5 mg at bedtime   - Small frequent PO intake  - Accupressure, aromatherapy, etoh swabs for nausea     Constipation  - Bisacodyl daily  - Miralax daily, increase as needed      Consultations This Hospital Stay   PALLIATIVE CARE ADULT IP CONSULT    Code Status   Full Code       The patient was discussed with Dr. Delfin Guerrero,  [id number]  [address] [address]  Phone: [phone]  Fax: [phone]  ______________________________________________________________________    Physical Exam   Vital Signs: Temp: 98.4  F (36.9  C) Temp src: Oral BP:  131/69 Pulse: 69   Resp: 18 SpO2: 94 % O2 Device: None (Room air)    Weight: 181 lbs 12.8 oz  Constitutional: awake, alert, cooperative, no apparent distress, and appears stated age, sitting in chair  ENT: Normocephalic, without obvious abnormality, atraumatic, external ears without lesions, oral pharynx with moist mucous membranes, tonsils without erythema or exudates, gums normal and good dentition.  Respiratory: No increased work of breathing, good air exchange, clear to auscultation bilaterally, no crackles or wheezing  Cardiovascular: Normal apical impulse, regular rate and rhythm, normal S1 and S2, no S3 or S4, and no murmur noted  GI: normal bowel sounds, soft, diffuse tenderness on abdominal suppression at epigastrium and the left lower quadrant no masses palpated  Musculoskeletal: There is no redness, warmth, or swelling of the joints.  Full range of motion noted.  Motor strength is 5 out of 5 all extremities bilaterally.  Tone is normal.  Neuropsychiatric: General: normal, calm, and normal eye contact  Level of consciousness: alert / normal  Affect: normal  Orientation: oriented to self, place, time and situation      Primary Care Physician   Kee Mccullough    Discharge Orders      Social Work Referral Specific Sites Only - See Locations in Order      Reason for your hospital stay    Abdominal pain with terminal ileitis     Follow-up and recommended labs and tests     Follow up with primary care provider, Kee Mccullough, within 7 days for hospital follow- up.    Follow up as scheduled with palliative on 9/10/2024.   Follow up as scheduled with oncology.     Activity    Your activity upon discharge: activity as tolerated     Diet    Follow this diet upon discharge:       Advance Diet as Tolerated: Low Fiber       Significant Results and Procedures   Most Recent 3 CBC's:  Recent Labs   Lab Test 09/04/24  1206 08/23/24  1427 08/15/24  1453   WBC 6.3 1.6* 1.7*   HGB 11.5* 8.3* 8.4*   MCV 88 86 84    130*  103*     Most Recent 3 BMP's:  Recent Labs   Lab Test 09/06/24  1318 09/06/24  0750 09/05/24  2201 09/05/24  0621 09/04/24  1206 08/23/24  1427 08/15/24  1453   NA  --   --   --   --  139 136 137   POTASSIUM  --   --   --   --  4.5 4.0 4.0   CHLORIDE  --   --   --   --  97* 96* 97*   CO2  --   --   --   --  26 28 29   BUN  --   --   --   --  8.4 12.1 9.0   CR  --   --   --   --  0.56 0.70 0.58   ANIONGAP  --   --   --   --  16* 12 11   ANGELA  --   --   --   --  8.9 8.7* 9.3   * 145* 131*   < > 145* 201* 191*    < > = values in this interval not displayed.   ,   Results for orders placed or performed during the hospital encounter of 09/04/24   CT Abdomen Pelvis w Contrast    Narrative    EXAM: CT ABDOMEN PELVIS W CONTRAST  LOCATION: [facility]  DATE: 9/4/2024    INDICATION: Lower abdominal tenderness, nausea.   COMPARISON: PET/CT 8/1/2024. CT chest abdomen and pelvis 7/25/2024.  TECHNIQUE: CT scan of the abdomen and pelvis was performed following injection of IV contrast. Multiplanar reformats were obtained. Dose reduction techniques were used.  CONTRAST: 90 ml Isovue 370    FINDINGS:     LOWER CHEST: Small to moderate right pleural effusion with an adjacent right lower lobe consolidation which likely reflects atelectasis is unchanged. Scattered prominent paraesophageal and cardiophrenic lymph nodes are also unchanged.     HEPATOBILIARY: Three hepatic metastases have not significantly changed in size since the recent PET/CT; the dominant mass is within hepatic segment 5/6, contains a central hypodense area, and measures up to 7.5 cm. No definite new hepatic masses. No   calcified gallstones or biliary ductal dilation.     PANCREAS: Normal.    SPLEEN: Normal.    ADRENAL GLANDS: Normal.    KIDNEYS/BLADDER: Tiny right upper pole renal cyst, which does not require follow-up. No urinary calculi or hydronephrosis. Normal bladder.     BOWEL: Stomach is normal. Tiny duodenal diverticulum.  Jejunum is normal in caliber. Marked wall thickening of a long segment of the distal ileum with engorgement of the surrounding vasa recta and edema within the central mesentery, compatible with   ileitis. A spiculated 2.8 x 2.5 cm mesenteric mass along the serosal surface of the terminal ileum has not significantly changed when measured similarly (3/#141). No bowel dilation or signs of obstruction. No pneumatosis or portal venous gas. Scattered   noninflamed distal colonic diverticuli.     LYMPH NODES: Mildly enlarged 1.2 cm right lower quadrant mesenteric node is unchanged. No newly enlarged lymph nodes.     PERITONEUM/RETROPERITONEUM: Confluent irregular peritoneal nodules within the left lower quadrant (for example, 3/#120) and within the central mesentery (for example, 3/#127) have not significantly changed. Small amount of simple ascites within the   pelvis.      VASCULATURE: Patent portal, splenic, and superior mesenteric veins. Mild aortic atherosclerosis. No abdominal aortic aneurysm.     PELVIC ORGANS: Normal.    MUSCULOSKELETAL: Unchanged sclerotic lesions within the left posterior iliac bone and left T11 vertebral body. Small nodules within the right gluteal subcutaneous fat are also unchanged (for example, 3/#170). No new or destructive bone lesions.       Impression    IMPRESSION:     1.  Ileitis involving a long segment of the distal ileum, including the portion of the terminal ileum involved by the central mesenteric mass. No evidence of resulting bowel obstruction.    2.  Extensive metastatic disease including mesenteric mike metastases, peritoneal carcinomatosis, liver metastases, and bone metastases have not significantly changed since 8/1/2024.     3.  Small amount of ascites predominantly within the pelvis.    4.  Unchanged small to moderate right pleural effusion with an adjacent consolidation, likely atelectasis.        Discharge Medications   Current Discharge Medication List        START  taking these medications    Details   bisacodyl (DULCOLAX) 5 MG EC tablet Take 1 tablet (5 mg) by mouth daily.  Qty: 30 tablet, Refills: 0    Associated Diagnoses: Constipation, unspecified constipation type      OLANZapine (ZYPREXA) 2.5 MG tablet Take 1 tablet (2.5 mg) by mouth at bedtime.  Qty: 30 tablet, Refills: 0    Associated Diagnoses: Nausea and vomiting, unspecified vomiting type      ondansetron (ZOFRAN ODT) 4 MG ODT tab Take 1 tablet (4 mg) by mouth every 12 hours.  Qty: 60 tablet, Refills: 0    Associated Diagnoses: Nausea and vomiting, unspecified vomiting type      polyethylene glycol (MIRALAX) 17 GM/Dose powder Take 17 g by mouth daily.  Qty: 510 g, Refills: 0    Associated Diagnoses: Constipation, unspecified constipation type      !! prochlorperazine (COMPAZINE) 10 MG tablet Take 1 tablet (10 mg) by mouth every 6 hours as needed for vomiting.  Qty: 30 tablet, Refills: 0    Associated Diagnoses: Nausea and vomiting, unspecified vomiting type       !! - Potential duplicate medications found. Please discuss with provider.        CONTINUE these medications which have CHANGED    Details   morphine (MS CONTIN) 30 MG CR tablet Take 1 tablet (30 mg) by mouth every 12 hours.  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 (H)           CONTINUE these medications which have NOT CHANGED    Details   acetaminophen (TYLENOL) 325 MG tablet Take 325-650 mg by mouth every 6 hours as needed for mild pain      lidocaine-prilocaine (EMLA) 2.5-2.5 % external cream Apply topically as needed for moderate pain  Qty: 30 g, Refills: 1    Associated Diagnoses: Lung mass      LORazepam (ATIVAN) 0.5 MG tablet Take 1-2 tablets (0.5-1 mg) by mouth every 6 hours as needed for anxiety or sleep  Qty: 30 tablet, Refills: 1    Associated Diagnoses: Lung mass      metFORMIN (GLUCOPHAGE XR) 500 MG 24 hr tablet Take 1 tablet (500 mg) by mouth daily (with dinner)  Qty: 90 tablet, Refills:  0    Associated Diagnoses: Elevated glucose      naloxone (NARCAN) 4 MG/0.1ML nasal spray Spray 1 spray (4 mg) into one nostril alternating nostrils as needed for opioid reversal every 2-3 minutes until assistance arrives  Qty: 2 each, Refills: 0      ondansetron (ZOFRAN) 8 MG tablet Take 1 tablet (8 mg) by mouth every 6 hours as needed for nausea (vomiting)  Qty: 30 tablet, Refills: 2    Associated Diagnoses: 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  Qty: 90 tablet, Refills: 0    Associated Diagnoses: Painful respiration      !! prochlorperazine (COMPAZINE) 10 MG tablet Take 1 tablet (10 mg) by mouth every 6 hours as needed for nausea or vomiting  Qty: 30 tablet, Refills: 1    Associated Diagnoses: Chemotherapy-induced nausea      promethazine (PHENERGAN) 12.5 MG tablet Take 1-2 tablets (12.5-25 mg) by mouth every 6 hours as needed for nausea.  Qty: 30 tablet, Refills: 1    Associated Diagnoses: Chemotherapy-induced nausea      simethicone (MYLICON) 125 MG chewable tablet Take 125 mg by mouth 4 times daily as needed for intestinal gas.       !! - Potential duplicate medications found. Please discuss with provider.        STOP taking these medications       DOCUSATE SODIUM PO Comments:   Reason for Stopping:             Allergies   No Known Allergies

## 2024-09-06 NOTE — PROGRESS NOTES
"PRIMARY DIAGNOSIS: \"GENERIC\" NURSING  OUTPATIENT/OBSERVATION GOALS TO BE MET BEFORE DISCHARGE:  ADLs back to baseline: No    Activity and level of assistance: Up with standby assistance.    Pain status: Improved-controlled with oral pain medications.    Return to near baseline physical activity: No     Discharge Planner Nurse   Safe discharge environment identified: Yes  Barriers to discharge: Yes       Entered by: Liliana Crowder RN 09/06/2024 2:52 AM     Please review provider order for any additional goals.   Nurse to notify provider when observation goals have been met and patient is ready for discharge.  "

## 2024-09-06 NOTE — CONSULTS
"  Palliative Care Consultation Note  [facility]      Patient: Louise Corado  Date of Admission:  9/4/2024    Requesting Clinician / Team: Dr Yash Fermin/[facility] medicine Hospitalist team.  Reason for consult: \"intractable abdominal pain, metastatic cancer.\"   Pain management due to cancer  Symptom management (nausea, presumed carcinoid tumor symptoms with flushing)       Recommendations & Counseling     GOALS OF CARE:   Life-prolonging without limits       ADVANCE CARE PLANNING:  No health care directive on file. Per system policy, Surrogate Decision-makers for Patients With Diminished Decision-making Capacity offers guidance on possible decision-makers.  Rowdy has been identified as a surrogate decision maker.  Recommend exploring further in future palliative medicine conversations.  There is no POLST form on file, defer to patient and/or next of kin for decisions   Code status: Full Code    MEDICAL MANAGEMENT:     #Pain,acute on chronic : Has R sided back/chest wall pain, sclerotic lesions noted on imaging, cancer-related.  ALSO has abdominal cramping/tightness pain that occurs with flushing episodes with ambulation/movement that are bothersome to her.  Wonder if some component of constipation could be contributing as well.    Home MME: 60MME from MS contin 30mg q12hr + 15-20mg oxycodone q4 hrs prn breakthrough pain, typically using 1-4 doses per day, 5 /day on bad days.  So typical day if 3 doses breakthrough med, she would use 60 MME + 75 MME = 135 MME every 24 hours    24-hr MME: 60mg oxycodone (75MME)+30mg MS Contin = 105 MME  Recommendations:    On APAP PRN-=-continue same.  Oxycodone 15-20mg q4H PRN mod-severe pain , continue same.  MS contin   30mg q12 hours   Suggest use of heat as needed/ice as needed.  Continue with octreotide (last dose of long-acting octreotide for carcinoid was 8/15/24).  ?? If higher dose needed but this appears appropriate dose " "per review on Up To Date.        #Nausea,disease processes with neuroendocrine tumor and suspected carcinoid tumor, plus nausea due to chemotherapy infusions.  -Pharmacologic management (present)  Ondansetron 6mg IV/PO presently PRN (not scheduled).  Prochlorperazine 10mg q6H prn (not scheudled)==one dose in past 24 hrs.  Promethazine tablet 12.5-25mg q6h prn nausea (last dose 9/5/24)  RECOMMENDATIONS: (also suggest Rx for trial at home at discharge.)  1. Schedule promethazine qday at noon (ordered for you)   2. Schedule ondansetron 4mg q12 hours (ordered for you)  3. Schedule olanzapine (would help with nausea, sleep, and anxiety) 2.5mg at bedtime *(ordered for you)  -Nonpharmacologic management  Small, frequent intake  Assess and avoid aggravating factors  Accupressure (C-band)  Aromatherapy, alcohol swabs known to be effective       #Deconditioning : has been self limiting activity due to flushing episodes.  Mostly sitting/in bed during daytime.  Encourage ambulation/movement.  She is aware of functional status necessary for continuing cytotoxic chemotherapy.    #Unintended weight loss (40# in past 5 months) due to metastatic neuroendocrine tumor, issues with nausea  #Protein calorie malnutrition in the context of acute illness as evidenced by unintentional weight loss and muscle loss   Nutrition Consult suggested.  Work on antiemetic medications.     #Constipation,Medication adverse effect  At home--on docusate only.  Recommend avoiding docusate as \"all mush, no push\" and studies show it worsens stool quality for cancer patients in RCT.  Presently in hospital, on PRN miralax BID plus Senna 2 tabs BID PRN (one dose in past 24 hrs)  RECOMMENDATIONS (ordered for you)  - bisacodyl daily (she has at home, has used only occasionally in past)  - miralax daily scheduled (increase to twice a day over time if needed)  - goal is soft easy to pass BM daily to every other day.    # Anxiety, in part related to medical " diagnosis.  - has not been taking citalopram with concerns for serotonergic effect.  - using lorazepam up to three times weekly at baseline, continue same.  - Recommend Oncology LISW support for emotional support/processing--order placed for outpatient setting.   - with ongoing use of opioids, agree with having naloxone nasal spray on hand at discharge- last prescribed July 2024 by Dr Saleh.  Recommend ensuring she has Rx on hand in case of inadvertent overdose/sedation from combination of meds..    PSYCHOSOCIAL/SPIRITUAL SUPPORT:  Family , Rowdy    ; daughter Rao starting [employer] at [facility] [employer] in [place] and son Abhishek will be joining the [employer] soon.  Friends   Lou community: Confucianist , welcomes spiritual care.  Open to being anointed.  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    Palliative Care will continue to follow, Louise has an appointment next Tuesday 9.10/24 with my colleague Stephen Neil MD in clinic. Thank you for the consult and allowing us to aid in the care of Louise Corado.    Avis Hunt MD  [facility], Palliative Care  Securely message with the [employer] ??? 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  Louise presented on 9/4/24 with five days of nausea, decreased oral intake, and abdominal pain..      Today, the patient was seen for:  Introduction to palliative care, establish rapport, symptom assessment specifically nausea and cancer from neuroendocrine tumor.    History of Present Illness   Met with Louise and her sister Nadine.     I introduced our role as an extra layer of support and how we help patients and  "families dealing with serious, potentially life-limiting illnesses. I explained the composition of the palliative care team.  Palliative care helps patients and families navigate their care while focusing on the whole person; providing emotional, social and spiritual support  Palliative care often assists with symptom management, information sharing about what to expect from the illness, available treatment options and what effect those options may have on the disease course, and provide effective communication and caring support.    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    Louise tells me her tumor is \"terminal, I know at some point I will die from this.\"  She wants to maximize time with her family, is aware her last scan showed partial response to her cancer treatments, and hopes she will continue to have improvements.    Chart reviewed, Louise is on chemotherapy (had cycle 6 recently) complicated by nausea; she has received phenergan Rx and ondansetron in outpatient setting; previously oncology notes indicate Louise did not get relief from prednisone, lorazepam, or compazine.  Louise follows with Dr Barreto of [facility].  Her tumor is thought to be poorly differentiated higher grade tumor, thought most consistent w small bowel neuroendocrine CA.  She receives octreotide long-acting injections every month, last given per records 8/15/24.   Her next PETCT is next week.      Louise notes pain in abdomen is typically a tightness/cramping sensation that occurs when she has flushing episodes.  Flushin episodes happen when she is exerting herself (if she's been active for 2+ minutes).  She will have flushing (no hives) and feel hot, and note a sense of cramping, shortness of breath, and the episode will last for 15 minutes and gradually subside.  She's has been on MS Contin 30mg q12 hours, oxycodone 15-20mg every 4 hrs as needed for " breakthrough (good days which are rare she may use 0-1 doses, and typical days 3 doses, bad days 4 or 5 doses) and acetaminophen for pain management.  She also notes pain in her mid back/lower aspect of R scapula.  This is well-managed with the opioid medications.  Is not using NSAID medications.    Louise endorses anxiety.  Has had significant psychosocial stressors with death of her father in past 3 years, parenting, working, and now the cancer diagnosis.    Louise has struggled with constipation--uses docusate alone for her bowel regimen.  Rare use of bisacodyl--last used a couple weeks ago when she had issues with constipation.  Has Bms every couple days typically.  Hasn't used miralax or other stimulant medications.    What brings mamadou: time with her family, children.  Enjoys watching movies, being in the pool (can exercise without flushing episodes there).      Preferences on information: WOULD WANT TO KNOW PROGNOSIS when it is more clear.  ALSO wondering whether she may become a surgical candidate.      Prognosis, Goals, & Planning:   Functional Status just prior to this current hospitalization:  ECOG2 (Ambulatory and capable of all selfcare but unable to carry out any work activities; may need help with IADLs up and about > 50% of waking hours)    Prognosis, Goals, and/or Advance Care Planning:  Did not directly address prognosis, she would like to hear from oncology if /when this is more clear.  Goals are clearly to get more time.  She hasn't completed a health care directive.    Code Status was addressed today:   No in effort to establish rapport and also discuss multiple symptoms, did not go into code status as clearly life prolonging goals.    Patient's decision making preferences: independently        Patient has decision-making capacity today for complex decisions: Intact          Coping, Meaning, & Spirituality:   Mood, coping, and/or meaning in the context of serious illness were addressed today:  Yes  Hinduism, active spiritual life.  Has had multiple stressors and notes friends and family have been great supports to her.    Social:   Living situation:lives with significant other/spouse  Important relationships/caregivers: Rowdy, sister Nadine; son Abhishek has been big support, daughter Rao a support and also recently started college  Occupation: on medical disability from nursing career.  Areas of fulfillment/mamadou: family, relationships    Medications:  Reviewed this patient's medication profile and medications from this hospitalization.    [place] Board of Pharmacy Data Base Reviewed: Yes:   reviewed - controlled substances reflected in medication list.    ROS:  Comprehensive ROS is reviewed and is negative except as here & per HPI:     Physical Exam   Vital Signs with Ranges  Temp:  [98.4  F (36.9  C)-99.3  F (37.4  C)] 98.5  F (36.9  C)  Pulse:  [65-87] 69  Resp:  [14-22] 18  BP: (117-151)/(57-80) 119/63  SpO2:  [92 %-95 %] 93 %  Wt Readings from Last 10 Encounters:   09/06/24 82.5 kg (181 lb 12.8 oz)   08/23/24 82 kg (180 lb 12.8 oz)   08/20/24 82.3 kg (181 lb 8 oz)   08/05/24 86.6 kg (191 lb)   07/25/24 89.4 kg (197 lb)   07/15/24 89.4 kg (197 lb)   07/15/24 89.4 kg (197 lb)   06/24/24 93.6 kg (206 lb 6.4 oz)   06/24/24 93.6 kg (206 lb 6.4 oz)   06/12/24 92.4 kg (203 lb 11.2 oz)     181 lbs 12.8 oz      Intake/Output Summary (Last 24 hours) at 9/6/2024 1557  Last data filed at 9/6/2024 0830  Gross per 24 hour   Intake 120 ml   Output --   Net 120 ml       PHYSICAL EXAM:  Alert 58 yo woman, NAD.  Sitting upright in chair, sister sitting with her.  Oriented x4, coherent historian.  Speech fluent, affect full.  Neck supple, no JVD.  Breathing nonlabored.    No JVD.    Abdomen soft, mildly tender  Extremities without edema.  No myoclonus, no tremor.    Data reviewed:  Last Comprehensive Metabolic Panel:  Lab Results   Component Value Date     09/04/2024    POTASSIUM 4.5 09/04/2024    CHLORIDE  "97 (L) 09/04/2024    CO2 26 09/04/2024    ANIONGAP 16 (H) 09/04/2024     (H) 09/06/2024    BUN 8.4 09/04/2024    CR 0.56 09/04/2024    GFRESTIMATED >90 09/04/2024    ANGELA 8.9 09/04/2024       CBC RESULTS:   Recent Labs   Lab Test 09/04/24  1206   WBC 6.3   RBC 4.04   HGB 11.5*   HCT 35.4   MCV 88   MCH 28.5   MCHC 32.5   RDW 19.7*        Lab Results   Component Value Date    AST 14 09/04/2024     Lab Results   Component Value Date    ALT 6 09/04/2024     No results found for: \"BILICONJ\"   Lab Results   Component Value Date    BILITOTAL 0.4 09/04/2024     Lab Results   Component Value Date    ALBUMIN 3.9 09/04/2024    ALBUMIN 3.7 05/02/2018     Lab Results   Component Value Date    PROTTOTAL 7.4 09/04/2024      Lab Results   Component Value Date    ALKPHOS 106 09/04/2024     UC 9/4/24 10-50K mix of lala    CT abdomen pelvis w contrast 9/4/24  IMPRESSION:     1.  Ileitis involving a long segment of the distal ileum, including the portion of the terminal ileum involved by the central mesenteric mass. No evidence of resulting bowel obstruction.     2.  Extensive metastatic disease including mesenteric miek metastases, peritoneal carcinomatosis, liver metastases, and bone metastases have not significantly changed since 8/1/2024.     3.  Small amount of ascites predominantly within the pelvis.     4.  Unchanged small to moderate right pleural effusion with an adjacent consolidation, likely atelectasis.     PETCT 8/1/24 onc whole body  INDICATION: Surgical treatment strategy for restaging poorly differentiated metastatic neuroendocrine carcinoma involving the liver   COMPARISON: PET/CT from 4/17/2024 and CT from 7/25/2024   CONTRAST: None   TECHNIQUE: Serum glucose level 125  mg/dL. One hour post intravenous administration of 11.5  mCi F-18 FDG, PET imaging was performed from the skull vertex to feet, utilizing attenuation correction with concurrent axial CT and PET/CT image fusion. Dose   reduction " techniques were used.     PET/CT FINDINGS:  Scattered sclerotic skeletal lesions have decreased in FDG activity, for example in the manubrium (SUVmax 5.0 to 3.8. Stable markedly FDG avid (SUVmax 8.5, previously 8.9) circumferential wall thickening in the terminal ileum with   adjacent FDG avid right lower quadrant mesenteric lymphadenopathy. Persistent FDG avid soft tissue nodularity in the left upper quadrant greater omentum. Uniform mild inflammatory FDG activity about the periphery of a partially loculated right pleural   effusion. Inflammatory FDG activity associated with injection sites in the right buttock. All other FDG activity is normal. Liver lesions and thoracic lymphadenopathy are no longer FDG avid in excess of background soft tissue levels.      CT FINDINGS: Areas of fat deposition in the wall of the ascending colon. Short segment intussusception near the hepatic flexure.  Passive atelectasis in the right lung. Right IJ Port-A-Cath tip in the right atrium. Hypoattenuation of blood pool relative   to myocardium, suggesting anemia. Duodenal diverticulum. Mild calcified atherosclerosis. No coronary artery calcifications.  Benign hyperostosis frontalis interna. Scattered non-FDG avid sclerotic skeletal lesions. Mild degenerative change in the spine.                                                                      IMPRESSION:     Partial response. Liver lesions and thoracic lymph node metastases are no longer FDG avid. Skeletal metastases have improved. Unchanged presumed primary tumor in the terminal ileum with adjacent mesenteric lymphadenopathy and peritoneal carcinomatosis.     ECG 9/4/24: sinus rhythm and Qtc is 431 ms.    100 minutes spent on the date of the encounter doing chart review, history and exam, documentation and further activities per the note.

## 2024-09-08 ENCOUNTER — PATIENT OUTREACH (OUTPATIENT)
Dept: CARE COORDINATION | Facility: CLINIC | Age: 59
End: 2024-09-08
Payer: COMMERCIAL

## 2024-09-08 NOTE — PROGRESS NOTES
Clinic Care Coordination Contact  Transitions of Care Outreach    Chief Complaint   Patient presents with    Clinic Care Coordination - Post Hospital       Most Recent Admission Date: 9/4/2024   Most Recent Admission Diagnosis: Intractable nausea and vomiting - R11.2  Neuroendocrine carcinoma metastatic to liver (H) - C7A.8, C7B.8  Nausea and vomiting, unspecified vomiting type - R11.2     Most Recent Discharge Date: 9/6/2024   Most Recent Discharge Diagnosis: Neuroendocrine carcinoma metastatic to liver (H) - C7A.8, C7B.8  Nausea and vomiting, unspecified vomiting type - R11.2  Intractable nausea and vomiting - R11.2  Adjustment disorder with anxious mood - F43.22  Constipation, unspecified constipation type - K59.00     Transitions of Care Assessment    Discharge Assessment  How are you doing now that you are home?: feeling better  How are your symptoms? (Red Flag symptoms escalate to triage hotline per guidelines): Improved  Do you know how to contact your clinic care team if you have future questions or changes to your health status? : Yes  Does the patient have their discharge instructions? : Yes  Does the patient have questions regarding their discharge instructions? : No  Were you started on any new medications or were there changes to any of your previous medications? : Yes  Does the patient have all of their medications?: Yes  Do you have questions regarding any of your medications? : No  Do you have all of your needed medical supplies or equipment (DME)?  (i.e. oxygen tank, CPAP, cane, etc.): Yes    Post-op ([facility] CTA Only)  If the patient had a surgery or procedure, do they have any questions for a nurse?: No         [facility] Explained and offered Care Coordination support to eligible patients: Yes    Patient accepted? No    Follow up Plan     Follow-up and recommended labs and tests  Follow up with primary care provider, Kee Mccullough, within 7 days for  hospital follow- up.  Follow up as scheduled with  palliative on 9/10/2024.  Follow up as scheduled with oncology.    Future Appointments   Date Time Provider Department [place]   9/10/2024 12:40 PM Stephen Neil MD [facility]   9/11/2024 11:45 AM [facility] LAB CHUNG [facility]   9/11/2024  1:00 PM [facility]   9/12/2024  2:00 PM [facility] INFUSION NURSE [facility]   9/16/2024  1:30 PM SJN PET CT 1 JNPET [facility]   9/23/2024  3:00 PM [facility] INFUSION INFUSION LAB [facility]   9/23/2024  3:30 PM Man Barreto MD [facility]   5/27/2025 12:00 PM Kee Mccullough MD [facility]       Outpatient Plan as outlined on AVS reviewed with patient.      For any urgent concerns, please contact our 24 hour nurse triage line: [phone]     ALKA Salas  [phone]  [facility]

## 2024-09-09 ENCOUNTER — TELEPHONE (OUTPATIENT)
Dept: UROLOGY | Facility: CLINIC | Age: 59
End: 2024-09-09
Payer: COMMERCIAL

## 2024-09-09 NOTE — TELEPHONE ENCOUNTER
Spoke with patient who states that insurance had been asking for the code.  Insurance company was called with diagnostic cystoscopy cpt code. Had to leave a message with Miriam.  Veena Way RN

## 2024-09-09 NOTE — TELEPHONE ENCOUNTER
[facility] Call Center    Phone Message    May a detailed message be left on voicemail: yes     Reason for Call: Other: Miriam needs the CPT codes for the Cysto on 9/11/24. Please call asap.      Action Taken: Message routed to:  [facility] ([facility]): Atif ARANA    Travel Screening: Not Applicable     Date of Service:

## 2024-09-10 ENCOUNTER — PATIENT OUTREACH (OUTPATIENT)
Dept: CARE COORDINATION | Facility: CLINIC | Age: 59
End: 2024-09-10

## 2024-09-10 ENCOUNTER — OFFICE VISIT (OUTPATIENT)
Dept: RADIATION ONCOLOGY | Facility: HOSPITAL | Age: 59
End: 2024-09-10
Payer: COMMERCIAL

## 2024-09-10 VITALS
DIASTOLIC BLOOD PRESSURE: 67 MMHG | SYSTOLIC BLOOD PRESSURE: 132 MMHG | HEART RATE: 82 BPM | RESPIRATION RATE: 18 BRPM | OXYGEN SATURATION: 95 %

## 2024-09-10 DIAGNOSIS — T40.2X5A THERAPEUTIC OPIOID-INDUCED CONSTIPATION (OIC): ICD-10-CM

## 2024-09-10 DIAGNOSIS — Z71.89 GOALS OF CARE, COUNSELING/DISCUSSION: ICD-10-CM

## 2024-09-10 DIAGNOSIS — C7B.8 NEUROENDOCRINE CARCINOMA METASTATIC TO LIVER (H): ICD-10-CM

## 2024-09-10 DIAGNOSIS — C7A.8 NEUROENDOCRINE CARCINOMA METASTATIC TO LIVER (H): ICD-10-CM

## 2024-09-10 DIAGNOSIS — C78.7 METASTASES TO THE LIVER (H): ICD-10-CM

## 2024-09-10 DIAGNOSIS — Z51.5 PALLIATIVE CARE PATIENT: Primary | ICD-10-CM

## 2024-09-10 DIAGNOSIS — G89.3 CANCER RELATED PAIN: ICD-10-CM

## 2024-09-10 DIAGNOSIS — K59.03 THERAPEUTIC OPIOID-INDUCED CONSTIPATION (OIC): ICD-10-CM

## 2024-09-10 PROCEDURE — 99214 OFFICE O/P EST MOD 30 MIN: CPT | Performed by: FAMILY MEDICINE

## 2024-09-10 PROCEDURE — G2211 COMPLEX E/M VISIT ADD ON: HCPCS | Performed by: FAMILY MEDICINE

## 2024-09-10 PROCEDURE — 99205 OFFICE O/P NEW HI 60 MIN: CPT | Performed by: FAMILY MEDICINE

## 2024-09-10 RX ORDER — SENNOSIDES A AND B 8.6 MG/1
TABLET, FILM COATED ORAL
Qty: 120 TABLET | Refills: 11 | Status: SHIPPED | OUTPATIENT
Start: 2024-09-10

## 2024-09-10 ASSESSMENT — PAIN SCALES - GENERAL: PAINLEVEL: MILD PAIN (2)

## 2024-09-10 NOTE — PROGRESS NOTES
Social Work - Intervention  [facility]  Data/Intervention:    Patient Name: Louise Corado Goes By: Louise    /Age: 1965 (59 year old)     Visit Type: Telephone  Referral Source: Dr. Hunt, Palliative Care  Reason for Referral: Emotional support     Psychosocial Information/Concerns:  Louise is a 59 year old with a diagnosis of neuroendocrine tumor. She receives her care at the [facility]. She met with Palliative Care on 9/10/24. 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     Intervention/Education/Resources Provided:  SW called Louise to introduce self and assess needs. She was recently discharged from the hospital and is doing well at home. SW explained role and support available. Louise is concerned about finances and is interested in resources. Discussed [facility] and [facility] and sent link to her via my chart. She doesn't have any other concerns at time of call but may want to meet in person in the future. Emotional support provided.      Assessment/Plan:  Provided patient with contact information and availability.    GA Garcia, [facility]  Adult Oncology Clinics  [place] (M,W), [place] (T) & [place] ()  *I am off Friday  Office: [phone]

## 2024-09-10 NOTE — TELEPHONE ENCOUNTER
Message left for insurance company to call back regarding coverage of diagnostic procedure.  Direct number for nurse given.  Veena Way RN

## 2024-09-10 NOTE — PATIENT INSTRUCTIONS
It was good to see you today, Louise.    Here are the things we talked about:  For opioid induced constipation:  - bisacodyl daily  - Senna 1 tablet po BID on days she has a BM or 2 tabs po BID on days with no BM  - add dulcolax suppository if no BM on the third day and daily thereafter and if no BM in 5 days call  - goal is soft easy to pass BM daily to every other day.    Call next week if you haven't been contacted about seeing an outpatient .    Stop to to schedule a follow up appointment in 2 months.    Tips for Conserving Your Energy   Cancer and cancer therapy can beaccompanied by feelings of extreme fatigue. To help you during the times you feel tired, these easy tips help conserve the energy you do have.   Activities of Daily Living   Plan ahead to avoid rushing.  Sit down to bathe and dry off. Wear a rosie robe instead of drying off.   Use a shower/bath organizer to decrease leaning and reaching.   Use extension handles on sponges and brushes.   Install grab rails inthe bathroom or use an elevated toilet seat.   Lay out clothes and toiletries before dressing.   Minimize leaning over to put on clothes and shoes. Bring your foot to your knee to apply socks and shoes. Fasten bra infront then turn to back.   Modify your home to maximize efficient energy use. For example, place chairs strategically to allow for rest stops -- for instance, along a long hallway.   Wear comfortable shoes andlow-heeled, slip on shoes. Wear button front shirts rather than pullovers.   Housekeeping   Schedule household tasks throughout the week.   Do housework sitting down when possible. Use long-handleddusters, dust mops, etc. Use a wheeled cart or alex's apron to carry supplies.   Delegate heavy housework, shopping, laundry and childcare when possible.   Drag or slide objects rather than lifting. If you do needto lift an object, use your leg muscles rather than your back muscles.   Sit when ironing and take rest  periods.   Stop working before becoming overly tired.   Shopping   Organize list by faustina.   Use a grocery cart for support.   Shop at less busy times.   Ask for help in getting to the car.   Buy clothes that don't require ironing.   Meal Preparation   Use convenience and easy-to-preparefoods.   Use small appliances that take less effort to use.   Arrange the preparation environment for easy access to frequently used items.   Prepare meals sitting down.   Soak dishes instead of scrubbing and letdishes air dry.   Prepare double portions and freeze half.   Plan activities that can be done sitting down, such as drawing pictures, playing games, reading, and computer games.   Encourage children to climb up ontoyour lap or into the highchair instead of being lifted.   Make a game of the household chores so that children will want to help.   Delegate childcare when possible.   Workplace   Plan workload totake advantage of peak energy times. Alternate physically demanding tasks with less demanding tasks.   Arrange work environment for easy access to commonly used equipment and supplies.   Leisure Do activities with acompanion.   Select activities that match your energy level. Balance activity and rest. Don't get over-tired.       How to get a hold of us:  For non-urgent matters, MyChart works best.    For more urgent matters, or if you prefer not to use MyChart, call our clinic nurse coordinator Amanda Josue RN at [phone]    We have an on-call number for evenings and weekends. Please call this only if you are having uncontrolled symptoms or serious side effects from your medicines: [phone].     For refills, please give us a week (5 working days) notice. We don't always have providers available everyday to do refills. If you call the day you run out of your medicine, we may not be able to refill it in time, so call 5 days in advance!    Stephen Neil MD MS FAAFP CAQHPM  [facility]  Office [phone]  Fax [phone]

## 2024-09-10 NOTE — PROGRESS NOTES
Palliative Care Outpatient Clinic Consultation Note    Patient:  Louise Corado    Chief Complaint:   Louise Corado 59 year old female who is presenting to the palliative medicine clinic today at the request of Brenda Pearson for a palliative care consultation secondary to neuroendocrine tumor.   The patient's primary care provider is:  Kee Mccullough.         Recommendations & Counseling      GOALS OF CARE:   Life-prolonging without limits         ADVANCE CARE PLANNING:  No health care directive on file. Per system policy, Surrogate Decision-makers for Patients With Diminished Decision-making Capacity offers guidance on possible decision-makers.  Rowdy has been identified as a surrogate decision maker.  Recommend exploring further in future palliative medicine conversations.  There is no POLST form on file, defer to patient and/or next of kin for decisions   Code status: Full Code     MEDICAL MANAGEMENT:      #Pain,acute on chronic : Has R sided back/chest wall pain, sclerotic lesions noted on imaging, cancer-related.  This is much better due to changes in meds on 9/6/2024    ALSO has abdominal cramping/tightness pain that occurs with flushing episodes with ambulation/movement that are bothersome to her.  Wonder if some component of constipation could be contributing as well.    Home MME: 60MME from MS contin 30mg q12hr + 15-20mg oxycodone q4 hrs prn breakthrough pain, typically using 1-4 doses per day, 5 /day on bad days.  So typical day if 3 doses breakthrough med, she would use 60 MME + 75 MME = 135 MME every 24 hours      24-hr MME: 60mg oxycodone (75MME)+30mg MS Contin = 105 MME  For past three days has needed 15 mg po oxycodone BID (an improvement)  Recommendations:    On APAP PRN-=-continue same.  Oxycodone 15-20mg q4H PRN mod-severe pain, continue same.  MS contin   30mg q12 hours   Suggest use of heat as needed/ice as needed.  Continue with octreotide per Dr. Barreto.        #Nausea,disease processes  "with neuroendocrine tumor and suspected carcinoid tumor, plus nausea due to chemotherapy infusions.  This has resolved currently and Louise has ondansetron supposed to be scheduled daily but she is using it prn and prochlorpemazine prn and phenergan which she isn't using        #Deconditioning : has been self limiting activity due to flushing episodes.  Mostly sitting/in bed during daytime.  Encourage ambulation/movement.  She is aware of functional status necessary for continuing cytotoxic chemotherapy.     #Unintended weight loss (40# in past 5 months) due to metastatic neuroendocrine tumor, issues with nausea  #Protein calorie malnutrition in the context of acute illness as evidenced by unintentional weight loss and muscle loss   Work on antiemetic medications.     #Constipation,Medication adverse effect  At home--on docusate only.  Recommend avoiding docusate as \"all mush, no push\" and studies show it worsens stool quality for cancer patients in RCT but Louise feels it makes a difference.    RECOMMENDATIONS   - bisacodyl daily  - Senna 1 tablet po BID on days she has a BM or 2 tabs po BID on days with no BM  new rx sent for this  - add dulcolax suppository if no BM on the third day and daily thereafter and if no BM in 5 days call  - goal is soft easy to pass BM daily to every other day.     # Anxiety, in part related to medical diagnosis.  - has not been taking citalopram with concerns for serotonergic effect.  - using lorazepam up to three times weekly at baseline, continue same.   prescribes this.  - Recommend Oncology [facility] support for emotional support/processing--order placed for outpatient setting.         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  Louise presented on 9/4/24 " "with five days of nausea, decreased oral intake, and abdominal pain..       Today, the patient was seen for:  Introduction to palliative care, establish rapport, symptom assessment specifically nausea and cancer from neuroendocrine tumor.     History of Present Illness   Met with Louise and her  Rowdy.     I introduced our role as an extra layer of support and how we help patients and families dealing with serious, potentially life-limiting illnesses. I explained the composition of the palliative care team.  Palliative care helps patients and families navigate their care while focusing on the whole person; providing emotional, social and spiritual support  Palliative care often assists with symptom management, information sharing about what to expect from the illness, available treatment options and what effect those options may have on the disease course, and provide effective communication and caring support.     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     Louise tells me her tumor is \"terminal, I know at some point I will die from this.\"  She wants to maximize time with her family, is aware her last scan showed partial response to her cancer treatments, and hopes she will continue to have improvements.     Chart reviewed, Louise is on chemotherapy (had cycle 6 recently) complicated by nausea; she has received phenergan Rx and ondansetron in outpatient setting; previously oncology notes indicate Louise did not get relief from prednisone, lorazepam, or compazine.  Louise follows with Dr Barreto of [facility].  Her tumor is thought to be poorly differentiated higher grade tumor, thought most consistent w small bowel neuroendocrine CA.  She receives octreotide long-acting injections every month, last given per records 8/15/24.   Her next PETCT is the week of September 9th, 2024.       Louise notes pain in abdomen is typically " a tightness/cramping sensation that occurs when she has flushing episodes.  Flushin episodes happen when she is exerting herself (if she's been active for 2+ minutes).  She will have flushing (no hives) and feel hot, and note a sense of cramping, shortness of breath, and the episode will last for 15 minutes and gradually subside.  She's has been on MS Contin 30mg q12 hours, oxycodone 15-20mg every 4 hrs as needed for breakthrough (good days which are rare she may use 0-1 doses, and typical days 3 doses, bad days 4 or 5 doses) and acetaminophen for pain management.  She also notes pain in her mid back/lower aspect of R scapula.  This is well-managed with the opioid medications.  Is not using NSAID medications.     Louise endorses anxiety.  Has had significant psychosocial stressors with death of her father in past 3 years, parenting, working, and now the cancer diagnosis.     Louise has struggled with constipation--uses docusate alone for her bowel regimen.  Rare use of bisacodyl--last used a couple weeks ago when she had issues with constipation.  Has Bms every couple days typically.  Hasn't used miralax or other stimulant medications.     What brings mamadou: time with her family, children.  Enjoys watching movies, being in the pool (can exercise without flushing episodes there).       Preferences on information: WOULD WANT TO KNOW PROGNOSIS when it is more clear.  ALSO wondering whether she may become a surgical candidate.       Prognosis, Goals, & Planning:   Functional Status just prior to this current hospitalization:  ECOG2 (Ambulatory and capable of all selfcare but unable to carry out any work activities; may need help with IADLs up and about > 50% of waking hours)     Prognosis, Goals, and/or Advance Care Planning:  Did not directly address prognosis, she would like to hear from oncology if /when this is more clear.  Goals are clearly to get more time.  She hasn't completed a health care directive.     Code  Status was addressed today:   No in effort to establish rapport and also discuss multiple symptoms, did not go into code status as clearly life prolonging goals.     Patient's decision making preferences: independently        Patient has decision-making capacity today for complex decisions: Intact          Coping, Meaning, & Spirituality:   Mood, coping, and/or meaning in the context of serious illness were addressed today: Yes  Episcopal, active spiritual life.  Has had multiple stressors and notes friends and family have been great supports to her.     Patient's Disease Understanding: very good    Coping:  overall OK she feels she is over the  so her diagnosis,  Now dealing with daughter leaving home for college at . [employer] is a stress.    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[employer]--hasn't worked since diagnosis  Hobbies: not many right now beyond watching movies or daughter's soccer matches;   Patient is 'famous for a couple of cooking dishes including a too die for pot roast; and a good turkey wild rice soup; '  Substance Use/History of misuse: none  Financial Concerns: yes, has applied for SSDI and qualifies for disability through work  Spiritual Background: Episcopal with no current [place] home;   Spiritual Concerns/Needs: is interested in the Sacrament of Healing     Medications:  No Known Allergies  Current Outpatient Medications   Medication Sig Dispense Refill    acetaminophen (TYLENOL) 325 MG tablet Take 325-650  mg by mouth every 6 hours as needed for mild pain      bisacodyl (DULCOLAX) 5 MG EC tablet Take 1 tablet (5 mg) by mouth daily. 30 tablet 0    lidocaine-prilocaine (EMLA) 2.5-2.5 % external cream Apply topically as needed for moderate pain 30 g 1    LORazepam (ATIVAN) 0.5 MG tablet Take 1-2 tablets (0.5-1 mg) by mouth every 6 hours as needed for anxiety or sleep 30 tablet 1    metFORMIN (GLUCOPHAGE XR) 500 MG 24 hr tablet Take 1 tablet (500 mg) by mouth daily (with dinner) 90 tablet 0    morphine (MS CONTIN) 30 MG CR tablet Take 1 tablet (30 mg) by mouth every 12 hours. 10 tablet 0    naloxone (NARCAN) 4 MG/0.1ML nasal spray Spray 1 spray (4 mg) into one nostril alternating nostrils as needed for opioid reversal every 2-3 minutes until assistance arrives 2 each 0    OLANZapine (ZYPREXA) 2.5 MG tablet Take 1 tablet (2.5 mg) by mouth at bedtime. 30 tablet 0    ondansetron (ZOFRAN ODT) 4 MG ODT tab Take 1 tablet (4 mg) by mouth every 12 hours. 60 tablet 0    ondansetron (ZOFRAN) 8 MG tablet Take 1 tablet (8 mg) by mouth every 6 hours as needed for nausea (vomiting) 30 tablet 2    oxyCODONE (ROXICODONE) 5 MG tablet Take 3-4 tablets (15-20 mg) by mouth every 4 hours as needed for pain. 90 tablet 0    polyethylene glycol (MIRALAX) 17 GM/Dose powder Take 17 g by mouth daily. 510 g 0    prochlorperazine (COMPAZINE) 10 MG tablet Take 1 tablet (10 mg) by mouth every 6 hours as needed for vomiting. 30 tablet 0    promethazine (PHENERGAN) 12.5 MG tablet Take 1-2 tablets (12.5-25 mg) by mouth every 6 hours as needed for nausea. 30 tablet 1    simethicone (MYLICON) 125 MG chewable tablet Take 125 mg by mouth 4 times daily as needed for intestinal gas.        [place] Board of Pharmacy Data Base Reviewed: Yes:   reviewed - controlled substances reflected in medication list.     Advance Care Planning:  Advance Directive:    none completed blank 4 page form provided today  Where is written copy located: n/a  Health Care Agent  Contact Information: will be  Rowdy TIDWELL:   n/a  CODE STATUS: FULL     REVIEW OF SYSTEMS:   ROS: 10 point ROS neg other than the symptoms noted above in the HPI and here:  Palliative Symptom Review (0=no symptom/no concern, 1=mild, 2=moderate, 3=severe):      Pain: 0 today      Fatigue: 0      Nausea: 1      Constipation: 2      Diarrhea: 0      Depressive Symptoms: 0      Anxiety: 1      Drowsiness: 0      Poor Appetite: 1      Shortness of Breath: 0      Insomnia: 0      Other: 0      Overall (0 good/no concerns, 3 very poor):  0      Physical Exam  Vital Signs with Ranges  Temp:  [98.4  F (36.9  C)-99.3  F (37.4  C)] 98.5  F (36.9  C)  Pulse:  [65-87] 69  Resp:  [14-22] 18  BP: (117-151)/(57-80) 119/63  SpO2:  [92 %-95 %] 93 %      Wt Readings from Last 10 Encounters:   09/06/24 82.5 kg (181 lb 12.8 oz)   08/23/24 82 kg (180 lb 12.8 oz)   08/20/24 82.3 kg (181 lb 8 oz)   08/05/24 86.6 kg (191 lb)   07/25/24 89.4 kg (197 lb)   07/15/24 89.4 kg (197 lb)   07/15/24 89.4 kg (197 lb)   06/24/24 93.6 kg (206 lb 6.4 oz)   06/24/24 93.6 kg (206 lb 6.4 oz)   06/12/24 92.4 kg (203 lb 11.2 oz)      181 lbs 12.8 oz        Intake/Output Summary (Last 24 hours) at 9/6/2024 1557  Last data filed at 9/6/2024 0830      Gross per 24 hour   Intake 120 ml   Output --   Net 120 ml         PHYSICAL EXAM:  Alert 58 yo woman, NAD.  Sitting upright in chair, sister sitting with her.  Oriented x4, coherent historian.  Speech fluent, affect full.  Neck supple, no JVD.  Breathing nonlabored.    No JVD.    Abdomen soft, mildly tender  Extremities without edema.  No myoclonus, no tremor.     Data reviewed:  Last Comprehensive Metabolic Panel:        Lab Results   Component Value Date      09/04/2024     POTASSIUM 4.5 09/04/2024     CHLORIDE 97 (L) 09/04/2024     CO2 26 09/04/2024     ANIONGAP 16 (H) 09/04/2024      (H) 09/06/2024     BUN 8.4 09/04/2024     CR 0.56 09/04/2024     GFRESTIMATED >90 09/04/2024     ANGELA 8.9  "09/04/2024         CBC RESULTS:       Recent Labs   Lab Test 09/04/24  1206   WBC 6.3   RBC 4.04   HGB 11.5*   HCT 35.4   MCV 88   MCH 28.5   MCHC 32.5   RDW 19.7*               Lab Results   Component Value Date     AST 14 09/04/2024            Lab Results   Component Value Date     ALT 6 09/04/2024      No results found for: \"BILICONJ\"         Lab Results   Component Value Date     BILITOTAL 0.4 09/04/2024            Lab Results   Component Value Date     ALBUMIN 3.9 09/04/2024     ALBUMIN 3.7 05/02/2018            Lab Results   Component Value Date     PROTTOTAL 7.4 09/04/2024            Lab Results   Component Value Date     ALKPHOS 106 09/04/2024      UC 9/4/24 10-50K mix of lala     CT abdomen pelvis w contrast 9/4/24  IMPRESSION:     1.  Ileitis involving a long segment of the distal ileum, including the portion of the terminal ileum involved by the central mesenteric mass. No evidence of resulting bowel obstruction.     2.  Extensive metastatic disease including mesenteric mike metastases, peritoneal carcinomatosis, liver metastases, and bone metastases have not significantly changed since 8/1/2024.     3.  Small amount of ascites predominantly within the pelvis.     4.  Unchanged small to moderate right pleural effusion with an adjacent consolidation, likely atelectasis.      PETCT 8/1/24 onc whole body  INDICATION: Surgical treatment strategy for restaging poorly differentiated metastatic neuroendocrine carcinoma involving the liver   COMPARISON: PET/CT from 4/17/2024 and CT from 7/25/2024   CONTRAST: None   TECHNIQUE: Serum glucose level 125  mg/dL. One hour post intravenous administration of 11.5  mCi F-18 FDG, PET imaging was performed from the skull vertex to feet, utilizing attenuation correction with concurrent axial CT and PET/CT image fusion. Dose   reduction techniques were used.     PET/CT FINDINGS:  Scattered sclerotic skeletal lesions have decreased in FDG activity, for example in the " manubrium (SUVmax 5.0 to 3.8. Stable markedly FDG avid (SUVmax 8.5, previously 8.9) circumferential wall thickening in the terminal ileum with   adjacent FDG avid right lower quadrant mesenteric lymphadenopathy. Persistent FDG avid soft tissue nodularity in the left upper quadrant greater omentum. Uniform mild inflammatory FDG activity about the periphery of a partially loculated right pleural   effusion. Inflammatory FDG activity associated with injection sites in the right buttock. All other FDG activity is normal. Liver lesions and thoracic lymphadenopathy are no longer FDG avid in excess of background soft tissue levels.      CT FINDINGS: Areas of fat deposition in the wall of the ascending colon. Short segment intussusception near the hepatic flexure.  Passive atelectasis in the right lung. Right IJ Port-A-Cath tip in the right atrium. Hypoattenuation of blood pool relative   to myocardium, suggesting anemia. Duodenal diverticulum. Mild calcified atherosclerosis. No coronary artery calcifications.  Benign hyperostosis frontalis interna. Scattered non-FDG avid sclerotic skeletal lesions. Mild degenerative change in the spine.                                                                      IMPRESSION:     Partial response. Liver lesions and thoracic lymph node metastases are no longer FDG avid. Skeletal metastases have improved. Unchanged presumed primary tumor in the terminal ileum with adjacent mesenteric lymphadenopathy and peritoneal carcinomatosis.      ECG 9/4/24: sinus rhythm and Qtc is 431 ms.    80 minutes spent on the date of the encounter doing chart review, history and exam, patient education & counseling, documentation and other activities as noted above.    The longitudinal plan of care for the diagnosis(es)/condition(s) as documented were addressed during this visit. Due to the added complexity in care, I will continue to support Louise in the subsequent management and with ongoing continuity  of care.      Stephen Neil MD MS FAAFP CAQHPM  [facility]  Office [phone]  Fax [phone]

## 2024-09-10 NOTE — LETTER
9/10/2024      Louise Corado  [address] [place] [address]      Dear Colleague,    Thank you for referring your patient, Louise Corado, to the [facility] [place]. Please see a copy of my visit note below.    Palliative Care Outpatient Clinic Consultation Note    Patient:  Louise Corado    Chief Complaint:   Louise Corado 59 year old female who is presenting to the palliative medicine clinic today at the request of Brenda Pearson for a palliative care consultation secondary to neuroendocrine tumor.   The patient's primary care provider is:  Kee Mccullough.         Recommendations & Counseling      GOALS OF CARE:   Life-prolonging without limits         ADVANCE CARE PLANNING:  No health care directive on file. Per system policy, Surrogate Decision-makers for Patients With Diminished Decision-making Capacity offers guidance on possible decision-makers.  Rowdy has been identified as a surrogate decision maker.  Recommend exploring further in future palliative medicine conversations.  There is no POLST form on file, defer to patient and/or next of kin for decisions   Code status: Full Code     MEDICAL MANAGEMENT:      #Pain,acute on chronic : Has R sided back/chest wall pain, sclerotic lesions noted on imaging, cancer-related.  This is much better due to changes in meds on 9/6/2024    ALSO has abdominal cramping/tightness pain that occurs with flushing episodes with ambulation/movement that are bothersome to her.  Wonder if some component of constipation could be contributing as well.    Home MME: 60MME from MS contin 30mg q12hr + 15-20mg oxycodone q4 hrs prn breakthrough pain, typically using 1-4 doses per day, 5 /day on bad days.  So typical day if 3 doses breakthrough med, she would use 60 MME + 75 MME = 135 MME every 24 hours      24-hr MME: 60mg oxycodone (75MME)+30mg MS Contin = 105 MME  For past three days has needed 15 mg po oxycodone BID (an  "improvement)  Recommendations:    On APAP PRN-=-continue same.  Oxycodone 15-20mg q4H PRN mod-severe pain, continue same.  MS contin   30mg q12 hours   Suggest use of heat as needed/ice as needed.  Continue with octreotide per Dr. Barreto.        #Nausea,disease processes with neuroendocrine tumor and suspected carcinoid tumor, plus nausea due to chemotherapy infusions.  This has resolved currently and Louise has ondansetron supposed to be scheduled daily but she is using it prn and prochlorpemazine prn and phenergan which she isn't using        #Deconditioning : has been self limiting activity due to flushing episodes.  Mostly sitting/in bed during daytime.  Encourage ambulation/movement.  She is aware of functional status necessary for continuing cytotoxic chemotherapy.     #Unintended weight loss (40# in past 5 months) due to metastatic neuroendocrine tumor, issues with nausea  #Protein calorie malnutrition in the context of acute illness as evidenced by unintentional weight loss and muscle loss   Work on antiemetic medications.     #Constipation,Medication adverse effect  At home--on docusate only.  Recommend avoiding docusate as \"all mush, no push\" and studies show it worsens stool quality for cancer patients in RCT but Louise feels it makes a difference.    RECOMMENDATIONS   - bisacodyl daily  - Senna 1 tablet po BID on days she has a BM or 2 tabs po BID on days with no BM  new rx sent for this  - add dulcolax suppository if no BM on the third day and daily thereafter and if no BM in 5 days call  - goal is soft easy to pass BM daily to every other day.     # Anxiety, in part related to medical diagnosis.  - has not been taking citalopram with concerns for serotonergic effect.  - using lorazepam up to three times weekly at baseline, continue same.   prescribes this.  - Recommend Oncology LISW support for emotional support/processing--order placed for outpatient setting.         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  Louise presented on 9/4/24 with five days of nausea, decreased oral intake, and abdominal pain..       Today, the patient was seen for:  Introduction to palliative care, establish rapport, symptom assessment specifically nausea and cancer from neuroendocrine tumor.     History of Present Illness   Met with Louise and her  Rowdy.     I introduced our role as an extra layer of support and how we help patients and families dealing with serious, potentially life-limiting illnesses. I explained the composition of the palliative care team.  Palliative care helps patients and families navigate their care while focusing on the whole person; providing emotional, social and spiritual support  Palliative care often assists with symptom management, information sharing about what to expect from the illness, available treatment options and what effect those options may have on the disease course, and provide effective communication and caring support.     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     Louise tells me her tumor is \"terminal, I know at some point I will die from this.\"  She wants to maximize time with her family, is aware her last scan showed partial response to her cancer treatments, and hopes she will continue to have improvements.     Chart reviewed, Louise is on chemotherapy (had cycle 6 recently) complicated by nausea; she has received phenergan Rx and ondansetron in outpatient setting; previously oncology notes indicate Louise did not get relief from prednisone, lorazepam, or compazine.  Louise follows with Dr Barreto of [facility].  Her tumor is thought to be poorly " differentiated higher grade tumor, thought most consistent w small bowel neuroendocrine CA.  She receives octreotide long-acting injections every month, last given per records 8/15/24.   Her next PETCT is the week of September 9th, 2024.       Louise notes pain in abdomen is typically a tightness/cramping sensation that occurs when she has flushing episodes.  Flushin episodes happen when she is exerting herself (if she's been active for 2+ minutes).  She will have flushing (no hives) and feel hot, and note a sense of cramping, shortness of breath, and the episode will last for 15 minutes and gradually subside.  She's has been on MS Contin 30mg q12 hours, oxycodone 15-20mg every 4 hrs as needed for breakthrough (good days which are rare she may use 0-1 doses, and typical days 3 doses, bad days 4 or 5 doses) and acetaminophen for pain management.  She also notes pain in her mid back/lower aspect of R scapula.  This is well-managed with the opioid medications.  Is not using NSAID medications.     Louise endorses anxiety.  Has had significant psychosocial stressors with death of her father in past 3 years, parenting, working, and now the cancer diagnosis.     Louise has struggled with constipation--uses docusate alone for her bowel regimen.  Rare use of bisacodyl--last used a couple weeks ago when she had issues with constipation.  Has Bms every couple days typically.  Hasn't used miralax or other stimulant medications.     What brings mamadou: time with her family, children.  Enjoys watching movies, being in the pool (can exercise without flushing episodes there).       Preferences on information: WOULD WANT TO KNOW PROGNOSIS when it is more clear.  ALSO wondering whether she may become a surgical candidate.       Prognosis, Goals, & Planning:   Functional Status just prior to this current hospitalization:  ECOG2 (Ambulatory and capable of all selfcare but unable to carry out any work activities; may need help with IADLs  up and about > 50% of waking hours)     Prognosis, Goals, and/or Advance Care Planning:  Did not directly address prognosis, she would like to hear from oncology if /when this is more clear.  Goals are clearly to get more time.  She hasn't completed a health care directive.     Code Status was addressed today:   No in effort to establish rapport and also discuss multiple symptoms, did not go into code status as clearly life prolonging goals.     Patient's decision making preferences: independently        Patient has decision-making capacity today for complex decisions: Intact          Coping, Meaning, & Spirituality:   Mood, coping, and/or meaning in the context of serious illness were addressed today: Yes  Denominational, active spiritual life.  Has had multiple stressors and notes friends and family have been great supports to her.     Patient's Disease Understanding: very good    Coping:  overall OK she feels she is over the  so her diagnosis,  Now dealing with daughter leaving home for college at . [employer] is a stress.    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[employer]--hasn't worked since diagnosis  Hobbies: not many right now beyond watching movies or daughter's soccer matches;   Patient is 'famous for a couple of cooking dishes including a too die for pot roast; and a good turkey wild rice soup; '  Substance Use/History of misuse: none  Financial Concerns: yes, has applied for SSDI and qualifies for disability through  work  Spiritual Background: Orthodox with no current [place] home;   Spiritual Concerns/Needs: is interested in the Sacrament of Healing     Medications:  No Known Allergies  Current Outpatient Medications   Medication Sig Dispense Refill     acetaminophen (TYLENOL) 325 MG tablet Take 325-650 mg by mouth every 6 hours as needed for mild pain       bisacodyl (DULCOLAX) 5 MG EC tablet Take 1 tablet (5 mg) by mouth daily. 30 tablet 0     lidocaine-prilocaine (EMLA) 2.5-2.5 % external cream Apply topically as needed for moderate pain 30 g 1     LORazepam (ATIVAN) 0.5 MG tablet Take 1-2 tablets (0.5-1 mg) by mouth every 6 hours as needed for anxiety or sleep 30 tablet 1     metFORMIN (GLUCOPHAGE XR) 500 MG 24 hr tablet Take 1 tablet (500 mg) by mouth daily (with dinner) 90 tablet 0     morphine (MS CONTIN) 30 MG CR tablet Take 1 tablet (30 mg) by mouth every 12 hours. 10 tablet 0     naloxone (NARCAN) 4 MG/0.1ML nasal spray Spray 1 spray (4 mg) into one nostril alternating nostrils as needed for opioid reversal every 2-3 minutes until assistance arrives 2 each 0     OLANZapine (ZYPREXA) 2.5 MG tablet Take 1 tablet (2.5 mg) by mouth at bedtime. 30 tablet 0     ondansetron (ZOFRAN ODT) 4 MG ODT tab Take 1 tablet (4 mg) by mouth every 12 hours. 60 tablet 0     ondansetron (ZOFRAN) 8 MG tablet Take 1 tablet (8 mg) by mouth every 6 hours as needed for nausea (vomiting) 30 tablet 2     oxyCODONE (ROXICODONE) 5 MG tablet Take 3-4 tablets (15-20 mg) by mouth every 4 hours as needed for pain. 90 tablet 0     polyethylene glycol (MIRALAX) 17 GM/Dose powder Take 17 g by mouth daily. 510 g 0     prochlorperazine (COMPAZINE) 10 MG tablet Take 1 tablet (10 mg) by mouth every 6 hours as needed for vomiting. 30 tablet 0     promethazine (PHENERGAN) 12.5 MG tablet Take 1-2 tablets (12.5-25 mg) by mouth every 6 hours as needed for nausea. 30 tablet 1     simethicone (MYLICON) 125 MG chewable tablet Take 125 mg by mouth 4 times daily as  needed for intestinal gas.        [place] Board of Pharmacy Data Base Reviewed: Yes:   reviewed - controlled substances reflected in medication list.     Advance Care Planning:  Advance Directive:    none completed blank 4 page form provided today  Where is written copy located: n/a  Health Care Agent Contact Information: will be  Rowdy TIDWELL:   n/a  CODE STATUS: FULL     REVIEW OF SYSTEMS:   ROS: 10 point ROS neg other than the symptoms noted above in the HPI and here:  Palliative Symptom Review (0=no symptom/no concern, 1=mild, 2=moderate, 3=severe):      Pain: 0 today      Fatigue: 0      Nausea: 1      Constipation: 2      Diarrhea: 0      Depressive Symptoms: 0      Anxiety: 1      Drowsiness: 0      Poor Appetite: 1      Shortness of Breath: 0      Insomnia: 0      Other: 0      Overall (0 good/no concerns, 3 very poor):  0      Physical Exam  Vital Signs with Ranges  Temp:  [98.4  F (36.9  C)-99.3  F (37.4  C)] 98.5  F (36.9  C)  Pulse:  [65-87] 69  Resp:  [14-22] 18  BP: (117-151)/(57-80) 119/63  SpO2:  [92 %-95 %] 93 %      Wt Readings from Last 10 Encounters:   09/06/24 82.5 kg (181 lb 12.8 oz)   08/23/24 82 kg (180 lb 12.8 oz)   08/20/24 82.3 kg (181 lb 8 oz)   08/05/24 86.6 kg (191 lb)   07/25/24 89.4 kg (197 lb)   07/15/24 89.4 kg (197 lb)   07/15/24 89.4 kg (197 lb)   06/24/24 93.6 kg (206 lb 6.4 oz)   06/24/24 93.6 kg (206 lb 6.4 oz)   06/12/24 92.4 kg (203 lb 11.2 oz)      181 lbs 12.8 oz        Intake/Output Summary (Last 24 hours) at 9/6/2024 1557  Last data filed at 9/6/2024 0830      Gross per 24 hour   Intake 120 ml   Output --   Net 120 ml         PHYSICAL EXAM:  Alert 58 yo woman, NAD.  Sitting upright in chair, sister sitting with her.  Oriented x4, coherent historian.  Speech fluent, affect full.  Neck supple, no JVD.  Breathing nonlabored.    No JVD.    Abdomen soft, mildly tender  Extremities without edema.  No myoclonus, no tremor.     Data reviewed:  Last Comprehensive  "Metabolic Panel:        Lab Results   Component Value Date      09/04/2024     POTASSIUM 4.5 09/04/2024     CHLORIDE 97 (L) 09/04/2024     CO2 26 09/04/2024     ANIONGAP 16 (H) 09/04/2024      (H) 09/06/2024     BUN 8.4 09/04/2024     CR 0.56 09/04/2024     GFRESTIMATED >90 09/04/2024     ANGELA 8.9 09/04/2024         CBC RESULTS:       Recent Labs   Lab Test 09/04/24  1206   WBC 6.3   RBC 4.04   HGB 11.5*   HCT 35.4   MCV 88   MCH 28.5   MCHC 32.5   RDW 19.7*               Lab Results   Component Value Date     AST 14 09/04/2024            Lab Results   Component Value Date     ALT 6 09/04/2024      No results found for: \"BILICONJ\"         Lab Results   Component Value Date     BILITOTAL 0.4 09/04/2024            Lab Results   Component Value Date     ALBUMIN 3.9 09/04/2024     ALBUMIN 3.7 05/02/2018            Lab Results   Component Value Date     PROTTOTAL 7.4 09/04/2024            Lab Results   Component Value Date     ALKPHOS 106 09/04/2024      UC 9/4/24 10-50K mix of lala     CT abdomen pelvis w contrast 9/4/24  IMPRESSION:     1.  Ileitis involving a long segment of the distal ileum, including the portion of the terminal ileum involved by the central mesenteric mass. No evidence of resulting bowel obstruction.     2.  Extensive metastatic disease including mesenteric mike metastases, peritoneal carcinomatosis, liver metastases, and bone metastases have not significantly changed since 8/1/2024.     3.  Small amount of ascites predominantly within the pelvis.     4.  Unchanged small to moderate right pleural effusion with an adjacent consolidation, likely atelectasis.      PETCT 8/1/24 onc whole body  INDICATION: Surgical treatment strategy for restaging poorly differentiated metastatic neuroendocrine carcinoma involving the liver   COMPARISON: PET/CT from 4/17/2024 and CT from 7/25/2024   CONTRAST: None   TECHNIQUE: Serum glucose level 125  mg/dL. One hour post intravenous administration " of 11.5  mCi F-18 FDG, PET imaging was performed from the skull vertex to feet, utilizing attenuation correction with concurrent axial CT and PET/CT image fusion. Dose   reduction techniques were used.     PET/CT FINDINGS:  Scattered sclerotic skeletal lesions have decreased in FDG activity, for example in the manubrium (SUVmax 5.0 to 3.8. Stable markedly FDG avid (SUVmax 8.5, previously 8.9) circumferential wall thickening in the terminal ileum with   adjacent FDG avid right lower quadrant mesenteric lymphadenopathy. Persistent FDG avid soft tissue nodularity in the left upper quadrant greater omentum. Uniform mild inflammatory FDG activity about the periphery of a partially loculated right pleural   effusion. Inflammatory FDG activity associated with injection sites in the right buttock. All other FDG activity is normal. Liver lesions and thoracic lymphadenopathy are no longer FDG avid in excess of background soft tissue levels.      CT FINDINGS: Areas of fat deposition in the wall of the ascending colon. Short segment intussusception near the hepatic flexure.  Passive atelectasis in the right lung. Right IJ Port-A-Cath tip in the right atrium. Hypoattenuation of blood pool relative   to myocardium, suggesting anemia. Duodenal diverticulum. Mild calcified atherosclerosis. No coronary artery calcifications.  Benign hyperostosis frontalis interna. Scattered non-FDG avid sclerotic skeletal lesions. Mild degenerative change in the spine.                                                                      IMPRESSION:     Partial response. Liver lesions and thoracic lymph node metastases are no longer FDG avid. Skeletal metastases have improved. Unchanged presumed primary tumor in the terminal ileum with adjacent mesenteric lymphadenopathy and peritoneal carcinomatosis.      ECG 9/4/24: sinus rhythm and Qtc is 431 ms.    80 minutes spent on the date of the encounter doing chart review, history and exam, patient  "education & counseling, documentation and other activities as noted above.    The longitudinal plan of care for the diagnosis(es)/condition(s) as documented were addressed during this visit. Due to the added complexity in care, I will continue to support Louise in the subsequent management and with ongoing continuity of care.      Stephen Neil MD MS FAAFP CAQHPM  [facility]  Office [phone]  Fax [phone]          Oncology Rooming Note    September 10, 2024 12:50 PM   Louise Corado is a 59 year old female who presents for:    Chief Complaint   Patient presents with     Oncology Clinic Visit     Palliative Care consult     Initial Vitals: /67   Pulse 82   Resp 18   SpO2 95%  Estimated body mass index is 32.2 kg/m  as calculated from the following:    Height as of 9/5/24: 1.6 m (5' 3\").    Weight as of 9/6/24: 82.5 kg (181 lb 12.8 oz). There is no height or weight on file to calculate BSA.  Mild Pain (2) Comment: Data Unavailable   No LMP recorded. Patient is postmenopausal.  Allergies reviewed: Yes  Medications reviewed: Yes    Medications: Medication refills not needed today.  Pharmacy name entered into TheShoppingPro:    [id number] [place] [address] - [address] AT [facility] [place], [place] - [address] [place] [address]    Frailty Screening:   Is the patient here for a new oncology consult visit in cancer care? 2. No      Clinical concerns: Pall Care consult. 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 Her biggest complaints/concerns are abdominal and back pain for which she is now on MS-contin and oxycodone with good relief. She has had some constipation and is now taking bisacodyl daily and doing okay. She was recently hospitalized with nausea/vomiting, she is very anorexic.   Dr. Neil was notified.      Gi Romero, " RN                Again, thank you for allowing me to participate in the care of your patient.        Sincerely,        Stephen Neil MD

## 2024-09-10 NOTE — PROGRESS NOTES
"Oncology Rooming Note    September 10, 2024 12:50 PM   Louise Corado is a 59 year old female who presents for:    Chief Complaint   Patient presents with    Oncology Clinic Visit     Palliative Care consult     Initial Vitals: /67   Pulse 82   Resp 18   SpO2 95%  Estimated body mass index is 32.2 kg/m  as calculated from the following:    Height as of 9/5/24: 1.6 m (5' 3\").    Weight as of 9/6/24: 82.5 kg (181 lb 12.8 oz). There is no height or weight on file to calculate BSA.  Mild Pain (2) Comment: Data Unavailable   No LMP recorded. Patient is postmenopausal.  Allergies reviewed: Yes  Medications reviewed: Yes    Medications: Medication refills not needed today.  Pharmacy name entered into leemail:    [id number] [place], [place] - [address] [facility] [place], [place] - [address] [place] [address]    Frailty Screening:   Is the patient here for a new oncology consult visit in cancer care? 2. No      Clinical concerns: Pall Care consult. 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 Her biggest complaints/concerns are abdominal and back pain for which she is now on MS-contin and oxycodone with good relief. She has had some constipation and is now taking bisacodyl daily and doing okay. She was recently hospitalized with nausea/vomiting, she is very anorexic.   Dr. Neil was notified.      Gi Romero RN              "

## 2024-09-11 ENCOUNTER — LAB (OUTPATIENT)
Dept: LAB | Facility: CLINIC | Age: 59
End: 2024-09-11
Payer: COMMERCIAL

## 2024-09-11 ENCOUNTER — OFFICE VISIT (OUTPATIENT)
Dept: UROLOGY | Facility: CLINIC | Age: 59
End: 2024-09-11
Payer: COMMERCIAL

## 2024-09-11 VITALS — SYSTOLIC BLOOD PRESSURE: 132 MMHG | DIASTOLIC BLOOD PRESSURE: 68 MMHG | TEMPERATURE: 98.4 F | HEART RATE: 87 BPM

## 2024-09-11 DIAGNOSIS — C7B.8 NEUROENDOCRINE CARCINOMA METASTATIC TO LIVER (H): ICD-10-CM

## 2024-09-11 DIAGNOSIS — C7A.8 NEUROENDOCRINE CARCINOMA METASTATIC TO LIVER (H): ICD-10-CM

## 2024-09-11 DIAGNOSIS — R31.29 MICROHEMATURIA: Primary | ICD-10-CM

## 2024-09-11 DIAGNOSIS — R73.03 PREDIABETES: ICD-10-CM

## 2024-09-11 LAB
FASTING STATUS PATIENT QL REPORTED: NO
GLUCOSE SERPL-MCNC: 178 MG/DL (ref 70–99)

## 2024-09-11 PROCEDURE — 52000 CYSTOURETHROSCOPY: CPT | Performed by: STUDENT IN AN ORGANIZED HEALTH CARE EDUCATION/TRAINING PROGRAM

## 2024-09-11 PROCEDURE — 36415 COLL VENOUS BLD VENIPUNCTURE: CPT

## 2024-09-11 PROCEDURE — 82947 ASSAY GLUCOSE BLOOD QUANT: CPT

## 2024-09-11 RX ORDER — MORPHINE SULFATE 30 MG/1
30 TABLET, FILM COATED, EXTENDED RELEASE ORAL EVERY 12 HOURS
Qty: 60 TABLET | Refills: 0 | Status: SHIPPED | OUTPATIENT
Start: 2024-09-11

## 2024-09-11 RX ORDER — CEPHALEXIN 500 MG/1
500 CAPSULE ORAL ONCE
Status: COMPLETED | OUTPATIENT
Start: 2024-09-11 | End: 2024-09-11

## 2024-09-11 RX ADMIN — CEPHALEXIN 500 MG: 500 CAPSULE ORAL at 13:19

## 2024-09-11 ASSESSMENT — PAIN SCALES - GENERAL: PAINLEVEL: NO PAIN (0)

## 2024-09-11 NOTE — PROGRESS NOTES
Pre-procedure diagnosis: Gross hematuria  Post procedure diagnosis: normal cystoscopy  Procedure performed: cystoscopy  Surgeon: Marco A Nguyễn MD  Anesthesia: local    Indications for procedure: Patient is a 59 year old year old female with a history of hematuria.  Here today for cysto    Description of procedure: After fully informed voluntary consent was obtained patient was brought into the procedure room, identified and placed in a supine position on the cysto table.  The vagina/intraoitus were prepped and draped in a sterile fashion with betadine.  A 15F flexible cystoscope was inserted into the urethra and the bladder and urethra examined in a systematic manner.  There were no tumor stones or diverticula.  Ureteric orifices were normal in position and number and effluxing clear urine.   Distal urethra was normal.  The patient tolerated the procedure well and there were no complications.      Assessment/Plan: Patient with a history of hematuria with negative cystoscopy.  Follow up as needed.  Prior imaging and urine cytology and FISH has been negative

## 2024-09-11 NOTE — PROGRESS NOTES
Patient educated regarding cystoscopy procedure and possible symptoms after completion.  Patient voiced understanding of information.  Handout given to patient.  Consent form signed.     Clinic Administered Medication Documentation    Patient was given cephalexin 500mg one capsule. Prior to medication administration, verified patient's identity using patient's name and date of birth.    Anuja Bains CMA

## 2024-09-11 NOTE — TELEPHONE ENCOUNTER
Received [employer] message from patient requesting refill of MS contin.     Last refill: 9/6/24 (10 tabs - 5 day supply)  Last office visit: 9/10/24  Scheduled for follow up 11/19/24     Will route request to NP for review.     Reviewed [place]  Report.

## 2024-09-12 ENCOUNTER — INFUSION THERAPY VISIT (OUTPATIENT)
Dept: INFUSION THERAPY | Facility: HOSPITAL | Age: 59
End: 2024-09-12
Attending: INTERNAL MEDICINE
Payer: COMMERCIAL

## 2024-09-12 VITALS
SYSTOLIC BLOOD PRESSURE: 112 MMHG | TEMPERATURE: 98.5 F | RESPIRATION RATE: 18 BRPM | DIASTOLIC BLOOD PRESSURE: 62 MMHG | OXYGEN SATURATION: 95 % | HEART RATE: 98 BPM

## 2024-09-12 DIAGNOSIS — C7A.8 NEUROENDOCRINE CARCINOMA METASTATIC TO LIVER (H): Primary | ICD-10-CM

## 2024-09-12 DIAGNOSIS — C7B.8 NEUROENDOCRINE CARCINOMA METASTATIC TO LIVER (H): Primary | ICD-10-CM

## 2024-09-12 PROCEDURE — 96372 THER/PROPH/DIAG INJ SC/IM: CPT | Performed by: INTERNAL MEDICINE

## 2024-09-12 PROCEDURE — 250N000011 HC RX IP 250 OP 636: Performed by: INTERNAL MEDICINE

## 2024-09-12 RX ADMIN — OCTREOTIDE ACETATE 30 MG: KIT at 14:05

## 2024-09-12 NOTE — PROGRESS NOTES
Louise was here today for injection x 1 to left gluteus naila.  Pt tolerated injection well and without incident.  Pt accompanied by sister.    Shari ZAMAN, CMA

## 2024-09-17 DIAGNOSIS — R07.1 PAINFUL RESPIRATION: ICD-10-CM

## 2024-09-17 RX ORDER — OXYCODONE HYDROCHLORIDE 5 MG/1
15-20 TABLET ORAL EVERY 4 HOURS PRN
Qty: 90 TABLET | Refills: 0 | Status: SHIPPED | OUTPATIENT
Start: 2024-09-17 | End: 2024-10-03

## 2024-09-17 NOTE — TELEPHONE ENCOUNTER
Received [employer] message from patient requesting refill of oxycodone.     Last refill: 8/23/24  Last office visit: 9/10/24  Scheduled for follow up 11/19/24     Will route request to MD/ for review.     Reviewed [place]  Report.

## 2024-09-19 ENCOUNTER — HOSPITAL ENCOUNTER (OUTPATIENT)
Dept: PET IMAGING | Facility: HOSPITAL | Age: 59
Discharge: HOME | End: 2024-09-19
Attending: INTERNAL MEDICINE
Payer: COMMERCIAL

## 2024-09-19 DIAGNOSIS — C7B.8 NEUROENDOCRINE CARCINOMA METASTATIC TO LIVER (H): ICD-10-CM

## 2024-09-19 DIAGNOSIS — C7A.8 NEUROENDOCRINE CARCINOMA METASTATIC TO LIVER (H): ICD-10-CM

## 2024-09-19 DIAGNOSIS — C7A.8 NEUROENDOCRINE CARCINOMA METASTATIC TO LIVER (H): Primary | ICD-10-CM

## 2024-09-19 DIAGNOSIS — C7B.8 NEUROENDOCRINE CARCINOMA METASTATIC TO LIVER (H): Primary | ICD-10-CM

## 2024-09-19 PROCEDURE — 250N000011 HC RX IP 250 OP 636: Performed by: INTERNAL MEDICINE

## 2024-09-19 PROCEDURE — 250N000011 HC RX IP 250 OP 636: Performed by: RADIOLOGY

## 2024-09-19 PROCEDURE — A9592 HC RX IP 250 OP 636: HCPCS | Performed by: INTERNAL MEDICINE

## 2024-09-19 PROCEDURE — 78815 PET IMAGE W/CT SKULL-THIGH: CPT | Mod: PS

## 2024-09-19 PROCEDURE — 71260 CT THORAX DX C+: CPT

## 2024-09-19 RX ORDER — IOPAMIDOL 755 MG/ML
90 INJECTION, SOLUTION INTRAVASCULAR ONCE
Status: COMPLETED | OUTPATIENT
Start: 2024-09-19 | End: 2024-09-19

## 2024-09-19 RX ORDER — HEPARIN SODIUM (PORCINE) LOCK FLUSH IV SOLN 100 UNIT/ML 100 UNIT/ML
500 SOLUTION INTRAVENOUS ONCE
Status: COMPLETED | OUTPATIENT
Start: 2024-09-19 | End: 2024-09-19

## 2024-09-19 RX ADMIN — IOPAMIDOL 90 ML: 755 INJECTION, SOLUTION INTRAVENOUS at 13:46

## 2024-09-19 RX ADMIN — HEPARIN 500 UNITS: 100 SYRINGE at 14:29

## 2024-09-19 RX ADMIN — COPPER CU 64 DOTATATE 4.06 MILLICURIE: 1 INJECTION, SOLUTION INTRAVENOUS at 12:21

## 2024-09-23 ENCOUNTER — ONCOLOGY VISIT (OUTPATIENT)
Dept: ONCOLOGY | Facility: HOSPITAL | Age: 59
End: 2024-09-23
Attending: INTERNAL MEDICINE
Payer: COMMERCIAL

## 2024-09-23 ENCOUNTER — LAB (OUTPATIENT)
Dept: INFUSION THERAPY | Facility: HOSPITAL | Age: 59
End: 2024-09-23
Attending: INTERNAL MEDICINE
Payer: COMMERCIAL

## 2024-09-23 VITALS
BODY MASS INDEX: 31.65 KG/M2 | RESPIRATION RATE: 16 BRPM | DIASTOLIC BLOOD PRESSURE: 74 MMHG | OXYGEN SATURATION: 96 % | TEMPERATURE: 98.5 F | HEIGHT: 62 IN | WEIGHT: 172 LBS | HEART RATE: 91 BPM | SYSTOLIC BLOOD PRESSURE: 117 MMHG

## 2024-09-23 DIAGNOSIS — C7A.8 NEUROENDOCRINE CARCINOMA METASTATIC TO LIVER (H): Primary | ICD-10-CM

## 2024-09-23 DIAGNOSIS — C7B.8 NEUROENDOCRINE CARCINOMA METASTATIC TO LIVER (H): Primary | ICD-10-CM

## 2024-09-23 LAB
ALBUMIN SERPL BCG-MCNC: 3.3 G/DL (ref 3.5–5.2)
ALP SERPL-CCNC: 113 U/L (ref 40–150)
ALT SERPL W P-5'-P-CCNC: 11 U/L (ref 0–50)
ANION GAP SERPL CALCULATED.3IONS-SCNC: 17 MMOL/L (ref 7–15)
AST SERPL W P-5'-P-CCNC: 22 U/L (ref 0–45)
BASOPHILS # BLD AUTO: 0 10E3/UL (ref 0–0.2)
BASOPHILS NFR BLD AUTO: 0 %
BILIRUB SERPL-MCNC: 0.4 MG/DL
BUN SERPL-MCNC: 12.5 MG/DL (ref 6–20)
CALCIUM SERPL-MCNC: 7.9 MG/DL (ref 8.8–10.4)
CHLORIDE SERPL-SCNC: 100 MMOL/L (ref 98–107)
CREAT SERPL-MCNC: 0.69 MG/DL (ref 0.51–0.95)
EGFRCR SERPLBLD CKD-EPI 2021: >90 ML/MIN/1.73M2
EOSINOPHIL # BLD AUTO: 0 10E3/UL (ref 0–0.7)
EOSINOPHIL NFR BLD AUTO: 1 %
ERYTHROCYTE [DISTWIDTH] IN BLOOD BY AUTOMATED COUNT: 18.6 % (ref 10–15)
GLUCOSE SERPL-MCNC: 178 MG/DL (ref 70–99)
HCO3 SERPL-SCNC: 22 MMOL/L (ref 22–29)
HCT VFR BLD AUTO: 36.2 % (ref 35–47)
HGB BLD-MCNC: 11.4 G/DL (ref 11.7–15.7)
IMM GRANULOCYTES # BLD: 0 10E3/UL
IMM GRANULOCYTES NFR BLD: 1 %
LYMPHOCYTES # BLD AUTO: 0.7 10E3/UL (ref 0.8–5.3)
LYMPHOCYTES NFR BLD AUTO: 16 %
MCH RBC QN AUTO: 29 PG (ref 26.5–33)
MCHC RBC AUTO-ENTMCNC: 31.5 G/DL (ref 31.5–36.5)
MCV RBC AUTO: 92 FL (ref 78–100)
MONOCYTES # BLD AUTO: 0.3 10E3/UL (ref 0–1.3)
MONOCYTES NFR BLD AUTO: 8 %
NEUTROPHILS # BLD AUTO: 3.3 10E3/UL (ref 1.6–8.3)
NEUTROPHILS NFR BLD AUTO: 75 %
NRBC # BLD AUTO: 0 10E3/UL
NRBC BLD AUTO-RTO: 0 /100
PLATELET # BLD AUTO: 245 10E3/UL (ref 150–450)
POTASSIUM SERPL-SCNC: 4.1 MMOL/L (ref 3.4–5.3)
PROT SERPL-MCNC: 5.9 G/DL (ref 6.4–8.3)
RBC # BLD AUTO: 3.93 10E6/UL (ref 3.8–5.2)
SODIUM SERPL-SCNC: 139 MMOL/L (ref 135–145)
WBC # BLD AUTO: 4.4 10E3/UL (ref 4–11)

## 2024-09-23 PROCEDURE — 85025 COMPLETE CBC W/AUTO DIFF WBC: CPT

## 2024-09-23 PROCEDURE — 82040 ASSAY OF SERUM ALBUMIN: CPT

## 2024-09-23 PROCEDURE — 99214 OFFICE O/P EST MOD 30 MIN: CPT | Performed by: INTERNAL MEDICINE

## 2024-09-23 PROCEDURE — 99215 OFFICE O/P EST HI 40 MIN: CPT | Performed by: INTERNAL MEDICINE

## 2024-09-23 PROCEDURE — G2211 COMPLEX E/M VISIT ADD ON: HCPCS | Performed by: INTERNAL MEDICINE

## 2024-09-23 PROCEDURE — 36415 COLL VENOUS BLD VENIPUNCTURE: CPT

## 2024-09-23 RX ORDER — HEPARIN SODIUM (PORCINE) LOCK FLUSH IV SOLN 100 UNIT/ML 100 UNIT/ML
SOLUTION INTRAVENOUS
Status: COMPLETED
Start: 2024-09-23 | End: 2024-09-23

## 2024-09-23 RX ORDER — HEPARIN SODIUM (PORCINE) LOCK FLUSH IV SOLN 100 UNIT/ML 100 UNIT/ML
5 SOLUTION INTRAVENOUS
OUTPATIENT
Start: 2024-09-23

## 2024-09-23 RX ORDER — HEPARIN SODIUM (PORCINE) LOCK FLUSH IV SOLN 100 UNIT/ML 100 UNIT/ML
5 SOLUTION INTRAVENOUS
Status: DISCONTINUED | OUTPATIENT
Start: 2024-09-23 | End: 2024-09-23 | Stop reason: HOSPADM

## 2024-09-23 ASSESSMENT — PAIN SCALES - GENERAL: PAINLEVEL: MODERATE PAIN (4)

## 2024-09-23 NOTE — PROGRESS NOTES
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    Weight as of this encounter: 78 kg (172 lb). Body surface area is 1.85 meters squared.  Moderate Pain (4) Comment: Data Unavailable   No LMP recorded. Patient is postmenopausal.  Allergies reviewed: Yes  Medications reviewed: Yes    Medications: Medication refills not needed today.  Pharmacy name entered into [facility]:    [id number] [place], [place] - [address] [facility] [place], [place] - [facility]    Frailty Screening:   Is the patient here for a new oncology consult visit in cancer care? 2. No      Clinical concerns:  Review PET and labs follow up      Hao Denny              "

## 2024-09-23 NOTE — LETTER
9/23/2024      Louise Corado  [address] [place] [address]      Dear Colleague,    Thank you for referring your patient, Louise Corado, to the [facility] CANCER [facility]. Please see a copy of my visit note below.    [facility] Hematology and Oncology Progress Note    Patient: Louise Corado  MRN: [id number]  Date of Service: Sep 23, 2024        Assessment and Plan:    1.  Metastatic neuroendocrine carcinoma: She has completed 5 cycles of chemotherapy.  Cycle 6 was limited secondary to poor tolerance.  She is here to review the results of her recent PET scan.  I personally viewed the images and shared them with Louise.  I also reviewed the images today personally with a radiologist.  Somewhat of a confusing picture.  She has imaging characteristics of both low-grade and high-grade neuroendocrine tumor.  There is suggestion of progression of the right lobe high-grade tumor.  To better characterize with going on it to help define treatment were going to biopsy both and the left sided liver lesion by the ligament and rebiopsy the larger necrotic right sided lesion.  If these do suggest a high-grade on the low-grade then we will treat the enlarging right lobe lesion with radiation or radioembolization.  If the left-sided lesion by the ligament is low-grade then we will continue her octreotide at 60 mg monthly he has remaining lesions outside of the right liver seems to be stable.  Her next line of therapy would be Lutathera for the low-grade lesions.    And she still seems to be symptomatic with persistent nausea and fatigue, are going to increase her Hydrea dose to 60 mg monthly.  Will give her an extra dose this week of 30 and then start 60 mg in 2 weeks.  If she does not have any improvement then we could consider switching to Lutathera to treat her symptoms.    2.  Nausea:  Comes on quickly. Movement also makes it worse. No headaches.  She is taking Phenergan as well as Zofran as  needed.  Also lorazepam.  She was seen by palliative care in the hospital.  Will continue outpatient follow-up.    3.  Pleural effusion: Mostly resolved now.  Follow clinically and radiographically.    4.  Anxiety: Continue lorazepam as needed and Zyprexa nightly.  This can be increased.    5.  Pain:  She is taking MS Contin 30 every 12 and oxycodone 10 mg as needed.      Medical decision Making:  I spent 45 minutes in the care of this patient today, which included time necessary for preparation for the visit, face to face time with the patient, communication of recommendations to the care team, and documentation time.  Today's visit was centered around a cancer diagnosis in the setting of additional medical comorbidities. Complex medical decision making was required. The risk of additional morbidity without further treatment is high.     ECOG Performance  1    Diagnosis:    1.  Metastatic neuroendocrine carcinoma, poorly differentiated:  Diagnosed April 2024 from a liver biopsy.  Her PET/CT shows malignant involvement of the liver, mediastinal nodes, left upper quadrant omentum, manubrium, and bilateral pleura.  There is some wall thickening and FDG activity in a short segment of the terminal ileum. Liver biopsy shows WHO, NET G2.     EGD 4/25/24: Occasional gastritis.  No mass.  Colonoscopy 4/25/24: Mass of terminal ileum.    NGS:  PDL1=0%, MDI-stable, TMB low,   CancerTYPE ID:  GI carcinoid 96%    Treatment:    Carboplatin and etoposide initiated May 13, 2024.  25% etopside dose reduction starting with cycle 5 secondary to nausea.  Cycle 5 was completed on August 7, 2024.    Interim History:    Louise returns today for a follow-up visit.  She completed chemotherapy about a month and a half ago.  She was admitted from September 4 through September 6 with nausea, vomiting, and abdominal pain.  Still having some nausea.  A little better than when she was on chemotherapy.  Appetite remains decreased.  Generalized  "forward of down abdominal pain.    Review of Systems:    As above in the history.     Review of Systems otherwise Negative for:  General: chills, fever or night sweats  Psychological: depression  Ophthalmic: blurry vision, double vision or loss of vision, vision change  ENT: epistaxis, oral lesions, hearing changes  Hematological and Lymphatic: bleeding, bruising, jaundice, swollen lymph nodes  Endocrine: hot flashes, unexpected weight changes  Respiratory: cough, hemoptysis, orthopnea  Cardiovascular: edema, palpitations or PND  Gastrointestinal: blood in stools, change in bowel habits, constipation  Genito-Urinary: change in urinary stream, incontinence, frequency/urgency  Musculoskeletal: joint swelling, muscle pain  Neurological: dizziness, headaches, numbness/tingling  Dermatological: lumps and rash    Physical Exam:    /74   Pulse 91   Temp 98.5  F (36.9  C)   Resp 16   Ht 1.575 m (5' 2\")   Wt 78 kg (172 lb)   SpO2 96%   BMI 31.46 kg/m      General: patient appears stated age of 59 year old. Nontoxic and in no distress.   HEENT: Head: atraumatic, normocephalic. Sclerae anicteric.  Chest:  Normal respiratory effort  Cardiac:  No edema.   Abdomen: abdomen is non-distended  Extremities: normal tone and muscle bulk.  Skin: no lesions or rash on visible skin. Warm and dry.   CNS: alert and oriented. Grossly non-focal.   Psychiatric: normal mood and affect.     Lab Results:    Recent Results (from the past 168 hour(s))   Comprehensive metabolic panel (BMP + Alb, Alk Phos, ALT, AST, Total. Bili, TP)   Result Value Ref Range    Sodium 139 135 - 145 mmol/L    Potassium 4.1 3.4 - 5.3 mmol/L    Carbon Dioxide (CO2) 22 22 - 29 mmol/L    Anion Gap 17 (H) 7 - 15 mmol/L    Urea Nitrogen 12.5 6.0 - 20.0 mg/dL    Creatinine 0.69 0.51 - 0.95 mg/dL    GFR Estimate >90 >60 mL/min/1.73m2    Calcium 7.9 (L) 8.8 - 10.4 mg/dL    Chloride 100 98 - 107 mmol/L    Glucose 178 (H) 70 - 99 mg/dL    Alkaline Phosphatase 113 40 " - 150 U/L    AST 22 0 - 45 U/L    ALT 11 0 - 50 U/L    Protein Total 5.9 (L) 6.4 - 8.3 g/dL    Albumin 3.3 (L) 3.5 - 5.2 g/dL    Bilirubin Total 0.4 <=1.2 mg/dL   CBC with platelets and differential   Result Value Ref Range    WBC Count 4.4 4.0 - 11.0 10e3/uL    RBC Count 3.93 3.80 - 5.20 10e6/uL    Hemoglobin 11.4 (L) 11.7 - 15.7 g/dL    Hematocrit 36.2 35.0 - 47.0 %    MCV 92 78 - 100 fL    MCH 29.0 26.5 - 33.0 pg    MCHC 31.5 31.5 - 36.5 g/dL    RDW 18.6 (H) 10.0 - 15.0 %    Platelet Count 245 150 - 450 10e3/uL    % Neutrophils 75 %    % Lymphocytes 16 %    % Monocytes 8 %    % Eosinophils 1 %    % Basophils 0 %    % Immature Granulocytes 1 %    NRBCs per 100 WBC 0 <1 /100    Absolute Neutrophils 3.3 1.6 - 8.3 10e3/uL    Absolute Lymphocytes 0.7 (L) 0.8 - 5.3 10e3/uL    Absolute Monocytes 0.3 0.0 - 1.3 10e3/uL    Absolute Eosinophils 0.0 0.0 - 0.7 10e3/uL    Absolute Basophils 0.0 0.0 - 0.2 10e3/uL    Absolute Immature Granulocytes 0.0 <=0.4 10e3/uL    Absolute NRBCs 0.0 10e3/uL     Imaging:    PET Dotatate Eyes to Thighs    Result Date: 9/19/2024  EXAM: PET DOTATATE EYES TO THIGHS, CT CHEST/ABDOMEN/PELVIS W CONTRAST LOCATION: [facility] DATE: 9/19/2024 INDICATION: Subsequent treatment planning and restaging for other malignant neuroendocrine tumors. Status post 5 cycles of chemotherapy. Monitor treatment response COMPARISON: CT the abdomen pelvis dated 9/4/2024, CT of the chest abdomen pelvis dated 7/25/2024 CT the chest and pelvis dated 6/19/2024 CONTRAST: 90 mL Isovue-370 TECHNIQUE: 4.1 mCi Cu 64 dotatate was administered intravenously to the patient. One hour post intravenous administration, PET imaging was performed from the skull vertex to mid thigh, utilizing attenuation correction with concurrent axial CT and PET/CT image fusion. Separate diagnostic CT of the chest, abdomen, and pelvis was performed. Dose reduction techniques were used. PET/CT FINDINGS: Somatostatin receptor  positive lesion in the terminal ileum with somatostatin receptor positive thoracic/mediastinal, bilateral hilar, retrocrural, and aortocaval lymph nodes, nodularity throughout the right greater than left pleura, lesions scattered throughout the liver parenchyma including a dominant example posterior right hepatic lobe measuring up to 6.4 x 4.8 cm, nodules scattered throughout the pelvic peritoneal cavity, and multiple scattered osseous lesions including examples  in the left greater wing of sphenoid bone, manubrium, spine, ribs, and, posterior left iliac bone, and left proximal femur suspicious for neuroendocrine neoplasm in the terminal ileum with metastases involving lymph nodes above/below the diaphragm, right greater than left pleura, liver, and scattered sites throughout the osseous structures. CT FINDINGS: Mild senescent intracranial changes. The aerodigestive track and neck soft tissues are unremarkable. Right chest port with tip terminating in the superior cavoatrial junction. Small-to-moderate sized right pleural effusion. Mild coronary artery calcium. Pelvic phleboliths. Multilevel degenerative changes of the spine.     IMPRESSION: Findings suspicious for neuroendocrine neoplasm in the terminal ileum with metastases involving lymph nodes above/below the diaphragm, right greater than left pleura, liver, peritoneal cavity, and scattered sites throughout the osseous structures. While comparison is challenging due to differences in imaging evaluation, when compared with prior CT examinations there has been significant progression of disease involving the liver parenchyma.    CT Chest/Abdomen/Pelvis w Contrast    Result Date: 9/19/2024  EXAM: PET DOTATATE EYES TO THIGHS, CT CHEST/ABDOMEN/PELVIS W CONTRAST LOCATION: [facility] DATE: 9/19/2024 INDICATION: Subsequent treatment planning and restaging for other malignant neuroendocrine tumors. Status post 5 cycles of chemotherapy. Monitor  treatment response COMPARISON: CT the abdomen pelvis dated 9/4/2024, CT of the chest abdomen pelvis dated 7/25/2024 CT the chest and pelvis dated 6/19/2024 CONTRAST: 90 mL Isovue-370 TECHNIQUE: 4.1 mCi Cu 64 dotatate was administered intravenously to the patient. One hour post intravenous administration, PET imaging was performed from the skull vertex to mid thigh, utilizing attenuation correction with concurrent axial CT and PET/CT image fusion. Separate diagnostic CT of the chest, abdomen, and pelvis was performed. Dose reduction techniques were used. PET/CT FINDINGS: Somatostatin receptor positive lesion in the terminal ileum with somatostatin receptor positive thoracic/mediastinal, bilateral hilar, retrocrural, and aortocaval lymph nodes, nodularity throughout the right greater than left pleura, lesions scattered throughout the liver parenchyma including a dominant example posterior right hepatic lobe measuring up to 6.4 x 4.8 cm, nodules scattered throughout the pelvic peritoneal cavity, and multiple scattered osseous lesions including examples  in the left greater wing of sphenoid bone, manubrium, spine, ribs, and, posterior left iliac bone, and left proximal femur suspicious for neuroendocrine neoplasm in the terminal ileum with metastases involving lymph nodes above/below the diaphragm, right greater than left pleura, liver, and scattered sites throughout the osseous structures. CT FINDINGS: Mild senescent intracranial changes. The aerodigestive track and neck soft tissues are unremarkable. Right chest port with tip terminating in the superior cavoatrial junction. Small-to-moderate sized right pleural effusion. Mild coronary artery calcium. Pelvic phleboliths. Multilevel degenerative changes of the spine.     IMPRESSION: Findings suspicious for neuroendocrine neoplasm in the terminal ileum with metastases involving lymph nodes above/below the diaphragm, right greater than left pleura, liver, peritoneal  cavity, and scattered sites throughout the osseous structures. While comparison is challenging due to differences in imaging evaluation, when compared with prior CT examinations there has been significant progression of disease involving the liver parenchyma.    CT Abdomen Pelvis w Contrast    Result Date: 9/4/2024  EXAM: CT ABDOMEN PELVIS W CONTRAST LOCATION: [facility] DATE: 9/4/2024 INDICATION: Lower abdominal tenderness, nausea. COMPARISON: PET/CT 8/1/2024. CT chest abdomen and pelvis 7/25/2024. TECHNIQUE: CT scan of the abdomen and pelvis was performed following injection of IV contrast. Multiplanar reformats were obtained. Dose reduction techniques were used. CONTRAST: 90 ml Isovue 370 FINDINGS: LOWER CHEST: Small to moderate right pleural effusion with an adjacent right lower lobe consolidation which likely reflects atelectasis is unchanged. Scattered prominent paraesophageal and cardiophrenic lymph nodes are also unchanged. HEPATOBILIARY: Three hepatic metastases have not significantly changed in size since the recent PET/CT; the dominant mass is within hepatic segment 5/6, contains a central hypodense area, and measures up to 7.5 cm. No definite new hepatic masses. No calcified gallstones or biliary ductal dilation. PANCREAS: Normal. SPLEEN: Normal. ADRENAL GLANDS: Normal. KIDNEYS/BLADDER: Tiny right upper pole renal cyst, which does not require follow-up. No urinary calculi or hydronephrosis. Normal bladder. BOWEL: Stomach is normal. Tiny duodenal diverticulum. Jejunum is normal in caliber. Marked wall thickening of a long segment of the distal ileum with engorgement of the surrounding vasa recta and edema within the central mesentery, compatible with ileitis. A spiculated 2.8 x 2.5 cm mesenteric mass along the serosal surface of the terminal ileum has not significantly changed when measured similarly (3/#141). No bowel dilation or signs of obstruction. No pneumatosis or portal  "venous gas. Scattered noninflamed distal colonic diverticuli. LYMPH NODES: Mildly enlarged 1.2 cm right lower quadrant mesenteric node is unchanged. No newly enlarged lymph nodes. PERITONEUM/RETROPERITONEUM: Confluent irregular peritoneal nodules within the left lower quadrant (for example, 3/#120) and within the central mesentery (for example, 3/#127) have not significantly changed. Small amount of simple ascites within the pelvis.  VASCULATURE: Patent portal, splenic, and superior mesenteric veins. Mild aortic atherosclerosis. No abdominal aortic aneurysm. PELVIC ORGANS: Normal. MUSCULOSKELETAL: Unchanged sclerotic lesions within the left posterior iliac bone and left T11 vertebral body. Small nodules within the right gluteal subcutaneous fat are also unchanged (for example, 3/#170). No new or destructive bone lesions.     IMPRESSION: 1.  Ileitis involving a long segment of the distal ileum, including the portion of the terminal ileum involved by the central mesenteric mass. No evidence of resulting bowel obstruction. 2.  Extensive metastatic disease including mesenteric mike metastases, peritoneal carcinomatosis, liver metastases, and bone metastases have not significantly changed since 8/1/2024. 3.  Small amount of ascites predominantly within the pelvis. 4.  Unchanged small to moderate right pleural effusion with an adjacent consolidation, likely atelectasis.        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    " Weight as of this encounter: 78 kg (172 lb). Body surface area is 1.85 meters squared.  Moderate Pain (4) Comment: Data Unavailable   No LMP recorded. Patient is postmenopausal.  Allergies reviewed: Yes  Medications reviewed: Yes    Medications: Medication refills not needed today.  Pharmacy name entered into [facility]:    [facility] #[id number] [place], [place] - [address] AT [place], [place] - [facility]    Frailty Screening:   Is the patient here for a new oncology consult visit in cancer care? 2. No      Clinical concerns:  Review PET and labs follow up      Hoa Denny                Again, thank you for allowing me to participate in the care of your patient.        Sincerely,        Man Barreto MD

## 2024-09-23 NOTE — PROGRESS NOTES
[facility] Hematology and Oncology Progress Note    Patient: Louise Corado  MRN: [id number]  Date of Service: Sep 23, 2024        Assessment and Plan:    1.  Metastatic neuroendocrine carcinoma: She has completed 5 cycles of chemotherapy.  Cycle 6 was limited secondary to poor tolerance.  She is here to review the results of her recent PET scan.  I personally viewed the images and shared them with Louise.  I also reviewed the images today personally with a radiologist.  Somewhat of a confusing picture.  She has imaging characteristics of both low-grade and high-grade neuroendocrine tumor.  There is suggestion of progression of the right lobe high-grade tumor.  To better characterize with going on it to help define treatment were going to biopsy both and the left sided liver lesion by the ligament and rebiopsy the larger necrotic right sided lesion.  If these do suggest a high-grade on the low-grade then we will treat the enlarging right lobe lesion with radiation or radioembolization.  If the left-sided lesion by the ligament is low-grade then we will continue her octreotide at 60 mg monthly he has remaining lesions outside of the right liver seems to be stable.  Her next line of therapy would be Lutathera for the low-grade lesions.    And she still seems to be symptomatic with persistent nausea and fatigue, are going to increase her Hydrea dose to 60 mg monthly.  Will give her an extra dose this week of 30 and then start 60 mg in 2 weeks.  If she does not have any improvement then we could consider switching to Lutathera to treat her symptoms.    2.  Nausea:  Comes on quickly. Movement also makes it worse. No headaches.  She is taking Phenergan as well as Zofran as needed.  Also lorazepam.  She was seen by palliative care in the hospital.  Will continue outpatient follow-up.    3.  Pleural effusion: Mostly resolved now.  Follow clinically and radiographically.    4.  Anxiety: Continue lorazepam as needed  and Zyprexa nightly.  This can be increased.    5.  Pain:  She is taking MS Contin 30 every 12 and oxycodone 10 mg as needed.      Medical decision Making:  I spent 45 minutes in the care of this patient today, which included time necessary for preparation for the visit, face to face time with the patient, communication of recommendations to the care team, and documentation time.  Today's visit was centered around a cancer diagnosis in the setting of additional medical comorbidities. Complex medical decision making was required. The risk of additional morbidity without further treatment is high.     ECOG Performance  1    Diagnosis:    1.  Metastatic neuroendocrine carcinoma, poorly differentiated:  Diagnosed April 2024 from a liver biopsy.  Her PET/CT shows malignant involvement of the liver, mediastinal nodes, left upper quadrant omentum, manubrium, and bilateral pleura.  There is some wall thickening and FDG activity in a short segment of the terminal ileum. Liver biopsy shows WHO, NET G2.     EGD 4/25/24: Occasional gastritis.  No mass.  Colonoscopy 4/25/24: Mass of terminal ileum.    NGS:  PDL1=0%, MDI-stable, TMB low,   CancerTYPE ID:  GI carcinoid 96%    Treatment:    Carboplatin and etoposide initiated May 13, 2024.  25% etopside dose reduction starting with cycle 5 secondary to nausea.  Cycle 5 was completed on August 7, 2024.    Interim History:    Louise returns today for a follow-up visit.  She completed chemotherapy about a month and a half ago.  She was admitted from September 4 through September 6 with nausea, vomiting, and abdominal pain.  Still having some nausea.  A little better than when she was on chemotherapy.  Appetite remains decreased.  Generalized forward of down abdominal pain.    Review of Systems:    As above in the history.     Review of Systems otherwise Negative for:  General: chills, fever or night sweats  Psychological: depression  Ophthalmic: blurry vision, double vision or loss of  "vision, vision change  ENT: epistaxis, oral lesions, hearing changes  Hematological and Lymphatic: bleeding, bruising, jaundice, swollen lymph nodes  Endocrine: hot flashes, unexpected weight changes  Respiratory: cough, hemoptysis, orthopnea  Cardiovascular: edema, palpitations or PND  Gastrointestinal: blood in stools, change in bowel habits, constipation  Genito-Urinary: change in urinary stream, incontinence, frequency/urgency  Musculoskeletal: joint swelling, muscle pain  Neurological: dizziness, headaches, numbness/tingling  Dermatological: lumps and rash    Physical Exam:    /74   Pulse 91   Temp 98.5  F (36.9  C)   Resp 16   Ht 1.575 m (5' 2\")   Wt 78 kg (172 lb)   SpO2 96%   BMI 31.46 kg/m      General: patient appears stated age of 59 year old. Nontoxic and in no distress.   HEENT: Head: atraumatic, normocephalic. Sclerae anicteric.  Chest:  Normal respiratory effort  Cardiac:  No edema.   Abdomen: abdomen is non-distended  Extremities: normal tone and muscle bulk.  Skin: no lesions or rash on visible skin. Warm and dry.   CNS: alert and oriented. Grossly non-focal.   Psychiatric: normal mood and affect.     Lab Results:    Recent Results (from the past 168 hour(s))   Comprehensive metabolic panel (BMP + Alb, Alk Phos, ALT, AST, Total. Bili, TP)   Result Value Ref Range    Sodium 139 135 - 145 mmol/L    Potassium 4.1 3.4 - 5.3 mmol/L    Carbon Dioxide (CO2) 22 22 - 29 mmol/L    Anion Gap 17 (H) 7 - 15 mmol/L    Urea Nitrogen 12.5 6.0 - 20.0 mg/dL    Creatinine 0.69 0.51 - 0.95 mg/dL    GFR Estimate >90 >60 mL/min/1.73m2    Calcium 7.9 (L) 8.8 - 10.4 mg/dL    Chloride 100 98 - 107 mmol/L    Glucose 178 (H) 70 - 99 mg/dL    Alkaline Phosphatase 113 40 - 150 U/L    AST 22 0 - 45 U/L    ALT 11 0 - 50 U/L    Protein Total 5.9 (L) 6.4 - 8.3 g/dL    Albumin 3.3 (L) 3.5 - 5.2 g/dL    Bilirubin Total 0.4 <=1.2 mg/dL   CBC with platelets and differential   Result Value Ref Range    WBC Count 4.4 4.0 - " 11.0 10e3/uL    RBC Count 3.93 3.80 - 5.20 10e6/uL    Hemoglobin 11.4 (L) 11.7 - 15.7 g/dL    Hematocrit 36.2 35.0 - 47.0 %    MCV 92 78 - 100 fL    MCH 29.0 26.5 - 33.0 pg    MCHC 31.5 31.5 - 36.5 g/dL    RDW 18.6 (H) 10.0 - 15.0 %    Platelet Count 245 150 - 450 10e3/uL    % Neutrophils 75 %    % Lymphocytes 16 %    % Monocytes 8 %    % Eosinophils 1 %    % Basophils 0 %    % Immature Granulocytes 1 %    NRBCs per 100 WBC 0 <1 /100    Absolute Neutrophils 3.3 1.6 - 8.3 10e3/uL    Absolute Lymphocytes 0.7 (L) 0.8 - 5.3 10e3/uL    Absolute Monocytes 0.3 0.0 - 1.3 10e3/uL    Absolute Eosinophils 0.0 0.0 - 0.7 10e3/uL    Absolute Basophils 0.0 0.0 - 0.2 10e3/uL    Absolute Immature Granulocytes 0.0 <=0.4 10e3/uL    Absolute NRBCs 0.0 10e3/uL     Imaging:    PET Dotatate Eyes to Thighs    Result Date: 9/19/2024  EXAM: PET DOTATATE EYES TO THIGHS, CT CHEST/ABDOMEN/PELVIS W CONTRAST LOCATION: [facility] DATE: 9/19/2024 INDICATION: Subsequent treatment planning and restaging for other malignant neuroendocrine tumors. Status post 5 cycles of chemotherapy. Monitor treatment response COMPARISON: CT the abdomen pelvis dated 9/4/2024, CT of the chest abdomen pelvis dated 7/25/2024 CT the chest and pelvis dated 6/19/2024 CONTRAST: 90 mL Isovue-370 TECHNIQUE: 4.1 mCi Cu 64 dotatate was administered intravenously to the patient. One hour post intravenous administration, PET imaging was performed from the skull vertex to mid thigh, utilizing attenuation correction with concurrent axial CT and PET/CT image fusion. Separate diagnostic CT of the chest, abdomen, and pelvis was performed. Dose reduction techniques were used. PET/CT FINDINGS: Somatostatin receptor positive lesion in the terminal ileum with somatostatin receptor positive thoracic/mediastinal, bilateral hilar, retrocrural, and aortocaval lymph nodes, nodularity throughout the right greater than left pleura, lesions scattered throughout the liver  parenchyma including a dominant example posterior right hepatic lobe measuring up to 6.4 x 4.8 cm, nodules scattered throughout the pelvic peritoneal cavity, and multiple scattered osseous lesions including examples  in the left greater wing of sphenoid bone, manubrium, spine, ribs, and, posterior left iliac bone, and left proximal femur suspicious for neuroendocrine neoplasm in the terminal ileum with metastases involving lymph nodes above/below the diaphragm, right greater than left pleura, liver, and scattered sites throughout the osseous structures. CT FINDINGS: Mild senescent intracranial changes. The aerodigestive track and neck soft tissues are unremarkable. Right chest port with tip terminating in the superior cavoatrial junction. Small-to-moderate sized right pleural effusion. Mild coronary artery calcium. Pelvic phleboliths. Multilevel degenerative changes of the spine.     IMPRESSION: Findings suspicious for neuroendocrine neoplasm in the terminal ileum with metastases involving lymph nodes above/below the diaphragm, right greater than left pleura, liver, peritoneal cavity, and scattered sites throughout the osseous structures. While comparison is challenging due to differences in imaging evaluation, when compared with prior CT examinations there has been significant progression of disease involving the liver parenchyma.    CT Chest/Abdomen/Pelvis w Contrast    Result Date: 9/19/2024  EXAM: PET DOTATATE EYES TO THIGHS, CT CHEST/ABDOMEN/PELVIS W CONTRAST LOCATION: [facility] DATE: 9/19/2024 INDICATION: Subsequent treatment planning and restaging for other malignant neuroendocrine tumors. Status post 5 cycles of chemotherapy. Monitor treatment response COMPARISON: CT the abdomen pelvis dated 9/4/2024, CT of the chest abdomen pelvis dated 7/25/2024 CT the chest and pelvis dated 6/19/2024 CONTRAST: 90 mL Isovue-370 TECHNIQUE: 4.1 mCi Cu 64 dotatate was administered intravenously to  the patient. One hour post intravenous administration, PET imaging was performed from the skull vertex to mid thigh, utilizing attenuation correction with concurrent axial CT and PET/CT image fusion. Separate diagnostic CT of the chest, abdomen, and pelvis was performed. Dose reduction techniques were used. PET/CT FINDINGS: Somatostatin receptor positive lesion in the terminal ileum with somatostatin receptor positive thoracic/mediastinal, bilateral hilar, retrocrural, and aortocaval lymph nodes, nodularity throughout the right greater than left pleura, lesions scattered throughout the liver parenchyma including a dominant example posterior right hepatic lobe measuring up to 6.4 x 4.8 cm, nodules scattered throughout the pelvic peritoneal cavity, and multiple scattered osseous lesions including examples  in the left greater wing of sphenoid bone, manubrium, spine, ribs, and, posterior left iliac bone, and left proximal femur suspicious for neuroendocrine neoplasm in the terminal ileum with metastases involving lymph nodes above/below the diaphragm, right greater than left pleura, liver, and scattered sites throughout the osseous structures. CT FINDINGS: Mild senescent intracranial changes. The aerodigestive track and neck soft tissues are unremarkable. Right chest port with tip terminating in the superior cavoatrial junction. Small-to-moderate sized right pleural effusion. Mild coronary artery calcium. Pelvic phleboliths. Multilevel degenerative changes of the spine.     IMPRESSION: Findings suspicious for neuroendocrine neoplasm in the terminal ileum with metastases involving lymph nodes above/below the diaphragm, right greater than left pleura, liver, peritoneal cavity, and scattered sites throughout the osseous structures. While comparison is challenging due to differences in imaging evaluation, when compared with prior CT examinations there has been significant progression of disease involving the liver  parenchyma.    CT Abdomen Pelvis w Contrast    Result Date: 9/4/2024  EXAM: CT ABDOMEN PELVIS W CONTRAST LOCATION: [facility] DATE: 9/4/2024 INDICATION: Lower abdominal tenderness, nausea. COMPARISON: PET/CT 8/1/2024. CT chest abdomen and pelvis 7/25/2024. TECHNIQUE: CT scan of the abdomen and pelvis was performed following injection of IV contrast. Multiplanar reformats were obtained. Dose reduction techniques were used. CONTRAST: 90 ml Isovue 370 FINDINGS: LOWER CHEST: Small to moderate right pleural effusion with an adjacent right lower lobe consolidation which likely reflects atelectasis is unchanged. Scattered prominent paraesophageal and cardiophrenic lymph nodes are also unchanged. HEPATOBILIARY: Three hepatic metastases have not significantly changed in size since the recent PET/CT; the dominant mass is within hepatic segment 5/6, contains a central hypodense area, and measures up to 7.5 cm. No definite new hepatic masses. No calcified gallstones or biliary ductal dilation. PANCREAS: Normal. SPLEEN: Normal. ADRENAL GLANDS: Normal. KIDNEYS/BLADDER: Tiny right upper pole renal cyst, which does not require follow-up. No urinary calculi or hydronephrosis. Normal bladder. BOWEL: Stomach is normal. Tiny duodenal diverticulum. Jejunum is normal in caliber. Marked wall thickening of a long segment of the distal ileum with engorgement of the surrounding vasa recta and edema within the central mesentery, compatible with ileitis. A spiculated 2.8 x 2.5 cm mesenteric mass along the serosal surface of the terminal ileum has not significantly changed when measured similarly (3/#141). No bowel dilation or signs of obstruction. No pneumatosis or portal venous gas. Scattered noninflamed distal colonic diverticuli. LYMPH NODES: Mildly enlarged 1.2 cm right lower quadrant mesenteric node is unchanged. No newly enlarged lymph nodes. PERITONEUM/RETROPERITONEUM: Confluent irregular peritoneal nodules  within the left lower quadrant (for example, 3/#120) and within the central mesentery (for example, 3/#127) have not significantly changed. Small amount of simple ascites within the pelvis.  VASCULATURE: Patent portal, splenic, and superior mesenteric veins. Mild aortic atherosclerosis. No abdominal aortic aneurysm. PELVIC ORGANS: Normal. MUSCULOSKELETAL: Unchanged sclerotic lesions within the left posterior iliac bone and left T11 vertebral body. Small nodules within the right gluteal subcutaneous fat are also unchanged (for example, 3/#170). No new or destructive bone lesions.     IMPRESSION: 1.  Ileitis involving a long segment of the distal ileum, including the portion of the terminal ileum involved by the central mesenteric mass. No evidence of resulting bowel obstruction. 2.  Extensive metastatic disease including mesenteric mike metastases, peritoneal carcinomatosis, liver metastases, and bone metastases have not significantly changed since 8/1/2024. 3.  Small amount of ascites predominantly within the pelvis. 4.  Unchanged small to moderate right pleural effusion with an adjacent consolidation, likely atelectasis.        Signed by: Man Barreto MD  +

## 2024-09-24 ENCOUNTER — MYC MEDICAL ADVICE (OUTPATIENT)
Dept: INTERVENTIONAL RADIOLOGY/VASCULAR | Facility: CLINIC | Age: 59
End: 2024-09-24
Payer: COMMERCIAL

## 2024-09-24 DIAGNOSIS — C7B.8 NEUROENDOCRINE CARCINOMA METASTATIC TO LIVER (H): Primary | ICD-10-CM

## 2024-09-24 DIAGNOSIS — C7A.8 NEUROENDOCRINE CARCINOMA METASTATIC TO LIVER (H): Primary | ICD-10-CM

## 2024-09-26 ENCOUNTER — INFUSION THERAPY VISIT (OUTPATIENT)
Dept: INFUSION THERAPY | Facility: HOSPITAL | Age: 59
End: 2024-09-26
Attending: INTERNAL MEDICINE
Payer: COMMERCIAL

## 2024-09-26 VITALS
OXYGEN SATURATION: 97 % | SYSTOLIC BLOOD PRESSURE: 115 MMHG | DIASTOLIC BLOOD PRESSURE: 75 MMHG | HEART RATE: 87 BPM | RESPIRATION RATE: 16 BRPM | TEMPERATURE: 98.1 F

## 2024-09-26 DIAGNOSIS — C7B.8 NEUROENDOCRINE CARCINOMA METASTATIC TO LIVER (H): Primary | ICD-10-CM

## 2024-09-26 DIAGNOSIS — C7A.8 NEUROENDOCRINE CARCINOMA METASTATIC TO LIVER (H): Primary | ICD-10-CM

## 2024-09-26 PROCEDURE — 250N000011 HC RX IP 250 OP 636: Performed by: INTERNAL MEDICINE

## 2024-09-26 PROCEDURE — 96372 THER/PROPH/DIAG INJ SC/IM: CPT | Performed by: INTERNAL MEDICINE

## 2024-09-26 RX ADMIN — OCTREOTIDE ACETATE 30 MG: KIT at 14:59

## 2024-09-26 NOTE — PROGRESS NOTES
Infusion Nursing Note:  Louise Corado presents today for cycle 3 day 1 octreotide.    Patient seen by provider today: No   present during visit today: Not Applicable.    Note: Louise arrived via wheelchair and in stable condition. Reports fatigue, poor appetite, nausea, dizziness with movement, and vomiting about once per day. She has gotten IVF in the past but hasn't gotten any for about a month. I sent a message to Carolin GONZALESCC regarding this to see if Dr. Barreto would be ok with Louise getting on a consistent schedule for IVF. Carolin will plan on calling Louise with the plan. Octreotide administered into the right gluteal muscle.      Intravenous Access:  No Intravenous access/labs at this visit.    Treatment Conditions:  Not Applicable.      Post Infusion Assessment:  Patient tolerated injection without incident.       Discharge Plan:   Patient and/or family verbalized understanding of discharge instructions and all questions answered.  AVS to patient via [employer].    Patient discharged in stable condition accompanied by: self.  Departure Mode: Ambulatory.      Alena Tinsley RN

## 2024-10-01 ENCOUNTER — HOSPITAL ENCOUNTER (OUTPATIENT)
Dept: ULTRASOUND IMAGING | Facility: CLINIC | Age: 59
Discharge: HOME | End: 2024-10-01
Attending: INTERNAL MEDICINE | Admitting: INTERNAL MEDICINE
Payer: COMMERCIAL

## 2024-10-01 VITALS
RESPIRATION RATE: 25 BRPM | TEMPERATURE: 98.1 F | HEART RATE: 87 BPM | DIASTOLIC BLOOD PRESSURE: 57 MMHG | SYSTOLIC BLOOD PRESSURE: 107 MMHG | OXYGEN SATURATION: 95 %

## 2024-10-01 DIAGNOSIS — C7B.8 NEUROENDOCRINE CARCINOMA METASTATIC TO LIVER (H): ICD-10-CM

## 2024-10-01 DIAGNOSIS — C7A.8 NEUROENDOCRINE CARCINOMA METASTATIC TO LIVER (H): ICD-10-CM

## 2024-10-01 DIAGNOSIS — R91.8 LUNG MASS: Primary | ICD-10-CM

## 2024-10-01 LAB — INR PPP: 1.26 (ref 0.85–1.15)

## 2024-10-01 PROCEDURE — 250N000011 HC RX IP 250 OP 636: Performed by: RADIOLOGY

## 2024-10-01 PROCEDURE — 88307 TISSUE EXAM BY PATHOLOGIST: CPT | Mod: 26 | Performed by: PATHOLOGY

## 2024-10-01 PROCEDURE — 88341 IMHCHEM/IMCYTCHM EA ADD ANTB: CPT | Mod: 26 | Performed by: PATHOLOGY

## 2024-10-01 PROCEDURE — 99152 MOD SED SAME PHYS/QHP 5/>YRS: CPT

## 2024-10-01 PROCEDURE — 88341 IMHCHEM/IMCYTCHM EA ADD ANTB: CPT | Mod: TC | Performed by: INTERNAL MEDICINE

## 2024-10-01 PROCEDURE — 88342 IMHCHEM/IMCYTCHM 1ST ANTB: CPT | Mod: 26 | Performed by: PATHOLOGY

## 2024-10-01 PROCEDURE — 99153 MOD SED SAME PHYS/QHP EA: CPT

## 2024-10-01 PROCEDURE — 88333 PATH CONSLTJ SURG CYTO XM 1: CPT | Mod: 26 | Performed by: PATHOLOGY

## 2024-10-01 PROCEDURE — 36591 DRAW BLOOD OFF VENOUS DEVICE: CPT | Performed by: RADIOLOGY

## 2024-10-01 PROCEDURE — 85610 PROTHROMBIN TIME: CPT | Performed by: RADIOLOGY

## 2024-10-01 PROCEDURE — 272N000710 US BIOPSY LIVER

## 2024-10-01 PROCEDURE — 250N000011 HC RX IP 250 OP 636: Performed by: INTERNAL MEDICINE

## 2024-10-01 PROCEDURE — 88360 TUMOR IMMUNOHISTOCHEM/MANUAL: CPT | Mod: 26 | Performed by: PATHOLOGY

## 2024-10-01 RX ORDER — NALOXONE HYDROCHLORIDE 0.4 MG/ML
0.4 INJECTION, SOLUTION INTRAMUSCULAR; INTRAVENOUS; SUBCUTANEOUS
Status: DISCONTINUED | OUTPATIENT
Start: 2024-10-01 | End: 2024-10-02 | Stop reason: HOSPADM

## 2024-10-01 RX ORDER — FENTANYL CITRATE 50 UG/ML
25-50 INJECTION, SOLUTION INTRAMUSCULAR; INTRAVENOUS EVERY 5 MIN PRN
Status: DISCONTINUED | OUTPATIENT
Start: 2024-10-01 | End: 2024-10-02 | Stop reason: HOSPADM

## 2024-10-01 RX ORDER — NALOXONE HYDROCHLORIDE 0.4 MG/ML
0.2 INJECTION, SOLUTION INTRAMUSCULAR; INTRAVENOUS; SUBCUTANEOUS
Status: DISCONTINUED | OUTPATIENT
Start: 2024-10-01 | End: 2024-10-02 | Stop reason: HOSPADM

## 2024-10-01 RX ORDER — FLUMAZENIL 0.1 MG/ML
0.2 INJECTION, SOLUTION INTRAVENOUS
Status: DISCONTINUED | OUTPATIENT
Start: 2024-10-01 | End: 2024-10-02 | Stop reason: HOSPADM

## 2024-10-01 RX ORDER — ONDANSETRON 2 MG/ML
4 INJECTION INTRAMUSCULAR; INTRAVENOUS EVERY 6 HOURS PRN
Status: DISCONTINUED | OUTPATIENT
Start: 2024-10-01 | End: 2024-10-02 | Stop reason: HOSPADM

## 2024-10-01 RX ORDER — ONDANSETRON 2 MG/ML
8 INJECTION INTRAMUSCULAR; INTRAVENOUS EVERY 6 HOURS PRN
Status: DISCONTINUED | OUTPATIENT
Start: 2024-10-01 | End: 2024-10-01

## 2024-10-01 RX ORDER — LIDOCAINE 40 MG/G
CREAM TOPICAL
Status: DISCONTINUED | OUTPATIENT
Start: 2024-10-01 | End: 2024-10-02 | Stop reason: HOSPADM

## 2024-10-01 RX ADMIN — ONDANSETRON 4 MG: 2 INJECTION INTRAMUSCULAR; INTRAVENOUS at 07:52

## 2024-10-01 RX ADMIN — FENTANYL CITRATE 50 MCG: 50 INJECTION, SOLUTION INTRAMUSCULAR; INTRAVENOUS at 09:02

## 2024-10-01 RX ADMIN — MIDAZOLAM HYDROCHLORIDE 1 MG: 1 INJECTION, SOLUTION INTRAMUSCULAR; INTRAVENOUS at 08:51

## 2024-10-01 RX ADMIN — MIDAZOLAM HYDROCHLORIDE 1 MG: 1 INJECTION, SOLUTION INTRAMUSCULAR; INTRAVENOUS at 08:43

## 2024-10-01 RX ADMIN — FENTANYL CITRATE 50 MCG: 50 INJECTION, SOLUTION INTRAMUSCULAR; INTRAVENOUS at 08:51

## 2024-10-01 NOTE — DISCHARGE INSTRUCTIONS
1. You are required to have someone accompany you home. Do not drive or operate machinery today as the medication may cause sleepiness.    2. Rest today and avoid strenuous activity or heavy lifting for 48 hours. Over-activity may produce dizziness and or nausea.    3. You should follow your normal diet. Drink plenty of fluids. No alcoholic beverages for 24 hours. *(Alcohol may interact with the medications you received today)    4. Leave bandage on today, you may remove tomorrow.    5. You may shower tomorrow. Do not soak in a bath tub, hot tub, or swim until the site is completely healed and the skin glue is off. Keep the site clean and dry.    ADDITIONAL INSTRUCTIONS    When to call your Doctor:     1. Watch your biopsy site for signs of infection, increase pain, redness, swelling, or any drainage and or fever or chills.    2. On-going nausea, vomiting, or un-usual increase in pain.    3. If you experience any of the above or sudden weakness, dizziness, abdominal pain, flank pain or a temperature above 100.0 degree F for more than 24 hours, call your Doctor.    4. Sudden on-set of shortness of breath - call 911 or go to the emergency room.         * Recovery After Conscious Sedation (Adult)  We gave you medicine by vein to make you sleepy or relaxed during your procedure. This may have included both a pain medicine and sleeping medicine. Most of the effects have worn off. But you may still feel sleepy for the next 6 to 8 hours.  Home care  Follow these guidelines when you get home:  You may feel sleepy and clumsy and have poor balance for the next few hours.  A responsible adult should stay with you for the next 8 hours. This person should make sure your condition doesn t get worse.  Don't drink any alcohol for the next 24 hours.  Don't drive, operate dangerous machinery, make important business or personal decisions or sign legal documents during the next 24 hours.  You may vomit (throw up) if you eat too soon  after the procedure. If this happens, drink small amounts of water, juice or clear broth. Wait to try solid food until you no longer have nausea (upset stomach).  Note: Your care team may tell you not to take any medicine by mouth for pain or sleep in the next 4 hours. These medicines may react with the medicines you had in the hospital. This could cause a much stronger response than usual.  Follow-up care  Follow up with your care team if you are not alert and back to your usual level of activity within 12 hours.  When to seek medical advice  Call your care team right away if any of these occur:  You still feel sleepy or clumsy after 12 hours, or your sleepiness gets worse  Weakness or dizziness gets worse  Repeated vomiting  If you can't be woken up and someone is staying with you, they should call 911.  For informational purposes only. Not to replace the advice of your health care provider.  Copyright   2018 [employer]. All rights reserved.

## 2024-10-01 NOTE — PRE-PROCEDURE
GENERAL PRE-PROCEDURE:   Procedure:  US guided liver biopsy of two liver lesions with  moderate sedation    Written consent obtained?: Yes    Risks and benefits: Risks, benefits and alternatives were discussed    Consent given by:  Patient  Patient states understanding of procedure being performed: Yes    Patient's understanding of procedure matches consent: Yes    Procedure consent matches procedure scheduled: Yes    Expected level of sedation:  Moderate  Appropriately NPO:  Yes  ASA Class:  2  Mallampati  :  Grade 3- soft palate visible, posterior pharyngeal wall not visible  Lungs:  Lungs clear with good breath sounds bilaterally  Heart:  Normal heart sounds and rate  History & Physical reviewed:  History and physical reviewed and no updates needed  Statement of review:  I have reviewed the lab findings, diagnostic data, medications, and the plan for sedation

## 2024-10-01 NOTE — PROCEDURES
[facility]    Procedure: IR Procedure Note    Date/Time: 10/1/2024 9:21 AM    Performed by: Chase Mcghee MD  Authorized by: Chase Mcghee MD  IR Fellow Physician:    Pre Procedure Diagnosis: Liver metastases  Post Procedure Diagnosis: Same as above    UNIVERSAL PROTOCOL   Site Marked: Yes  Prior Images Obtained and Reviewed:  Yes  Required items: Required blood products, implants, devices and special equipment available    Patient identity confirmed:  Verbally with patient and arm band  Patient was reevaluated immediately before administering moderate or deep sedation or anesthesia  Confirmation Checklist:  Patient's identity using two indicators  Time out: Immediately prior to the procedure a time out was called    Universal Protocol: the Joint Commission Universal Protocol was followed    Preparation: Patient was prepped and draped in usual sterile fashion       ANESTHESIA    Local Anesthetic:  Lidocaine 1% without epinephrine      SEDATION  Patient Sedated: Yes    Sedation:  Fentanyl and midazolam  Vital signs: Vital signs monitored during sedation    Findings: Right and left hepatic lobe masses were biopsied.     Specimens: core needle biopsy specimens sent for pathological analysis    Procedural Complications: None    Condition: Stable    Plan: Bed rest x 3 hours. If no issues can discharge home.       PROCEDURE  Describe Procedure: US guided liver biopsy of a right and left hepatic lobe lesions.   Length of time physician/provider present for 1:1 monitoring during sedation:  38-52 min

## 2024-10-01 NOTE — IR NOTE
Patient Name: Louise Corado  Medical Record Number: [id number]  Today's Date: 10/1/2024    Procedure: US guided Liver Biopsy 2 sites biopsied  Proceduralist: Dr Chase Mcghee  Pathology present: Yes    Procedure Start: 0849  Procedure end: 0914  Sedation medications administered: Fentanyl 100mcg Versed 2mg     Report given to: BAL  : No    Other Notes: Pt arrived to US room 1 from pre/post rm 1. Consent reviewed. Pt denies any questions or concerns regarding procedure. Pt positioned supine and monitored per protocol. Pt tolerated procedure without any noted complications. Pt transferred back to pre/post rm 1.    Successful biopsy to 2 hepatic lesions. No bleeding or pain noted. Denies nausea. Pt is A&Ox4 with no complaints. Discharge instructios discussed with patient and  Rowdy. Transported via  to private vehicle.      Brenda Estrada RN

## 2024-10-03 ENCOUNTER — INFUSION THERAPY VISIT (OUTPATIENT)
Dept: INFUSION THERAPY | Facility: HOSPITAL | Age: 59
End: 2024-10-03
Attending: INTERNAL MEDICINE
Payer: COMMERCIAL

## 2024-10-03 ENCOUNTER — MYC REFILL (OUTPATIENT)
Dept: PALLIATIVE CARE | Facility: CLINIC | Age: 59
End: 2024-10-03

## 2024-10-03 ENCOUNTER — TELEPHONE (OUTPATIENT)
Dept: ONCOLOGY | Facility: HOSPITAL | Age: 59
End: 2024-10-03
Payer: COMMERCIAL

## 2024-10-03 VITALS
HEART RATE: 92 BPM | BODY MASS INDEX: 31.53 KG/M2 | RESPIRATION RATE: 18 BRPM | DIASTOLIC BLOOD PRESSURE: 73 MMHG | OXYGEN SATURATION: 96 % | SYSTOLIC BLOOD PRESSURE: 117 MMHG | WEIGHT: 172.4 LBS | TEMPERATURE: 98 F

## 2024-10-03 DIAGNOSIS — C7B.8 NEUROENDOCRINE CARCINOMA METASTATIC TO LIVER (H): Primary | ICD-10-CM

## 2024-10-03 DIAGNOSIS — R07.1 PAINFUL RESPIRATION: ICD-10-CM

## 2024-10-03 DIAGNOSIS — C7A.8 NEUROENDOCRINE CARCINOMA METASTATIC TO LIVER (H): Primary | ICD-10-CM

## 2024-10-03 LAB
PATH REPORT.COMMENTS IMP SPEC: ABNORMAL
PATH REPORT.COMMENTS IMP SPEC: YES
PATH REPORT.FINAL DX SPEC: ABNORMAL
PATH REPORT.GROSS SPEC: ABNORMAL
PATH REPORT.MICROSCOPIC SPEC OTHER STN: ABNORMAL
PATH REPORT.RELEVANT HX SPEC: ABNORMAL
PHOTO IMAGE: ABNORMAL

## 2024-10-03 PROCEDURE — 96360 HYDRATION IV INFUSION INIT: CPT

## 2024-10-03 PROCEDURE — 258N000003 HC RX IP 258 OP 636: Performed by: INTERNAL MEDICINE

## 2024-10-03 PROCEDURE — 250N000011 HC RX IP 250 OP 636: Performed by: INTERNAL MEDICINE

## 2024-10-03 RX ORDER — HEPARIN SODIUM (PORCINE) LOCK FLUSH IV SOLN 100 UNIT/ML 100 UNIT/ML
SOLUTION INTRAVENOUS
Status: COMPLETED
Start: 2024-10-03 | End: 2024-10-03

## 2024-10-03 RX ADMIN — SODIUM CHLORIDE 1000 ML: 9 INJECTION, SOLUTION INTRAVENOUS at 14:30

## 2024-10-03 RX ADMIN — HEPARIN 500 UNITS: 100 SYRINGE at 15:31

## 2024-10-03 NOTE — TELEPHONE ENCOUNTER
Oncology Distress Screen    Called Louise in regards to her positive oncology nutrition distress screen:    1. How concerned are you about your ability to eat? :  9  2. How concerned are you about unintended weight loss or your current weight? : 9    Louise reports low intake due to poor appetite and nausea. She has lost weight. Reviewed tips to support good nutrition with low appetite and nausea. Written resources sent via email. Supplement samples also sent via [employer].      Amanda Roberts, MS, RD, LD

## 2024-10-03 NOTE — PROGRESS NOTES
Infusion Nursing Note:  Louise Corado presents today for IV fluid hydration.    Patient seen by provider today: No   present during visit today: Not Applicable.    Note: Louise arrives with her sister. Anorexia and concern for weight loss is chronic; no new issues today.      Intravenous Access:  Implanted Port.    Treatment Conditions:  Not Applicable.      Post Infusion Assessment:  Patient tolerated infusion without incident.  Blood return noted pre and post infusion.  Site patent and intact, free from redness, edema or discomfort.  No evidence of extravasations.  Access discontinued per protocol.       Discharge Plan:   Patient and/or family verbalized understanding of discharge instructions and all questions answered.  Patient discharged in stable condition accompanied by: sister.  Departure Mode: Ambulatory.      Anupama Barnett RN BSN

## 2024-10-04 RX ORDER — OXYCODONE HYDROCHLORIDE 5 MG/1
15-20 TABLET ORAL EVERY 4 HOURS PRN
Qty: 90 TABLET | Refills: 0 | Status: SHIPPED | OUTPATIENT
Start: 2024-10-04 | End: 2024-10-14

## 2024-10-04 NOTE — TELEPHONE ENCOUNTER
Received [employer] message from patient requesting refill of oxycodone.     Last refill: 9/20/24  Last office visit: 9/10/24  Scheduled for follow up 11/19/24     Will route request to MD/ for review.     Reviewed [place]  Report.

## 2024-10-08 ENCOUNTER — ONCOLOGY VISIT (OUTPATIENT)
Dept: ONCOLOGY | Facility: HOSPITAL | Age: 59
End: 2024-10-08
Attending: INTERNAL MEDICINE
Payer: COMMERCIAL

## 2024-10-08 ENCOUNTER — PATIENT OUTREACH (OUTPATIENT)
Dept: ONCOLOGY | Facility: HOSPITAL | Age: 59
End: 2024-10-08
Payer: COMMERCIAL

## 2024-10-08 VITALS
WEIGHT: 174.6 LBS | SYSTOLIC BLOOD PRESSURE: 115 MMHG | RESPIRATION RATE: 16 BRPM | TEMPERATURE: 97.9 F | BODY MASS INDEX: 32.13 KG/M2 | HEIGHT: 62 IN | HEART RATE: 90 BPM | OXYGEN SATURATION: 97 % | DIASTOLIC BLOOD PRESSURE: 61 MMHG

## 2024-10-08 DIAGNOSIS — C7B.8 NEUROENDOCRINE CARCINOMA METASTATIC TO LIVER (H): Primary | ICD-10-CM

## 2024-10-08 DIAGNOSIS — C7A.8 NEUROENDOCRINE CARCINOMA METASTATIC TO LIVER (H): Primary | ICD-10-CM

## 2024-10-08 DIAGNOSIS — R11.0 NAUSEA: ICD-10-CM

## 2024-10-08 PROCEDURE — 99213 OFFICE O/P EST LOW 20 MIN: CPT | Performed by: INTERNAL MEDICINE

## 2024-10-08 PROCEDURE — 99215 OFFICE O/P EST HI 40 MIN: CPT | Performed by: INTERNAL MEDICINE

## 2024-10-08 PROCEDURE — G2211 COMPLEX E/M VISIT ADD ON: HCPCS | Performed by: INTERNAL MEDICINE

## 2024-10-08 RX ORDER — SCOLOPAMINE TRANSDERMAL SYSTEM 1 MG/1
1 PATCH, EXTENDED RELEASE TRANSDERMAL
Qty: 10 PATCH | Refills: 0 | Status: SHIPPED | OUTPATIENT
Start: 2024-10-08 | End: 2024-11-07

## 2024-10-08 ASSESSMENT — PAIN SCALES - GENERAL: PAINLEVEL: MILD PAIN (2)

## 2024-10-08 NOTE — PROGRESS NOTES
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     Initial Vitals: /61 (BP Location: Left arm, Patient Position: Sitting, Cuff Size: Adult Regular)   Pulse 90   Temp 97.9  F (36.6  C) (Tympanic)   Resp 16   Ht 1.575 m (5' 2\")   Wt 79.2 kg (174 lb 9.6 oz)   SpO2 97%   BMI 31.93 kg/m   Estimated body mass index is 31.93 kg/m  as calculated from the following:    Height as of this encounter: 1.575 m (5' 2\").    Weight as of this encounter: 79.2 kg (174 lb 9.6 oz). Body surface area is 1.86 meters squared.  Mild Pain (2) Comment: Data Unavailable   No LMP recorded. Patient is postmenopausal.  Allergies reviewed: Yes  Medications reviewed: Yes    Medications: MEDICATION REFILLS NEEDED TODAY. Provider was notified.  Pharmacy name entered into [employer]:    [id number] [place], [place] - [address] AT [facility] [place], [place] - [facility]    Frailty Screening:   Is the patient here for a new oncology consult visit in cancer care? 2. No      Clinical concerns: Louise continues to have nausea, and notes medications don't seem to help much.  She is also requesting a refill of the Phenergan.   Dr. Barreto was notified.      Vickie Bhakta CMA              "

## 2024-10-08 NOTE — PROGRESS NOTES
[facility]: Cancer Care                                                                                          Sent [employer] message:    Ozzy Gil,    We were able to get you scheduled for your follow up visit with Dr. Barreto on Monday November 11th at 3:30pm at [facility]. Please let me know if this doesn't work. Otherwise, we will see you then.     Signature:  Carolin Biswas RN

## 2024-10-08 NOTE — LETTER
10/8/2024      Louise Corado  [address] [place] [address]      Dear Colleague,    Thank you for referring your patient, Louise Corado, to the [facility] CANC[facility]. Please see a copy of my visit note below.    [facility] Hematology and Oncology Progress Note    Patient: Louise Corado  MRN: [id number]  Date of Service: Oct 8, 2024        Assessment and Plan:    1.  Metastatic neuroendocrine carcinoma: She had biopsy of 2 liver lesions 1 week ago.  Pathology report is being read out as: Metastatic neuroendocrine tumor, grade 2.  Less than 1 mitosis per high-power field.  Ki-67 4 to 6%.  Ki-67 is consistent with a grade 2 (well-differentiated) neuroendocrine tumor.  For comparison, right sided liver biopsy from April 12, 2024 shows: Poorly differentiated malignant neoplasm consistent with neuroendocrine carcinoma.  Ki-67 15 to 20% of tumor cells. No Mitotic rate reported.    By looking at the report these seem like 2 different histologies.  I am having pathology directly compare these 2 biopsies to see if they truly are different.  If they are than the chemotherapy probably treated the high-grade component in our left ovary with a grade 1/grade 2 neuroendocrine tumor.  This would typically be treated with somatostatin analog.  This was started in July of this year.  She has had 4 doses of 30 mg.  We are going to increase to 60 mg to see if we can get a response.  If not then we will have to decide between radioactive ligand therapy versus FOLFOX or Cape/tem    2.  Nausea: Still having nausea.  She says this definitely associated with movement which makes it worse.  We are going to get an MRI of the brain to make sure were not missing anything structural.  Will  also try scopolamine to see if this helps with the movement component.  I asked her to take one of her nausea medications twice a day, morning and afternoon, every day to try to better suppress her nausea.  Up until now  she has been doing her medications as needed.  She is following up with palliative care outpatient in a month.    3.  Pleural effusion: Mostly resolved now.  Follow clinically and radiographically.    4.  Anxiety: Continue lorazepam as needed and Zyprexa nightly.  This can be increased.    5.  Pain:  She is taking MS Contin 30 every 12 and oxycodone 10 mg as needed.      Medical decision Making:  I spent 44 minutes in the care of this patient today, which included time necessary for preparation for the visit, face to face time with the patient, communication of recommendations to the care team, and documentation time.  Today's visit was centered around a cancer diagnosis in the setting of additional medical comorbidities. Complex medical decision making was required. The risk of additional morbidity without further treatment is high.     ECOG Performance  1    Diagnosis:    1.  Metastatic neuroendocrine carcinoma, poorly differentiated:  Diagnosed April 2024 from a liver biopsy.  Her PET/CT shows malignant involvement of the liver, mediastinal nodes, left upper quadrant omentum, manubrium, and bilateral pleura.  There is some wall thickening and FDG activity in a short segment of the terminal ileum. Liver biopsy shows WHO, NET G2.     EGD 4/25/24: Occasional gastritis.  No mass.  Colonoscopy 4/25/24: Mass of terminal ileum.    NGS:  PDL1=0%, MDI-stable, TMB low,   CancerTYPE ID:  GI carcinoid 96%    Treatment:    Carboplatin and etoposide initiated May 13, 2024.  25% etopside dose reduction starting with cycle 5 secondary to nausea.  Cycle 5 was completed on August 7, 2024.    Interim History:    Louise returns today for a follow-up visit.  She completed chemotherapy about a month and a half ago.  She was admitted from September 4 through September 6 with nausea, vomiting, and abdominal pain.  Still having some nausea.  A little better than when she was on chemotherapy.  Appetite remains decreased.  Generalized  forward of down abdominal pain.    Review of Systems:    As above in the history.     Review of Systems otherwise Negative for:  General: chills, fever or night sweats  Psychological: depression  Ophthalmic: blurry vision, double vision or loss of vision, vision change  ENT: epistaxis, oral lesions, hearing changes  Hematological and Lymphatic: bleeding, bruising, jaundice, swollen lymph nodes  Endocrine: hot flashes, unexpected weight changes  Respiratory: cough, hemoptysis, orthopnea  Cardiovascular: edema, palpitations or PND  Gastrointestinal: blood in stools, change in bowel habits, constipation  Genito-Urinary: change in urinary stream, incontinence, frequency/urgency  Musculoskeletal: joint swelling, muscle pain  Neurological: dizziness, headaches, numbness/tingling  Dermatological: lumps and rash    Physical Exam:    There were no vitals taken for this visit.    General: patient appears stated age of 59 year old. Nontoxic and in no distress.   HEENT: Head: atraumatic, normocephalic. Sclerae anicteric.  Chest:  Normal respiratory effort  Cardiac:  No edema.   Abdomen: abdomen is non-distended  Extremities: normal tone and muscle bulk.  Skin: no lesions or rash on visible skin. Warm and dry.   CNS: alert and oriented. Grossly non-focal.   Psychiatric: normal mood and affect.     Lab Results:    No results found for this or any previous visit (from the past 168 hour(s)).    Imaging:    US Biopsy Liver    Result Date: 10/1/2024  EXAM: 1. PERCUTANEOUS BIOPSY RIGHT AND LEFT HEPATIC LOBE LESIONS 2. ULTRASOUND GUIDANCE 3. CONSCIOUS SEDATION LOCATION: [facility] DATE: 10/1/2024 INDICATION: Neuroendocrine carcinoma metastatic to liver (H), Neuroendocrine carcinoma metastatic to liver (H). A rebiopsy of the right hepatic lobe lesion was requested in addition to a biopsy of a left hepatic lobe lesion to better delineate treatment options. PROCEDURE: Informed consent obtained. Site marked. Prior  images reviewed. Required items made available. Patient identity was confirmed verbally and with arm band. Patient reevaluated immediately before administering sedation. Universal protocol was followed. Time out performed. The site was prepped and draped in sterile fashion. 10 mL of 1 percent lidocaine was infused into the local soft tissues. Using standard technique and under direct ultrasound guidance, an 18 gauge biopsy needle was used to make three core biopsies of the right hepatic lobe lesion. Subsequently, attention was directed towards the left hepatic lobe lesion and an 18-gauge biopsy needle was used under ultrasound guidance to take 4 samples. Tissue was submitted to Pathology. The patient tolerated the procedure well. No complications. SEDATION: Versed 2 mg. Fentanyl 100 mcg. The procedure was performed with administration intravenous conscious sedation with appropriate preoperative, intraoperative, and postoperative evaluation. 30 minutes of supervised face to face conscious sedation time was provided by a radiology nurse under my direct supervision.     IMPRESSION: Status post US-guided biopsy right and left hepatic lobe lesions. Reference CPT Code: 44643, 58367, 33187, 02165    PET Dotatate Eyes to Thighs    Result Date: 9/19/2024  EXAM: PET DOTATATE EYES TO THIGHS, CT CHEST/ABDOMEN/PELVIS W CONTRAST LOCATION: [facility] DATE: 9/19/2024 INDICATION: Subsequent treatment planning and restaging for other malignant neuroendocrine tumors. Status post 5 cycles of chemotherapy. Monitor treatment response COMPARISON: CT the abdomen pelvis dated 9/4/2024, CT of the chest abdomen pelvis dated 7/25/2024 CT the chest and pelvis dated 6/19/2024 CONTRAST: 90 mL Isovue-370 TECHNIQUE: 4.1 mCi Cu 64 dotatate was administered intravenously to the patient. One hour post intravenous administration, PET imaging was performed from the skull vertex to mid thigh, utilizing attenuation correction with  concurrent axial CT and PET/CT image fusion. Separate diagnostic CT of the chest, abdomen, and pelvis was performed. Dose reduction techniques were used. PET/CT FINDINGS: Somatostatin receptor positive lesion in the terminal ileum with somatostatin receptor positive thoracic/mediastinal, bilateral hilar, retrocrural, and aortocaval lymph nodes, nodularity throughout the right greater than left pleura, lesions scattered throughout the liver parenchyma including a dominant example posterior right hepatic lobe measuring up to 6.4 x 4.8 cm, nodules scattered throughout the pelvic peritoneal cavity, and multiple scattered osseous lesions including examples  in the left greater wing of sphenoid bone, manubrium, spine, ribs, and, posterior left iliac bone, and left proximal femur suspicious for neuroendocrine neoplasm in the terminal ileum with metastases involving lymph nodes above/below the diaphragm, right greater than left pleura, liver, and scattered sites throughout the osseous structures. CT FINDINGS: Mild senescent intracranial changes. The aerodigestive track and neck soft tissues are unremarkable. Right chest port with tip terminating in the superior cavoatrial junction. Small-to-moderate sized right pleural effusion. Mild coronary artery calcium. Pelvic phleboliths. Multilevel degenerative changes of the spine.     IMPRESSION: Findings suspicious for neuroendocrine neoplasm in the terminal ileum with metastases involving lymph nodes above/below the diaphragm, right greater than left pleura, liver, peritoneal cavity, and scattered sites throughout the osseous structures. While comparison is challenging due to differences in imaging evaluation, when compared with prior CT examinations there has been significant progression of disease involving the liver parenchyma.    CT Chest/Abdomen/Pelvis w Contrast    Result Date: 9/19/2024  EXAM: PET DOTATATE EYES TO THIGHS, CT CHEST/ABDOMEN/PELVIS W CONTRAST LOCATION: [facility] DATE: 9/19/2024 INDICATION: Subsequent treatment planning and restaging for other malignant neuroendocrine tumors. Status post 5 cycles of chemotherapy. Monitor treatment response COMPARISON: CT the abdomen pelvis dated 9/4/2024, CT of the chest abdomen pelvis dated 7/25/2024 CT the chest and pelvis dated 6/19/2024 CONTRAST: 90 mL Isovue-370 TECHNIQUE: 4.1 mCi Cu 64 dotatate was administered intravenously to the patient. One hour post intravenous administration, PET imaging was performed from the skull vertex to mid thigh, utilizing attenuation correction with concurrent axial CT and PET/CT image fusion. Separate diagnostic CT of the chest, abdomen, and pelvis was performed. Dose reduction techniques were used. PET/CT FINDINGS: Somatostatin receptor positive lesion in the terminal ileum with somatostatin receptor positive thoracic/mediastinal, bilateral hilar, retrocrural, and aortocaval lymph nodes, nodularity throughout the right greater than left pleura, lesions scattered throughout the liver parenchyma including a dominant example posterior right hepatic lobe measuring up to 6.4 x 4.8 cm, nodules scattered throughout the pelvic peritoneal cavity, and multiple scattered osseous lesions including examples  in the left greater wing of sphenoid bone, manubrium, spine, ribs, and, posterior left iliac bone, and left proximal femur suspicious for neuroendocrine neoplasm in the terminal ileum with metastases involving lymph nodes above/below the diaphragm, right greater than left pleura, liver, and scattered sites throughout the osseous structures. CT FINDINGS: Mild senescent intracranial changes. The aerodigestive track and neck soft tissues are unremarkable. Right chest port with tip terminating in the superior cavoatrial junction. Small-to-moderate sized right pleural effusion. Mild coronary artery calcium. Pelvic phleboliths. Multilevel degenerative changes of the spine.  "    IMPRESSION: Findings suspicious for neuroendocrine neoplasm in the terminal ileum with metastases involving lymph nodes above/below the diaphragm, right greater than left pleura, liver, peritoneal cavity, and scattered sites throughout the osseous structures. While comparison is challenging due to differences in imaging evaluation, when compared with prior CT examinations there has been significant progression of disease involving the liver parenchyma.       Signed by: Man Barreto MD  +    Oncology Rooming Note    October 8, 2024 9:43 AM   Louise Corado is a 59 year old female who presents for:    Chief Complaint   Patient presents with     Oncology Clinic Visit     Return visit, review biopsy of 10/1, related to Neuroendocrine carcinoma metastatic to liver.     Initial Vitals: /61 (BP Location: Left arm, Patient Position: Sitting, Cuff Size: Adult Regular)   Pulse 90   Temp 97.9  F (36.6  C) (Tympanic)   Resp 16   Ht 1.575 m (5' 2\")   Wt 79.2 kg (174 lb 9.6 oz)   SpO2 97%   BMI 31.93 kg/m   Estimated body mass index is 31.93 kg/m  as calculated from the following:    Height as of this encounter: 1.575 m (5' 2\").    Weight as of this encounter: 79.2 kg (174 lb 9.6 oz). Body surface area is 1.86 meters squared.  Mild Pain (2) Comment: Data Unavailable   No LMP recorded. Patient is postmenopausal.  Allergies reviewed: Yes  Medications reviewed: Yes    Medications: MEDICATION REFILLS NEEDED TODAY. Provider was notified.  Pharmacy name entered into [employer]:    [facility] #[id number] [place], [place] - [address] AT [facility] [place], [place] - [address] [place] [address]    Frailty Screening:   Is the patient here for a new oncology consult visit in cancer care? 2. No      Clinical concerns: Louise continues to have nausea, and notes medications don't seem to help much.  She is also requesting a refill of the Phenergan.   Dr. Barreto was " notified.      Vickie Bhakta CMA                Again, thank you for allowing me to participate in the care of your patient.        Sincerely,        Man Barreto MD

## 2024-10-08 NOTE — PROGRESS NOTES
[facility] Hematology and Oncology Progress Note    Patient: Louise Corado  MRN: [id number]  Date of Service: Oct 8, 2024        Assessment and Plan:    1.  Metastatic neuroendocrine carcinoma: She had biopsy of 2 liver lesions 1 week ago.  Pathology report is being read out as: Metastatic neuroendocrine tumor, grade 2.  Less than 1 mitosis per high-power field.  Ki-67 4 to 6%.  Ki-67 is consistent with a grade 2 (well-differentiated) neuroendocrine tumor.  For comparison, right sided liver biopsy from April 12, 2024 shows: Poorly differentiated malignant neoplasm consistent with neuroendocrine carcinoma.  Ki-67 15 to 20% of tumor cells. No Mitotic rate reported.    By looking at the report these seem like 2 different histologies.  I am having pathology directly compare these 2 biopsies to see if they truly are different.  If they are than the chemotherapy probably treated the high-grade component in our left ovary with a grade 1/grade 2 neuroendocrine tumor.  This would typically be treated with somatostatin analog.  This was started in July of this year.  She has had 4 doses of 30 mg.  We are going to increase to 60 mg to see if we can get a response.  If not then we will have to decide between radioactive ligand therapy versus FOLFOX or Cape/tem    2.  Nausea: Still having nausea.  She says this definitely associated with movement which makes it worse.  We are going to get an MRI of the brain to make sure were not missing anything structural.  Will  also try scopolamine to see if this helps with the movement component.  I asked her to take one of her nausea medications twice a day, morning and afternoon, every day to try to better suppress her nausea.  Up until now she has been doing her medications as needed.  She is following up with palliative care outpatient in a month.    3.  Pleural effusion: Mostly resolved now.  Follow clinically and radiographically.    4.  Anxiety: Continue lorazepam as needed  and Zyprexa nightly.  This can be increased.    5.  Pain:  She is taking MS Contin 30 every 12 and oxycodone 10 mg as needed.      Medical decision Making:  I spent 44 minutes in the care of this patient today, which included time necessary for preparation for the visit, face to face time with the patient, communication of recommendations to the care team, and documentation time.  Today's visit was centered around a cancer diagnosis in the setting of additional medical comorbidities. Complex medical decision making was required. The risk of additional morbidity without further treatment is high.     ECOG Performance  1    Diagnosis:    1.  Metastatic neuroendocrine carcinoma, poorly differentiated:  Diagnosed April 2024 from a liver biopsy.  Her PET/CT shows malignant involvement of the liver, mediastinal nodes, left upper quadrant omentum, manubrium, and bilateral pleura.  There is some wall thickening and FDG activity in a short segment of the terminal ileum. Liver biopsy shows WHO, NET G2.     EGD 4/25/24: Occasional gastritis.  No mass.  Colonoscopy 4/25/24: Mass of terminal ileum.    NGS:  PDL1=0%, MDI-stable, TMB low,   CancerTYPE ID:  GI carcinoid 96%    Treatment:    Carboplatin and etoposide initiated May 13, 2024.  25% etopside dose reduction starting with cycle 5 secondary to nausea.  Cycle 5 was completed on August 7, 2024.    Interim History:    Louise returns today for a follow-up visit.  She completed chemotherapy about a month and a half ago.  She was admitted from September 4 through September 6 with nausea, vomiting, and abdominal pain.  Still having some nausea.  A little better than when she was on chemotherapy.  Appetite remains decreased.  Generalized forward of down abdominal pain.    Review of Systems:    As above in the history.     Review of Systems otherwise Negative for:  General: chills, fever or night sweats  Psychological: depression  Ophthalmic: blurry vision, double vision or loss of  vision, vision change  ENT: epistaxis, oral lesions, hearing changes  Hematological and Lymphatic: bleeding, bruising, jaundice, swollen lymph nodes  Endocrine: hot flashes, unexpected weight changes  Respiratory: cough, hemoptysis, orthopnea  Cardiovascular: edema, palpitations or PND  Gastrointestinal: blood in stools, change in bowel habits, constipation  Genito-Urinary: change in urinary stream, incontinence, frequency/urgency  Musculoskeletal: joint swelling, muscle pain  Neurological: dizziness, headaches, numbness/tingling  Dermatological: lumps and rash    Physical Exam:    There were no vitals taken for this visit.    General: patient appears stated age of 59 year old. Nontoxic and in no distress.   HEENT: Head: atraumatic, normocephalic. Sclerae anicteric.  Chest:  Normal respiratory effort  Cardiac:  No edema.   Abdomen: abdomen is non-distended  Extremities: normal tone and muscle bulk.  Skin: no lesions or rash on visible skin. Warm and dry.   CNS: alert and oriented. Grossly non-focal.   Psychiatric: normal mood and affect.     Lab Results:    No results found for this or any previous visit (from the past 168 hour(s)).    Imaging:    US Biopsy Liver    Result Date: 10/1/2024  EXAM: 1. PERCUTANEOUS BIOPSY RIGHT AND LEFT HEPATIC LOBE LESIONS 2. ULTRASOUND GUIDANCE 3. CONSCIOUS SEDATION LOCATION: [facility] DATE: 10/1/2024 INDICATION: Neuroendocrine carcinoma metastatic to liver (H), Neuroendocrine carcinoma metastatic to liver (H). A rebiopsy of the right hepatic lobe lesion was requested in addition to a biopsy of a left hepatic lobe lesion to better delineate treatment options. PROCEDURE: Informed consent obtained. Site marked. Prior images reviewed. Required items made available. Patient identity was confirmed verbally and with arm band. Patient reevaluated immediately before administering sedation. Universal protocol was followed. Time out performed. The site was prepped and  draped in sterile fashion. 10 mL of 1 percent lidocaine was infused into the local soft tissues. Using standard technique and under direct ultrasound guidance, an 18 gauge biopsy needle was used to make three core biopsies of the right hepatic lobe lesion. Subsequently, attention was directed towards the left hepatic lobe lesion and an 18-gauge biopsy needle was used under ultrasound guidance to take 4 samples. Tissue was submitted to Pathology. The patient tolerated the procedure well. No complications. SEDATION: Versed 2 mg. Fentanyl 100 mcg. The procedure was performed with administration intravenous conscious sedation with appropriate preoperative, intraoperative, and postoperative evaluation. 30 minutes of supervised face to face conscious sedation time was provided by a radiology nurse under my direct supervision.     IMPRESSION: Status post US-guided biopsy right and left hepatic lobe lesions. Reference CPT Code: 40385, 51680, 40168, 12215    PET Dotatate Eyes to Thighs    Result Date: 9/19/2024  EXAM: PET DOTATATE EYES TO THIGHS, CT CHEST/ABDOMEN/PELVIS W CONTRAST LOCATION: [facility] DATE: 9/19/2024 INDICATION: Subsequent treatment planning and restaging for other malignant neuroendocrine tumors. Status post 5 cycles of chemotherapy. Monitor treatment response COMPARISON: CT the abdomen pelvis dated 9/4/2024, CT of the chest abdomen pelvis dated 7/25/2024 CT the chest and pelvis dated 6/19/2024 CONTRAST: 90 mL Isovue-370 TECHNIQUE: 4.1 mCi Cu 64 dotatate was administered intravenously to the patient. One hour post intravenous administration, PET imaging was performed from the skull vertex to mid thigh, utilizing attenuation correction with concurrent axial CT and PET/CT image fusion. Separate diagnostic CT of the chest, abdomen, and pelvis was performed. Dose reduction techniques were used. PET/CT FINDINGS: Somatostatin receptor positive lesion in the terminal ileum with  somatostatin receptor positive thoracic/mediastinal, bilateral hilar, retrocrural, and aortocaval lymph nodes, nodularity throughout the right greater than left pleura, lesions scattered throughout the liver parenchyma including a dominant example posterior right hepatic lobe measuring up to 6.4 x 4.8 cm, nodules scattered throughout the pelvic peritoneal cavity, and multiple scattered osseous lesions including examples  in the left greater wing of sphenoid bone, manubrium, spine, ribs, and, posterior left iliac bone, and left proximal femur suspicious for neuroendocrine neoplasm in the terminal ileum with metastases involving lymph nodes above/below the diaphragm, right greater than left pleura, liver, and scattered sites throughout the osseous structures. CT FINDINGS: Mild senescent intracranial changes. The aerodigestive track and neck soft tissues are unremarkable. Right chest port with tip terminating in the superior cavoatrial junction. Small-to-moderate sized right pleural effusion. Mild coronary artery calcium. Pelvic phleboliths. Multilevel degenerative changes of the spine.     IMPRESSION: Findings suspicious for neuroendocrine neoplasm in the terminal ileum with metastases involving lymph nodes above/below the diaphragm, right greater than left pleura, liver, peritoneal cavity, and scattered sites throughout the osseous structures. While comparison is challenging due to differences in imaging evaluation, when compared with prior CT examinations there has been significant progression of disease involving the liver parenchyma.    CT Chest/Abdomen/Pelvis w Contrast    Result Date: 9/19/2024  EXAM: PET DOTATATE EYES TO THIGHS, CT CHEST/ABDOMEN/PELVIS W CONTRAST LOCATION: [facility] DATE: 9/19/2024 INDICATION: Subsequent treatment planning and restaging for other malignant neuroendocrine tumors. Status post 5 cycles of chemotherapy. Monitor treatment response COMPARISON: CT the abdomen  pelvis dated 9/4/2024, CT of the chest abdomen pelvis dated 7/25/2024 CT the chest and pelvis dated 6/19/2024 CONTRAST: 90 mL Isovue-370 TECHNIQUE: 4.1 mCi Cu 64 dotatate was administered intravenously to the patient. One hour post intravenous administration, PET imaging was performed from the skull vertex to mid thigh, utilizing attenuation correction with concurrent axial CT and PET/CT image fusion. Separate diagnostic CT of the chest, abdomen, and pelvis was performed. Dose reduction techniques were used. PET/CT FINDINGS: Somatostatin receptor positive lesion in the terminal ileum with somatostatin receptor positive thoracic/mediastinal, bilateral hilar, retrocrural, and aortocaval lymph nodes, nodularity throughout the right greater than left pleura, lesions scattered throughout the liver parenchyma including a dominant example posterior right hepatic lobe measuring up to 6.4 x 4.8 cm, nodules scattered throughout the pelvic peritoneal cavity, and multiple scattered osseous lesions including examples  in the left greater wing of sphenoid bone, manubrium, spine, ribs, and, posterior left iliac bone, and left proximal femur suspicious for neuroendocrine neoplasm in the terminal ileum with metastases involving lymph nodes above/below the diaphragm, right greater than left pleura, liver, and scattered sites throughout the osseous structures. CT FINDINGS: Mild senescent intracranial changes. The aerodigestive track and neck soft tissues are unremarkable. Right chest port with tip terminating in the superior cavoatrial junction. Small-to-moderate sized right pleural effusion. Mild coronary artery calcium. Pelvic phleboliths. Multilevel degenerative changes of the spine.     IMPRESSION: Findings suspicious for neuroendocrine neoplasm in the terminal ileum with metastases involving lymph nodes above/below the diaphragm, right greater than left pleura, liver, peritoneal cavity, and scattered sites throughout the osseous  structures. While comparison is challenging due to differences in imaging evaluation, when compared with prior CT examinations there has been significant progression of disease involving the liver parenchyma.       Signed by: Man Barreto MD  +

## 2024-10-10 ENCOUNTER — INFUSION THERAPY VISIT (OUTPATIENT)
Dept: INFUSION THERAPY | Facility: HOSPITAL | Age: 59
End: 2024-10-10
Attending: INTERNAL MEDICINE
Payer: COMMERCIAL

## 2024-10-10 VITALS
TEMPERATURE: 98 F | DIASTOLIC BLOOD PRESSURE: 68 MMHG | OXYGEN SATURATION: 96 % | HEART RATE: 100 BPM | SYSTOLIC BLOOD PRESSURE: 129 MMHG | RESPIRATION RATE: 16 BRPM | HEIGHT: 62 IN | BODY MASS INDEX: 31.65 KG/M2 | WEIGHT: 172 LBS

## 2024-10-10 DIAGNOSIS — C7A.8 NEUROENDOCRINE CARCINOMA METASTATIC TO LIVER (H): Primary | ICD-10-CM

## 2024-10-10 DIAGNOSIS — C7B.8 NEUROENDOCRINE CARCINOMA METASTATIC TO LIVER (H): Primary | ICD-10-CM

## 2024-10-10 PROCEDURE — 96372 THER/PROPH/DIAG INJ SC/IM: CPT | Performed by: INTERNAL MEDICINE

## 2024-10-10 PROCEDURE — 250N000011 HC RX IP 250 OP 636: Performed by: INTERNAL MEDICINE

## 2024-10-10 RX ORDER — OCTREOTIDE ACETATE 30 MG
60 KIT INTRAMUSCULAR ONCE
Status: COMPLETED | OUTPATIENT
Start: 2024-10-10 | End: 2024-10-10

## 2024-10-10 RX ADMIN — OCTREOTIDE ACETATE 60 MG: KIT at 14:59

## 2024-10-10 NOTE — PROGRESS NOTES
"Infusion Nursing Note:  Louise Corado presents today for Octreotide 60mg (increase from 30mg; pt aware).    Patient seen by provider today: No    Note: Pt arrives via wheelchair, family member, to [facility] Infusion. Pt reports ongoing nausea, with vomiting 1-3x daily. Bilat lower leg edema, moderate; no redness, heat or pain. Pt reports edema has been present for 3-4 weeks, with worsening this week.  sent secure msg updating on pt status. Pt reports ongoing right sided back/chest pain, due to cancer. Pt reports ongoing dizziness and tingling when standing. Discussed process of today's visit; pt aware of no IV fluids due to IV fluid shortage.    /68 (BP Location: Left arm, Patient Position: Sitting, Cuff Size: Adult Regular)   Pulse 100   Temp 98  F (36.7  C) (Oral)   Resp 16   Ht 1.575 m (5' 2\")   Wt 78 kg (172 lb)   SpO2 96%   BMI 31.46 kg/m      Intravenous Access:  No Intravenous access/labs at this visit.    Treatment Conditions:  No labs drawn.    Post Infusion Assessment:  Patient tolerated IM injections without incident; bilat gluteal.    Discharge Plan:   Patient discharged in stable condition accompanied by: family member.  Departure Mode: Wheelchair.      Antoinette Cristina RN    " Leads disconnected and attached to new generator.

## 2024-10-11 ENCOUNTER — TELEPHONE (OUTPATIENT)
Dept: ONCOLOGY | Facility: HOSPITAL | Age: 59
End: 2024-10-11
Payer: COMMERCIAL

## 2024-10-11 NOTE — TELEPHONE ENCOUNTER
"I called Louise at the request of Carolin to check on her leg edema. She identified herself using name and . We discussed the edema, she said she has had it about 3 wks, just forgot to mention it at her last appt. She went on to say it is better in the morning before getting out of bed. I asked about support hose and she said she didn't think she could use them. I advised since her swelling is better over night that she should be cognizant of not sitting in a chair with her feet on the floor. Being up and active can mobilize that fluid as well. Avoid salt to decrease fluid retention. I asked if her legs were red/firm and she said, \"oh no\". She accepted all the information I gave her and told her elevation is the best way to control that swelling. She accepted the information and I wished her well, she thanked me for the call. KIRT Aponte RN, OCN, CBCN    "

## 2024-10-12 ENCOUNTER — MYC REFILL (OUTPATIENT)
Dept: ONCOLOGY | Facility: HOSPITAL | Age: 59
End: 2024-10-12
Payer: COMMERCIAL

## 2024-10-12 ENCOUNTER — MYC REFILL (OUTPATIENT)
Dept: FAMILY MEDICINE | Facility: CLINIC | Age: 59
End: 2024-10-12
Payer: COMMERCIAL

## 2024-10-12 DIAGNOSIS — R73.09 ELEVATED GLUCOSE: ICD-10-CM

## 2024-10-12 DIAGNOSIS — T45.1X5A CHEMOTHERAPY-INDUCED NAUSEA: ICD-10-CM

## 2024-10-12 DIAGNOSIS — R11.0 CHEMOTHERAPY-INDUCED NAUSEA: ICD-10-CM

## 2024-10-14 ENCOUNTER — MYC REFILL (OUTPATIENT)
Dept: PALLIATIVE CARE | Facility: CLINIC | Age: 59
End: 2024-10-14
Payer: COMMERCIAL

## 2024-10-14 DIAGNOSIS — R07.1 PAINFUL RESPIRATION: ICD-10-CM

## 2024-10-14 RX ORDER — METFORMIN HYDROCHLORIDE 500 MG/1
500 TABLET, EXTENDED RELEASE ORAL
Qty: 90 TABLET | Refills: 0 | Status: SHIPPED | OUTPATIENT
Start: 2024-10-14

## 2024-10-14 RX ORDER — OXYCODONE HYDROCHLORIDE 5 MG/1
15-20 TABLET ORAL EVERY 4 HOURS PRN
Qty: 90 TABLET | Refills: 0 | Status: SHIPPED | OUTPATIENT
Start: 2024-10-14 | End: 2024-10-16

## 2024-10-14 NOTE — TELEPHONE ENCOUNTER
Received [employer] message from patient requesting refill of oxycodone.     Last refill: 10/4/24  Last office visit: 9/10/24  Scheduled for follow up 11/19/24     Will route request to NP for review.     Reviewed [place]  [place].

## 2024-10-15 RX ORDER — PROMETHAZINE HYDROCHLORIDE 12.5 MG/1
12.5-25 TABLET ORAL EVERY 6 HOURS PRN
Qty: 30 TABLET | Refills: 1 | Status: SHIPPED | OUTPATIENT
Start: 2024-10-15

## 2024-10-16 DIAGNOSIS — R11.2 NAUSEA AND VOMITING, UNSPECIFIED VOMITING TYPE: ICD-10-CM

## 2024-10-16 RX ORDER — OXYCODONE HYDROCHLORIDE 5 MG/1
15-20 TABLET ORAL EVERY 4 HOURS PRN
Qty: 90 TABLET | Refills: 0 | Status: SHIPPED | OUTPATIENT
Start: 2024-10-16 | End: 2024-10-29

## 2024-10-16 RX ORDER — OLANZAPINE 2.5 MG/1
2.5 TABLET, FILM COATED ORAL AT BEDTIME
Qty: 30 TABLET | Refills: 0 | Status: SHIPPED | OUTPATIENT
Start: 2024-10-16 | End: 2024-11-07

## 2024-10-16 NOTE — TELEPHONE ENCOUNTER
Patient sent a Mychart requesting a refill of Olanzapine. This medication was started when she was in the hospital last. Filled: 9/6/24, #30, 0 refills.  Will route this refill to Brenda Cabrera CNP to review in Dr. Estevan bishop.     Argelia Simon RN on 10/16/2024 at 11:28 AM

## 2024-10-17 ENCOUNTER — INFUSION THERAPY VISIT (OUTPATIENT)
Dept: INFUSION THERAPY | Facility: HOSPITAL | Age: 59
End: 2024-10-17
Attending: INTERNAL MEDICINE
Payer: COMMERCIAL

## 2024-10-17 VITALS
SYSTOLIC BLOOD PRESSURE: 117 MMHG | HEART RATE: 96 BPM | DIASTOLIC BLOOD PRESSURE: 57 MMHG | TEMPERATURE: 98.2 F | RESPIRATION RATE: 16 BRPM | OXYGEN SATURATION: 94 %

## 2024-10-17 DIAGNOSIS — C7B.8 NEUROENDOCRINE CARCINOMA METASTATIC TO LIVER (H): Primary | ICD-10-CM

## 2024-10-17 DIAGNOSIS — C7A.8 NEUROENDOCRINE CARCINOMA METASTATIC TO LIVER (H): Primary | ICD-10-CM

## 2024-10-17 PROCEDURE — 258N000003 HC RX IP 258 OP 636: Performed by: INTERNAL MEDICINE

## 2024-10-17 PROCEDURE — 96360 HYDRATION IV INFUSION INIT: CPT

## 2024-10-17 PROCEDURE — 250N000011 HC RX IP 250 OP 636: Performed by: INTERNAL MEDICINE

## 2024-10-17 RX ORDER — HEPARIN SODIUM (PORCINE) LOCK FLUSH IV SOLN 100 UNIT/ML 100 UNIT/ML
5 SOLUTION INTRAVENOUS
Status: DISCONTINUED | OUTPATIENT
Start: 2024-10-17 | End: 2024-10-17 | Stop reason: HOSPADM

## 2024-10-17 RX ORDER — HEPARIN SODIUM (PORCINE) LOCK FLUSH IV SOLN 100 UNIT/ML 100 UNIT/ML
5 SOLUTION INTRAVENOUS
Status: CANCELLED | OUTPATIENT
Start: 2024-10-17

## 2024-10-17 RX ADMIN — SODIUM CHLORIDE 1000 ML: 9 INJECTION, SOLUTION INTRAVENOUS at 11:46

## 2024-10-17 RX ADMIN — HEPARIN 5 ML: 100 SYRINGE at 12:52

## 2024-10-17 NOTE — PROGRESS NOTES
Infusion Nursing Note:  Louise Corado presents today for IV fluids.    Patient seen by provider today: No   present during visit today: Not Applicable.    Note: Louise arrives by wheelchair to [facility] for IV fluids. She continues to have nausea but is doing quite a bit better since starting the Scopolamine patch. She is eating and drinking better.      Intravenous Access:  Implanted Port.    Treatment Conditions:  Not Applicable.      Post Infusion Assessment:  Patient tolerated infusion without incident.  Blood return noted pre and post infusion.  Site patent and intact, free from redness, edema or discomfort.  No evidence of extravasations.  Access discontinued per protocol.       Discharge Plan:   AVS to patient via MYCHART.  Patient will return 10/24 for next appointment.   Patient discharged in stable condition accompanied by: friend.  Departure Mode: Wheelchair.      Meenu Weiss RN

## 2024-10-24 ENCOUNTER — INFUSION THERAPY VISIT (OUTPATIENT)
Dept: INFUSION THERAPY | Facility: HOSPITAL | Age: 59
End: 2024-10-24
Attending: INTERNAL MEDICINE
Payer: COMMERCIAL

## 2024-10-24 ENCOUNTER — MYC REFILL (OUTPATIENT)
Dept: ONCOLOGY | Facility: HOSPITAL | Age: 59
End: 2024-10-24

## 2024-10-24 ENCOUNTER — MYC REFILL (OUTPATIENT)
Dept: PALLIATIVE CARE | Facility: CLINIC | Age: 59
End: 2024-10-24

## 2024-10-24 VITALS
DIASTOLIC BLOOD PRESSURE: 81 MMHG | SYSTOLIC BLOOD PRESSURE: 119 MMHG | HEART RATE: 92 BPM | TEMPERATURE: 98 F | OXYGEN SATURATION: 97 % | RESPIRATION RATE: 16 BRPM

## 2024-10-24 DIAGNOSIS — C7B.8 NEUROENDOCRINE CARCINOMA METASTATIC TO LIVER (H): Primary | ICD-10-CM

## 2024-10-24 DIAGNOSIS — C7B.8 NEUROENDOCRINE CARCINOMA METASTATIC TO LIVER (H): ICD-10-CM

## 2024-10-24 DIAGNOSIS — R91.8 LUNG MASS: ICD-10-CM

## 2024-10-24 DIAGNOSIS — C7A.8 NEUROENDOCRINE CARCINOMA METASTATIC TO LIVER (H): ICD-10-CM

## 2024-10-24 DIAGNOSIS — C7A.8 NEUROENDOCRINE CARCINOMA METASTATIC TO LIVER (H): Primary | ICD-10-CM

## 2024-10-24 PROCEDURE — 250N000011 HC RX IP 250 OP 636: Performed by: INTERNAL MEDICINE

## 2024-10-24 PROCEDURE — 258N000003 HC RX IP 258 OP 636: Performed by: INTERNAL MEDICINE

## 2024-10-24 PROCEDURE — 96360 HYDRATION IV INFUSION INIT: CPT

## 2024-10-24 RX ORDER — LIDOCAINE/PRILOCAINE 2.5 %-2.5%
CREAM (GRAM) TOPICAL PRN
Qty: 30 G | Refills: 1 | Status: SHIPPED | OUTPATIENT
Start: 2024-10-24

## 2024-10-24 RX ORDER — HEPARIN SODIUM (PORCINE) LOCK FLUSH IV SOLN 100 UNIT/ML 100 UNIT/ML
5 SOLUTION INTRAVENOUS
Status: CANCELLED | OUTPATIENT
Start: 2024-10-24

## 2024-10-24 RX ORDER — HEPARIN SODIUM (PORCINE) LOCK FLUSH IV SOLN 100 UNIT/ML 100 UNIT/ML
5 SOLUTION INTRAVENOUS
Status: DISCONTINUED | OUTPATIENT
Start: 2024-10-24 | End: 2024-10-24 | Stop reason: HOSPADM

## 2024-10-24 RX ORDER — LORAZEPAM 0.5 MG/1
.5-1 TABLET ORAL EVERY 6 HOURS PRN
Qty: 30 TABLET | Refills: 1 | Status: SHIPPED | OUTPATIENT
Start: 2024-10-24

## 2024-10-24 RX ORDER — MORPHINE SULFATE 30 MG/1
30 TABLET, FILM COATED, EXTENDED RELEASE ORAL EVERY 12 HOURS
Qty: 60 TABLET | Refills: 0 | Status: SHIPPED | OUTPATIENT
Start: 2024-10-24

## 2024-10-24 RX ADMIN — HEPARIN 5 ML: 100 SYRINGE at 12:25

## 2024-10-24 RX ADMIN — SODIUM CHLORIDE 1000 ML: 9 INJECTION, SOLUTION INTRAVENOUS at 11:21

## 2024-10-24 NOTE — PROGRESS NOTES
Infusion Nursing Note:  Louise Corado presents today for IVF.    Patient seen by provider today: No   present during visit today: Not Applicable.    Note: Patient reports the scopolamine patches have been controlling her nausea well, no longer taking po antiemetics. Pt's appetite improving as well. Pt received 1L NS.      Intravenous Access:  Implanted Port with excellent return. Pt forgot EMLA cream today, declined the offer of applying an ice pack prior to accessing.    Treatment Conditions:  Not Applicable.      Post Infusion Assessment:  Patient tolerated infusion without incident.  Blood return noted pre and post infusion.  Site patent and intact, free from redness, edema or discomfort.  Access discontinued per protocol.       Discharge Plan:   Patient will return 10/31 for next appointment.   Patient discharged in stable condition accompanied by: sister.  Departure Mode: Wheelchair.      Dianna Mensah RN

## 2024-10-24 NOTE — TELEPHONE ENCOUNTER
Received [employer] message from patient requesting refill of MS contin.     Last refill: 9/12/24  Last office visit: 9/10/24  Scheduled for follow up 11/19/24     Will route request to MD/ for review.     Reviewed [place]  [place].

## 2024-10-29 ENCOUNTER — MYC REFILL (OUTPATIENT)
Dept: PALLIATIVE CARE | Facility: CLINIC | Age: 59
End: 2024-10-29
Payer: COMMERCIAL

## 2024-10-29 DIAGNOSIS — R07.1 PAINFUL RESPIRATION: ICD-10-CM

## 2024-10-29 RX ORDER — OXYCODONE HYDROCHLORIDE 5 MG/1
15-20 TABLET ORAL EVERY 4 HOURS PRN
Qty: 90 TABLET | Refills: 0 | Status: SHIPPED | OUTPATIENT
Start: 2024-10-29 | End: 2024-11-07

## 2024-10-29 NOTE — TELEPHONE ENCOUNTER
Received [employer] message from patient requesting refill of oxycodone.     Last refill: 10/16/24  Last office visit: 9/10/24  Scheduled for follow up 11/19/24      Will route request to MD/ for review.      Reviewed [place]  Report.

## 2024-10-30 ENCOUNTER — HOSPITAL ENCOUNTER (OUTPATIENT)
Dept: MRI IMAGING | Facility: CLINIC | Age: 59
Discharge: HOME | End: 2024-10-30
Attending: INTERNAL MEDICINE | Admitting: INTERNAL MEDICINE
Payer: COMMERCIAL

## 2024-10-30 DIAGNOSIS — C7B.8 NEUROENDOCRINE CARCINOMA METASTATIC TO LIVER (H): ICD-10-CM

## 2024-10-30 DIAGNOSIS — C7A.8 NEUROENDOCRINE CARCINOMA METASTATIC TO LIVER (H): ICD-10-CM

## 2024-10-30 PROCEDURE — 70553 MRI BRAIN STEM W/O & W/DYE: CPT

## 2024-10-30 PROCEDURE — A9585 GADOBUTROL INJECTION: HCPCS | Performed by: INTERNAL MEDICINE

## 2024-10-30 PROCEDURE — 255N000002 HC RX 255 OP 636: Performed by: INTERNAL MEDICINE

## 2024-10-30 RX ORDER — GADOBUTROL 604.72 MG/ML
8 INJECTION INTRAVENOUS ONCE
Status: COMPLETED | OUTPATIENT
Start: 2024-10-30 | End: 2024-10-30

## 2024-10-30 RX ADMIN — GADOBUTROL 8 ML: 604.72 INJECTION INTRAVENOUS at 13:35

## 2024-11-01 ENCOUNTER — PATIENT OUTREACH (OUTPATIENT)
Dept: ONCOLOGY | Facility: HOSPITAL | Age: 59
End: 2024-11-01
Payer: COMMERCIAL

## 2024-11-01 DIAGNOSIS — C7B.8 NEUROENDOCRINE CARCINOMA METASTATIC TO LIVER (H): Primary | ICD-10-CM

## 2024-11-01 DIAGNOSIS — C7A.8 NEUROENDOCRINE CARCINOMA METASTATIC TO LIVER (H): Primary | ICD-10-CM

## 2024-11-01 RX ORDER — FUROSEMIDE 20 MG/1
20 TABLET ORAL DAILY
Qty: 60 TABLET | Refills: 1 | Status: SHIPPED | OUTPATIENT
Start: 2024-11-01

## 2024-11-01 NOTE — PROGRESS NOTES
[facility]: Cancer Care                                                                                          Called patient to let her know that per Dr. Barreto, patient should start taking a protein supplement/powder daily and to start taking lasix 20mg daily. WE will recheck CMP labs on 11/11 during her follow up visit with Dr. Barreto. Also told the patient that per Dr. Barreto, he presented her case at the tumor conference and the consensus was that both biopsies had the same diagnosis and the plan should be to continue the octreotide and monitor with imaging. Patient verbalized understanding.     Signature:  Carolin Biswas RN

## 2024-11-06 RX ORDER — OCTREOTIDE ACETATE 30 MG
60 KIT INTRAMUSCULAR ONCE
OUTPATIENT
Start: 2024-12-05 | End: 2024-12-05

## 2024-11-06 RX ORDER — OCTREOTIDE ACETATE 30 MG
60 KIT INTRAMUSCULAR ONCE
OUTPATIENT
Start: 2024-12-31 | End: 2025-01-02

## 2024-11-06 RX ORDER — OCTREOTIDE ACETATE 30 MG
60 KIT INTRAMUSCULAR ONCE
Status: CANCELLED | OUTPATIENT
Start: 2024-11-07 | End: 2024-11-07

## 2024-11-07 ENCOUNTER — INFUSION THERAPY VISIT (OUTPATIENT)
Dept: INFUSION THERAPY | Facility: HOSPITAL | Age: 59
End: 2024-11-07
Attending: INTERNAL MEDICINE
Payer: COMMERCIAL

## 2024-11-07 ENCOUNTER — MYC REFILL (OUTPATIENT)
Dept: PALLIATIVE CARE | Facility: CLINIC | Age: 59
End: 2024-11-07

## 2024-11-07 ENCOUNTER — MYC REFILL (OUTPATIENT)
Dept: ONCOLOGY | Facility: HOSPITAL | Age: 59
End: 2024-11-07

## 2024-11-07 VITALS
HEART RATE: 90 BPM | TEMPERATURE: 98.1 F | RESPIRATION RATE: 18 BRPM | SYSTOLIC BLOOD PRESSURE: 105 MMHG | DIASTOLIC BLOOD PRESSURE: 57 MMHG | OXYGEN SATURATION: 98 %

## 2024-11-07 DIAGNOSIS — C7B.8 NEUROENDOCRINE CARCINOMA METASTATIC TO LIVER (H): Primary | ICD-10-CM

## 2024-11-07 DIAGNOSIS — R07.1 PAINFUL RESPIRATION: ICD-10-CM

## 2024-11-07 DIAGNOSIS — R11.2 NAUSEA AND VOMITING, UNSPECIFIED VOMITING TYPE: ICD-10-CM

## 2024-11-07 DIAGNOSIS — R11.0 NAUSEA: ICD-10-CM

## 2024-11-07 DIAGNOSIS — C7A.8 NEUROENDOCRINE CARCINOMA METASTATIC TO LIVER (H): Primary | ICD-10-CM

## 2024-11-07 PROCEDURE — 250N000011 HC RX IP 250 OP 636: Performed by: INTERNAL MEDICINE

## 2024-11-07 PROCEDURE — 96372 THER/PROPH/DIAG INJ SC/IM: CPT | Performed by: INTERNAL MEDICINE

## 2024-11-07 RX ORDER — HEPARIN SODIUM (PORCINE) LOCK FLUSH IV SOLN 100 UNIT/ML 100 UNIT/ML
5 SOLUTION INTRAVENOUS
Status: CANCELLED | OUTPATIENT
Start: 2024-11-07

## 2024-11-07 RX ORDER — HEPARIN SODIUM (PORCINE) LOCK FLUSH IV SOLN 100 UNIT/ML 100 UNIT/ML
5 SOLUTION INTRAVENOUS
Status: DISCONTINUED | OUTPATIENT
Start: 2024-11-07 | End: 2024-11-07 | Stop reason: HOSPADM

## 2024-11-07 RX ORDER — OCTREOTIDE ACETATE 30 MG
60 KIT INTRAMUSCULAR ONCE
Status: COMPLETED | OUTPATIENT
Start: 2024-11-07 | End: 2024-11-07

## 2024-11-07 RX ADMIN — OCTREOTIDE ACETATE 60 MG: KIT at 11:47

## 2024-11-07 NOTE — PROGRESS NOTES
Infusion Nursing Note:  Louise CONWAY Katiesavana presents today for octreotide.    Patient seen by provider today: No   present during visit today: Not Applicable.    Note: pt given 1 injection in each gluteal. Pt is drinking well she states and nausea usually starts on day 3 of her scoplamine patch. Pt was also started on lasix 20mg daily. Pt declines iv fluids today. Edema in lower extremties..      Intravenous Access:  Implanted Port.    Treatment Conditions:  Not Applicable.      Post Infusion Assessment:  Patient tolerated injection without incident.       Discharge Plan:   Patient and/or family verbalized understanding of discharge instructions and all questions answered.      Kristen Keane RN

## 2024-11-08 RX ORDER — OXYCODONE HYDROCHLORIDE 5 MG/1
15-20 TABLET ORAL EVERY 4 HOURS PRN
Qty: 90 TABLET | Refills: 0 | Status: SHIPPED | OUTPATIENT
Start: 2024-11-08

## 2024-11-08 RX ORDER — SCOLOPAMINE TRANSDERMAL SYSTEM 1 MG/1
1 PATCH, EXTENDED RELEASE TRANSDERMAL
Qty: 10 PATCH | Refills: 0 | Status: SHIPPED | OUTPATIENT
Start: 2024-11-08

## 2024-11-08 RX ORDER — OLANZAPINE 2.5 MG/1
2.5 TABLET, FILM COATED ORAL AT BEDTIME
Qty: 30 TABLET | Refills: 0 | Status: SHIPPED | OUTPATIENT
Start: 2024-11-08

## 2024-11-08 NOTE — TELEPHONE ENCOUNTER
Received [employer] message from patient requesting refill of oxycodone.     Last refill: 10/29/24  Last office visit: 9/10/24  Scheduled for follow up 11/19/24     Will route request to MD for review.     Reviewed [place].

## 2024-11-11 ENCOUNTER — LAB (OUTPATIENT)
Dept: INFUSION THERAPY | Facility: HOSPITAL | Age: 59
End: 2024-11-11
Attending: INTERNAL MEDICINE
Payer: COMMERCIAL

## 2024-11-11 ENCOUNTER — ONCOLOGY VISIT (OUTPATIENT)
Dept: ONCOLOGY | Facility: HOSPITAL | Age: 59
End: 2024-11-11
Attending: INTERNAL MEDICINE
Payer: COMMERCIAL

## 2024-11-11 VITALS
BODY MASS INDEX: 32.57 KG/M2 | HEART RATE: 91 BPM | RESPIRATION RATE: 16 BRPM | DIASTOLIC BLOOD PRESSURE: 75 MMHG | HEIGHT: 62 IN | WEIGHT: 177 LBS | TEMPERATURE: 98.7 F | OXYGEN SATURATION: 98 % | SYSTOLIC BLOOD PRESSURE: 113 MMHG

## 2024-11-11 DIAGNOSIS — C7A.8 NEUROENDOCRINE CARCINOMA METASTATIC TO LIVER (H): Primary | ICD-10-CM

## 2024-11-11 DIAGNOSIS — C7B.8 NEUROENDOCRINE CARCINOMA METASTATIC TO LIVER (H): Primary | ICD-10-CM

## 2024-11-11 DIAGNOSIS — F06.4 ANXIETY DISORDER DUE TO GENERAL MEDICAL CONDITION: ICD-10-CM

## 2024-11-11 DIAGNOSIS — R11.2 CHEMOTHERAPY INDUCED NAUSEA AND VOMITING: ICD-10-CM

## 2024-11-11 DIAGNOSIS — T45.1X5A CHEMOTHERAPY INDUCED NAUSEA AND VOMITING: ICD-10-CM

## 2024-11-11 DIAGNOSIS — R60.0 BILATERAL LOWER EXTREMITY EDEMA: ICD-10-CM

## 2024-11-11 DIAGNOSIS — F51.01 PRIMARY INSOMNIA: ICD-10-CM

## 2024-11-11 LAB
ALBUMIN SERPL BCG-MCNC: 2.4 G/DL (ref 3.5–5.2)
ALP SERPL-CCNC: 180 U/L (ref 40–150)
ALT SERPL W P-5'-P-CCNC: 36 U/L (ref 0–50)
ANION GAP SERPL CALCULATED.3IONS-SCNC: 12 MMOL/L (ref 7–15)
AST SERPL W P-5'-P-CCNC: 53 U/L (ref 0–45)
BILIRUB SERPL-MCNC: 0.4 MG/DL
BUN SERPL-MCNC: 8.5 MG/DL (ref 6–20)
CALCIUM SERPL-MCNC: 7.5 MG/DL (ref 8.8–10.4)
CHLORIDE SERPL-SCNC: 102 MMOL/L (ref 98–107)
CREAT SERPL-MCNC: 0.63 MG/DL (ref 0.51–0.95)
EGFRCR SERPLBLD CKD-EPI 2021: >90 ML/MIN/1.73M2
ERYTHROCYTE [DISTWIDTH] IN BLOOD BY AUTOMATED COUNT: 15.9 % (ref 10–15)
GLUCOSE SERPL-MCNC: 216 MG/DL (ref 70–99)
HCO3 SERPL-SCNC: 25 MMOL/L (ref 22–29)
HCT VFR BLD AUTO: 33.7 % (ref 35–47)
HGB BLD-MCNC: 11.2 G/DL (ref 11.7–15.7)
MCH RBC QN AUTO: 31.5 PG (ref 26.5–33)
MCHC RBC AUTO-ENTMCNC: 33.2 G/DL (ref 31.5–36.5)
MCV RBC AUTO: 95 FL (ref 78–100)
NT-PROBNP SERPL-MCNC: 247 PG/ML (ref 0–900)
PLATELET # BLD AUTO: 246 10E3/UL (ref 150–450)
POTASSIUM SERPL-SCNC: 3.8 MMOL/L (ref 3.4–5.3)
PROT SERPL-MCNC: 5.4 G/DL (ref 6.4–8.3)
RBC # BLD AUTO: 3.55 10E6/UL (ref 3.8–5.2)
SODIUM SERPL-SCNC: 139 MMOL/L (ref 135–145)
WBC # BLD AUTO: 5.5 10E3/UL (ref 4–11)

## 2024-11-11 PROCEDURE — 99212 OFFICE O/P EST SF 10 MIN: CPT | Performed by: INTERNAL MEDICINE

## 2024-11-11 PROCEDURE — 99215 OFFICE O/P EST HI 40 MIN: CPT | Performed by: INTERNAL MEDICINE

## 2024-11-11 PROCEDURE — 85014 HEMATOCRIT: CPT

## 2024-11-11 PROCEDURE — G2211 COMPLEX E/M VISIT ADD ON: HCPCS | Performed by: INTERNAL MEDICINE

## 2024-11-11 PROCEDURE — 83880 ASSAY OF NATRIURETIC PEPTIDE: CPT

## 2024-11-11 PROCEDURE — 36591 DRAW BLOOD OFF VENOUS DEVICE: CPT

## 2024-11-11 PROCEDURE — 250N000011 HC RX IP 250 OP 636: Performed by: INTERNAL MEDICINE

## 2024-11-11 PROCEDURE — 84155 ASSAY OF PROTEIN SERUM: CPT

## 2024-11-11 PROCEDURE — 82947 ASSAY GLUCOSE BLOOD QUANT: CPT

## 2024-11-11 RX ORDER — HEPARIN SODIUM (PORCINE) LOCK FLUSH IV SOLN 100 UNIT/ML 100 UNIT/ML
5 SOLUTION INTRAVENOUS
OUTPATIENT
Start: 2024-11-11

## 2024-11-11 RX ORDER — HEPARIN SODIUM (PORCINE) LOCK FLUSH IV SOLN 100 UNIT/ML 100 UNIT/ML
5 SOLUTION INTRAVENOUS
Status: DISCONTINUED | OUTPATIENT
Start: 2024-11-11 | End: 2024-11-11 | Stop reason: HOSPADM

## 2024-11-11 RX ADMIN — HEPARIN 5 ML: 100 SYRINGE at 15:10

## 2024-11-11 ASSESSMENT — PAIN SCALES - GENERAL: PAINLEVEL_OUTOF10: MODERATE PAIN (4)

## 2024-11-11 NOTE — PROGRESS NOTES
"Oncology Rooming Note    November 11, 2024 3:24 PM   Louise Corado is a 59 year old female who presents for:    Chief Complaint   Patient presents with    Oncology Clinic Visit     Neuroendocrine carcinoma metastatic to liver     Initial Vitals: /75 (BP Location: Right arm, Patient Position: Sitting, Cuff Size: Adult Regular)   Pulse 91   Temp 98.7  F (37.1  C) (Tympanic)   Resp 16   Ht 1.575 m (5' 2\")   Wt 80.3 kg (177 lb)   SpO2 98%   BMI 32.37 kg/m   Estimated body mass index is 32.37 kg/m  as calculated from the following:    Height as of this encounter: 1.575 m (5' 2\").    Weight as of this encounter: 80.3 kg (177 lb). Body surface area is 1.87 meters squared.  Moderate Pain (4) Comment: with some pain meds   No LMP recorded. Patient is postmenopausal.  Allergies reviewed: Yes  Medications reviewed: Yes    Medications: Medication refills not needed today.  Pharmacy name entered into [facility]:    [facility] [id number] [place], [place] - [address] AT [facility] [place] [id number] [id number] [place], [place] - [address] AT [facility] [address]    Frailty Screening:   Is the patient here for a new oncology consult visit in cancer care? 2. No      Clinical concerns:  1 month labs follow up      Hoa Denny              "

## 2024-11-11 NOTE — LETTER
11/11/2024      Louise Corado  [address] [place] [address]      Dear Colleague,    Thank you for referring your patient, Louise Corado, to the [facility] [place]. Please see a copy of my visit note below.    [facility] Hematology and Oncology Progress Note    Patient: Louise Corado  MRN: [id number]  Date of Service: Nov 11, 2024        Assessment and Plan:    1.  Metastatic neuroendocrine carcinoma:  I again reviewed her most recent CT imaging.  I also reviewed her most recent biopsy results from the liver.  Close showed a neuroendocrine tumor, well-differentiated, grade 2.  As such, we will continue to treat her with octreotide 60 mg monthly.  Her case was recently discussed at tumor conference and this was the recommendation.  I would like to get CT imaging after 2 more cycles.  Upon progression we will refer her for Lutathera.  Will use CT imaging alone to assess response.  This was reviewed with Louise and her .  Questions were answered.    2.  Nausea: This is improved significantly with this [place] patch which she will continue.  She takes the other antiemetics rarely.  She is following with palliative care.  Next appointment on November 19.    3.  Pleural effusion: Mostly resolved now.  Follow clinically and radiographically.    4.  Anxiety: She is having daily anxiety.  She is taking her Ativan once or twice a day routinely.  We are going to start a daily anxiety medication for her.  She can continue to take the lorazepam as needed for situational anxiety.      5.  Pain:  She is taking MS Contin 30 every 12 and oxycodone 10 mg as needed.      6. Edema: Still has edema.  We are going to change her Lasix dose to 20 mg twice a day from once a day.    CMP from today was reviewed and shows a sodium of 139 and potassium 3.8.    7.  Insomnia: She is on a low-dose of Zyprexa.  Will increase this to 5 mg at bedtime.    Medical decision Making:  I spent 43 minutes in  the care of this patient today, which included time necessary for preparation for the visit, face to face time with the patient, communication of recommendations to the care team, and documentation time.  Today's visit was centered around a cancer diagnosis in the setting of additional medical comorbidities. Complex medical decision making was required. The risk of additional morbidity without further treatment is high.   High-risk medications were managed: Treatment induced nausea.    ECOG Performance  1    Diagnosis:    1.  Metastatic neuroendocrine carcinoma, poorly differentiated:  Diagnosed April 2024 from a liver biopsy.  Her PET/CT shows malignant involvement of the liver, mediastinal nodes, left upper quadrant omentum, manubrium, and bilateral pleura.  There is some wall thickening and FDG activity in a short segment of the terminal ileum. Liver biopsy shows WHO, NET G2.     EGD 4/25/24: Occasional gastritis.  No mass.  Colonoscopy 4/25/24: Mass of terminal ileum.    NGS:  PDL1=0%, MDI-stable, TMB low,   CancerTYPE ID:  GI carcinoid 96%    Treatment:    Carboplatin and etoposide initiated May 13, 2024.  25% etopside dose reduction starting with cycle 5 secondary to nausea.  Cycle 5 was completed on August 7, 2024.    Interim History:    Louise returns today for a follow-up visit.  The scopolamine restarted at her last visit it helped her nausea significantly.  Lower extremity edema has improved some but still is persistent.  No worsening pain.    Review of Systems:    As above in the history.     Review of Systems otherwise Negative for:  General: chills, fever or night sweats  Psychological: depression  Ophthalmic: blurry vision, double vision or loss of vision, vision change  ENT: epistaxis, oral lesions, hearing changes  Hematological and Lymphatic: bleeding, bruising, jaundice, swollen lymph nodes  Endocrine: hot flashes, unexpected weight changes  Respiratory: cough, hemoptysis,  "orthopnea  Cardiovascular: palpitations or PND  Gastrointestinal: blood in stools, change in bowel habits, constipation  Genito-Urinary: change in urinary stream, incontinence, frequency/urgency  Musculoskeletal: joint swelling, muscle pain  Neurological: dizziness, headaches, numbness/tingling  Dermatological: lumps and rash    Physical Exam:    /75 (BP Location: Right arm, Patient Position: Sitting, Cuff Size: Adult Regular)   Pulse 91   Temp 98.7  F (37.1  C) (Tympanic)   Resp 16   Ht 1.575 m (5' 2\")   Wt 80.3 kg (177 lb)   SpO2 98%   BMI 32.37 kg/m      General: patient appears stated age of 59 year old. Nontoxic and in no distress.   HEENT: Head: atraumatic, normocephalic. Sclerae anicteric.  Chest:  Normal respiratory effort  Cardiac: +2 bipedal edema to the mid calf  Abdomen: abdomen is non-distended  Extremities: normal tone and muscle bulk.  Skin: no lesions or rash on visible skin. Warm and dry.   CNS: alert and oriented. Grossly non-focal.   Psychiatric: normal mood and affect.     Lab Results:    Recent Results (from the past week)   CBC with platelets   Result Value Ref Range    WBC Count 5.5 4.0 - 11.0 10e3/uL    RBC Count 3.55 (L) 3.80 - 5.20 10e6/uL    Hemoglobin 11.2 (L) 11.7 - 15.7 g/dL    Hematocrit 33.7 (L) 35.0 - 47.0 %    MCV 95 78 - 100 fL    MCH 31.5 26.5 - 33.0 pg    MCHC 33.2 31.5 - 36.5 g/dL    RDW 15.9 (H) 10.0 - 15.0 %    Platelet Count 246 150 - 450 10e3/uL   Comprehensive metabolic panel   Result Value Ref Range    Sodium 139 135 - 145 mmol/L    Potassium 3.8 3.4 - 5.3 mmol/L    Carbon Dioxide (CO2) 25 22 - 29 mmol/L    Anion Gap 12 7 - 15 mmol/L    Urea Nitrogen 8.5 6.0 - 20.0 mg/dL    Creatinine 0.63 0.51 - 0.95 mg/dL    GFR Estimate >90 >60 mL/min/1.73m2    Calcium 7.5 (L) 8.8 - 10.4 mg/dL    Chloride 102 98 - 107 mmol/L    Glucose 216 (H) 70 - 99 mg/dL    Alkaline Phosphatase 180 (H) 40 - 150 U/L    AST 53 (H) 0 - 45 U/L    ALT 36 0 - 50 U/L    Protein Total 5.4 (L) " 6.4 - 8.3 g/dL    Albumin 2.4 (L) 3.5 - 5.2 g/dL    Bilirubin Total 0.4 <=1.2 mg/dL   BNP-N terminal pro   Result Value Ref Range    N Terminal Pro BNP Outpatient 247 0 - 900 pg/mL     Imaging:    MR Brain w/o & w Contrast    Result Date: 10/31/2024  EXAM: MR BRAIN W/O and W CONTRAST LOCATION: [facility] DATE: 10/30/2024 INDICATION: nausea, dizziness, neuroendocrine tumor. COMPARISON: PET evaluation 9/19/2024. CONTRAST: 8 mL Gadavist TECHNIQUE: Routine multiplanar multisequence head MRI without and with intravenous contrast. FINDINGS: INTRACRANIAL CONTENTS: History of primary neuroendocrine tumor. Prior PET examination reviewed 9/19/2024. No evidence for acute/subacute infarction. Nothing for restricted diffusion abnormality is present intracranially. No mass, mass effect, midline shift, chronic or acute hemorrhage, or extra-axial fluid collections. Normal brain parenchymal signal for age. Mild generalized cerebral atrophy. No hydrocephalus. Normal position of the cerebellar tonsils. Postcontrast imaging shows pathologic enhancement of the diploic space of the left greater wing of sphenoid and possibly parasagittal right parietal diploic space, as detailed in osseous structures below, compatible with osseous calvarial metastasis. Pathologic nonnodular dural enhancement left frontal opercular level adjacent to the left greater wing of the sphenoid, may represent dural reactivity versus metastatic dural invasion of adjacent osseous metastasis. No associated adjacent left frontal parenchymal edema or enhancement. No evidence for abnormal intraparenchymal or leptomeningeal enhancement. Meckel's cave/cavernous sinus and dural venous sinus enhancement is normal. No pathologic enhancement at the skull base/cranial nerve level or suprahyoid neck soft tissues. SELLA: No sella/supersellar mass or hemorrhage. No pathologic enhancement or evidence for sella metastasis. OSSEOUS STRUCTURES/SOFT TISSUES:  Abnormal pathologic enhancing hypertense T2 signal associated with the left greater wing of the sphenoid and left frontal, with some additional abnormal enhancement and prominence involving the adjacent scalp soft tissues  and temporalis musculature anteriorly. Underlying dural thickening/enhancement may represent dural invasion versus dural reactivity. This osseous abnormality corresponds to avid hypermetabolic activity on prior PET evaluation 9/19/2024 and is compatible  with osseous metastases with dural invasion and/or reactivity. I do not see adjacent parenchymal edema of the left frontal opercular region.. Additional foci of indeterminate enhancement diploic space parasagittal right parietal bone series 1300 images 46 and 41 may represent additional sites of calvarial metastatic involvement. The major intracranial vascular flow voids are maintained. ORBITS: No pathologic orbital enhancement. SINUSES/MASTOIDS: No paranasal sinus mucosal disease. Scattered fluid/membrane thickening in the left mastoid air cells. No apparent mass in the posterior nasopharynx or skull base. Nasopharynx is patent.     IMPRESSION: 1.  Pathologic enhancing hyperintense T2 marrow signal changes of the diploic space of the greater wing of sphenoid with overlying asymmetric soft tissue enhancement involving left scalp and temporalis musculature. Deep to this osseous abnormality, there  is asymmetric increased dural/pachymeningeal enhancement. This is best seen series 1300 image 107 and series 13 image 68. This corresponds to the hypermetabolic uptake abnormality on 9/19/2024 PET evaluation and is compatible with calvarial/osseous metastasis. Patient has history of malignant neuroendocrine tumor. The dural enhancement may represent dural reactivity versus dural invasion, however there is no adjacent parenchymal edema at the left frontal opercular level to suggest intraparenchymal invasion or extent. 2.  Additional small foci diploic  "space enhancement abnormality at the parasagittal right parietal bone which are indeterminate series 1300 images 39 and 46, but may represent additional sites of calvarial metastasis. The hypermetabolic activity in this region is low-intermediate relative to the left sphenoid osseous uptake. This is seen on the prior PET evaluation image 196 axial data set. No additional pathologic marrow signal or enhancement is otherwise noted. 3.  No abnormal intraparenchymal or leptomeningeal enhancement. No evidence for additional CNS metastatic disease. 4.  No intracranial mass, mass effect or midline shift. No acute or chronic hemorrhage. No recent infarction.       Signed by: Man Barreto MD    Oncology Rooming Note    November 11, 2024 3:24 PM   Louise Corado is a 59 year old female who presents for:    Chief Complaint   Patient presents with     Oncology Clinic Visit     Neuroendocrine carcinoma metastatic to liver     Initial Vitals: /75 (BP Location: Right arm, Patient Position: Sitting, Cuff Size: Adult Regular)   Pulse 91   Temp 98.7  F (37.1  C) (Tympanic)   Resp 16   Ht 1.575 m (5' 2\")   Wt 80.3 kg (177 lb)   SpO2 98%   BMI 32.37 kg/m   Estimated body mass index is 32.37 kg/m  as calculated from the following:    Height as of this encounter: 1.575 m (5' 2\").    Weight as of this encounter: 80.3 kg (177 lb). Body surface area is 1.87 meters squared.  Moderate Pain (4) Comment: with some pain meds   No LMP recorded. Patient is postmenopausal.  Allergies reviewed: Yes  Medications reviewed: Yes    Medications: Medication refills not needed today.  Pharmacy name entered into [facility]:    [id number] - [place], [place] - [address] AT [facility] [place], [place] - [address] [id number] [place], [place] - [address] AT [facility] [address]    Frailty Screening:   Is the patient here for a new oncology " consult visit in cancer care? 2. No      Clinical concerns:  1 month labs follow up      Hoa Denny                Again, thank you for allowing me to participate in the care of your patient.        Sincerely,        Man Barreto MD

## 2024-11-11 NOTE — PROGRESS NOTES
[facility] Hematology and Oncology Progress Note    Patient: Louise Corado  MRN: [id number]  Date of Service: Nov 11, 2024        Assessment and Plan:    1.  Metastatic neuroendocrine carcinoma:  I again reviewed her most recent CT imaging.  I also reviewed her most recent biopsy results from the liver.  Close showed a neuroendocrine tumor, well-differentiated, grade 2.  As such, we will continue to treat her with octreotide 60 mg monthly.  Her case was recently discussed at tumor conference and this was the recommendation.  I would like to get CT imaging after 2 more cycles.  Upon progression we will refer her for Lutathera.  Will use CT imaging alone to assess response.  This was reviewed with Louise and her .  Questions were answered.    2.  Nausea: This is improved significantly with this [place] patch which she will continue.  She takes the other antiemetics rarely.  She is following with palliative care.  Next appointment on November 19.    3.  Pleural effusion: Mostly resolved now.  Follow clinically and radiographically.    4.  Anxiety: She is having daily anxiety.  She is taking her Ativan once or twice a day routinely.  We are going to start a daily anxiety medication for her.  She can continue to take the lorazepam as needed for situational anxiety.      5.  Pain:  She is taking MS Contin 30 every 12 and oxycodone 10 mg as needed.      6. Edema: Still has edema.  We are going to change her Lasix dose to 20 mg twice a day from once a day.    CMP from today was reviewed and shows a sodium of 139 and potassium 3.8.    7.  Insomnia: She is on a low-dose of Zyprexa.  Will increase this to 5 mg at bedtime.    Medical decision Making:  I spent 43 minutes in the care of this patient today, which included time necessary for preparation for the visit, face to face time with the patient, communication of recommendations to the care team, and documentation time.  Today's visit was centered around a  cancer diagnosis in the setting of additional medical comorbidities. Complex medical decision making was required. The risk of additional morbidity without further treatment is high.   High-risk medications were managed: Treatment induced nausea.    ECOG Performance  1    Diagnosis:    1.  Metastatic neuroendocrine carcinoma, poorly differentiated:  Diagnosed April 2024 from a liver biopsy.  Her PET/CT shows malignant involvement of the liver, mediastinal nodes, left upper quadrant omentum, manubrium, and bilateral pleura.  There is some wall thickening and FDG activity in a short segment of the terminal ileum. Liver biopsy shows WHO, NET G2.     EGD 4/25/24: Occasional gastritis.  No mass.  Colonoscopy 4/25/24: Mass of terminal ileum.    NGS:  PDL1=0%, MDI-stable, TMB low,   CancerTYPE ID:  GI carcinoid 96%    Treatment:    Carboplatin and etoposide initiated May 13, 2024.  25% etopside dose reduction starting with cycle 5 secondary to nausea.  Cycle 5 was completed on August 7, 2024.    Interim History:    Louise returns today for a follow-up visit.  The scopolamine restarted at her last visit it helped her nausea significantly.  Lower extremity edema has improved some but still is persistent.  No worsening pain.    Review of Systems:    As above in the history.     Review of Systems otherwise Negative for:  General: chills, fever or night sweats  Psychological: depression  Ophthalmic: blurry vision, double vision or loss of vision, vision change  ENT: epistaxis, oral lesions, hearing changes  Hematological and Lymphatic: bleeding, bruising, jaundice, swollen lymph nodes  Endocrine: hot flashes, unexpected weight changes  Respiratory: cough, hemoptysis, orthopnea  Cardiovascular: palpitations or PND  Gastrointestinal: blood in stools, change in bowel habits, constipation  Genito-Urinary: change in urinary stream, incontinence, frequency/urgency  Musculoskeletal: joint swelling, muscle pain  Neurological:  "dizziness, headaches, numbness/tingling  Dermatological: lumps and rash    Physical Exam:    /75 (BP Location: Right arm, Patient Position: Sitting, Cuff Size: Adult Regular)   Pulse 91   Temp 98.7  F (37.1  C) (Tympanic)   Resp 16   Ht 1.575 m (5' 2\")   Wt 80.3 kg (177 lb)   SpO2 98%   BMI 32.37 kg/m      General: patient appears stated age of 59 year old. Nontoxic and in no distress.   HEENT: Head: atraumatic, normocephalic. Sclerae anicteric.  Chest:  Normal respiratory effort  Cardiac: +2 bipedal edema to the mid calf  Abdomen: abdomen is non-distended  Extremities: normal tone and muscle bulk.  Skin: no lesions or rash on visible skin. Warm and dry.   CNS: alert and oriented. Grossly non-focal.   Psychiatric: normal mood and affect.     Lab Results:    Recent Results (from the past week)   CBC with platelets   Result Value Ref Range    WBC Count 5.5 4.0 - 11.0 10e3/uL    RBC Count 3.55 (L) 3.80 - 5.20 10e6/uL    Hemoglobin 11.2 (L) 11.7 - 15.7 g/dL    Hematocrit 33.7 (L) 35.0 - 47.0 %    MCV 95 78 - 100 fL    MCH 31.5 26.5 - 33.0 pg    MCHC 33.2 31.5 - 36.5 g/dL    RDW 15.9 (H) 10.0 - 15.0 %    Platelet Count 246 150 - 450 10e3/uL   Comprehensive metabolic panel   Result Value Ref Range    Sodium 139 135 - 145 mmol/L    Potassium 3.8 3.4 - 5.3 mmol/L    Carbon Dioxide (CO2) 25 22 - 29 mmol/L    Anion Gap 12 7 - 15 mmol/L    Urea Nitrogen 8.5 6.0 - 20.0 mg/dL    Creatinine 0.63 0.51 - 0.95 mg/dL    GFR Estimate >90 >60 mL/min/1.73m2    Calcium 7.5 (L) 8.8 - 10.4 mg/dL    Chloride 102 98 - 107 mmol/L    Glucose 216 (H) 70 - 99 mg/dL    Alkaline Phosphatase 180 (H) 40 - 150 U/L    AST 53 (H) 0 - 45 U/L    ALT 36 0 - 50 U/L    Protein Total 5.4 (L) 6.4 - 8.3 g/dL    Albumin 2.4 (L) 3.5 - 5.2 g/dL    Bilirubin Total 0.4 <=1.2 mg/dL   BNP-N terminal pro   Result Value Ref Range    N Terminal Pro BNP Outpatient 247 0 - 900 pg/mL     Imaging:    MR Brain w/o & w Contrast    Result Date: 10/31/2024  EXAM: " MR BRAIN W/O and W CONTRAST LOCATION: [facility] DATE: 10/30/2024 INDICATION: nausea, dizziness, neuroendocrine tumor. COMPARISON: PET evaluation 9/19/2024. CONTRAST: 8 mL Gadavist TECHNIQUE: Routine multiplanar multisequence head MRI without and with intravenous contrast. FINDINGS: INTRACRANIAL CONTENTS: History of primary neuroendocrine tumor. Prior PET examination reviewed 9/19/2024. No evidence for acute/subacute infarction. Nothing for restricted diffusion abnormality is present intracranially. No mass, mass effect, midline shift, chronic or acute hemorrhage, or extra-axial fluid collections. Normal brain parenchymal signal for age. Mild generalized cerebral atrophy. No hydrocephalus. Normal position of the cerebellar tonsils. Postcontrast imaging shows pathologic enhancement of the diploic space of the left greater wing of sphenoid and possibly parasagittal right parietal diploic space, as detailed in osseous structures below, compatible with osseous calvarial metastasis. Pathologic nonnodular dural enhancement left frontal opercular level adjacent to the left greater wing of the sphenoid, may represent dural reactivity versus metastatic dural invasion of adjacent osseous metastasis. No associated adjacent left frontal parenchymal edema or enhancement. No evidence for abnormal intraparenchymal or leptomeningeal enhancement. Meckel's cave/cavernous sinus and dural venous sinus enhancement is normal. No pathologic enhancement at the skull base/cranial nerve level or suprahyoid neck soft tissues. SELLA: No sella/supersellar mass or hemorrhage. No pathologic enhancement or evidence for sella metastasis. OSSEOUS STRUCTURES/SOFT TISSUES: Abnormal pathologic enhancing hypertense T2 signal associated with the left greater wing of the sphenoid and left frontal, with some additional abnormal enhancement and prominence involving the adjacent scalp soft tissues  and temporalis musculature  anteriorly. Underlying dural thickening/enhancement may represent dural invasion versus dural reactivity. This osseous abnormality corresponds to avid hypermetabolic activity on prior PET evaluation 9/19/2024 and is compatible  with osseous metastases with dural invasion and/or reactivity. I do not see adjacent parenchymal edema of the left frontal opercular region.. Additional foci of indeterminate enhancement diploic space parasagittal right parietal bone series 1300 images 46 and 41 may represent additional sites of calvarial metastatic involvement. The major intracranial vascular flow voids are maintained. ORBITS: No pathologic orbital enhancement. SINUSES/MASTOIDS: No paranasal sinus mucosal disease. Scattered fluid/membrane thickening in the left mastoid air cells. No apparent mass in the posterior nasopharynx or skull base. Nasopharynx is patent.     IMPRESSION: 1.  Pathologic enhancing hyperintense T2 marrow signal changes of the diploic space of the greater wing of sphenoid with overlying asymmetric soft tissue enhancement involving left scalp and temporalis musculature. Deep to this osseous abnormality, there  is asymmetric increased dural/pachymeningeal enhancement. This is best seen series 1300 image 107 and series 13 image 68. This corresponds to the hypermetabolic uptake abnormality on 9/19/2024 PET evaluation and is compatible with calvarial/osseous metastasis. Patient has history of malignant neuroendocrine tumor. The dural enhancement may represent dural reactivity versus dural invasion, however there is no adjacent parenchymal edema at the left frontal opercular level to suggest intraparenchymal invasion or extent. 2.  Additional small foci diploic space enhancement abnormality at the parasagittal right parietal bone which are indeterminate series 1300 images 39 and 46, but may represent additional sites of calvarial metastasis. The hypermetabolic activity in this region is low-intermediate  relative to the left sphenoid osseous uptake. This is seen on the prior PET evaluation image 196 axial data set. No additional pathologic marrow signal or enhancement is otherwise noted. 3.  No abnormal intraparenchymal or leptomeningeal enhancement. No evidence for additional CNS metastatic disease. 4.  No intracranial mass, mass effect or midline shift. No acute or chronic hemorrhage. No recent infarction.       Signed by: Man Barreto MD

## 2024-11-12 ENCOUNTER — PATIENT OUTREACH (OUTPATIENT)
Dept: ONCOLOGY | Facility: HOSPITAL | Age: 59
End: 2024-11-12
Payer: COMMERCIAL

## 2024-11-12 NOTE — PROGRESS NOTES
[facility]: Cancer Care                                                                                            Situation: Patient chart reviewed by care coordinator.    Background: Patient has a hx of neuroendocrine carcinoma metastatic to the liver. Completed 6 cycles of carboplatin/etoposide.     Assessment: Currently receiving monthly octreotide injections.     Plan/Recommendations: Follow up visit with Dr. Barreto with labs at next octreotide injection on 12/2.     Signature:  Carolin Biswas RN

## 2024-11-14 ENCOUNTER — PATIENT OUTREACH (OUTPATIENT)
Dept: ONCOLOGY | Facility: HOSPITAL | Age: 59
End: 2024-11-14
Payer: COMMERCIAL

## 2024-11-14 NOTE — PROGRESS NOTES
[facility]: Cancer Care                                                                                          Called and left a brief message regarding her citalopram prescription that Dr. Barreto requested to prescribe for the patient. Provided the [facility] clinic call back number.     Signature:  Carolin Biswas RN  
No

## 2024-11-19 ENCOUNTER — MYC REFILL (OUTPATIENT)
Dept: PALLIATIVE CARE | Facility: CLINIC | Age: 59
End: 2024-11-19
Payer: COMMERCIAL

## 2024-11-19 ENCOUNTER — OFFICE VISIT (OUTPATIENT)
Dept: RADIATION ONCOLOGY | Facility: HOSPITAL | Age: 59
End: 2024-11-19
Attending: FAMILY MEDICINE
Payer: COMMERCIAL

## 2024-11-19 VITALS
TEMPERATURE: 98.4 F | SYSTOLIC BLOOD PRESSURE: 115 MMHG | OXYGEN SATURATION: 99 % | RESPIRATION RATE: 20 BRPM | HEART RATE: 102 BPM | DIASTOLIC BLOOD PRESSURE: 70 MMHG

## 2024-11-19 DIAGNOSIS — K59.03 THERAPEUTIC OPIOID-INDUCED CONSTIPATION (OIC): ICD-10-CM

## 2024-11-19 DIAGNOSIS — R07.1 PAINFUL RESPIRATION: ICD-10-CM

## 2024-11-19 DIAGNOSIS — G89.3 CANCER RELATED PAIN: ICD-10-CM

## 2024-11-19 DIAGNOSIS — C7B.8 NEUROENDOCRINE CARCINOMA METASTATIC TO LIVER (H): ICD-10-CM

## 2024-11-19 DIAGNOSIS — C7A.8 NEUROENDOCRINE CARCINOMA METASTATIC TO LIVER (H): ICD-10-CM

## 2024-11-19 DIAGNOSIS — T40.2X5A THERAPEUTIC OPIOID-INDUCED CONSTIPATION (OIC): ICD-10-CM

## 2024-11-19 DIAGNOSIS — Z51.5 PALLIATIVE CARE PATIENT: Primary | ICD-10-CM

## 2024-11-19 DIAGNOSIS — C78.7 METASTASES TO THE LIVER (H): ICD-10-CM

## 2024-11-19 PROCEDURE — G2211 COMPLEX E/M VISIT ADD ON: HCPCS | Performed by: FAMILY MEDICINE

## 2024-11-19 PROCEDURE — 99215 OFFICE O/P EST HI 40 MIN: CPT | Performed by: FAMILY MEDICINE

## 2024-11-19 PROCEDURE — 99214 OFFICE O/P EST MOD 30 MIN: CPT | Performed by: FAMILY MEDICINE

## 2024-11-19 RX ORDER — OXYCODONE HYDROCHLORIDE 5 MG/1
15-20 TABLET ORAL EVERY 4 HOURS PRN
Qty: 120 TABLET | Refills: 0 | Status: SHIPPED | OUTPATIENT
Start: 2024-11-19

## 2024-11-19 ASSESSMENT — PAIN SCALES - GENERAL: PAINLEVEL_OUTOF10: MILD PAIN (3)

## 2024-11-19 NOTE — PROGRESS NOTES
"Oncology Rooming Note    November 19, 2024 2:47 PM   Louise Corado is a 59 year old female who presents for:    Chief Complaint   Patient presents with    Oncology Clinic Visit    palliative care     Follow up     Initial Vitals: /70 (BP Location: Left arm, Patient Position: Sitting)   Pulse 102   Temp 98.4  F (36.9  C)   Resp 20   SpO2 99%  Estimated body mass index is 32.37 kg/m  as calculated from the following:    Height as of 11/11/24: 1.575 m (5' 2\").    Weight as of 11/11/24: 80.3 kg (177 lb). There is no height or weight on file to calculate BSA.  Mild Pain (3) Comment: Data Unavailable   No LMP recorded. Patient is postmenopausal.  Allergies reviewed: Yes  Medications reviewed: Yes    Medications: Medication refills not needed today.  Pharmacy name entered into EPIC:    [id number] [place], [place] - [address] AT [facility] [place] [id number] [id number] [place], [place] - [address] AT [facility] [address]    Frailty Screening:   Is the patient here for a new oncology consult visit in cancer care? 2. No      Clinical concerns: Follow up with palliative care  Dr. Neil was notified.      Naheed Verduzco RN              "

## 2024-11-19 NOTE — PATIENT INSTRUCTIONS
It was good to see you today, Louise.  Happy Thanksgijael and Jyothi Scales!    Here are the things we talked about:  Reliably use the MS Contin twice a day  Use the oxycodone 3-4 tablets every four hours as needed for breakthrough pain.  Amanda will call in 10-14 days to see if we need to make further adjustments in pain medicines.    Try a milk shake with boost or ensure to build up your protein (albumin) level.    Please bring your completed advanced care plan documents to  your infusion visit on December 2nd.    Stop to schedule a follow up appointment in 3 months in person.     How to get a hold of us:  For non-urgent matters, MyChart works best.    For more urgent matters, or if you prefer not to use MyChart, call our clinic nurse coordinator Amanda Josue RN at [phone]    We have an on-call number for evenings and weekends. Please call this only if you are having uncontrolled symptoms or serious side effects from your medicines: [phone].     For refills, please give us a week (5 working days) notice. We don't always have providers available everyday to do refills. If you call the day you run out of your medicine, we may not be able to refill it in time, so call 5 days in advance!    Stephen Neil MD MS FAAFP CAQHPM  [facility]  Office [phone]  Fax [phone]

## 2024-11-19 NOTE — PROGRESS NOTES
Palliative Care Outpatient Clinic Progress Note    Patient Name: Louise Corado  Primary Provider: Kee Mccullough   Recommendations & Counseling      GOALS OF CARE:   Life-prolonging without limits         ADVANCE CARE PLANNING:  No health care directive on file. Per system policy, Surrogate Decision-makers for Patients With Diminished Decision-making Capacity offers guidance on possible decision-makers.  Rowdy has been identified as a surrogate decision maker.  Recommend exploring further in future palliative medicine conversations.  There is no POLST form on file, defer to patient and/or next of kin for decisions   Code status: Full Code     MEDICAL MANAGEMENT:      #Pain,acute on chronic : Has R sided back/chest wall pain, sclerotic lesions noted on imaging, cancer-related.  This is much better due to changes in meds on 9/6/2024     Recommendations:    On APAP PRN-=-continue same.  Oxycodone 15-20mg q4H PRN mod-severe pain, continue same and refill with new quantity of #120 provided today.  MS contin   30mg q12 hours --Louise will do this more reliably and in two weeks we'll assess if an increase is warranted to this  Suggest use of heat as needed/ice as needed.  Continue with octreotide per Dr. Barreto.        #Nausea,disease processes with neuroendocrine tumor and suspected carcinoid tumor, plus nausea due to chemotherapy infusions.  This has resolved currently and Louise has ondansetron supposed to be scheduled daily but she is using it prn and prochlorpemazine prn and phenergan which she isn't using        #Deconditioning : has been self limiting activity due to flushing episodes.  Mostly sitting/in bed during daytime.  Encourage ambulation/movement.  She is aware of functional status necessary for continuing cytotoxic chemotherapy.     #Unintended weight loss (40# in past 5 months) due to metastatic neuroendocrine tumor, issues with nausea  #Protein calorie malnutrition in the context of acute  "illness as evidenced by unintentional weight loss and muscle loss   Work on antiemetic medications.  Discussed ways to increase protein intake     #Constipation,Medication adverse effect  At home--on docusate only.  Recommend avoiding docusate as \"all mush, no push\" and studies show it worsens stool quality for cancer patients in RCT but Louise feels it makes a difference.     RECOMMENDATIONS   - bisacodyl daily  - Senna 1 tablet po BID on days she has a BM or 2 tabs po BID on days with no BM  new rx sent for this  - add dulcolax suppository if no BM on the third day and daily thereafter and if no BM in 5 days call  - goal is soft easy to pass BM daily to every other day.     # Anxiety, in part related to medical diagnosis.  - has not been taking citalopram with concerns for serotonergic effect.  - using lorazepam up to three times weekly at baseline, continue same.   prescribes this.  - Recommend Oncology [facility] support for emotional support/processing--order placed for outpatient setting.        Counseling: All of the above was explained to the patient in lay language. The patient has verbalized a clear understanding of the discussion, asked appropriate questions, which have been answered to patient's apparent satisfaction. The patient is in agreement with the above plan.      Chief Complaint/Patient ID: Louise Corado 59 year old female with PMHx of metastatic neuroendocrine carcinoma with cancer associated pain    Last Palliative care appointment: 09/10/2024 with me     Reviewed:  Yes:   reviewed - controlled substances reflected in medication list.    Interim History:  Louise Corado is a 59 year old female who is seen today for follow up with Palliative Care Clinic  and we were joined by her , Rowdy.  Overall she is well except she finds herself bored at times.  We discussed some strategies to deal with this.     Pain:  pretty good control with MS Contin 30 mg po BID and oxycodone 10-20 " mg po q 6 hours prn.  Louise acknowledges she sometimes forgets her MS Contin and is procuring a pill minder today.  She will use the MS Contin faithfully and then check in with in 2 weeks to see if further adjustments are needed.  If they are my bias would be to increase to 45 mg po BID rather than 30 mg TID since she is already struggling to use it BID.    Appetite/Nausea: fair; struggling to eat more protein     Bowels: no concerns     Sleep: OK mostly     Mood: OK--still anxious at times     Coping:  OK;     Family History- Reviewed in Epic.    No Known Allergies    Social History:  Pertinent changes to social history/social situation since last visit: none  Key support resources:  and family  Advance Directive Status:  no ACP documents in Epic    Social History     Tobacco Use    Smoking status: Never     Passive exposure: Never    Smokeless tobacco: Never   Vaping Use    Vaping status: Never Used         No Known Allergies  Current Outpatient Medications   Medication Sig Dispense Refill    acetaminophen (TYLENOL) 325 MG tablet Take 325-650 mg by mouth every 6 hours as needed for mild pain      bisacodyl (DULCOLAX) 5 MG EC tablet Take 1 tablet (5 mg) by mouth daily. 30 tablet 0    furosemide (LASIX) 20 MG tablet Take 1 tablet (20 mg) by mouth daily. 60 tablet 1    lidocaine-prilocaine (EMLA) 2.5-2.5 % external cream Apply topically as needed for moderate pain. 30 g 1    LORazepam (ATIVAN) 0.5 MG tablet Take 1-2 tablets (0.5-1 mg) by mouth every 6 hours as needed for anxiety or sleep. 30 tablet 1    metFORMIN (GLUCOPHAGE XR) 500 MG 24 hr tablet Take 1 tablet (500 mg) by mouth daily (with dinner). 90 tablet 0    morphine (MS CONTIN) 30 MG CR tablet Take 1 tablet (30 mg) by mouth every 12 hours. 60 tablet 0    naloxone (NARCAN) 4 MG/0.1ML nasal spray Spray 1 spray (4 mg) into one nostril alternating nostrils as needed for opioid reversal every 2-3 minutes until assistance arrives (Patient not taking:  Reported on 10/8/2024) 2 each 0    OLANZapine (ZYPREXA) 2.5 MG tablet Take 1 tablet (2.5 mg) by mouth at bedtime. 30 tablet 0    oxyCODONE (ROXICODONE) 5 MG tablet Take 3-4 tablets (15-20 mg) by mouth every 4 hours as needed for pain. 90 tablet 0    polyethylene glycol (MIRALAX) 17 GM/Dose powder Take 17 g by mouth daily. (Patient not taking: Reported on 10/8/2024) 510 g 0    prochlorperazine (COMPAZINE) 10 MG tablet Take 1 tablet (10 mg) by mouth every 6 hours as needed for vomiting. 30 tablet 0    promethazine (PHENERGAN) 12.5 MG tablet Take 1-2 tablets (12.5-25 mg) by mouth every 6 hours as needed for nausea. 30 tablet 1    scopolamine (TRANSDERM) 1 MG/3DAYS 72 hr patch Place 1 patch over 72 hours onto the skin every 72 hours. 10 patch 0    senna (SENOKOT) 8.6 MG tablet Take 1 tablet by mouth 2 times daily. May also take 2 tablets 2 times daily as needed for constipation (if no BM previous day). 120 tablet 11    simethicone (MYLICON) 125 MG chewable tablet Take 125 mg by mouth 4 times daily as needed for intestinal gas.       Past Medical History:   Diagnosis Date    Metastatic malignant neuroendocrine tumor to liver (H)      Past Surgical History:   Procedure Laterality Date    IR CHEST PORT PLACEMENT > 5 YRS OF AGE  4/23/2024       Physical Exam:   GENERAL APPEARANCE: robust appearing, alert and no distress; neatly groomed  EYES: Eyes grossly normal to inspection, PERRLA, conjunctivae and sclerae without injection or discharge, EOM intact   RESP:  no increased work of breathing; speaks in complete sentences;   MS: No musculoskeletal defects are noted  SKIN: No suspicious lesions or rashes, hydration status appears adequate with normal skin turgor   PSYCH: Alert and oriented x3; speech- coherent , normal rate and volume; able to articulate logical thoughts, able to abstract reason, no tangential thoughts, no hallucinations or delusions, mentation appears normal, Mood is euthymic. Affect is appropriate for this  mood state and bright. Thought content is free of suicidal ideation, hallucinations, and delusions.  Eye contact is good during conversation.       Key Data Reviewed:  LABS: 11/11/2024- Cr 0.63, Albumin 2.4,  Hgb 11.2,  alk phos 180; AST 53    IMAGING: 10/30/2024 MR BRAIN WO & W/CONTRAST  IMPRESSION:  1.  Pathologic enhancing hyperintense T2 marrow signal changes of the diploic space of the greater wing of sphenoid with overlying asymmetric soft tissue enhancement involving left scalp and temporalis musculature. Deep to this osseous abnormality, there   is asymmetric increased dural/pachymeningeal enhancement. This is best seen series 1300 image 107 and series 13 image 68. This corresponds to the hypermetabolic uptake abnormality on 9/19/2024 PET evaluation and is compatible with calvarial/osseous   metastasis. Patient has history of malignant neuroendocrine tumor. The dural enhancement may represent dural reactivity versus dural invasion, however there is no adjacent parenchymal edema at the left frontal opercular level to suggest intraparenchymal   invasion or extent.     2.  Additional small foci diploic space enhancement abnormality at the parasagittal right parietal bone which are indeterminate series 1300 images 39 and 46, but may represent additional sites of calvarial metastasis. The hypermetabolic activity in this   region is low-intermediate relative to the left sphenoid osseous uptake. This is seen on the prior PET evaluation image 196 axial data set. No additional pathologic marrow signal or enhancement is otherwise noted.     3.  No abnormal intraparenchymal or leptomeningeal enhancement. No evidence for additional CNS metastatic disease.     4.  No intracranial mass, mass effect or midline shift. No acute or chronic hemorrhage. No recent infarction.    40 minutes spent on the date of the encounter doing chart review, history and exam, patient education & counseling, documentation and other activities  as noted above.    The longitudinal plan of care for the diagnosis(es)/condition(s) as documented were addressed during this visit. Due to the added complexity in care, I will continue to support Louise in the subsequent management and with ongoing continuity of care.    Stephen Neil MD MS FAAFP CAQHPM  [facility]  Office [phone]  Fax [phone]

## 2024-11-19 NOTE — LETTER
11/19/2024      Louise Corado  [address] [place] [address]      Dear Colleague,    Thank you for referring your patient, Louise Corado, to the [facility] RADIATION ONCOLOGY [place]. Please see a copy of my visit note below.    Palliative Care Outpatient Clinic Progress Note    Patient Name: Louise Corado  Primary Provider: Kee Mccullough   Recommendations & Counseling      GOALS OF CARE:   Life-prolonging without limits         ADVANCE CARE PLANNING:  No health care directive on file. Per system policy, Surrogate Decision-makers for Patients With Diminished Decision-making Capacity offers guidance on possible decision-makers.  Rowdy has been identified as a surrogate decision maker.  Recommend exploring further in future palliative medicine conversations.  There is no POLST form on file, defer to patient and/or next of kin for decisions   Code status: Full Code     MEDICAL MANAGEMENT:      #Pain,acute on chronic : Has R sided back/chest wall pain, sclerotic lesions noted on imaging, cancer-related.  This is much better due to changes in meds on 9/6/2024     Recommendations:    On APAP PRN-=-continue same.  Oxycodone 15-20mg q4H PRN mod-severe pain, continue same and refill with new quantity of #120 provided today.  MS contin   30mg q12 hours --Louise will do this more reliably and in two weeks we'll assess if an increase is warranted to this  Suggest use of heat as needed/ice as needed.  Continue with octreotide per Dr. Barreto.        #Nausea,disease processes with neuroendocrine tumor and suspected carcinoid tumor, plus nausea due to chemotherapy infusions.  This has resolved currently and Louise has ondansetron supposed to be scheduled daily but she is using it prn and prochlorpemazine prn and phenergan which she isn't using        #Deconditioning : has been self limiting activity due to flushing episodes.  Mostly sitting/in bed during daytime.  Encourage  "ambulation/movement.  She is aware of functional status necessary for continuing cytotoxic chemotherapy.     #Unintended weight loss (40# in past 5 months) due to metastatic neuroendocrine tumor, issues with nausea  #Protein calorie malnutrition in the context of acute illness as evidenced by unintentional weight loss and muscle loss   Work on antiemetic medications.  Discussed ways to increase protein intake     #Constipation,Medication adverse effect  At home--on docusate only.  Recommend avoiding docusate as \"all mush, no push\" and studies show it worsens stool quality for cancer patients in RCT but Louise feels it makes a difference.     RECOMMENDATIONS   - bisacodyl daily  - Senna 1 tablet po BID on days she has a BM or 2 tabs po BID on days with no BM  new rx sent for this  - add dulcolax suppository if no BM on the third day and daily thereafter and if no BM in 5 days call  - goal is soft easy to pass BM daily to every other day.     # Anxiety, in part related to medical diagnosis.  - has not been taking citalopram with concerns for serotonergic effect.  - using lorazepam up to three times weekly at baseline, continue same.   prescribes this.  - Recommend Oncology [facility] support for emotional support/processing--order placed for outpatient setting.        Counseling: All of the above was explained to the patient in lay language. The patient has verbalized a clear understanding of the discussion, asked appropriate questions, which have been answered to patient's apparent satisfaction. The patient is in agreement with the above plan.      Chief Complaint/Patient ID: Louise Corado 59 year old female with PMHx of metastatic neuroendocrine carcinoma with cancer associated pain    Last Palliative care appointment: 09/10/2024 with me     Reviewed:  Yes:   reviewed - controlled substances reflected in medication list.    Interim History:  Louise Corado is a 59 year old female who is seen today for " follow up with Palliative Care Clinic  and we were joined by her , Rowdy.  Overall she is well except she finds herself bored at times.  We discussed some strategies to deal with this.     Pain:  pretty good control with MS Contin 30 mg po BID and oxycodone 10-20 mg po q 6 hours prn.  Louise acknowledges she sometimes forgets her MS Contin and is procuring a pill minder today.  She will use the MS Contin faithfully and then check in with in 2 weeks to see if further adjustments are needed.  If they are my bias would be to increase to 45 mg po BID rather than 30 mg TID since she is already struggling to use it BID.    Appetite/Nausea: fair; struggling to eat more protein     Bowels: no concerns     Sleep: OK mostly     Mood: OK--still anxious at times     Coping:  OK;     Family History- Reviewed in Epic.    No Known Allergies    Social History:  Pertinent changes to social history/social situation since last visit: none  Key support resources:  and family  Advance Directive Status:  no ACP documents in Epic    Social History     Tobacco Use     Smoking status: Never     Passive exposure: Never     Smokeless tobacco: Never   Vaping Use     Vaping status: Never Used         No Known Allergies  Current Outpatient Medications   Medication Sig Dispense Refill     acetaminophen (TYLENOL) 325 MG tablet Take 325-650 mg by mouth every 6 hours as needed for mild pain       bisacodyl (DULCOLAX) 5 MG EC tablet Take 1 tablet (5 mg) by mouth daily. 30 tablet 0     furosemide (LASIX) 20 MG tablet Take 1 tablet (20 mg) by mouth daily. 60 tablet 1     lidocaine-prilocaine (EMLA) 2.5-2.5 % external cream Apply topically as needed for moderate pain. 30 g 1     LORazepam (ATIVAN) 0.5 MG tablet Take 1-2 tablets (0.5-1 mg) by mouth every 6 hours as needed for anxiety or sleep. 30 tablet 1     metFORMIN (GLUCOPHAGE XR) 500 MG 24 hr tablet Take 1 tablet (500 mg) by mouth daily (with dinner). 90 tablet 0     morphine (MS  CONTIN) 30 MG CR tablet Take 1 tablet (30 mg) by mouth every 12 hours. 60 tablet 0     naloxone (NARCAN) 4 MG/0.1ML nasal spray Spray 1 spray (4 mg) into one nostril alternating nostrils as needed for opioid reversal every 2-3 minutes until assistance arrives (Patient not taking: Reported on 10/8/2024) 2 each 0     OLANZapine (ZYPREXA) 2.5 MG tablet Take 1 tablet (2.5 mg) by mouth at bedtime. 30 tablet 0     oxyCODONE (ROXICODONE) 5 MG tablet Take 3-4 tablets (15-20 mg) by mouth every 4 hours as needed for pain. 90 tablet 0     polyethylene glycol (MIRALAX) 17 GM/Dose powder Take 17 g by mouth daily. (Patient not taking: Reported on 10/8/2024) 510 g 0     prochlorperazine (COMPAZINE) 10 MG tablet Take 1 tablet (10 mg) by mouth every 6 hours as needed for vomiting. 30 tablet 0     promethazine (PHENERGAN) 12.5 MG tablet Take 1-2 tablets (12.5-25 mg) by mouth every 6 hours as needed for nausea. 30 tablet 1     scopolamine (TRANSDERM) 1 MG/3DAYS 72 hr patch Place 1 patch over 72 hours onto the skin every 72 hours. 10 patch 0     senna (SENOKOT) 8.6 MG tablet Take 1 tablet by mouth 2 times daily. May also take 2 tablets 2 times daily as needed for constipation (if no BM previous day). 120 tablet 11     simethicone (MYLICON) 125 MG chewable tablet Take 125 mg by mouth 4 times daily as needed for intestinal gas.       Past Medical History:   Diagnosis Date     Metastatic malignant neuroendocrine tumor to liver (H)      Past Surgical History:   Procedure Laterality Date     IR CHEST PORT PLACEMENT > 5 YRS OF AGE  4/23/2024       Physical Exam:   GENERAL APPEARANCE: robust appearing, alert and no distress; neatly groomed  EYES: Eyes grossly normal to inspection, PERRLA, conjunctivae and sclerae without injection or discharge, EOM intact   RESP:  no increased work of breathing; speaks in complete sentences;   MS: No musculoskeletal defects are noted  SKIN: No suspicious lesions or rashes, hydration status appears adequate  with normal skin turgor   PSYCH: Alert and oriented x3; speech- coherent , normal rate and volume; able to articulate logical thoughts, able to abstract reason, no tangential thoughts, no hallucinations or delusions, mentation appears normal, Mood is euthymic. Affect is appropriate for this mood state and bright. Thought content is free of suicidal ideation, hallucinations, and delusions.  Eye contact is good during conversation.       Key Data Reviewed:  LABS: 11/11/2024- Cr 0.63, Albumin 2.4,  Hgb 11.2,  alk phos 180; AST 53    IMAGING: 10/30/2024 MR BRAIN WO & W/CONTRAST  IMPRESSION:  1.  Pathologic enhancing hyperintense T2 marrow signal changes of the diploic space of the greater wing of sphenoid with overlying asymmetric soft tissue enhancement involving left scalp and temporalis musculature. Deep to this osseous abnormality, there   is asymmetric increased dural/pachymeningeal enhancement. This is best seen series 1300 image 107 and series 13 image 68. This corresponds to the hypermetabolic uptake abnormality on 9/19/2024 PET evaluation and is compatible with calvarial/osseous   metastasis. Patient has history of malignant neuroendocrine tumor. The dural enhancement may represent dural reactivity versus dural invasion, however there is no adjacent parenchymal edema at the left frontal opercular level to suggest intraparenchymal   invasion or extent.     2.  Additional small foci diploic space enhancement abnormality at the parasagittal right parietal bone which are indeterminate series 1300 images 39 and 46, but may represent additional sites of calvarial metastasis. The hypermetabolic activity in this   region is low-intermediate relative to the left sphenoid osseous uptake. This is seen on the prior PET evaluation image 196 axial data set. No additional pathologic marrow signal or enhancement is otherwise noted.     3.  No abnormal intraparenchymal or leptomeningeal enhancement. No evidence for additional  "CNS metastatic disease.     4.  No intracranial mass, mass effect or midline shift. No acute or chronic hemorrhage. No recent infarction.    40 minutes spent on the date of the encounter doing chart review, history and exam, patient education & counseling, documentation and other activities as noted above.    The longitudinal plan of care for the diagnosis(es)/condition(s) as documented were addressed during this visit. Due to the added complexity in care, I will continue to support Louise in the subsequent management and with ongoing continuity of care.    Stephen Neil MD MS FAAFP CAQHPM  [facility] Palliative Care Service  Office [phone]  Fax [phone]     Oncology Rooming Note    November 19, 2024 2:47 PM   Louise Corado is a 59 year old female who presents for:    Chief Complaint   Patient presents with     Oncology Clinic Visit     palliative care     Follow up     Initial Vitals: /70 (BP Location: Left arm, Patient Position: Sitting)   Pulse 102   Temp 98.4  F (36.9  C)   Resp 20   SpO2 99%  Estimated body mass index is 32.37 kg/m  as calculated from the following:    Height as of 11/11/24: 1.575 m (5' 2\").    Weight as of 11/11/24: 80.3 kg (177 lb). There is no height or weight on file to calculate BSA.  Mild Pain (3) Comment: Data Unavailable   No LMP recorded. Patient is postmenopausal.  Allergies reviewed: Yes  Medications reviewed: Yes    Medications: Medication refills not needed today.  Pharmacy name entered into [facility]:    [id number] [place], [place] - [address] AT [facility] [place] [id number] [id number] - [place], [place] - [address] AT [facility] [address]    Frailty Screening:   Is the patient here for a new oncology consult visit in cancer care? 2. No      Clinical concerns: Follow up with palliative care  Dr. Neil was notified.      Naheed Verduzco, " RN                Again, thank you for allowing me to participate in the care of your patient.        Sincerely,        Stephen Neil MD

## 2024-11-20 ENCOUNTER — PATIENT OUTREACH (OUTPATIENT)
Dept: ONCOLOGY | Facility: HOSPITAL | Age: 59
End: 2024-11-20
Payer: COMMERCIAL

## 2024-11-20 DIAGNOSIS — C7B.8 NEUROENDOCRINE CARCINOMA METASTATIC TO LIVER (H): Primary | ICD-10-CM

## 2024-11-20 DIAGNOSIS — C7A.8 NEUROENDOCRINE CARCINOMA METASTATIC TO LIVER (H): Primary | ICD-10-CM

## 2024-11-20 DIAGNOSIS — F06.4 ANXIETY DISORDER DUE TO GENERAL MEDICAL CONDITION: ICD-10-CM

## 2024-11-20 DIAGNOSIS — F41.9 ANXIETY: ICD-10-CM

## 2024-11-20 DIAGNOSIS — R11.2 NAUSEA AND VOMITING, UNSPECIFIED VOMITING TYPE: ICD-10-CM

## 2024-11-20 RX ORDER — OLANZAPINE 2.5 MG/1
5 TABLET, FILM COATED ORAL AT BEDTIME
Qty: 30 TABLET | Refills: 0 | Status: SHIPPED | OUTPATIENT
Start: 2024-11-20

## 2024-11-20 NOTE — PROGRESS NOTES
"[facility]: Cancer Care                                                                                          Called patient to let her know that per Dr. Barreto, he had discussed the patient's concern of citalopram and interaction with octreotide. Per Dr. Barreto,    \"There is an extremely low risk of prolonged QT interval with antianxiety medications and octreotide.  I think it would be safe to give.  Our plan will be to get an EKG to check the QT interval on December 3 when she comes in for treatment and then 1 month later for her subsequent dose of octreotide.\"    Patient was agreeable to starting citalopram. Patient asked for a refill of zyprexa. Confirmed with the patient that per Dr. Barreto, patient should take 5mg of zyprexa at night and the new refill prescription will reflect the new dosage. Patient verbalized understanding.     Signature:  Carolin Biswas RN  "

## 2024-11-21 DIAGNOSIS — F06.4 ANXIETY DISORDER DUE TO GENERAL MEDICAL CONDITION: Primary | ICD-10-CM

## 2024-11-21 RX ORDER — CITALOPRAM HYDROBROMIDE 20 MG/1
20 TABLET ORAL DAILY
Qty: 30 TABLET | Refills: 2 | Status: SHIPPED | OUTPATIENT
Start: 2024-11-21

## 2024-11-26 ENCOUNTER — MYC REFILL (OUTPATIENT)
Dept: PALLIATIVE CARE | Facility: CLINIC | Age: 59
End: 2024-11-26
Payer: COMMERCIAL

## 2024-11-26 DIAGNOSIS — C7A.8 NEUROENDOCRINE CARCINOMA METASTATIC TO LIVER (H): ICD-10-CM

## 2024-11-26 DIAGNOSIS — C7B.8 NEUROENDOCRINE CARCINOMA METASTATIC TO LIVER (H): ICD-10-CM

## 2024-11-27 RX ORDER — MORPHINE SULFATE 30 MG/1
30 TABLET, FILM COATED, EXTENDED RELEASE ORAL EVERY 12 HOURS
Qty: 60 TABLET | Refills: 0 | Status: SHIPPED | OUTPATIENT
Start: 2024-11-27

## 2024-11-27 NOTE — TELEPHONE ENCOUNTER
Received [employer] message from patient requesting refill of MS Contin.     Last refill: 10/25/2024  Last office visit: 11/19/2024  Scheduled for follow up pending per check out request     Will route request to MD/ for review.     Reviewed [place]  Report.

## 2024-12-02 ENCOUNTER — PATIENT OUTREACH (OUTPATIENT)
Dept: ONCOLOGY | Facility: HOSPITAL | Age: 59
End: 2024-12-02

## 2024-12-02 ENCOUNTER — INFUSION THERAPY VISIT (OUTPATIENT)
Dept: INFUSION THERAPY | Facility: HOSPITAL | Age: 59
End: 2024-12-02
Attending: INTERNAL MEDICINE
Payer: COMMERCIAL

## 2024-12-02 ENCOUNTER — HOSPITAL ENCOUNTER (OUTPATIENT)
Dept: CARDIOLOGY | Facility: CLINIC | Age: 59
Discharge: HOME | End: 2024-12-02
Attending: INTERNAL MEDICINE
Payer: COMMERCIAL

## 2024-12-02 ENCOUNTER — ONCOLOGY VISIT (OUTPATIENT)
Dept: ONCOLOGY | Facility: HOSPITAL | Age: 59
End: 2024-12-02
Attending: INTERNAL MEDICINE
Payer: COMMERCIAL

## 2024-12-02 VITALS
HEIGHT: 62 IN | WEIGHT: 163 LBS | TEMPERATURE: 97.9 F | RESPIRATION RATE: 16 BRPM | DIASTOLIC BLOOD PRESSURE: 75 MMHG | HEART RATE: 71 BPM | OXYGEN SATURATION: 99 % | SYSTOLIC BLOOD PRESSURE: 121 MMHG | BODY MASS INDEX: 30 KG/M2

## 2024-12-02 DIAGNOSIS — C7A.8 NEUROENDOCRINE CARCINOMA METASTATIC TO LIVER (H): ICD-10-CM

## 2024-12-02 DIAGNOSIS — C7A.8 NEUROENDOCRINE CARCINOMA METASTATIC TO LIVER (H): Primary | ICD-10-CM

## 2024-12-02 DIAGNOSIS — C7B.8 NEUROENDOCRINE CARCINOMA METASTATIC TO LIVER (H): ICD-10-CM

## 2024-12-02 DIAGNOSIS — C7B.8 NEUROENDOCRINE CARCINOMA METASTATIC TO LIVER (H): Primary | ICD-10-CM

## 2024-12-02 DIAGNOSIS — R60.0 EDEMA, LOWER EXTREMITY: ICD-10-CM

## 2024-12-02 DIAGNOSIS — R11.0 NAUSEA: ICD-10-CM

## 2024-12-02 DIAGNOSIS — R74.8 BLOOD ALKALINE PHOSPHATASE INCREASED COMPARED WITH PRIOR MEASUREMENT: ICD-10-CM

## 2024-12-02 LAB
ALBUMIN SERPL BCG-MCNC: 2.6 G/DL (ref 3.5–5.2)
ALP SERPL-CCNC: 251 U/L (ref 40–150)
ALT SERPL W P-5'-P-CCNC: 41 U/L (ref 0–50)
ANION GAP SERPL CALCULATED.3IONS-SCNC: 11 MMOL/L (ref 7–15)
AST SERPL W P-5'-P-CCNC: 57 U/L (ref 0–45)
ATRIAL RATE - MUSE: 202 BPM
BILIRUB SERPL-MCNC: 0.3 MG/DL
BUN SERPL-MCNC: 8.8 MG/DL (ref 6–20)
CALCIUM SERPL-MCNC: 7.8 MG/DL (ref 8.8–10.4)
CHLORIDE SERPL-SCNC: 101 MMOL/L (ref 98–107)
CREAT SERPL-MCNC: 0.6 MG/DL (ref 0.51–0.95)
DIASTOLIC BLOOD PRESSURE - MUSE: NORMAL MMHG
EGFRCR SERPLBLD CKD-EPI 2021: >90 ML/MIN/1.73M2
GLUCOSE SERPL-MCNC: 186 MG/DL (ref 70–99)
HCO3 SERPL-SCNC: 27 MMOL/L (ref 22–29)
INTERPRETATION ECG - MUSE: NORMAL
P AXIS - MUSE: 25 DEGREES
POTASSIUM SERPL-SCNC: 3.6 MMOL/L (ref 3.4–5.3)
PR INTERVAL - MUSE: NORMAL MS
PROT SERPL-MCNC: 5.6 G/DL (ref 6.4–8.3)
QRS DURATION - MUSE: 82 MS
QT - MUSE: 366 MS
QTC - MUSE: 400 MS
R AXIS - MUSE: 1 DEGREES
SODIUM SERPL-SCNC: 139 MMOL/L (ref 135–145)
SYSTOLIC BLOOD PRESSURE - MUSE: NORMAL MMHG
T AXIS - MUSE: 14 DEGREES
VENTRICULAR RATE- MUSE: 72 BPM

## 2024-12-02 PROCEDURE — 82040 ASSAY OF SERUM ALBUMIN: CPT

## 2024-12-02 PROCEDURE — 250N000011 HC RX IP 250 OP 636: Performed by: INTERNAL MEDICINE

## 2024-12-02 PROCEDURE — 82947 ASSAY GLUCOSE BLOOD QUANT: CPT

## 2024-12-02 PROCEDURE — 99215 OFFICE O/P EST HI 40 MIN: CPT | Performed by: INTERNAL MEDICINE

## 2024-12-02 PROCEDURE — 99213 OFFICE O/P EST LOW 20 MIN: CPT | Performed by: INTERNAL MEDICINE

## 2024-12-02 PROCEDURE — G2211 COMPLEX E/M VISIT ADD ON: HCPCS | Performed by: INTERNAL MEDICINE

## 2024-12-02 PROCEDURE — 96372 THER/PROPH/DIAG INJ SC/IM: CPT | Performed by: INTERNAL MEDICINE

## 2024-12-02 PROCEDURE — 82247 BILIRUBIN TOTAL: CPT

## 2024-12-02 PROCEDURE — 36591 DRAW BLOOD OFF VENOUS DEVICE: CPT

## 2024-12-02 RX ORDER — HEPARIN SODIUM (PORCINE) LOCK FLUSH IV SOLN 100 UNIT/ML 100 UNIT/ML
5 SOLUTION INTRAVENOUS
Status: DISCONTINUED | OUTPATIENT
Start: 2024-12-02 | End: 2024-12-02 | Stop reason: HOSPADM

## 2024-12-02 RX ORDER — OCTREOTIDE ACETATE 30 MG
60 KIT INTRAMUSCULAR ONCE
Status: COMPLETED | OUTPATIENT
Start: 2024-12-02 | End: 2024-12-02

## 2024-12-02 RX ORDER — HEPARIN SODIUM (PORCINE) LOCK FLUSH IV SOLN 100 UNIT/ML 100 UNIT/ML
5 SOLUTION INTRAVENOUS
OUTPATIENT
Start: 2024-12-02

## 2024-12-02 RX ADMIN — OCTREOTIDE ACETATE 60 MG: KIT at 11:59

## 2024-12-02 RX ADMIN — HEPARIN 5 ML: 100 SYRINGE at 10:20

## 2024-12-02 ASSESSMENT — PAIN SCALES - GENERAL: PAINLEVEL_OUTOF10: MILD PAIN (2)

## 2024-12-02 NOTE — PROGRESS NOTES
[facility] Hematology and Oncology Progress Note    Patient: Louise Corado  MRN: [id number]  Date of Service: Dec 2, 2024        Assessment and Plan:    1.  Metastatic neuroendocrine carcinoma: Octreotide dose recently increased to 60 mg monthly.  She notes that she seems to have more symptoms a week before her injection.  She will be getting an injection today.  I told her that if her symptoms do not improve or if she finds them to intolerant then it might be worth switching over to Lutathera for symptom management even if she does not have any definitive evidence of progression.  For now we will plan on seeing her back in a month reevaluating but she can let us know in the interim if she would like to move forward.  We will repeat a CT scan prior to her next visit in a month.  Continue octreotide 60 mg monthly.    2.  Nausea: Her scopolamine patch helps the most.  Symptoms worsen the week before her octreotide shot.  She takes Phenergan 25 mg and lorazepam half milligram tablets.  I told her she can take 2 tablets of lorazepam per dose.  She then fills and occasionally with Compazine or Zofran.   She saw palliative care on November 19.    3.  Pleural effusion: Mostly resolved now.  Follow clinically and radiographically.    4.  Anxiety: She recently started citalopram 20 mg daily.  She can continue to take the lorazepam as needed for situational anxiety.      5.  Pain:  She is taking MS Contin 30 every 12 and oxycodone 10 mg as needed.  Follows with palliative care.    6.  Edema: Improved with Lasix twice a day.  Dependent.  No changes made today.      7.  Insomnia: Zyprexa 5 mg nightly.     8.  Constipation: Being managed by palliative care.  She is on senna twice daily and daily bisacodyl.    9.  Elevated: Phosphatase: CMP from today is reviewed and shows an alkaline phosphatase of 251, AST 57 and ALT 41.  Bilirubin 0.3.  Alk phos has been increasing since we increased her octreotide dose to 60 mg.  I  think this is the likely culprit rather than tumor progression in the liver.  If her numbers continue to rise in 4 weeks and we may have passed out but.  I did and switch to Lutathera.    Data review:  I reviewed and personally interpreted her EKG today.  Intervals look normal with no acute findings.    Medical decision Making:  I spent 40 minutes in the care of this patient today, which included time necessary for preparation for the visit, face to face time with the patient, communication of recommendations to the care team, and documentation time.  Today's visit was centered around a cancer diagnosis in the setting of additional medical comorbidities. Complex medical decision making was required. The risk of additional morbidity without further treatment is high.     ECOG Performance  1    Diagnosis:    1.  Metastatic neuroendocrine carcinoma, poorly differentiated:  Diagnosed April 2024 from a liver biopsy.  Her PET/CT shows malignant involvement of the liver, mediastinal nodes, left upper quadrant omentum, manubrium, and bilateral pleura.  There is some wall thickening and FDG activity in a short segment of the terminal ileum. Liver biopsy shows WHO, NET G2.     EGD 4/25/24: Occasional gastritis.  No mass.  Colonoscopy 4/25/24: Mass of terminal ileum.    NGS:  PDL1=0%, MDI-stable, TMB low,   CancerTYPE ID:  GI carcinoid 96%    Treatment:    Carboplatin and etoposide initiated May 13, 2024.  25% etopside dose reduction starting with cycle 5 secondary to nausea.  Cycle 5 was completed on August 7, 2024.    Octreotide started 7/16/2024.  30 mg.  Increase to 60 mg October 10, 2024.    Interim History:    Louise returns today for a follow-up visit.  She seems to notice that the last week before her next treatment is troubled with more nausea and vomiting.  She needs to take her antiemetics morre frequently this week.  Pain is controlled.  No other acute complaints.    Review of Systems:    As above in the history.  "    Review of Systems otherwise Negative for:  General: chills, fever or night sweats  Psychological: depression  Ophthalmic: blurry vision, double vision or loss of vision, vision change  ENT: epistaxis, oral lesions, hearing changes  Hematological and Lymphatic: bleeding, bruising, jaundice, swollen lymph nodes  Endocrine: hot flashes, unexpected weight changes  Respiratory: cough, hemoptysis, orthopnea  Cardiovascular: palpitations or PND  Gastrointestinal: blood in stools, change in bowel habits, constipation  Genito-Urinary: change in urinary stream, incontinence, frequency/urgency  Musculoskeletal: joint swelling, muscle pain  Neurological: dizziness, headaches, numbness/tingling  Dermatological: lumps and rash    Physical Exam:    /75 (BP Location: Right arm, Patient Position: Sitting, Cuff Size: Adult Regular)   Pulse 71   Temp 97.9  F (36.6  C) (Tympanic)   Resp 16   Ht 1.575 m (5' 2\")   Wt 73.9 kg (163 lb)   SpO2 99%   BMI 29.81 kg/m      General: patient appears stated age of 59 year old. Nontoxic and in no distress.   HEENT: Head: atraumatic, normocephalic. Sclerae anicteric.  Chest:  Normal respiratory effort  Cardiac: +1 bipedal edema to the mid calf  Abdomen: abdomen is non-distended  Extremities: normal tone and muscle bulk.  Skin: no lesions or rash on visible skin. Warm and dry.   CNS: alert and oriented. Grossly non-focal.   Psychiatric: normal mood and affect.     Lab Results:    Recent Results (from the past week)   Comprehensive metabolic panel (BMP + Alb, Alk Phos, ALT, AST, Total. Bili, TP)   Result Value Ref Range    Sodium 139 135 - 145 mmol/L    Potassium 3.6 3.4 - 5.3 mmol/L    Carbon Dioxide (CO2) 27 22 - 29 mmol/L    Anion Gap 11 7 - 15 mmol/L    Urea Nitrogen 8.8 6.0 - 20.0 mg/dL    Creatinine 0.60 0.51 - 0.95 mg/dL    GFR Estimate >90 >60 mL/min/1.73m2    Calcium 7.8 (L) 8.8 - 10.4 mg/dL    Chloride 101 98 - 107 mmol/L    Glucose 186 (H) 70 - 99 mg/dL    Alkaline " Phosphatase 251 (H) 40 - 150 U/L    AST 57 (H) 0 - 45 U/L    ALT 41 0 - 50 U/L    Protein Total 5.6 (L) 6.4 - 8.3 g/dL    Albumin 2.6 (L) 3.5 - 5.2 g/dL    Bilirubin Total 0.3 <=1.2 mg/dL   EKG 12-lead complete w/read - Clinics   Result Value Ref Range    Systolic Blood Pressure  mmHg    Diastolic Blood Pressure  mmHg    Ventricular Rate 72 BPM    Atrial Rate 202 BPM    VT Interval  ms    QRS Duration 82 ms     ms    QTc 400 ms    P Axis 25 degrees    R AXIS 1 degrees    T Axis 14 degrees    Interpretation ECG       Atrial flutter  Cannot rule out Anterior infarct , age undetermined  Abnormal ECG  When compared with ECG of 04-Sep-2024 13:13,  Atrial flutter has replaced Sinus rhythm       Imaging:    No results found.      Signed by: Man Barreto MD

## 2024-12-02 NOTE — LETTER
"12/2/2024      Louise Corado  [address] [place] [address]      Dear Colleague,    Thank you for referring your patient, Louise Corado, to the [facility]. Please see a copy of my visit note below.    Oncology Rooming Note    December 2, 2024 10:37 AM   Louise Corado is a 59 year old female who presents for:    Chief Complaint   Patient presents with     Oncology Clinic Visit     Neuroendocrine carcinoma metastatic to liver     Initial Vitals: /75 (BP Location: Right arm, Patient Position: Sitting, Cuff Size: Adult Regular)   Pulse 71   Temp 97.9  F (36.6  C) (Tympanic)   Resp 16   Ht 1.575 m (5' 2\")   Wt 73.9 kg (163 lb)   SpO2 99%   BMI 29.81 kg/m   Estimated body mass index is 29.81 kg/m  as calculated from the following:    Height as of this encounter: 1.575 m (5' 2\").    Weight as of this encounter: 73.9 kg (163 lb). Body surface area is 1.8 meters squared.  Mild Pain (2) Comment: Data Unavailable   No LMP recorded. Patient is postmenopausal.  Allergies reviewed: Yes  Medications reviewed: Yes    Medications: Medication refills not needed today.  Pharmacy name entered into [facility]:    [facility] #[id number] [place], [place] - [address] AT [facility] [place] [address] [facility] #[id number] - [place], [place] - [address] AT [facility] [address]    Frailty Screening:   Is the patient here for a new oncology consult visit in cancer care? 2. No      Clinical concerns:  3 week labs and EKG      Hoa Denny              [facility] Hematology and Oncology Progress Note    Patient: Louise Corado  MRN: [id number]  Date of Service: Dec 2, 2024        Assessment and Plan:    1.  Metastatic neuroendocrine carcinoma: Octreotide dose recently increased to 60 mg monthly.  She notes that she seems to have more symptoms a week before her injection.  She will be getting " an injection today.  I told her that if her symptoms do not improve or if she finds them to intolerant then it might be worth switching over to Lutathera for symptom management even if she does not have any definitive evidence of progression.  For now we will plan on seeing her back in a month reevaluating but she can let us know in the interim if she would like to move forward.  We will repeat a CT scan prior to her next visit in a month.  Continue octreotide 60 mg monthly.    2.  Nausea: Her scopolamine patch helps the most.  Symptoms worsen the week before her octreotide shot.  She takes Phenergan 25 mg and lorazepam half milligram tablets.  I told her she can take 2 tablets of lorazepam per dose.  She then fills and occasionally with Compazine or Zofran.   She saw palliative care on November 19.    3.  Pleural effusion: Mostly resolved now.  Follow clinically and radiographically.    4.  Anxiety: She recently started citalopram 20 mg daily.  She can continue to take the lorazepam as needed for situational anxiety.      5.  Pain:  She is taking MS Contin 30 every 12 and oxycodone 10 mg as needed.  Follows with palliative care.    6.  Edema: Improved with Lasix twice a day.  Dependent.  No changes made today.      7.  Insomnia: Zyprexa 5 mg nightly.     8.  Constipation: Being managed by palliative care.  She is on senna twice daily and daily bisacodyl.    9.  Elevated: Phosphatase: CMP from today is reviewed and shows an alkaline phosphatase of 251, AST 57 and ALT 41.  Bilirubin 0.3.  Alk phos has been increasing since we increased her octreotide dose to 60 mg.  I think this is the likely culprit rather than tumor progression in the liver.  If her numbers continue to rise in 4 weeks and we may have passed out but.  I did and switch to Lutathera.    Data review:  I reviewed and personally interpreted her EKG today.  Intervals look normal with no acute findings.    Medical decision Making:  I spent 40 minutes in  the care of this patient today, which included time necessary for preparation for the visit, face to face time with the patient, communication of recommendations to the care team, and documentation time.  Today's visit was centered around a cancer diagnosis in the setting of additional medical comorbidities. Complex medical decision making was required. The risk of additional morbidity without further treatment is high.     ECOG Performance  1    Diagnosis:    1.  Metastatic neuroendocrine carcinoma, poorly differentiated:  Diagnosed April 2024 from a liver biopsy.  Her PET/CT shows malignant involvement of the liver, mediastinal nodes, left upper quadrant omentum, manubrium, and bilateral pleura.  There is some wall thickening and FDG activity in a short segment of the terminal ileum. Liver biopsy shows WHO, NET G2.     EGD 4/25/24: Occasional gastritis.  No mass.  Colonoscopy 4/25/24: Mass of terminal ileum.    NGS:  PDL1=0%, MDI-stable, TMB low,   CancerTYPE ID:  GI carcinoid 96%    Treatment:    Carboplatin and etoposide initiated May 13, 2024.  25% etopside dose reduction starting with cycle 5 secondary to nausea.  Cycle 5 was completed on August 7, 2024.    Octreotide started 7/16/2024.  30 mg.  Increase to 60 mg October 10, 2024.    Interim History:    Louise returns today for a follow-up visit.  She seems to notice that the last week before her next treatment is troubled with more nausea and vomiting.  She needs to take her antiemetics morre frequently this week.  Pain is controlled.  No other acute complaints.    Review of Systems:    As above in the history.     Review of Systems otherwise Negative for:  General: chills, fever or night sweats  Psychological: depression  Ophthalmic: blurry vision, double vision or loss of vision, vision change  ENT: epistaxis, oral lesions, hearing changes  Hematological and Lymphatic: bleeding, bruising, jaundice, swollen lymph nodes  Endocrine: hot flashes, unexpected  "weight changes  Respiratory: cough, hemoptysis, orthopnea  Cardiovascular: palpitations or PND  Gastrointestinal: blood in stools, change in bowel habits, constipation  Genito-Urinary: change in urinary stream, incontinence, frequency/urgency  Musculoskeletal: joint swelling, muscle pain  Neurological: dizziness, headaches, numbness/tingling  Dermatological: lumps and rash    Physical Exam:    /75 (BP Location: Right arm, Patient Position: Sitting, Cuff Size: Adult Regular)   Pulse 71   Temp 97.9  F (36.6  C) (Tympanic)   Resp 16   Ht 1.575 m (5' 2\")   Wt 73.9 kg (163 lb)   SpO2 99%   BMI 29.81 kg/m      General: patient appears stated age of 59 year old. Nontoxic and in no distress.   HEENT: Head: atraumatic, normocephalic. Sclerae anicteric.  Chest:  Normal respiratory effort  Cardiac: +1 bipedal edema to the mid calf  Abdomen: abdomen is non-distended  Extremities: normal tone and muscle bulk.  Skin: no lesions or rash on visible skin. Warm and dry.   CNS: alert and oriented. Grossly non-focal.   Psychiatric: normal mood and affect.     Lab Results:    Recent Results (from the past week)   Comprehensive metabolic panel (BMP + Alb, Alk Phos, ALT, AST, Total. Bili, TP)   Result Value Ref Range    Sodium 139 135 - 145 mmol/L    Potassium 3.6 3.4 - 5.3 mmol/L    Carbon Dioxide (CO2) 27 22 - 29 mmol/L    Anion Gap 11 7 - 15 mmol/L    Urea Nitrogen 8.8 6.0 - 20.0 mg/dL    Creatinine 0.60 0.51 - 0.95 mg/dL    GFR Estimate >90 >60 mL/min/1.73m2    Calcium 7.8 (L) 8.8 - 10.4 mg/dL    Chloride 101 98 - 107 mmol/L    Glucose 186 (H) 70 - 99 mg/dL    Alkaline Phosphatase 251 (H) 40 - 150 U/L    AST 57 (H) 0 - 45 U/L    ALT 41 0 - 50 U/L    Protein Total 5.6 (L) 6.4 - 8.3 g/dL    Albumin 2.6 (L) 3.5 - 5.2 g/dL    Bilirubin Total 0.3 <=1.2 mg/dL   EKG 12-lead complete w/read - Clinics   Result Value Ref Range    Systolic Blood Pressure  mmHg    Diastolic Blood Pressure  mmHg    Ventricular Rate 72 BPM    Atrial " Rate 202 BPM    WI Interval  ms    QRS Duration 82 ms     ms    QTc 400 ms    P Axis 25 degrees    R AXIS 1 degrees    T Axis 14 degrees    Interpretation ECG       Atrial flutter  Cannot rule out Anterior infarct , age undetermined  Abnormal ECG  When compared with ECG of 04-Sep-2024 13:13,  Atrial flutter has replaced Sinus rhythm       Imaging:    No results found.      Signed by: Man Barreto MD      Again, thank you for allowing me to participate in the care of your patient.        Sincerely,        Man Barreto MD

## 2024-12-02 NOTE — PROGRESS NOTES
"Oncology Rooming Note    December 2, 2024 10:37 AM   Louise Corado is a 59 year old female who presents for:    Chief Complaint   Patient presents with    Oncology Clinic Visit     Neuroendocrine carcinoma metastatic to liver     Initial Vitals: /75 (BP Location: Right arm, Patient Position: Sitting, Cuff Size: Adult Regular)   Pulse 71   Temp 97.9  F (36.6  C) (Tympanic)   Resp 16   Ht 1.575 m (5' 2\")   Wt 73.9 kg (163 lb)   SpO2 99%   BMI 29.81 kg/m   Estimated body mass index is 29.81 kg/m  as calculated from the following:    Height as of this encounter: 1.575 m (5' 2\").    Weight as of this encounter: 73.9 kg (163 lb). Body surface area is 1.8 meters squared.  Mild Pain (2) Comment: Data Unavailable   No LMP recorded. Patient is postmenopausal.  Allergies reviewed: Yes  Medications reviewed: Yes    Medications: Medication refills not needed today.  Pharmacy name entered into [facility]:    [facility] #[id number] [place], [place] - [address] AT [facility] [place] [id number] [facility] #[id number] [place], [place] - [address] AT [facility] [address]    Frailty Screening:   Is the patient here for a new oncology consult visit in cancer care? 2. No      Clinical concerns:  3 week labs and EKG      Hoa Denny"

## 2024-12-02 NOTE — PROGRESS NOTES
[facility]: Cancer Care                                                                                            Situation: Patient chart reviewed by care coordinator.    Background: Patient was seen in clinic by Dr. Barreto today 12/2 for a follow up visit and labs for the management of neuroendocrine carcinoma metastatic to the liver. Currently being treated with monthly octreotide injections. Patient also had EKG done due to starting citalopram.     Assessment: Patient will continue monthly octreotide injections and will get a CT scan in 3 weeks.     Plan/Recommendations: Follow up visit with Dr. Barreto on 12/24 with labs and a CT scan prior on 12/20.     Signature:  Carolin Biswas RN

## 2024-12-02 NOTE — PROGRESS NOTES
Infusion Nursing Note:  Louise Corado presents today for Injection(s) . Octreotide  Patient seen by provider today: Yes Dr Barreto   present during visit today: Not Applicable.        Post Infusion Assessment:  Patient tolerated injection without incident.       Discharge Plan:   Patient discharged in stable condition accompanied by: .  Departure Mode: Ambulatory.      Marcelina Mtz LPN

## 2024-12-03 DIAGNOSIS — R07.1 PAINFUL RESPIRATION: ICD-10-CM

## 2024-12-03 RX ORDER — OXYCODONE HYDROCHLORIDE 5 MG/1
15-20 TABLET ORAL EVERY 4 HOURS PRN
Qty: 120 TABLET | Refills: 0 | Status: SHIPPED | OUTPATIENT
Start: 2024-12-03

## 2024-12-03 NOTE — TELEPHONE ENCOUNTER
Call placed to pt to discuss pain control since her visit with Dr. Neil. She is taking MS contin 30 mg Q 12 hr and oxycodone 15-20 mg Q 4 hr prn. PRN doses average 2-3 times daily. She is requesting a refill of oxycodone.    Last refill: 11/19/24  Last office visit: 11/19/24  Scheduled for follow up per check out request.     Will route request to MD/ for review.     Reviewed [place]  Report.

## 2024-12-14 ENCOUNTER — MYC REFILL (OUTPATIENT)
Dept: ONCOLOGY | Facility: HOSPITAL | Age: 59
End: 2024-12-14
Payer: COMMERCIAL

## 2024-12-14 DIAGNOSIS — R11.0 NAUSEA: ICD-10-CM

## 2024-12-16 RX ORDER — SCOLOPAMINE TRANSDERMAL SYSTEM 1 MG/1
1 PATCH, EXTENDED RELEASE TRANSDERMAL
Qty: 10 PATCH | Refills: 0 | Status: SHIPPED | OUTPATIENT
Start: 2024-12-16

## 2024-12-20 ENCOUNTER — HOSPITAL ENCOUNTER (OUTPATIENT)
Dept: CT IMAGING | Facility: CLINIC | Age: 59
Discharge: HOME | End: 2024-12-20
Attending: INTERNAL MEDICINE | Admitting: INTERNAL MEDICINE
Payer: COMMERCIAL

## 2024-12-20 ENCOUNTER — MYC REFILL (OUTPATIENT)
Dept: ONCOLOGY | Facility: HOSPITAL | Age: 59
End: 2024-12-20
Payer: COMMERCIAL

## 2024-12-20 ENCOUNTER — MYC REFILL (OUTPATIENT)
Dept: PALLIATIVE CARE | Facility: CLINIC | Age: 59
End: 2024-12-20
Payer: COMMERCIAL

## 2024-12-20 DIAGNOSIS — C7B.8 NEUROENDOCRINE CARCINOMA METASTATIC TO LIVER (H): ICD-10-CM

## 2024-12-20 DIAGNOSIS — R11.2 NAUSEA AND VOMITING, UNSPECIFIED VOMITING TYPE: ICD-10-CM

## 2024-12-20 DIAGNOSIS — C7A.8 NEUROENDOCRINE CARCINOMA METASTATIC TO LIVER (H): ICD-10-CM

## 2024-12-20 DIAGNOSIS — R91.8 LUNG MASS: ICD-10-CM

## 2024-12-20 PROCEDURE — 250N000011 HC RX IP 250 OP 636: Performed by: INTERNAL MEDICINE

## 2024-12-20 PROCEDURE — 74177 CT ABD & PELVIS W/CONTRAST: CPT

## 2024-12-20 PROCEDURE — 71260 CT THORAX DX C+: CPT

## 2024-12-20 RX ORDER — HEPARIN SODIUM,PORCINE 10 UNIT/ML
5-10 VIAL (ML) INTRAVENOUS EVERY 24 HOURS
Status: DISCONTINUED | OUTPATIENT
Start: 2024-12-20 | End: 2024-12-21 | Stop reason: HOSPADM

## 2024-12-20 RX ORDER — IOPAMIDOL 755 MG/ML
90 INJECTION, SOLUTION INTRAVASCULAR ONCE
Status: COMPLETED | OUTPATIENT
Start: 2024-12-20 | End: 2024-12-20

## 2024-12-20 RX ORDER — HEPARIN SODIUM,PORCINE 10 UNIT/ML
5-10 VIAL (ML) INTRAVENOUS
Status: DISCONTINUED | OUTPATIENT
Start: 2024-12-20 | End: 2024-12-21 | Stop reason: HOSPADM

## 2024-12-20 RX ORDER — HEPARIN SODIUM (PORCINE) LOCK FLUSH IV SOLN 100 UNIT/ML 100 UNIT/ML
5-10 SOLUTION INTRAVENOUS
Status: DISCONTINUED | OUTPATIENT
Start: 2024-12-20 | End: 2024-12-21 | Stop reason: HOSPADM

## 2024-12-20 RX ADMIN — HEPARIN SODIUM (PORCINE) LOCK FLUSH IV SOLN 100 UNIT/ML 5 ML: 100 SOLUTION at 14:07

## 2024-12-20 RX ADMIN — IOPAMIDOL 90 ML: 755 INJECTION, SOLUTION INTRAVENOUS at 14:01

## 2024-12-22 NOTE — TELEPHONE ENCOUNTER
Received [employer] message from patient requesting refill of MS Contin.     Last refill: 11/28/2024  Last office visit: 11/19/2024  Scheduled for follow up 3/4/2025    Will route request to MD/ for review.     Reviewed [place]  Report.

## 2024-12-23 RX ORDER — LORAZEPAM 0.5 MG/1
.5-1 TABLET ORAL EVERY 6 HOURS PRN
Qty: 30 TABLET | Refills: 1 | Status: SHIPPED | OUTPATIENT
Start: 2024-12-23

## 2024-12-23 RX ORDER — FUROSEMIDE 20 MG/1
20 TABLET ORAL DAILY
Qty: 60 TABLET | Refills: 1 | Status: SHIPPED | OUTPATIENT
Start: 2024-12-23

## 2024-12-23 RX ORDER — MORPHINE SULFATE 30 MG/1
30 TABLET, FILM COATED, EXTENDED RELEASE ORAL EVERY 12 HOURS
Qty: 60 TABLET | Refills: 0 | Status: SHIPPED | OUTPATIENT
Start: 2024-12-26

## 2024-12-23 RX ORDER — OLANZAPINE 2.5 MG/1
5 TABLET, FILM COATED ORAL AT BEDTIME
Qty: 30 TABLET | Refills: 0 | Status: SHIPPED | OUTPATIENT
Start: 2024-12-23

## 2024-12-24 ENCOUNTER — LAB (OUTPATIENT)
Dept: INFUSION THERAPY | Facility: HOSPITAL | Age: 59
End: 2024-12-24
Attending: INTERNAL MEDICINE
Payer: COMMERCIAL

## 2024-12-24 ENCOUNTER — MYC REFILL (OUTPATIENT)
Dept: PALLIATIVE CARE | Facility: CLINIC | Age: 59
End: 2024-12-24

## 2024-12-24 ENCOUNTER — ONCOLOGY VISIT (OUTPATIENT)
Dept: ONCOLOGY | Facility: HOSPITAL | Age: 59
End: 2024-12-24
Attending: INTERNAL MEDICINE
Payer: COMMERCIAL

## 2024-12-24 VITALS
BODY MASS INDEX: 30.36 KG/M2 | TEMPERATURE: 98.2 F | RESPIRATION RATE: 18 BRPM | OXYGEN SATURATION: 97 % | DIASTOLIC BLOOD PRESSURE: 64 MMHG | SYSTOLIC BLOOD PRESSURE: 125 MMHG | HEIGHT: 62 IN | WEIGHT: 165 LBS | HEART RATE: 92 BPM

## 2024-12-24 DIAGNOSIS — K76.0 HEPATIC STEATOSIS: ICD-10-CM

## 2024-12-24 DIAGNOSIS — C7A.8 NEUROENDOCRINE CARCINOMA METASTATIC TO LIVER (H): ICD-10-CM

## 2024-12-24 DIAGNOSIS — C7B.8 NEUROENDOCRINE CARCINOMA METASTATIC TO LIVER (H): Primary | ICD-10-CM

## 2024-12-24 DIAGNOSIS — R07.1 PAINFUL RESPIRATION: ICD-10-CM

## 2024-12-24 DIAGNOSIS — C7B.8 NEUROENDOCRINE CARCINOMA METASTATIC TO LIVER (H): ICD-10-CM

## 2024-12-24 DIAGNOSIS — C7A.8 NEUROENDOCRINE CARCINOMA METASTATIC TO LIVER (H): Primary | ICD-10-CM

## 2024-12-24 LAB
ALBUMIN SERPL BCG-MCNC: 2.8 G/DL (ref 3.5–5.2)
ALP SERPL-CCNC: 238 U/L (ref 40–150)
ALT SERPL W P-5'-P-CCNC: 53 U/L (ref 0–50)
ANION GAP SERPL CALCULATED.3IONS-SCNC: 12 MMOL/L (ref 7–15)
AST SERPL W P-5'-P-CCNC: 67 U/L (ref 0–45)
BILIRUB SERPL-MCNC: 0.3 MG/DL
BUN SERPL-MCNC: 6.3 MG/DL (ref 6–20)
CALCIUM SERPL-MCNC: 8.4 MG/DL (ref 8.8–10.4)
CHLORIDE SERPL-SCNC: 103 MMOL/L (ref 98–107)
CREAT SERPL-MCNC: 0.53 MG/DL (ref 0.51–0.95)
EGFRCR SERPLBLD CKD-EPI 2021: >90 ML/MIN/1.73M2
GLUCOSE SERPL-MCNC: 160 MG/DL (ref 70–99)
HCO3 SERPL-SCNC: 26 MMOL/L (ref 22–29)
POTASSIUM SERPL-SCNC: 4.3 MMOL/L (ref 3.4–5.3)
PROT SERPL-MCNC: 6 G/DL (ref 6.4–8.3)
SODIUM SERPL-SCNC: 141 MMOL/L (ref 135–145)

## 2024-12-24 PROCEDURE — 82247 BILIRUBIN TOTAL: CPT

## 2024-12-24 PROCEDURE — 36415 COLL VENOUS BLD VENIPUNCTURE: CPT

## 2024-12-24 PROCEDURE — G2211 COMPLEX E/M VISIT ADD ON: HCPCS | Performed by: INTERNAL MEDICINE

## 2024-12-24 PROCEDURE — 84132 ASSAY OF SERUM POTASSIUM: CPT

## 2024-12-24 PROCEDURE — 99213 OFFICE O/P EST LOW 20 MIN: CPT | Performed by: INTERNAL MEDICINE

## 2024-12-24 PROCEDURE — 80053 COMPREHEN METABOLIC PANEL: CPT

## 2024-12-24 PROCEDURE — 99215 OFFICE O/P EST HI 40 MIN: CPT | Performed by: INTERNAL MEDICINE

## 2024-12-24 ASSESSMENT — PAIN SCALES - GENERAL: PAINLEVEL_OUTOF10: NO PAIN (0)

## 2024-12-24 NOTE — PROGRESS NOTES
"Oncology Rooming Note    December 24, 2024 10:48 AM   Louise Corado is a 59 year old female who presents for:    Chief Complaint   Patient presents with    Oncology Clinic Visit     Return visit      Initial Vitals: /64 (BP Location: Right arm, Patient Position: Sitting, Cuff Size: Adult Regular)   Pulse 92   Temp 98.2  F (36.8  C) (Tympanic)   Resp 18   Ht 1.575 m (5' 2\")   Wt 74.8 kg (165 lb)   SpO2 97%   BMI 30.18 kg/m   Estimated body mass index is 30.18 kg/m  as calculated from the following:    Height as of this encounter: 1.575 m (5' 2\").    Weight as of this encounter: 74.8 kg (165 lb). Body surface area is 1.81 meters squared.  No Pain (0) Comment: Data Unavailable   No LMP recorded. Patient is postmenopausal.  Allergies reviewed: Yes  Medications reviewed: Yes    Medications: MEDICATION REFILLS NEEDED TODAY. Provider was notified.  Pharmacy name entered into [facility]:    [facility] #[id number] [place], [place] - [address] AT [facility] [place], [place] - [facility] #[id number] [place], [place] - [address] AT [facility] [address]    Frailty Screening:   Is the patient here for a new oncology consult visit in cancer care? 2. No      Clinical concerns:     RAGHAVENDRA GAGE CMA              "

## 2024-12-24 NOTE — PROGRESS NOTES
[facility] Hematology and Oncology Progress Note    Patient: Louise Corado  MRN: [id number]  Date of Service: Dec 24, 2024        Assessment and Plan:    1.  Metastatic neuroendocrine carcinoma: Octreotide dose recently increased to 60 mg monthly.  She notes that she seems to have more symptoms a week before her injection.  She will be getting an injection today.  I told her that if her symptoms do not improve or if she finds them to intolerant then it might be worth switching over to Lutathera for symptom management even if she does not have any definitive evidence of progression.  For now we will plan on seeing her back in a month reevaluating but she can let us know in the interim if she would like to move forward.  We will repeat a CT scan prior to her next visit in a month.  Continue octreotide 60 mg monthly.    2.  Nausea: Her scopolamine patch helps the most.  Symptoms worsen the week before her octreotide shot.  She takes Phenergan 25 mg and lorazepam half milligram tablets.  I told her she can take 2 tablets of lorazepam per dose.  She then fills and occasionally with Compazine or Zofran.   She saw palliative care on November 19.    3.  Pleural effusion: Mostly resolved now.  Follow clinically and radiographically.    4.  Anxiety: She recently started citalopram 20 mg daily.  She can continue to take the lorazepam as needed for situational anxiety.      5.  Pain:  She is taking MS Contin 30 every 12 and oxycodone 10 mg as needed.  Follows with palliative care.    6.  Edema: Improved with Lasix twice a day.  Dependent.  No changes made today.      7.  Insomnia: Zyprexa 5 mg nightly.     8.  Constipation: Being managed by palliative care.  She is on senna twice daily and daily bisacodyl.    9.  Elevated: Phosphatase: CMP from today is reviewed and shows an alkaline phosphatase of 251, AST 57 and ALT 41.  Bilirubin 0.3.  Alk phos has been increasing since we increased her octreotide dose to 60 mg.  I  think this is the likely culprit rather than tumor progression in the liver.  If her numbers continue to rise in 4 weeks and we may have passed out but.  I did and switch to Lutathera.        ECOG Performance  1    Diagnosis:    1.  Metastatic neuroendocrine carcinoma, poorly differentiated:  Diagnosed April 2024 from a liver biopsy.  Her PET/CT shows malignant involvement of the liver, mediastinal nodes, left upper quadrant omentum, manubrium, and bilateral pleura.  There is some wall thickening and FDG activity in a short segment of the terminal ileum. Liver biopsy shows WHO, NET G2.     EGD 4/25/24: Occasional gastritis.  No mass.  Colonoscopy 4/25/24: Mass of terminal ileum.    NGS:  PDL1=0%, MDI-stable, TMB low,   CancerTYPE ID:  GI carcinoid 96%    Treatment:    Carboplatin and etoposide initiated May 13, 2024.  25% etopside dose reduction starting with cycle 5 secondary to nausea.  Cycle 5 was completed on August 7, 2024.    Octreotide started 7/16/2024.  30 mg.  Increase to 60 mg October 10, 2024.    Interim History:    Louise returns today for a follow-up visit.  She seems to notice that the last week before her next treatment is troubled with more nausea and vomiting.  She needs to take her antiemetics morre frequently this week.  Pain is controlled.  No other acute complaints.    Eating more  Walking more    Review of Systems:    As above in the history.     Review of Systems otherwise Negative for:  General: chills, fever or night sweats  Psychological: depression  Ophthalmic: blurry vision, double vision or loss of vision, vision change  ENT: epistaxis, oral lesions, hearing changes  Hematological and Lymphatic: bleeding, bruising, jaundice, swollen lymph nodes  Endocrine: hot flashes, unexpected weight changes  Respiratory: cough, hemoptysis, orthopnea  Cardiovascular: palpitations or PND  Gastrointestinal: blood in stools, change in bowel habits, constipation  Genito-Urinary: change in urinary stream,  incontinence, frequency/urgency  Musculoskeletal: joint swelling, muscle pain  Neurological: dizziness, headaches, numbness/tingling  Dermatological: lumps and rash    Physical Exam:    There were no vitals taken for this visit.    General: patient appears stated age of 59 year old. Nontoxic and in no distress.   HEENT: Head: atraumatic, normocephalic. Sclerae anicteric.  Chest:  Normal respiratory effort  Cardiac: +1 bipedal edema to the mid calf  Abdomen: abdomen is non-distended  Extremities: normal tone and muscle bulk.  Skin: no lesions or rash on visible skin. Warm and dry.   CNS: alert and oriented. Grossly non-focal.   Psychiatric: normal mood and affect.     Lab Results:    No results found for this or any previous visit (from the past week).    Imaging:    CT Chest/Abdomen/Pelvis w Contrast    Result Date: 12/23/2024  EXAM: CT CHEST/ABDOMEN/PELVIS W CONTRAST LOCATION: [facility] DATE: 12/20/2024 INDICATION: follow up metastatic neuroendocrine tumor COMPARISON: 9/19/2024 PET scan TECHNIQUE: CT scan of the chest, abdomen, and pelvis was performed following injection of IV contrast. Multiplanar reformats were obtained. Dose reduction techniques were used. CONTRAST: 90ml Isovue 370 FINDINGS: LUNGS AND PLEURA: Stable bilateral pleural metastases more prominent on the right side with diffuse thickening of the right pleura and small right pleural effusion with associated atelectasis right lower lobe unchanged. Stable small nodules left upper lobe largest image 15 measures 8 mm unchanged. MEDIASTINUM/AXILLAE: Stable mediastinal and bilateral hilar lymph nodes largest right subcarinal node image 68 measures 2.3 x 1.7 cm unchanged. Right paratracheal lymph node image 46 measures 1.7 x 1.1 cm unchanged. CORONARY ARTERY CALCIFICATION: None. HEPATOBILIARY: Severe diffuse hepatic steatosis. Liver masses both right and left lobes largest in segment 7 image 154 measures 6.4 x 4.8 cm unchanged.  Mass in segment 4 measures 2.8 x 3.0 cm unchanged. PANCREAS: Normal. SPLEEN: Normal. ADRENAL GLANDS: Normal. KIDNEYS/BLADDER: Normal. BOWEL: Mass in the right lower quadrant mesentery stable measuring 2.5 x 2.8 cm image 2:15. Peritoneal carcinomatosis unchanged. Most prominent nodularity left posterior lateral pericolic gutter. LYMPH NODES: Tiny normal size retroperitoneal lymph nodes unchanged. VASCULATURE: Normal. PELVIC ORGANS: Normal. MUSCULOSKELETAL: Stable faintly sclerotic multiple skeletal lesions much better seen on previous PET scan.     IMPRESSION: 1.  Stable metastatic disease with stable nodular mass right lower mesentery, stable peritoneal carcinomatosis, liver masses, bone metastases, pleural metastases, and adenopathy above and below the diaphragms. 2.  Stable small right pleural effusion with atelectasis. 3.  Comparison to most recent PET scan 9/19/2024 and CT 9/4/2024.       Signed by: Man Barreto MD

## 2024-12-24 NOTE — Clinical Note
12/24/2024      Louise Corado  [address] [place] [address]      Dear Colleague,    Thank you for referring your patient, Louise Corado, to the [facility] [place]. Please see a copy of my visit note below.    [facility] Hematology and Oncology Progress Note    Patient: Louise Corado  MRN: [id number]  Date of Service: Dec 24, 2024        Assessment and Plan:    1.  Metastatic neuroendocrine carcinoma: Octreotide dose recently increased to 60 mg monthly.  She notes that she seems to have more symptoms a week before her injection.  She will be getting an injection today.  I told her that if her symptoms do not improve or if she finds them to intolerant then it might be worth switching over to Lutathera for symptom management even if she does not have any definitive evidence of progression.  For now we will plan on seeing her back in a month reevaluating but she can let us know in the interim if she would like to move forward.  We will repeat a CT scan prior to her next visit in a month.  Continue octreotide 60 mg monthly.    2.  Nausea: Her scopolamine patch helps the most.  Symptoms worsen the week before her octreotide shot.  She takes Phenergan 25 mg and lorazepam half milligram tablets.  I told her she can take 2 tablets of lorazepam per dose.  She then fills and occasionally with Compazine or Zofran.   She saw palliative care on November 19.    3.  Pleural effusion: Mostly resolved now.  Follow clinically and radiographically.    4.  Anxiety: She recently started citalopram 20 mg daily.  She can continue to take the lorazepam as needed for situational anxiety.      5.  Pain:  She is taking MS Contin 30 every 12 and oxycodone 10 mg as needed.  Follows with palliative care.    6.  Edema: Improved with Lasix twice a day.  Dependent.  No changes made today.      7.  Insomnia: Zyprexa 5 mg nightly.     8.  Constipation: Being managed by palliative care.  She is on senna twice  daily and daily bisacodyl.    9.  Elevated: Phosphatase: CMP from today is reviewed and shows an alkaline phosphatase of 251, AST 57 and ALT 41.  Bilirubin 0.3.  Alk phos has been increasing since we increased her octreotide dose to 60 mg.  I think this is the likely culprit rather than tumor progression in the liver.  If her numbers continue to rise in 4 weeks and we may have passed out but.  I did and switch to Lutathera.        ECOG Performance  1    Diagnosis:    1.  Metastatic neuroendocrine carcinoma, poorly differentiated:  Diagnosed April 2024 from a liver biopsy.  Her PET/CT shows malignant involvement of the liver, mediastinal nodes, left upper quadrant omentum, manubrium, and bilateral pleura.  There is some wall thickening and FDG activity in a short segment of the terminal ileum. Liver biopsy shows WHO, NET G2.     EGD 4/25/24: Occasional gastritis.  No mass.  Colonoscopy 4/25/24: Mass of terminal ileum.    NGS:  PDL1=0%, MDI-stable, TMB low,   CancerTYPE ID:  GI carcinoid 96%    Treatment:    Carboplatin and etoposide initiated May 13, 2024.  25% etopside dose reduction starting with cycle 5 secondary to nausea.  Cycle 5 was completed on August 7, 2024.    Octreotide started 7/16/2024.  30 mg.  Increase to 60 mg October 10, 2024.    Interim History:    Louise returns today for a follow-up visit.  She seems to notice that the last week before her next treatment is troubled with more nausea and vomiting.  She needs to take her antiemetics morre frequently this week.  Pain is controlled.  No other acute complaints.    Eating more  Walking more    Review of Systems:    As above in the history.     Review of Systems otherwise Negative for:  General: chills, fever or night sweats  Psychological: depression  Ophthalmic: blurry vision, double vision or loss of vision, vision change  ENT: epistaxis, oral lesions, hearing changes  Hematological and Lymphatic: bleeding, bruising, jaundice, swollen lymph  nodes  Endocrine: hot flashes, unexpected weight changes  Respiratory: cough, hemoptysis, orthopnea  Cardiovascular: palpitations or PND  Gastrointestinal: blood in stools, change in bowel habits, constipation  Genito-Urinary: change in urinary stream, incontinence, frequency/urgency  Musculoskeletal: joint swelling, muscle pain  Neurological: dizziness, headaches, numbness/tingling  Dermatological: lumps and rash    Physical Exam:    There were no vitals taken for this visit.    General: patient appears stated age of 59 year old. Nontoxic and in no distress.   HEENT: Head: atraumatic, normocephalic. Sclerae anicteric.  Chest:  Normal respiratory effort  Cardiac: +1 bipedal edema to the mid calf  Abdomen: abdomen is non-distended  Extremities: normal tone and muscle bulk.  Skin: no lesions or rash on visible skin. Warm and dry.   CNS: alert and oriented. Grossly non-focal.   Psychiatric: normal mood and affect.     Lab Results:    No results found for this or any previous visit (from the past week).    Imaging:    CT Chest/Abdomen/Pelvis w Contrast    Result Date: 12/23/2024  EXAM: CT CHEST/ABDOMEN/PELVIS W CONTRAST LOCATION: [facility] DATE: 12/20/2024 INDICATION: follow up metastatic neuroendocrine tumor COMPARISON: 9/19/2024 PET scan TECHNIQUE: CT scan of the chest, abdomen, and pelvis was performed following injection of IV contrast. Multiplanar reformats were obtained. Dose reduction techniques were used. CONTRAST: 90ml Isovue 370 FINDINGS: LUNGS AND PLEURA: Stable bilateral pleural metastases more prominent on the right side with diffuse thickening of the right pleura and small right pleural effusion with associated atelectasis right lower lobe unchanged. Stable small nodules left upper lobe largest image 15 measures 8 mm unchanged. MEDIASTINUM/AXILLAE: Stable mediastinal and bilateral hilar lymph nodes largest right subcarinal node image 68 measures 2.3 x 1.7 cm unchanged. Right  "paratracheal lymph node image 46 measures 1.7 x 1.1 cm unchanged. CORONARY ARTERY CALCIFICATION: None. HEPATOBILIARY: Severe diffuse hepatic steatosis. Liver masses both right and left lobes largest in segment 7 image 154 measures 6.4 x 4.8 cm unchanged. Mass in segment 4 measures 2.8 x 3.0 cm unchanged. PANCREAS: Normal. SPLEEN: Normal. ADRENAL GLANDS: Normal. KIDNEYS/BLADDER: Normal. BOWEL: Mass in the right lower quadrant mesentery stable measuring 2.5 x 2.8 cm image 2:15. Peritoneal carcinomatosis unchanged. Most prominent nodularity left posterior lateral pericolic gutter. LYMPH NODES: Tiny normal size retroperitoneal lymph nodes unchanged. VASCULATURE: Normal. PELVIC ORGANS: Normal. MUSCULOSKELETAL: Stable faintly sclerotic multiple skeletal lesions much better seen on previous PET scan.     IMPRESSION: 1.  Stable metastatic disease with stable nodular mass right lower mesentery, stable peritoneal carcinomatosis, liver masses, bone metastases, pleural metastases, and adenopathy above and below the diaphragms. 2.  Stable small right pleural effusion with atelectasis. 3.  Comparison to most recent PET scan 9/19/2024 and CT 9/4/2024.       Signed by: Man Barreto MD    Oncology Rooming Note    December 24, 2024 10:48 AM   Louise Corado is a 59 year old female who presents for:    Chief Complaint   Patient presents with    Oncology Clinic Visit     Return visit      Initial Vitals: /64 (BP Location: Right arm, Patient Position: Sitting, Cuff Size: Adult Regular)   Pulse 92   Temp 98.2  F (36.8  C) (Tympanic)   Resp 18   Ht 1.575 m (5' 2\")   Wt 74.8 kg (165 lb)   SpO2 97%   BMI 30.18 kg/m   Estimated body mass index is 30.18 kg/m  as calculated from the following:    Height as of this encounter: 1.575 m (5' 2\").    Weight as of this encounter: 74.8 kg (165 lb). Body surface area is 1.81 meters squared.  No Pain (0) Comment: Data Unavailable   No LMP recorded. Patient is postmenopausal.  Allergies " reviewed: Yes  Medications reviewed: Yes    Medications: MEDICATION REFILLS NEEDED TODAY. Provider was notified.  Pharmacy name entered into [facility]:    [id number] - [place], [place] - [address] AT [facility] [place], [place] - [address] [id number] - [place], [place] - [address] AT [facility] [address]    Frailty Screening:   Is the patient here for a new oncology consult visit in cancer care? 2. No      Clinical concerns:     RAGHAVENDRA GAGE CMA                  Again, thank you for allowing me to participate in the care of your patient.        Sincerely,        Man Barreto MD    Electronically signed

## 2024-12-26 RX ORDER — OXYCODONE HYDROCHLORIDE 5 MG/1
15-20 TABLET ORAL EVERY 4 HOURS PRN
Qty: 120 TABLET | Refills: 0 | Status: SHIPPED | OUTPATIENT
Start: 2024-12-26

## 2024-12-26 NOTE — TELEPHONE ENCOUNTER
Received My[employer] message from patient requesting refill of oxycodone.     Last refill: 12/4/24  Last office visit: 11/19/24  Scheduled for follow up 3/4/25     Will route request to MD/ for review.     Reviewed [place]  Report.

## 2025-01-11 ENCOUNTER — MYC REFILL (OUTPATIENT)
Dept: ONCOLOGY | Facility: HOSPITAL | Age: 60
End: 2025-01-11
Payer: COMMERCIAL

## 2025-01-11 ENCOUNTER — MYC REFILL (OUTPATIENT)
Dept: PALLIATIVE CARE | Facility: CLINIC | Age: 60
End: 2025-01-11
Payer: COMMERCIAL

## 2025-01-11 DIAGNOSIS — R07.1 PAINFUL RESPIRATION: ICD-10-CM

## 2025-01-11 DIAGNOSIS — R11.2 NAUSEA AND VOMITING, UNSPECIFIED VOMITING TYPE: ICD-10-CM

## 2025-01-13 ENCOUNTER — TELEPHONE (OUTPATIENT)
Dept: ONCOLOGY | Facility: HOSPITAL | Age: 60
End: 2025-01-13
Payer: COMMERCIAL

## 2025-01-13 ENCOUNTER — MYC REFILL (OUTPATIENT)
Dept: ONCOLOGY | Facility: HOSPITAL | Age: 60
End: 2025-01-13
Payer: COMMERCIAL

## 2025-01-13 DIAGNOSIS — C7B.8 NEUROENDOCRINE CARCINOMA METASTATIC TO LIVER (H): Primary | ICD-10-CM

## 2025-01-13 DIAGNOSIS — C7A.8 NEUROENDOCRINE CARCINOMA METASTATIC TO LIVER (H): Primary | ICD-10-CM

## 2025-01-13 DIAGNOSIS — R11.0 NAUSEA: ICD-10-CM

## 2025-01-13 DIAGNOSIS — R73.09 ELEVATED GLUCOSE: ICD-10-CM

## 2025-01-13 RX ORDER — OLANZAPINE 2.5 MG/1
5 TABLET, FILM COATED ORAL AT BEDTIME
Qty: 30 TABLET | Refills: 0 | Status: SHIPPED | OUTPATIENT
Start: 2025-01-13

## 2025-01-13 RX ORDER — OXYCODONE HYDROCHLORIDE 5 MG/1
15-20 TABLET ORAL EVERY 4 HOURS PRN
Qty: 120 TABLET | Refills: 0 | Status: SHIPPED | OUTPATIENT
Start: 2025-01-13

## 2025-01-13 RX ORDER — OXYCODONE HYDROCHLORIDE 5 MG/1
15-20 TABLET ORAL EVERY 4 HOURS PRN
Qty: 120 TABLET | Refills: 0 | Status: SHIPPED | OUTPATIENT
Start: 2025-01-13 | End: 2025-01-13

## 2025-01-13 NOTE — TELEPHONE ENCOUNTER
I received a voice message today from Louise.  She is calling for Dr. Neil's nurse.  She is requesting that her oxycodone prescription be sent into [email or web address] on [address] in [place].  I gave her a call back to let her know this prescription was just sent over at 4:30 PM today.  She was happy to hear this.  She has no other questions at this time.    Argelia Simon RN on 1/13/2025 at 4:33 PM

## 2025-01-13 NOTE — TELEPHONE ENCOUNTER
Received bVisualt message from patient requesting refill of oxycodone.     Last refill: 12/26/24  Last office visit: 11/19/24  Scheduled for follow up 3/4/25     Will route request to MD/ for review.     Reviewed [place]  Report.

## 2025-01-13 NOTE — TELEPHONE ENCOUNTER
Last Written Prescription Date:  12/16/24  Last Fill Quantity: 10,  # refills: 0   Last office visit provider:  12/24/24 with Dr. Barreto     Requested Prescriptions   Pending Prescriptions Disp Refills    scopolamine (TRANSDERM) 1 MG/3DAYS 72 hr patch 10 patch 0     Sig: Place 1 patch over 72 hours onto the skin every 72 hours.       There is no refill protocol information for this order          Cher Lott RN 01/13/25 4:16 PM

## 2025-01-14 ENCOUNTER — TRANSFERRED RECORDS (OUTPATIENT)
Dept: HEALTH INFORMATION MANAGEMENT | Facility: CLINIC | Age: 60
End: 2025-01-14
Payer: COMMERCIAL

## 2025-01-14 DIAGNOSIS — G89.3 CANCER ASSOCIATED PAIN: ICD-10-CM

## 2025-01-14 DIAGNOSIS — C7A.8 NEUROENDOCRINE CARCINOMA METASTATIC TO LIVER (H): Primary | ICD-10-CM

## 2025-01-14 DIAGNOSIS — C7B.8 NEUROENDOCRINE CARCINOMA METASTATIC TO LIVER (H): Primary | ICD-10-CM

## 2025-01-14 RX ORDER — MORPHINE SULFATE 15 MG/1
15 TABLET, FILM COATED, EXTENDED RELEASE ORAL EVERY 12 HOURS
Qty: 60 TABLET | Refills: 0 | Status: SHIPPED | OUTPATIENT
Start: 2025-01-14 | End: 2025-02-13

## 2025-01-14 RX ORDER — SCOPOLAMINE 1 MG/3D
1 PATCH, EXTENDED RELEASE TRANSDERMAL
Qty: 10 PATCH | Refills: 0 | Status: SHIPPED | OUTPATIENT
Start: 2025-01-14

## 2025-01-14 NOTE — TELEPHONE ENCOUNTER
Dr. Neil would like pt to increase MS contin to 45 mg every 12 hours based on oxycodone needs. Pt has 30 mg tabs at home. Will send in a new order for 15 mg tabs.    FARIHA GarciaN, RN  Palliative Care Nurse Clinician    [phone] (Direct)  [phone] (Main)  [phone] (Appointment Scheduling)

## 2025-01-18 ENCOUNTER — MYC REFILL (OUTPATIENT)
Dept: FAMILY MEDICINE | Facility: CLINIC | Age: 60
End: 2025-01-18
Payer: COMMERCIAL

## 2025-01-18 DIAGNOSIS — R73.09 ELEVATED GLUCOSE: ICD-10-CM

## 2025-01-20 ENCOUNTER — TRANSFERRED RECORDS (OUTPATIENT)
Dept: HEALTH INFORMATION MANAGEMENT | Facility: CLINIC | Age: 60
End: 2025-01-20
Payer: COMMERCIAL

## 2025-01-20 RX ORDER — METFORMIN HYDROCHLORIDE 500 MG/1
500 TABLET, EXTENDED RELEASE ORAL
Qty: 90 TABLET | Refills: 0 | Status: SHIPPED | OUTPATIENT
Start: 2025-01-20

## 2025-01-20 NOTE — TELEPHONE ENCOUNTER
Refilled 1 time only, please call patient to schedule for Office visit (in person) and lab only appointment prior to check blood glucose level (A1c already ordered by a different provider).

## 2025-01-21 NOTE — TELEPHONE ENCOUNTER
Outgoing call to patient, relayed provider message. Assisted in making OV appointment and lab appointment.

## 2025-01-23 ENCOUNTER — OFFICE VISIT (OUTPATIENT)
Dept: ONCOLOGY | Facility: HOSPITAL | Age: 60
End: 2025-01-23
Attending: INTERNAL MEDICINE
Payer: COMMERCIAL

## 2025-01-23 VITALS — WEIGHT: 157.6 LBS | BODY MASS INDEX: 28.83 KG/M2

## 2025-01-23 DIAGNOSIS — C7A.8 NEUROENDOCRINE CARCINOMA METASTATIC TO LIVER (H): ICD-10-CM

## 2025-01-23 DIAGNOSIS — C7B.8 NEUROENDOCRINE CARCINOMA METASTATIC TO LIVER (H): ICD-10-CM

## 2025-01-23 DIAGNOSIS — K76.0 HEPATIC STEATOSIS: ICD-10-CM

## 2025-01-23 PROCEDURE — 97802 MEDICAL NUTRITION INDIV IN: CPT | Performed by: DIETITIAN, REGISTERED

## 2025-01-23 NOTE — PROGRESS NOTES
"CLINICAL NUTRITION SERVICES - ASSESSMENT NOTE    Louise Corado 59 year old referred for MNT related to neuroendocrine carcinoma     Time Spent: 30 minutes  Visit Type: initial in person visit  Pt accompanied by: spouse, Rowdy  Referring Physician: Dr. Barreto    NUTRITION HISTORY  Factors affecting nutrition intake include:decreased appetite, nausea, and taste aversions  Current diet/appetite: regular diet/ fair appetite  Chemotherapy: octreotide on hold  Radiation: n/a     Diet Recall  Louise estimates eating about twice/ day. She reports some improvement in intake recently and has been eating more protein. She has been liking steak. She also reports eating oatmeal, fruits, vegetables, and cheese and crackers. She is drinking water, almond milk, juice/ gatorade. She was drinking some Fairlife shakes but does not like the aftertaste.     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    ANTHROPOMETRICS  Height:   Ht Readings from Last 1 Encounters:   12/24/24 1.575 m (5' 2\")     Weight:   Wt Readings from Last 1 Encounters:   01/23/25 71.5 kg (157 lb 9.6 oz)     BMI: 28.8 kg/m2  Weight Status:  Overweight BMI 25-29.9  IBW: 50 kg (143%)  Weight History: Louise has lost 6.5 kg (8%) in the past 3 months. She is still losing weight.  Wt Readings from Last 10 Encounters:   01/23/25 71.5 kg (157 lb 9.6 oz)   12/24/24 74.8 kg (165 lb)   12/02/24 73.9 kg (163 lb)   11/11/24 80.3 kg (177 lb)   10/10/24 " 78 kg (172 lb)   10/08/24 79.2 kg (174 lb 9.6 oz)   10/03/24 78.2 kg (172 lb 6.4 oz)   09/23/24 78 kg (172 lb)   09/06/24 82.5 kg (181 lb 12.8 oz)   08/23/24 82 kg (180 lb 12.8 oz)     Dosing Weight: 55 kg (adj for >120% IBW)    Medications/vitamins/minerals/herbals:   Reviewed    Labs:   Labs reviewed    ASSESSED NUTRITION NEEDS:  Estimated Energy Needs: 6688-4306 kcals (25-30 Kcal/Kg)  Justification: maintenance  Estimated Protein Needs: 70-85 grams protein (1.2-1.5 g pro/Kg)  Justification: increased needs  Estimated Fluid Needs: 6065-9055 mL   Justification: maintenance    MALNUTRITION:  % Weight Loss:  > 7.5% in 3 months (severe malnutrition)  % Intake:  <75% for >/= 1 month (moderate malnutrition)  Subcutaneous Fat Loss:  None observed  Muscle Loss:  mild generalized losses  Fluid Retention:  Mild (improved per patient)    Malnutrition Diagnosis: Moderate malnutrition  In Context of:  Chronic illness or disease    NUTRITION DIAGNOSIS:  Inadequate oral intake related to decreased ability to consume sufficient energy due to side effects of cancer therapy as evidenced by 8% wt loss x past 3 months, dietary intake 50-75% estimated needs.      INTERVENTIONS  Provided written & verbal education:     - Discussed nutrition and hydration needs.   - Discussed strategies to help fortify meals and snacks. Encouraged to focus on small, frequent meals.    - Reviewed sources of protein. Encouraged to have a protein source with each meal and snack. Suggested to aim for 72 g protein/ day.   - Reviewed common barriers to eating with cancer treatment.  Discussed ways to cope with low appetite.   - Reviewed high calorie/high protein oral nutrition supplement options. Encouraged utilizing these ONS in home made shakes/smoothies to prevent flavor fatigue.      Provided pt with corresponding education materials/handouts on:  Academy of Nutrition and Dietetics High mercedes/High protein recipes, High Calorie High Protein Nutrition  Therapy, Sources of Protein, [employer] Recipes    Pt verbalize understanding of materials provided during consult.   Patient Understanding: Excellent  Expected patient engagement: Excellent    Goals  1. Aim for 5-6 small frequent meals  2. Aim for 1500 kcal and 72 g protein, 6-8 cups fluids/day  3. Weight maintenance     Follow-Up Plans: Pt has RD contact information for questions.      MONITORING AND EVALUATION:  -Food/beverage intake  -Weight trends      Amanda Roberts, MS, RD, LD

## 2025-01-23 NOTE — LETTER
"1/23/2025      Louise Corado  [address] [place] [address]      Dear Colleague,    Thank you for referring your patient, Louise Corado, to the [facility]. Please see a copy of my visit note below.    CLINICAL NUTRITION SERVICES - ASSESSMENT NOTE    Louise Corado 59 year old referred for MNT related to neuroendocrine carcinoma     Time Spent: 30 minutes  Visit Type: initial in person visit  Pt accompanied by: spouse, Rowdy  Referring Physician: Dr. Brareto    NUTRITION HISTORY  Factors affecting nutrition intake include:decreased appetite, nausea, and taste aversions  Current diet/appetite: regular diet/ fair appetite  Chemotherapy: octreotide on hold  Radiation: n/a     Diet Recall  Louise estimates eating about twice/ day. She reports some improvement in intake recently and has been eating more protein. She has been liking steak. She also reports eating oatmeal, fruits, vegetables, and cheese and crackers. She is drinking water, almond milk, juice/ gatorade. She was drinking some Fairlife shakes but does not like the aftertaste.     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    ANTHROPOMETRICS  Height:   Ht Readings from Last 1 Encounters:   12/24/24 1.575 m (5' 2\")     Weight:   Wt Readings from Last 1 Encounters:   01/23/25 71.5 kg (157 lb 9.6 oz)     BMI: 28.8 kg/m2  Weight Status:  Overweight BMI 25-29.9  IBW: 50 kg (143%)  Weight History: " Louise has lost 6.5 kg (8%) in the past 3 months. She is still losing weight.  Wt Readings from Last 10 Encounters:   01/23/25 71.5 kg (157 lb 9.6 oz)   12/24/24 74.8 kg (165 lb)   12/02/24 73.9 kg (163 lb)   11/11/24 80.3 kg (177 lb)   10/10/24 78 kg (172 lb)   10/08/24 79.2 kg (174 lb 9.6 oz)   10/03/24 78.2 kg (172 lb 6.4 oz)   09/23/24 78 kg (172 lb)   09/06/24 82.5 kg (181 lb 12.8 oz)   08/23/24 82 kg (180 lb 12.8 oz)     Dosing Weight: 55 kg (adj for >120% IBW)    Medications/vitamins/minerals/herbals:   Reviewed    Labs:   Labs reviewed    ASSESSED NUTRITION NEEDS:  Estimated Energy Needs: 5296-9045 kcals (25-30 Kcal/Kg)  Justification: maintenance  Estimated Protein Needs: 70-85 grams protein (1.2-1.5 g pro/Kg)  Justification: increased needs  Estimated Fluid Needs: 9292-2351 mL   Justification: maintenance    MALNUTRITION:  % Weight Loss:  > 7.5% in 3 months (severe malnutrition)  % Intake:  <75% for >/= 1 month (moderate malnutrition)  Subcutaneous Fat Loss:  None observed  Muscle Loss:  mild generalized losses  Fluid Retention:  Mild (improved per patient)    Malnutrition Diagnosis: Moderate malnutrition  In Context of:  Chronic illness or disease    NUTRITION DIAGNOSIS:  Inadequate oral intake related to decreased ability to consume sufficient energy due to side effects of cancer therapy as evidenced by 8% wt loss x past 3 months, dietary intake 50-75% estimated needs.      INTERVENTIONS  Provided written & verbal education:     - Discussed nutrition and hydration needs.   - Discussed strategies to help fortify meals and snacks. Encouraged to focus on small, frequent meals.    - Reviewed sources of protein. Encouraged to have a protein source with each meal and snack. Suggested to aim for 72 g protein/ day.   - Reviewed common barriers to eating with cancer treatment.  Discussed ways to cope with low appetite.   - Reviewed high calorie/high protein oral nutrition supplement options. Encouraged utilizing  these ONS in home made shakes/smoothies to prevent flavor fatigue.      Provided pt with corresponding education materials/handouts on:  Academy of Nutrition and Dietetics High mercedes/High protein recipes, High Calorie High Protein Nutrition Therapy, Sources of Protein, [facility] Recipes    Pt verbalize understanding of materials provided during consult.   Patient Understanding: Excellent  Expected patient engagement: Excellent    Goals  1. Aim for 5-6 small frequent meals  2. Aim for 1500 kcal and 72 g protein, 6-8 cups fluids/day  3. Weight maintenance     Follow-Up Plans: Pt has RD contact information for questions.      MONITORING AND EVALUATION:  -Food/beverage intake  -Weight trends      Amanda Roberts, MS, RD, LD      Again, thank you for allowing me to participate in the care of your patient.        Sincerely,        Jessi Roberts RD    Electronically signed

## 2025-01-26 ENCOUNTER — MYC REFILL (OUTPATIENT)
Dept: ONCOLOGY | Facility: HOSPITAL | Age: 60
End: 2025-01-26
Payer: COMMERCIAL

## 2025-01-26 ENCOUNTER — MYC REFILL (OUTPATIENT)
Dept: PALLIATIVE CARE | Facility: CLINIC | Age: 60
End: 2025-01-26
Payer: COMMERCIAL

## 2025-01-26 DIAGNOSIS — C7A.8 NEUROENDOCRINE CARCINOMA METASTATIC TO LIVER (H): ICD-10-CM

## 2025-01-26 DIAGNOSIS — C7B.8 NEUROENDOCRINE CARCINOMA METASTATIC TO LIVER (H): ICD-10-CM

## 2025-01-26 DIAGNOSIS — R11.2 NAUSEA AND VOMITING, UNSPECIFIED VOMITING TYPE: ICD-10-CM

## 2025-01-27 RX ORDER — MORPHINE SULFATE 30 MG/1
30 TABLET, FILM COATED, EXTENDED RELEASE ORAL EVERY 12 HOURS
Qty: 60 TABLET | Refills: 0 | Status: SHIPPED | OUTPATIENT
Start: 2025-01-27

## 2025-01-27 RX ORDER — OLANZAPINE 2.5 MG/1
5 TABLET, FILM COATED ORAL AT BEDTIME
Qty: 30 TABLET | Refills: 0 | Status: SHIPPED | OUTPATIENT
Start: 2025-01-27

## 2025-01-27 NOTE — TELEPHONE ENCOUNTER
Received Pressit message from patient requesting refill of MS contin 30 mg tab.    Last refill: 12/30/24  Last office visit: 11/19/24  Scheduled for follow up 3/4/25     Will route request to MD/ for review.     Reviewed [place]  Report.

## 2025-01-27 NOTE — TELEPHONE ENCOUNTER
Last Written Prescription Date:  1/13/25  Last Fill Quantity: 30,  # refills: 0   Last office visit provider:  12/24/24 with Dr. Barreto, follow up in clinic 2/18/25     Requested Prescriptions   Pending Prescriptions Disp Refills    OLANZapine (ZYPREXA) 2.5 MG tablet 30 tablet 0     Sig: Take 2 tablets (5 mg) by mouth at bedtime.       Antipsychotic Medications Failed - 1/27/2025  8:41 AM        Failed - Medication indicated for associated diagnosis     Medication is associated with one or more of the following diagnoses:             Agitation            Autistic disorder            Bipolar disorder            Chemotherapy-induced nausea and vomiting            Delirium            Depression            Schizophrenia             Mood disorder            Passed - Most recent blood pressure under 140/90 in past 12 months        Passed - Patient is 12 years of age or older        Passed - Lipid panel on file within the past 12 months     Recent Labs   Lab Test 06/07/24  0959   CHOL 171   TRIG 190*   HDL 35*   LDL 98   NHDL 136*               Passed - Heart Rate on file within past 12 months     Pulse Readings from Last 3 Encounters:   12/24/24 92   12/02/24 71   11/19/24 102               Passed - A1c or Glucose on file in past 12 months     Recent Labs   Lab Test 12/24/24  1145 05/13/24  0920 04/30/24  1510   *   < > 122*   A1C  --   --  6.8*    < > = values in this interval not displayed.       Please review patients last 3 weights. If a weight gain of >10 lbs exists, you may refill the prescription once after instructing the patient to schedule an appointment within the next 30 days.    Wt Readings from Last 3 Encounters:   01/23/25 71.5 kg (157 lb 9.6 oz)   12/24/24 74.8 kg (165 lb)   12/02/24 73.9 kg (163 lb)             Passed - Medication is active on med list        Passed - Recent (12 month) or future (90 days) visit with authorizing provider s specialty     The patient must have completed an in-person  or virtual visit within the past 12 months or has a future visit scheduled within the next 90 days with the authorizing provider s specialty.  Urgent care and e-visits do not qualify as an office visit for this protocol.          Passed - Patient is not pregnant        Passed - No positve pregnancy test on file in past 12 months             Cher Lott RN 01/27/25 8:41 AM

## 2025-02-05 ENCOUNTER — MYC REFILL (OUTPATIENT)
Dept: PALLIATIVE CARE | Facility: CLINIC | Age: 60
End: 2025-02-05
Payer: COMMERCIAL

## 2025-02-05 DIAGNOSIS — R07.1 PAINFUL RESPIRATION: ICD-10-CM

## 2025-02-06 RX ORDER — OXYCODONE HYDROCHLORIDE 5 MG/1
15-20 TABLET ORAL EVERY 4 HOURS PRN
Qty: 120 TABLET | Refills: 0 | Status: SHIPPED | OUTPATIENT
Start: 2025-02-06

## 2025-02-06 NOTE — TELEPHONE ENCOUNTER
Received [employer] message from patient requesting refill of oxycodone.     Last refill: 1/13/25  Last office visit: 11/19/24  Scheduled for follow up 3/4/25     Will route request to MD/ for review.     Reviewed [place]  Report.

## 2025-02-13 ENCOUNTER — LAB (OUTPATIENT)
Dept: LAB | Facility: CLINIC | Age: 60
End: 2025-02-13
Payer: COMMERCIAL

## 2025-02-13 DIAGNOSIS — R73.03 PREDIABETES: ICD-10-CM

## 2025-02-13 DIAGNOSIS — C7A.8 NEUROENDOCRINE CARCINOMA METASTATIC TO LIVER (H): ICD-10-CM

## 2025-02-13 DIAGNOSIS — C7B.8 NEUROENDOCRINE CARCINOMA METASTATIC TO LIVER (H): ICD-10-CM

## 2025-02-13 LAB
EST. AVERAGE GLUCOSE BLD GHB EST-MCNC: 131 MG/DL
HBA1C MFR BLD: 6.2 % (ref 0–5.6)

## 2025-02-14 ENCOUNTER — MYC REFILL (OUTPATIENT)
Dept: ONCOLOGY | Facility: HOSPITAL | Age: 60
End: 2025-02-14

## 2025-02-14 ENCOUNTER — MYC REFILL (OUTPATIENT)
Dept: PALLIATIVE CARE | Facility: CLINIC | Age: 60
End: 2025-02-14

## 2025-02-14 DIAGNOSIS — C7B.8 NEUROENDOCRINE CARCINOMA METASTATIC TO LIVER (H): ICD-10-CM

## 2025-02-14 DIAGNOSIS — R11.2 NAUSEA AND VOMITING, UNSPECIFIED VOMITING TYPE: ICD-10-CM

## 2025-02-14 DIAGNOSIS — R11.0 NAUSEA: ICD-10-CM

## 2025-02-14 DIAGNOSIS — G89.3 CANCER ASSOCIATED PAIN: ICD-10-CM

## 2025-02-14 DIAGNOSIS — C7A.8 NEUROENDOCRINE CARCINOMA METASTATIC TO LIVER (H): ICD-10-CM

## 2025-02-16 RX ORDER — MORPHINE SULFATE 15 MG/1
15 TABLET, FILM COATED, EXTENDED RELEASE ORAL EVERY 12 HOURS
Qty: 60 TABLET | Refills: 0 | Status: SHIPPED | OUTPATIENT
Start: 2025-02-16

## 2025-02-17 RX ORDER — OLANZAPINE 2.5 MG/1
5 TABLET, FILM COATED ORAL AT BEDTIME
Qty: 30 TABLET | Refills: 0 | Status: SHIPPED | OUTPATIENT
Start: 2025-02-17

## 2025-02-17 RX ORDER — SCOPOLAMINE 1 MG/3D
1 PATCH, EXTENDED RELEASE TRANSDERMAL
Qty: 10 PATCH | Refills: 0 | Status: SHIPPED | OUTPATIENT
Start: 2025-02-17

## 2025-02-17 NOTE — TELEPHONE ENCOUNTER
Last Written Prescription Date:  1/27/24  Last Fill Quantity: 30,  # refills: 0   Last office visit provider:  12/24/24 with Dr. Barreto     Requested Prescriptions   Pending Prescriptions Disp Refills    OLANZapine (ZYPREXA) 2.5 MG tablet 30 tablet 0     Sig: Take 2 tablets (5 mg) by mouth at bedtime.       Antipsychotic Medications Failed - 2/17/2025  8:01 AM        Failed - Recent (12 month) or future (90 days) visit with authorizing provider s specialty     The patient must have completed an in-person or virtual visit within the past 12 months or has a future visit scheduled within the next 90 days with the authorizing provider s specialty.  Urgent care and e-visits do not qualify as an office visit for this protocol.          Failed - Medication indicated for associated diagnosis     Medication is associated with one or more of the following diagnoses:             Agitation            Autistic disorder            Bipolar disorder            Chemotherapy-induced nausea and vomiting            Delirium            Depression            Schizophrenia             Mood disorder            Passed - Most recent blood pressure under 140/90 in past 12 months        Passed - Patient is 12 years of age or older        Passed - Lipid panel on file within the past 12 months     Recent Labs   Lab Test 06/07/24  0959   CHOL 171   TRIG 190*   HDL 35*   LDL 98   NHDL 136*               Passed - Heart Rate on file within past 12 months     Pulse Readings from Last 3 Encounters:   12/24/24 92   12/02/24 71   11/19/24 102               Passed - A1c or Glucose on file in past 12 months     Recent Labs   Lab Test 02/13/25  1312   *  180*   A1C 6.2*       Please review patients last 3 weights. If a weight gain of >10 lbs exists, you may refill the prescription once after instructing the patient to schedule an appointment within the next 30 days.    Wt Readings from Last 3 Encounters:   01/23/25 71.5 kg (157 lb 9.6 oz)    12/24/24 74.8 kg (165 lb)   12/02/24 73.9 kg (163 lb)             Passed - Medication is active on med list and the sig matches. RN to manually verify dose and sig if red X/fail.     If the protocol passes (green check), you do not need to verify med dose and sig.    A prescription matches if they are the same clinical intention.    For Example: once daily and every morning are the same.    For all fails (red x), verify dose and sig.    If the refill does match what is on file, the RN can still proceed to approve the refill request.     If they do not match, route to the appropriate provider.             Passed - Patient is not pregnant        Passed - No positve pregnancy test on file in past 12 months             Cher Lott RN 02/17/25 8:01 AM

## 2025-02-17 NOTE — TELEPHONE ENCOUNTER
Last Written Prescription Date:  1/14/25  Last Fill Quantity: 10,  # refills: 0   Last office visit provider:  12/24/24 with Dr. Barreto     Requested Prescriptions   Pending Prescriptions Disp Refills    scopolamine (TRANSDERM) 1 MG/3DAYS 72 hr patch 10 patch 0     Sig: Place 1 patch over 72 hours onto the skin every 72 hours.       There is no refill protocol information for this order          Cher Lott RN 02/17/25 8:03 AM

## 2025-02-19 DIAGNOSIS — R07.1 PAINFUL RESPIRATION: ICD-10-CM

## 2025-02-19 RX ORDER — OXYCODONE HYDROCHLORIDE 5 MG/1
15-20 TABLET ORAL EVERY 4 HOURS PRN
Qty: 120 TABLET | Refills: 0 | Status: SHIPPED | OUTPATIENT
Start: 2025-02-19

## 2025-02-19 NOTE — TELEPHONE ENCOUNTER
Resending oxycodone prescription to a different pharmacy who has it in stock.    FARIHA GarciaN, RN  Palliative Care Nurse Clinician    [phone] (Direct)  [phone] (Main)  [phone] (Appointment Scheduling)

## 2025-03-04 ENCOUNTER — OFFICE VISIT (OUTPATIENT)
Dept: RADIATION ONCOLOGY | Facility: HOSPITAL | Age: 60
End: 2025-03-04
Payer: COMMERCIAL

## 2025-03-04 VITALS
OXYGEN SATURATION: 97 % | SYSTOLIC BLOOD PRESSURE: 94 MMHG | WEIGHT: 146.8 LBS | TEMPERATURE: 98.4 F | RESPIRATION RATE: 20 BRPM | DIASTOLIC BLOOD PRESSURE: 56 MMHG | BODY MASS INDEX: 26.85 KG/M2 | HEART RATE: 124 BPM

## 2025-03-04 DIAGNOSIS — Z51.5 PALLIATIVE CARE PATIENT: Primary | ICD-10-CM

## 2025-03-04 DIAGNOSIS — C7A.8 NEUROENDOCRINE CARCINOMA METASTATIC TO LIVER (H): ICD-10-CM

## 2025-03-04 DIAGNOSIS — C7B.8 NEUROENDOCRINE CARCINOMA METASTATIC TO LIVER (H): ICD-10-CM

## 2025-03-04 DIAGNOSIS — G89.3 CANCER RELATED PAIN: ICD-10-CM

## 2025-03-04 DIAGNOSIS — C78.7 METASTASES TO THE LIVER (H): ICD-10-CM

## 2025-03-04 PROCEDURE — 3074F SYST BP LT 130 MM HG: CPT | Performed by: FAMILY MEDICINE

## 2025-03-04 PROCEDURE — 3078F DIAST BP <80 MM HG: CPT | Performed by: FAMILY MEDICINE

## 2025-03-04 PROCEDURE — 99214 OFFICE O/P EST MOD 30 MIN: CPT | Performed by: FAMILY MEDICINE

## 2025-03-04 PROCEDURE — 1125F AMNT PAIN NOTED PAIN PRSNT: CPT | Performed by: FAMILY MEDICINE

## 2025-03-04 PROCEDURE — G2211 COMPLEX E/M VISIT ADD ON: HCPCS | Performed by: FAMILY MEDICINE

## 2025-03-04 PROCEDURE — 99215 OFFICE O/P EST HI 40 MIN: CPT | Performed by: FAMILY MEDICINE

## 2025-03-04 RX ORDER — MORPHINE SULFATE 60 MG/1
60 TABLET, FILM COATED, EXTENDED RELEASE ORAL EVERY 12 HOURS
Qty: 60 TABLET | Refills: 0 | Status: SHIPPED | OUTPATIENT
Start: 2025-03-04 | End: 2025-04-03

## 2025-03-04 ASSESSMENT — PAIN SCALES - GENERAL: PAINLEVEL_OUTOF10: MODERATE PAIN (5)

## 2025-03-04 NOTE — PROGRESS NOTES
Palliative Care Outpatient Clinic Progress Note    Patient Name: Louise Corado  Primary Provider: Kee Mccullough     Recommendations & Counseling      GOALS OF CARE:   Life-prolonging without limits         ADVANCE CARE PLANNING:  No health care directive on file. Per system policy, Surrogate Decision-makers for Patients With Diminished Decision-making Capacity offers guidance on possible decision-makers.  Rowdy has been identified as a surrogate decision maker.  Recommend exploring further in future palliative medicine conversations.  There is no POLST form on file, defer to patient and/or next of kin for decisions   Code status: Full Code     MEDICAL MANAGEMENT:      #Pain,acute on chronic : Has R sided back/chest wall pain, sclerotic lesions noted on imaging, cancer-related.     Recommendations:    On APAP PRN-=-continue same.  Oxycodone 20 mg q4H PRN mod-severe pain, continue same and refill with new quantity of #120 provided today typically uses this TID.  INCREASE MS contin   from 45 mg q12 hours to 60 mg po BID--new rx sent; OK to use up old 15 and 30 mg tabs to make 60 mg doses  Suggest use of heat as needed/ice as needed.  Continue with octreotide per Dr. Barreto.  RNCC symptom check in 2 weeks.        #Nausea,disease processes with neuroendocrine tumor and suspected carcinoid tumor, plus nausea due to chemotherapy infusions.  This has resolved currently and Louise has ondansetron supposed to be scheduled daily but she is using it prn and prochlorpemazine prn and phenergan which she isn't using        #Deconditioning : has been self limiting activity due to flushing episodes.  Mostly sitting/in bed during daytime.  Encourage ambulation/movement.  She is aware of functional status necessary for continuing cytotoxic chemotherapy.     #Unintended weight loss due to metastatic neuroendocrine tumor, issues with nausea  #Protein calorie malnutrition in the context of acute illness as evidenced by  "unintentional weight loss and muscle loss   Work on antiemetic medications.  Discussed ways to increase protein intake  We provided Ensure samples today and several coupons, as well.     #Constipation,Medication adverse effect  At home--on docusate only.  Recommend avoiding docusate as \"all mush, no push\" and studies show it worsens stool quality for cancer patients in RCT but Louise feels it makes a difference.     RECOMMENDATIONS   - bisacodyl daily  - Senna 1 tablet po BID on days she has a BM or 2 tabs po BID on days with no BM  new rx sent for this  - add dulcolax suppository if no BM on the third day and daily thereafter and if no BM in 5 days call  - goal is soft easy to pass BM daily to every other day.     # Anxiety, in part related to medical diagnosis.  - has not been taking citalopram with concerns for serotonergic effect.  - using lorazepam up to three times weekly at baseline, continue same.   prescribes this.  - Recommend [facility] support for emotional support/processing--order placed for outpatient setting.         Counseling: All of the above was explained to the patient in lay language. The patient has verbalized a clear understanding of the discussion, asked appropriate questions, which have been answered to patient's apparent satisfaction. The patient is in agreement with the above plan.        Chief Complaint/Patient ID: Louise Corado 59 year old female with PMHx of metastatic neuroendocrine carcinoma with cancer associated pain    Last Palliative care appointment: 11/19/2025 with me     Reviewed:  Yes:   reviewed - controlled substances reflected in medication list.    Interim History:  Louise Corado is a 59 year old female who is seen today for follow up with Palliative Care via billable video visit. Tumor seems stable with octreotide on  imaging from late December 2024.She had developed some hepatic steatosis and a liver specialist said it is OK to continue " octreotide.     Pain:  still present and maybe a bit worse during this unplanned treatment holiday    Appetite/Nausea: hungry but little appeals     Bowels: no constipation--uses prns     Sleep: no concerns     Mood: overall OK--her insurance issues were stressful     Coping:  'on top of the wave'    Family History- Reviewed in Epic.    No Known Allergies    Social History:  Pertinent changes to social history/social situation since last visit: none  Key support resources: , Rowdy.  Advance Directive Status:  no ACP documents in Epic.    Social History     Tobacco Use    Smoking status: Never     Passive exposure: Never    Smokeless tobacco: Never   Vaping Use    Vaping status: Never Used         No Known Allergies  Current Outpatient Medications   Medication Sig Dispense Refill    acetaminophen (TYLENOL) 325 MG tablet Take 325-650 mg by mouth every 6 hours as needed for mild pain      bisacodyl (DULCOLAX) 5 MG EC tablet Take 1 tablet (5 mg) by mouth daily. 30 tablet 0    citalopram (CELEXA) 20 MG tablet Take 1 tablet (20 mg) by mouth daily. 30 tablet 2    furosemide (LASIX) 20 MG tablet Take 1 tablet (20 mg) by mouth daily. 60 tablet 1    lidocaine-prilocaine (EMLA) 2.5-2.5 % external cream Apply topically as needed for moderate pain. 30 g 1    LORazepam (ATIVAN) 0.5 MG tablet Take 1-2 tablets (0.5-1 mg) by mouth every 6 hours as needed for anxiety or sleep. 30 tablet 1    metFORMIN (GLUCOPHAGE XR) 500 MG 24 hr tablet Take 1 tablet (500 mg) by mouth daily (with dinner). 90 tablet 0    morphine (MS CONTIN) 15 MG CR tablet Take 1 tablet (15 mg) by mouth every 12 hours. Take 1-15 mg tab and 1-30 mg tab every 12 hours 60 tablet 0    morphine (MS CONTIN) 30 MG CR tablet Take 1 tablet (30 mg) by mouth every 12 hours. Take 1-15 mg tab and 1-30 mg tab every 12 hours. 60 tablet 0    naloxone (NARCAN) 4 MG/0.1ML nasal spray Spray 1 spray (4 mg) into one nostril alternating nostrils as needed for opioid reversal every  2-3 minutes until assistance arrives (Patient not taking: Reported on 10/8/2024) 2 each 0    OLANZapine (ZYPREXA) 2.5 MG tablet Take 2 tablets (5 mg) by mouth at bedtime. 30 tablet 0    oxyCODONE (ROXICODONE) 5 MG tablet Take 3-4 tablets (15-20 mg) by mouth every 4 hours as needed for pain. 120 tablet 0    polyethylene glycol (MIRALAX) 17 GM/Dose powder Take 17 g by mouth daily. (Patient not taking: Reported on 10/8/2024) 510 g 0    prochlorperazine (COMPAZINE) 10 MG tablet Take 1 tablet (10 mg) by mouth every 6 hours as needed for vomiting. 30 tablet 0    promethazine (PHENERGAN) 12.5 MG tablet Take 1-2 tablets (12.5-25 mg) by mouth every 6 hours as needed for nausea. 30 tablet 1    scopolamine (TRANSDERM) 1 MG/3DAYS 72 hr patch Place 1 patch over 72 hours onto the skin every 72 hours. 10 patch 0    senna (SENOKOT) 8.6 MG tablet Take 1 tablet by mouth 2 times daily. May also take 2 tablets 2 times daily as needed for constipation (if no BM previous day). 120 tablet 11    simethicone (MYLICON) 125 MG chewable tablet Take 125 mg by mouth 4 times daily as needed for intestinal gas.       Past Medical History:   Diagnosis Date    Metastatic malignant neuroendocrine tumor to liver (H)      Past Surgical History:   Procedure Laterality Date    IR CHEST PORT PLACEMENT > 5 YRS OF AGE  4/23/2024       Physical Exam:   GENERAL APPEARANCE: robust appearing, alert and no distress; neatly groomed  EYES: Eyes grossly normal to inspection, PERRLA, conjunctivae and sclerae without injection or discharge, EOM intact   RESP:  no increased work of breathing; speaks in complete sentences;   MS: No musculoskeletal defects are noted  SKIN: No suspicious lesions or rashes, hydration status appears adequate with normal skin turgor   PSYCH: Alert and oriented x3; speech- coherent , normal rate and volume; able to articulate logical thoughts, able to abstract reason, no tangential thoughts, no hallucinations or delusions, mentation appears  normal, Mood is euthymic. Affect is appropriate for this mood state and bright. Thought content is free of suicidal ideation, hallucinations, and delusions.  Eye contact is good during conversation.       Key Data Reviewed:  LABS: 2025- Cr 0.58, Albumin 3.1,  Hgb 11.2,  Alk phos 209; AST 58      IMAGIN2025 CT CAP W/CONTRAST  IMPRESSION:  1.  Stable metastatic disease with stable nodular mass right lower mesentery, stable peritoneal carcinomatosis, liver masses, bone metastases, pleural metastases, and adenopathy above and below the diaphragms.     2.  Stable small right pleural effusion with atelectasis.     3.  Comparison to most recent PET scan 2024 and CT 2024    40 minutes spent on the date of the encounter doing chart review, history and exam, patient education & counseling, documentation and other activities as noted above.    The longitudinal plan of care for the diagnosis(es)/condition(s) as documented were addressed during this visit. Due to the added complexity in care, I will continue to support Louise in the subsequent management and with ongoing continuity of care.    Stephen Neil MD MS FAAFP CAQHPM  [facility]  Office [phone]  Fax [phone]

## 2025-03-04 NOTE — PATIENT INSTRUCTIONS
It was good to see you today, Louise and Rowdy.    Here are the things we talked about:  Let's increase the MS Contin from 45 to 60 mg twice a day.  Feel free to use up your 15 mg and 40 mg tablets to equal 60mg and then I sent in a new prescription for the 60 mg tablets to the [facility] on [address].    Amanda will for a symptom check in 2 weeks.    Stop to schedule a follow up appointment in late May.     How to get a hold of us:  For non-urgent matters, MyChart works best.    For more urgent matters, or if you prefer not to use MyChart, call our clinic nurse coordinator Amanda Josue RN at [phone]    We have an on-call number for evenings and weekends. Please call this only if you are having uncontrolled symptoms or serious side effects from your medicines: [phone].     For refills, please give us a week (5 working days) notice. We don't always have providers available everyday to do refills. If you call the day you run out of your medicine, we may not be able to refill it in time, so call 5 days in advance!    Stephen Neil MD MS FAAFP CAQHPM  [facility]  Office [phone]  Fax [phone]

## 2025-03-04 NOTE — PROGRESS NOTES
"Oncology Rooming Note    March 4, 2025 2:26 PM   Louise Corado is a 59 year old female who presents for:    Chief Complaint   Patient presents with    Oncology Clinic Visit    Palliative Care Referral Automated Outreach     Follow up     Initial Vitals: BP 94/56 (BP Location: Left arm, Patient Position: Sitting)   Pulse (!) 124   Temp 98.4  F (36.9  C)   Resp 20   Wt 66.6 kg (146 lb 12.8 oz)   SpO2 97%   BMI 26.85 kg/m   Estimated body mass index is 26.85 kg/m  as calculated from the following:    Height as of 12/24/24: 1.575 m (5' 2\").    Weight as of this encounter: 66.6 kg (146 lb 12.8 oz). Body surface area is 1.71 meters squared.  Moderate Pain (5) Comment: Data Unavailable   No LMP recorded. Patient is postmenopausal.  Allergies reviewed: Yes  Medications reviewed: Yes    Medications: Medication refills not needed today.  Pharmacy name entered into [facility]:    [facility] #[id number] - [place], [place] - [address] AT [facility] [place] [id number] [id number] [address] AT [id number] [place], [place] - [address] AT [facility] [address]    Frailty Screening:   Is the patient here for a new oncology consult visit in cancer care? 2. No    PHQ9:  Did this patient require a PHQ9?: No      Clinical concerns: follow up palliative care, having more abd/back pain  Dr. Neil was notified.      Naheed Verduzco RN              "

## 2025-03-04 NOTE — LETTER
3/4/2025      Louise Corado  [address] [place] [address]      Dear Colleague,    Thank you for referring your patient, Louise Corado, to the [facility] [place]. Please see a copy of my visit note below.    Palliative Care Outpatient Clinic Progress Note    Patient Name: Louise Corado  Primary Provider: Kee Mccullough     Recommendations & Counseling      GOALS OF CARE:   Life-prolonging without limits         ADVANCE CARE PLANNING:  No health care directive on file. Per system policy, Surrogate Decision-makers for Patients With Diminished Decision-making Capacity offers guidance on possible decision-makers.  Rowdy has been identified as a surrogate decision maker.  Recommend exploring further in future palliative medicine conversations.  There is no POLST form on file, defer to patient and/or next of kin for decisions   Code status: Full Code     MEDICAL MANAGEMENT:      #Pain,acute on chronic : Has R sided back/chest wall pain, sclerotic lesions noted on imaging, cancer-related.     Recommendations:    On APAP PRN-=-continue same.  Oxycodone 20 mg q4H PRN mod-severe pain, continue same and refill with new quantity of #120 provided today typically uses this TID.  INCREASE MS contin   from 45 mg q12 hours to 60 mg po BID--new rx sent; OK to use up old 15 and 30 mg tabs to make 60 mg doses  Suggest use of heat as needed/ice as needed.  Continue with octreotide per Dr. Barreto.  CC symptom check in 2 weeks.        #Nausea,disease processes with neuroendocrine tumor and suspected carcinoid tumor, plus nausea due to chemotherapy infusions.  This has resolved currently and Louise has ondansetron supposed to be scheduled daily but she is using it prn and prochlorpemazine prn and phenergan which she isn't using        #Deconditioning : has been self limiting activity due to flushing episodes.  Mostly sitting/in bed during daytime.  Encourage ambulation/movement.  She is  "aware of functional status necessary for continuing cytotoxic chemotherapy.     #Unintended weight loss due to metastatic neuroendocrine tumor, issues with nausea  #Protein calorie malnutrition in the context of acute illness as evidenced by unintentional weight loss and muscle loss   Work on antiemetic medications.  Discussed ways to increase protein intake  We provided Ensure samples today and several coupons, as well.     #Constipation,Medication adverse effect  At home--on docusate only.  Recommend avoiding docusate as \"all mush, no push\" and studies show it worsens stool quality for cancer patients in RCT but Louise feels it makes a difference.     RECOMMENDATIONS   - bisacodyl daily  - Senna 1 tablet po BID on days she has a BM or 2 tabs po BID on days with no BM  new rx sent for this  - add dulcolax suppository if no BM on the third day and daily thereafter and if no BM in 5 days call  - goal is soft easy to pass BM daily to every other day.     # Anxiety, in part related to medical diagnosis.  - has not been taking citalopram with concerns for serotonergic effect.  - using lorazepam up to three times weekly at baseline, continue same.   prescribes this.  - Recommend Oncology [facility] support for emotional support/processing--order placed for outpatient setting.         Counseling: All of the above was explained to the patient in lay language. The patient has verbalized a clear understanding of the discussion, asked appropriate questions, which have been answered to patient's apparent satisfaction. The patient is in agreement with the above plan.        Chief Complaint/Patient ID: Louise Corado 59 year old female with PMHx of metastatic neuroendocrine carcinoma with cancer associated pain    Last Palliative care appointment: 11/19/2025 with me     Reviewed:  Yes:   reviewed - controlled substances reflected in medication list.    Interim History:  Louise Corado is a 59 year old female who is " seen today for follow up with Palliative Care via billable video visit. Tumor seems stable with octreotide on  imaging from late December 2024.She had developed some hepatic steatosis and a liver specialist said it is OK to continue octreotide.     Pain:  still present and maybe a bit worse during this unplanned treatment holiday    Appetite/Nausea: hungry but little appeals     Bowels: no constipation--uses prns     Sleep: no concerns     Mood: overall OK--her insurance issues were stressful     Coping:  'on top of the wave'    Family History- Reviewed in Epic.    No Known Allergies    Social History:  Pertinent changes to social history/social situation since last visit: none  Key support resources: , Rowdy.  Advance Directive Status:  no ACP documents in Epic.    Social History     Tobacco Use     Smoking status: Never     Passive exposure: Never     Smokeless tobacco: Never   Vaping Use     Vaping status: Never Used         No Known Allergies  Current Outpatient Medications   Medication Sig Dispense Refill     acetaminophen (TYLENOL) 325 MG tablet Take 325-650 mg by mouth every 6 hours as needed for mild pain       bisacodyl (DULCOLAX) 5 MG EC tablet Take 1 tablet (5 mg) by mouth daily. 30 tablet 0     citalopram (CELEXA) 20 MG tablet Take 1 tablet (20 mg) by mouth daily. 30 tablet 2     furosemide (LASIX) 20 MG tablet Take 1 tablet (20 mg) by mouth daily. 60 tablet 1     lidocaine-prilocaine (EMLA) 2.5-2.5 % external cream Apply topically as needed for moderate pain. 30 g 1     LORazepam (ATIVAN) 0.5 MG tablet Take 1-2 tablets (0.5-1 mg) by mouth every 6 hours as needed for anxiety or sleep. 30 tablet 1     metFORMIN (GLUCOPHAGE XR) 500 MG 24 hr tablet Take 1 tablet (500 mg) by mouth daily (with dinner). 90 tablet 0     morphine (MS CONTIN) 15 MG CR tablet Take 1 tablet (15 mg) by mouth every 12 hours. Take 1-15 mg tab and 1-30 mg tab every 12 hours 60 tablet 0     morphine (MS CONTIN) 30 MG CR tablet  Take 1 tablet (30 mg) by mouth every 12 hours. Take 1-15 mg tab and 1-30 mg tab every 12 hours. 60 tablet 0     naloxone (NARCAN) 4 MG/0.1ML nasal spray Spray 1 spray (4 mg) into one nostril alternating nostrils as needed for opioid reversal every 2-3 minutes until assistance arrives (Patient not taking: Reported on 10/8/2024) 2 each 0     OLANZapine (ZYPREXA) 2.5 MG tablet Take 2 tablets (5 mg) by mouth at bedtime. 30 tablet 0     oxyCODONE (ROXICODONE) 5 MG tablet Take 3-4 tablets (15-20 mg) by mouth every 4 hours as needed for pain. 120 tablet 0     polyethylene glycol (MIRALAX) 17 GM/Dose powder Take 17 g by mouth daily. (Patient not taking: Reported on 10/8/2024) 510 g 0     prochlorperazine (COMPAZINE) 10 MG tablet Take 1 tablet (10 mg) by mouth every 6 hours as needed for vomiting. 30 tablet 0     promethazine (PHENERGAN) 12.5 MG tablet Take 1-2 tablets (12.5-25 mg) by mouth every 6 hours as needed for nausea. 30 tablet 1     scopolamine (TRANSDERM) 1 MG/3DAYS 72 hr patch Place 1 patch over 72 hours onto the skin every 72 hours. 10 patch 0     senna (SENOKOT) 8.6 MG tablet Take 1 tablet by mouth 2 times daily. May also take 2 tablets 2 times daily as needed for constipation (if no BM previous day). 120 tablet 11     simethicone (MYLICON) 125 MG chewable tablet Take 125 mg by mouth 4 times daily as needed for intestinal gas.       Past Medical History:   Diagnosis Date     Metastatic malignant neuroendocrine tumor to liver (H)      Past Surgical History:   Procedure Laterality Date     IR CHEST PORT PLACEMENT > 5 YRS OF AGE  4/23/2024       Physical Exam:   GENERAL APPEARANCE: robust appearing, alert and no distress; neatly groomed  EYES: Eyes grossly normal to inspection, PERRLA, conjunctivae and sclerae without injection or discharge, EOM intact   RESP:  no increased work of breathing; speaks in complete sentences;   MS: No musculoskeletal defects are noted  SKIN: No suspicious lesions or rashes, hydration  status appears adequate with normal skin turgor   PSYCH: Alert and oriented x3; speech- coherent , normal rate and volume; able to articulate logical thoughts, able to abstract reason, no tangential thoughts, no hallucinations or delusions, mentation appears normal, Mood is euthymic. Affect is appropriate for this mood state and bright. Thought content is free of suicidal ideation, hallucinations, and delusions.  Eye contact is good during conversation.       Key Data Reviewed:  LABS: 2025- Cr 0.58, Albumin 3.1,  Hgb 11.2,  Alk phos 209; AST 58      IMAGIN2025 CT CAP W/CONTRAST  IMPRESSION:  1.  Stable metastatic disease with stable nodular mass right lower mesentery, stable peritoneal carcinomatosis, liver masses, bone metastases, pleural metastases, and adenopathy above and below the diaphragms.     2.  Stable small right pleural effusion with atelectasis.     3.  Comparison to most recent PET scan 2024 and CT 2024    40 minutes spent on the date of the encounter doing chart review, history and exam, patient education & counseling, documentation and other activities as noted above.    The longitudinal plan of care for the diagnosis(es)/condition(s) as documented were addressed during this visit. Due to the added complexity in care, I will continue to support Louise in the subsequent management and with ongoing continuity of care.    Stephen Neil MD MS FAAFP CAQHPM  [facility]  Office [phone]  Fax [phone]     Oncology Rooming Note    2025 2:26 PM   Louise Corado is a 59 year old female who presents for:    Chief Complaint   Patient presents with     Oncology Clinic Visit     Palliative Care Referral Automated Outreach     Follow up     Initial Vitals: BP 94/56 (BP Location: Left arm, Patient Position: Sitting)   Pulse (!) 124   Temp 98.4  F (36.9  C)   Resp 20   Wt 66.6 kg (146 lb 12.8 oz)   SpO2 97%   BMI 26.85 kg/m   Estimated  "body mass index is 26.85 kg/m  as calculated from the following:    Height as of 12/24/24: 1.575 m (5' 2\").    Weight as of this encounter: 66.6 kg (146 lb 12.8 oz). Body surface area is 1.71 meters squared.  Moderate Pain (5) Comment: Data Unavailable   No LMP recorded. Patient is postmenopausal.  Allergies reviewed: Yes  Medications reviewed: Yes    Medications: Medication refills not needed today.  Pharmacy name entered into [facility]:    [id number] - [place], [place] - [address] AT [facility] [place], [place] [address] [id number] [place] [place] [address] [id number] - [place], [place] - [address] AT [facility] [address]    Frailty Screening:   Is the patient here for a new oncology consult visit in cancer care? 2. No    PHQ9:  Did this patient require a PHQ9?: No      Clinical concerns: follow up palliative care, having more abd/back pain  Dr. Neil was notified.      Naheed Verduzco, RN                Again, thank you for allowing me to participate in the care of your patient.        Sincerely,        Stephen Neil MD    Electronically signed"

## 2025-03-05 ENCOUNTER — OFFICE VISIT (OUTPATIENT)
Dept: FAMILY MEDICINE | Facility: CLINIC | Age: 60
End: 2025-03-05
Payer: COMMERCIAL

## 2025-03-05 VITALS
RESPIRATION RATE: 14 BRPM | DIASTOLIC BLOOD PRESSURE: 70 MMHG | HEART RATE: 114 BPM | HEIGHT: 62 IN | BODY MASS INDEX: 27.16 KG/M2 | WEIGHT: 147.6 LBS | TEMPERATURE: 97.4 F | OXYGEN SATURATION: 99 % | SYSTOLIC BLOOD PRESSURE: 102 MMHG

## 2025-03-05 DIAGNOSIS — G89.3 CANCER ASSOCIATED PAIN: ICD-10-CM

## 2025-03-05 DIAGNOSIS — F41.9 ANXIETY: ICD-10-CM

## 2025-03-05 DIAGNOSIS — C7B.8 METASTATIC MALIGNANT NEUROENDOCRINE TUMOR TO LIVER (H): Primary | ICD-10-CM

## 2025-03-05 DIAGNOSIS — R60.0 LOCALIZED EDEMA: ICD-10-CM

## 2025-03-05 DIAGNOSIS — F11.90 CHRONIC, CONTINUOUS USE OF OPIOIDS: ICD-10-CM

## 2025-03-05 DIAGNOSIS — R73.03 PREDIABETES: ICD-10-CM

## 2025-03-05 DIAGNOSIS — K76.0 HEPATIC STEATOSIS: ICD-10-CM

## 2025-03-05 DIAGNOSIS — Z12.31 ENCOUNTER FOR SCREENING MAMMOGRAM FOR BREAST CANCER: ICD-10-CM

## 2025-03-05 RX ORDER — METFORMIN HYDROCHLORIDE 500 MG/1
500 TABLET, EXTENDED RELEASE ORAL
Qty: 90 TABLET | Refills: 3 | Status: SHIPPED | OUTPATIENT
Start: 2025-03-05

## 2025-03-05 NOTE — PROGRESS NOTES
Assessment & Plan     Metastatic malignant neuroendocrine tumor to liver (H)0  Chronic opioid use  Cancer associated pain    Status post chemotherapy with carboplatin and etoposide every 3 weeks between May and August 2024.  Was on supportive treatment with octreotide,  - Monitoring of liver enzymes and potential need for liver biopsy discussed. Oncologist to provide further guidance on treatment and monitoring.  - Monitor liver enzymes every 6-12 months. Consider liver biopsy based on oncologist's recommendation.  -Is on chronic opioid treatment for cancer related pain, managed by palliative care    Localized edema  - Continue Lasix for swelling.    Prediabetes  - Blood sugar levels improved, A1c at 6.2%, below diabetic range.  - Continue metformin, monitor blood sugar every six months.    Encounter for screening mammogram for breast cancer  - No history of abnormal mammograms or breast biopsy. Family history of breast cancer in mother and sister.  They are not wishing to consider breast cancer risks at this time, genetic screening, or breast cancer prophylaxis.  Will address this again at our next visit.  At this time we will focus on getting her mammogram.  - Schedule mammogram. Order placed for mammogram.    Hepatic steatosis  Had thorough workup with [facility], other than hepatic steatosis no other causes of chronic liver elevations were found.  Still possible related to drug-induced liver injury, worsening tumor burden.     Gastroenterology believed drug-induced liver injury to be unlikely so octreotide treatment was recommended to be restarted when last seen by them on January 14, 2025.  She will follow-up with oncology on this.     Has had 1 dose of hepatitis A vaccine, recommend that we get the second dose today to finish series.  Has hepatitis B surface antibody positive previously, with other serologies negative, indicating positive immunity status  - Monitor liver enzymes annually. Consider liver  "biopsy, [facility] did recommended, she will talk to her oncologist and pursue liver biopsy if they agree.    AST   Date Value Ref Range Status   02/13/2025 58 (H) 0 - 45 U/L Final     Lab Results   Component Value Date    ALT 31 02/13/2025       Anxiety  Continue use of Ativan as needed, attempt to not use this at times when using opioids to prevent overdose.  Other than brief episodes of anxiety reports that she is doing well, does not wish for anything for baseline anxiety.      Vaccinations  - Vaccination status reviewed.  - Administer shingles, flu, COVID, and hepatitis A vaccines. Repeat COVID vaccine in six months.    Consent was obtained from the patient to use an AI documentation tool in the creation of this note.      The longitudinal plan of care for the diagnosis(es)/condition(s) as documented were addressed during this visit. Due to the added complexity in care, I will continue to support Louise in the subsequent management and with ongoing continuity of care.  Follow-up for adult preventative exam in 2 months      BMI  Estimated body mass index is 27 kg/m  as calculated from the following:    Height as of this encounter: 1.575 m (5' 2\").    Weight as of this encounter: 67 kg (147 lb 9.6 oz).   Weight management plan: Discussed healthy diet and exercise guidelines          Subjective   Louise is a 59 year old, presenting for the following health issues:  Follow Up (Diabetes and med check. Discuss overdue immunizations. Review [facility] appointments.)        3/5/2025     1:50 PM   Additional Questions   Roomed by JODI SHAFER     History of Present Illness       Reason for visit:  The provider asked me to come in   She is taking medications regularly.      History of Present Illness-  Louise Corado, 59-year-old female    - Insurance issues led to cancellation of doctor appointments since February.  - Blood sugar levels improved from 6.8% A1c in June to 6.2% A1c, below diabetic range.  - Continued taking " "metformin, one tablet daily.  - Past chemotherapy led to interruption of some immunizations.  - No history of abnormal mammograms or breast biopsies.  - No recent pleural effusions or thoracentesis.  - Lost 30 lbs since October, not intentional.              Review of Systems  Constitutional, neuro, ENT, endocrine, pulmonary, cardiac, gastrointestinal, genitourinary, musculoskeletal, integument and psychiatric systems are negative, except as otherwise noted.      Objective    /70 (BP Location: Right arm, Patient Position: Sitting, Cuff Size: Adult Regular)   Pulse 114   Temp 97.4  F (36.3  C)   Resp 14   Ht 1.575 m (5' 2\")   Wt 67 kg (147 lb 9.6 oz)   SpO2 99%   BMI 27.00 kg/m    Body mass index is 27 kg/m .  Physical Exam   GENERAL: alert and no distress  EYES: Eyes grossly normal to inspection, PERRL and conjunctivae and sclerae normal  HENT: ear canals and TM's normal, nose and mouth without ulcers or lesions  NECK: no adenopathy, no asymmetry, masses, or scars  RESP: lungs clear to auscultation - no rales, rhonchi or wheezes  CV: regular rate and rhythm, normal S1 S2, no S3 or S4, no murmur, click or rub, no peripheral edema  ABDOMEN: soft, nontender, no hepatosplenomegaly, no masses and bowel sounds normal  MS: no gross musculoskeletal defects noted, no edema  SKIN: no suspicious lesions or rashes  NEURO: Normal strength and tone, mentation intact and speech normal  PSYCH: mentation appears normal, affect normal/bright    Lab on 02/13/2025   Component Date Value Ref Range Status    Glucose 02/13/2025 180 (H)  70 - 99 mg/dL Final    Patient Fasting > 8hrs? 02/13/2025 Unknown   Final    Sodium 02/13/2025 142  135 - 145 mmol/L Final    Potassium 02/13/2025 3.7  3.4 - 5.3 mmol/L Final    Carbon Dioxide (CO2) 02/13/2025 28  22 - 29 mmol/L Final    Anion Gap 02/13/2025 14  7 - 15 mmol/L Final    Urea Nitrogen 02/13/2025 8.9  6.0 - 20.0 mg/dL Final    Creatinine 02/13/2025 0.58  0.51 - 0.95 mg/dL Final    " GFR Estimate 02/13/2025 >90  >60 mL/min/1.73m2 Final    eGFR calculated using 2021 CKD-EPI equation.    Calcium 02/13/2025 8.6 (L)  8.8 - 10.4 mg/dL Final    Chloride 02/13/2025 100  98 - 107 mmol/L Final    Glucose 02/13/2025 180 (H)  70 - 99 mg/dL Final    Alkaline Phosphatase 02/13/2025 209 (H)  40 - 150 U/L Final    AST 02/13/2025 58 (H)  0 - 45 U/L Final    ALT 02/13/2025 31  0 - 50 U/L Final    Protein Total 02/13/2025 6.6  6.4 - 8.3 g/dL Final    Albumin 02/13/2025 3.1 (L)  3.5 - 5.2 g/dL Final    Bilirubin Total 02/13/2025 0.5  <=1.2 mg/dL Final    Patient Fasting > 8hrs? 02/13/2025 Unknown   Final    Estimated Average Glucose 02/13/2025 131 (H)  <117 mg/dL Final    Hemoglobin A1C 02/13/2025 6.2 (H)  0.0 - 5.6 % Final    Normal <5.7%   Prediabetes 5.7-6.4%    Diabetes 6.5% or higher     Note: Adopted from ADA consensus guidelines.    Bilirubin Direct 02/13/2025 <0.20  0.00 - 0.30 mg/dL Final           Signed Electronically by: Kee Mccullough MD

## 2025-03-05 NOTE — PATIENT INSTRUCTIONS
Based on our discussion, I have outlined the following instructions for you:    - Schedule and attend a mammogram screening for breast cancer.  - Continue taking Lasix for swelling as directed.  - Keep taking metformin for blood sugar management.  - Check your blood sugar levels every six months.  - Get your liver enzymes checked every 6-12 months.  - Discuss with your oncologist if a liver biopsy is needed.  - Get your liver enzymes checked once a year for hepatic steatosis.  - Get vaccinated for shingles, flu, COVID, and hepatitis A.  - Plan to get another COVID vaccine in six months.    Thank you again for your visit, and we look forward to supporting you in your journey to better health.

## 2025-03-06 ENCOUNTER — PATIENT OUTREACH (OUTPATIENT)
Dept: CARE COORDINATION | Facility: CLINIC | Age: 60
End: 2025-03-06
Payer: COMMERCIAL

## 2025-03-06 DIAGNOSIS — C7A.8 NEUROENDOCRINE CARCINOMA METASTATIC TO LIVER (H): Primary | ICD-10-CM

## 2025-03-06 DIAGNOSIS — C7B.8 NEUROENDOCRINE CARCINOMA METASTATIC TO LIVER (H): Primary | ICD-10-CM

## 2025-03-06 PROBLEM — R73.03 PREDIABETES: Status: ACTIVE | Noted: 2025-03-06

## 2025-03-06 PROBLEM — R60.0 LOCALIZED EDEMA: Status: ACTIVE | Noted: 2025-03-06

## 2025-03-06 PROBLEM — G89.3 CANCER ASSOCIATED PAIN: Status: ACTIVE | Noted: 2025-03-06

## 2025-03-06 PROBLEM — F41.9 ANXIETY: Status: ACTIVE | Noted: 2025-03-06

## 2025-03-06 PROBLEM — K76.0 HEPATIC STEATOSIS: Status: ACTIVE | Noted: 2025-03-06

## 2025-03-06 PROBLEM — F11.90 CHRONIC, CONTINUOUS USE OF OPIOIDS: Status: ACTIVE | Noted: 2025-03-06

## 2025-03-10 ENCOUNTER — ONCOLOGY VISIT (OUTPATIENT)
Dept: ONCOLOGY | Facility: HOSPITAL | Age: 60
End: 2025-03-10
Attending: INTERNAL MEDICINE
Payer: COMMERCIAL

## 2025-03-10 ENCOUNTER — INFUSION THERAPY VISIT (OUTPATIENT)
Dept: INFUSION THERAPY | Facility: HOSPITAL | Age: 60
End: 2025-03-10
Attending: INTERNAL MEDICINE
Payer: COMMERCIAL

## 2025-03-10 VITALS
HEART RATE: 101 BPM | WEIGHT: 147.7 LBS | BODY MASS INDEX: 27.18 KG/M2 | OXYGEN SATURATION: 98 % | DIASTOLIC BLOOD PRESSURE: 85 MMHG | RESPIRATION RATE: 17 BRPM | SYSTOLIC BLOOD PRESSURE: 141 MMHG | HEIGHT: 62 IN

## 2025-03-10 DIAGNOSIS — C7B.8 NEUROENDOCRINE CARCINOMA METASTATIC TO LIVER (H): ICD-10-CM

## 2025-03-10 DIAGNOSIS — R11.2 NAUSEA AND VOMITING, UNSPECIFIED VOMITING TYPE: ICD-10-CM

## 2025-03-10 DIAGNOSIS — C7B.8 METASTATIC MALIGNANT NEUROENDOCRINE TUMOR TO LIVER (H): Primary | ICD-10-CM

## 2025-03-10 DIAGNOSIS — R11.0 NAUSEA: ICD-10-CM

## 2025-03-10 DIAGNOSIS — C7A.8 NEUROENDOCRINE CARCINOMA METASTATIC TO LIVER (H): ICD-10-CM

## 2025-03-10 LAB
ALBUMIN SERPL BCG-MCNC: 3.1 G/DL (ref 3.5–5.2)
ALP SERPL-CCNC: 187 U/L (ref 40–150)
ALT SERPL W P-5'-P-CCNC: 41 U/L (ref 0–50)
ANION GAP SERPL CALCULATED.3IONS-SCNC: 10 MMOL/L (ref 7–15)
AST SERPL W P-5'-P-CCNC: 42 U/L (ref 0–45)
BASOPHILS # BLD AUTO: 0 10E3/UL (ref 0–0.2)
BASOPHILS NFR BLD AUTO: 0 %
BILIRUB SERPL-MCNC: 0.3 MG/DL
BUN SERPL-MCNC: 11 MG/DL (ref 6–20)
CALCIUM SERPL-MCNC: 8.7 MG/DL (ref 8.8–10.4)
CHLORIDE SERPL-SCNC: 102 MMOL/L (ref 98–107)
CREAT SERPL-MCNC: 0.48 MG/DL (ref 0.51–0.95)
EGFRCR SERPLBLD CKD-EPI 2021: >90 ML/MIN/1.73M2
EOSINOPHIL # BLD AUTO: 0.1 10E3/UL (ref 0–0.7)
EOSINOPHIL NFR BLD AUTO: 1 %
ERYTHROCYTE [DISTWIDTH] IN BLOOD BY AUTOMATED COUNT: 13 % (ref 10–15)
GLUCOSE SERPL-MCNC: 107 MG/DL (ref 70–99)
HCO3 SERPL-SCNC: 26 MMOL/L (ref 22–29)
HCT VFR BLD AUTO: 36.4 % (ref 35–47)
HGB BLD-MCNC: 12.2 G/DL (ref 11.7–15.7)
IMM GRANULOCYTES # BLD: 0.1 10E3/UL
IMM GRANULOCYTES NFR BLD: 1 %
LYMPHOCYTES # BLD AUTO: 2.2 10E3/UL (ref 0.8–5.3)
LYMPHOCYTES NFR BLD AUTO: 30 %
MCH RBC QN AUTO: 31.9 PG (ref 26.5–33)
MCHC RBC AUTO-ENTMCNC: 33.5 G/DL (ref 31.5–36.5)
MCV RBC AUTO: 95 FL (ref 78–100)
MONOCYTES # BLD AUTO: 0.6 10E3/UL (ref 0–1.3)
MONOCYTES NFR BLD AUTO: 8 %
NEUTROPHILS # BLD AUTO: 4.4 10E3/UL (ref 1.6–8.3)
NEUTROPHILS NFR BLD AUTO: 61 %
NRBC # BLD AUTO: 0 10E3/UL
NRBC BLD AUTO-RTO: 0 /100
PLATELET # BLD AUTO: 409 10E3/UL (ref 150–450)
POTASSIUM SERPL-SCNC: 4.2 MMOL/L (ref 3.4–5.3)
PROT SERPL-MCNC: 6.6 G/DL (ref 6.4–8.3)
RBC # BLD AUTO: 3.83 10E6/UL (ref 3.8–5.2)
SODIUM SERPL-SCNC: 138 MMOL/L (ref 135–145)
WBC # BLD AUTO: 7.2 10E3/UL (ref 4–11)

## 2025-03-10 PROCEDURE — 85025 COMPLETE CBC W/AUTO DIFF WBC: CPT

## 2025-03-10 PROCEDURE — 99215 OFFICE O/P EST HI 40 MIN: CPT | Performed by: INTERNAL MEDICINE

## 2025-03-10 PROCEDURE — 80053 COMPREHEN METABOLIC PANEL: CPT

## 2025-03-10 PROCEDURE — 84260 ASSAY OF SEROTONIN: CPT

## 2025-03-10 PROCEDURE — 99213 OFFICE O/P EST LOW 20 MIN: CPT | Performed by: INTERNAL MEDICINE

## 2025-03-10 PROCEDURE — 36591 DRAW BLOOD OFF VENOUS DEVICE: CPT

## 2025-03-10 PROCEDURE — G2211 COMPLEX E/M VISIT ADD ON: HCPCS | Performed by: INTERNAL MEDICINE

## 2025-03-10 PROCEDURE — 250N000011 HC RX IP 250 OP 636: Performed by: INTERNAL MEDICINE

## 2025-03-10 PROCEDURE — 86316 IMMUNOASSAY TUMOR OTHER: CPT

## 2025-03-10 RX ORDER — HEPARIN SODIUM (PORCINE) LOCK FLUSH IV SOLN 100 UNIT/ML 100 UNIT/ML
5 SOLUTION INTRAVENOUS
Status: DISCONTINUED | OUTPATIENT
Start: 2025-03-10 | End: 2025-03-10 | Stop reason: HOSPADM

## 2025-03-10 RX ORDER — MEGESTROL ACETATE 40 MG/ML
400 SUSPENSION ORAL DAILY
Qty: 240 ML | Refills: 1 | Status: SHIPPED | OUTPATIENT
Start: 2025-03-10

## 2025-03-10 RX ORDER — SCOPOLAMINE 1 MG/3D
1 PATCH, EXTENDED RELEASE TRANSDERMAL
Qty: 10 PATCH | Refills: 3 | Status: SHIPPED | OUTPATIENT
Start: 2025-03-10

## 2025-03-10 RX ORDER — OLANZAPINE 2.5 MG/1
2.5 TABLET, FILM COATED ORAL AT BEDTIME
Qty: 30 TABLET | Refills: 2 | Status: SHIPPED | OUTPATIENT
Start: 2025-03-10

## 2025-03-10 RX ORDER — HEPARIN SODIUM (PORCINE) LOCK FLUSH IV SOLN 100 UNIT/ML 100 UNIT/ML
5 SOLUTION INTRAVENOUS
OUTPATIENT
Start: 2025-03-10

## 2025-03-10 RX ADMIN — HEPARIN 5 ML: 100 SYRINGE at 07:57

## 2025-03-10 ASSESSMENT — PAIN SCALES - GENERAL: PAINLEVEL_OUTOF10: MILD PAIN (3)

## 2025-03-10 NOTE — LETTER
"3/10/2025      Louise Corado  [address] [place] [address]      Dear Colleague,    Thank you for referring your patient, Louise Corado, to the [facility]. Please see a copy of my visit note below.    Oncology Rooming Note    March 10, 2025 8:30 AM   Louise Corado is a 59 year old female who presents for:    Chief Complaint   Patient presents with     Oncology Clinic Visit     Follow up -   Metastatic malignant neuroendocrine tumor to liver      Initial Vitals: BP (!) 141/85 (BP Location: Left arm, Patient Position: Sitting, Cuff Size: Adult Regular)   Pulse 101   Resp 17   Ht 1.575 m (5' 2\")   Wt 67 kg (147 lb 11.2 oz)   SpO2 98%   BMI 27.01 kg/m   Estimated body mass index is 27.01 kg/m  as calculated from the following:    Height as of this encounter: 1.575 m (5' 2\").    Weight as of this encounter: 67 kg (147 lb 11.2 oz). Body surface area is 1.71 meters squared.  Mild Pain (3) Comment: Data Unavailable   No LMP recorded. Patient is postmenopausal.  Allergies reviewed: Yes  Medications reviewed: Yes    Medications: MEDICATION REFILLS NEEDED TODAY. Provider was notified.  Pharmacy name entered into [facility]:    [id number] - [place], [place] - [address] AT [facility] [address] [id number] [place], [place] - [address] AT [facility] #[id number] - [place], [place] - [address] AT [facility] [address]    Frailty Screening:   Is the patient here for a new oncology consult visit in cancer care? 2. No    PHQ9:  Did this patient require a PHQ9?: No      Clinical concerns:   Follow up.   Pt reported that palliative provider increased her Morphine rx to 60mg, one tab BID. Need a refill on Morphine. Missed last tx appointment due to insurance issues.       Katia Bains MA              [facility] " Hematology and Oncology Progress Note    Patient: Louise Corado  MRN: [id number]  Date of Service: Mar 10, 2025        Assessment and Plan:    1.  Metastatic neuroendocrine carcinoma: Her octreotide has been on hold since discovering significant and relatively acute onset fatty liver in December 2024.  She states that she thinks her symptoms have overall worsened since holding her treatment 3 months ago.  Therefore we are going to restart her octreotide at 60 mg per dose every month.  I would expect that this will help her symptoms improved.  We are going to repeat a CT scan at this point since it has been 3 months and had like to have a new pretreatment baseline.  We will continue scans every 4 months.    2.  Nausea: Continue scopolamine patch.  She is basically only using lorazepam as needed.  Nothing else really seems to have helped.      3.  Pleural effusion: Mostly resolved now.  Follow clinically and radiographically.    4.  Anxiety: Generally well-controlled.  She is taking lorazepam as needed and olanzapine 2.5 mg every night.    5.  Pain: Her MS Contin dose was recently uptitrated to 60 mg every 12.  This is being managed by the palliative care service with whom she is following.      6.  Insomnia: Zyprexa 2.5 mg nightly.     7.  Hepatic steatosis: She was seen at [facility] on January 14, 2025.  It was thought the hepatic steatosis might have been secondary to rapid weight loss.  Octreotide is unlikely to cause this condition.  It was thought reasonable to restart octreotide.  LFTs are improving.    8.  Weight loss: She states she has an appetite but really just cannot eat much due to taste disturbance.  We are going to try Megace 400 mg daily for a few weeks to see if this helps.    Medical decision Making:  I spent 42 minutes in the care of this patient today, which included time necessary for preparation for the visit, face to face time with the patient, communication of  recommendations to the care team, and documentation time.      Prescriptions written this visit:  [employer]      Labs/Tests ordered this visit:  CT scan chest to evaluate cancer.    ECOG Performance  1    Diagnosis:    1.  Metastatic neuroendocrine carcinoma, poorly differentiated:  Diagnosed April 2024 from a liver biopsy.  Her PET/CT shows malignant involvement of the liver, mediastinal nodes, left upper quadrant omentum, manubrium, and bilateral pleura.  There is some wall thickening and FDG activity in a short segment of the terminal ileum. Liver biopsy shows WHO, NET G2.     EGD 4/25/24: Occasional gastritis.  No mass.  Colonoscopy 4/25/24: Mass of terminal ileum.    NGS:  PDL1=0%, MDI-stable, TMB low,   CancerTYPE ID:  GI carcinoid 96%    Treatment:    Carboplatin and etoposide initiated May 13, 2024.  25% etopside dose reduction starting with cycle 5 secondary to nausea.  Cycle 5 was completed on August 7, 2024.    Octreotide started 7/16/2024.  30 mg.  Increase to 60 mg October 10, 2024.  Held after December 2 dose for fatty liver.    Interim History:    Louise returns today for a follow-up visit. Had some more nausea last week but now it is better this week.  Chronic back pain.  MS Contin was recently uptitrated by palliative care.  She thinks her scopolamine continues to work pretty well.  No acute complaints today.    Review of Systems:    As above in the history.     Review of Systems otherwise Negative for:  General: chills, fever or night sweats  Psychological: depression  Ophthalmic: blurry vision, double vision or loss of vision, vision change  ENT: epistaxis, oral lesions, hearing changes  Hematological and Lymphatic: bleeding, bruising, jaundice, swollen lymph nodes  Endocrine: hot flashes, unexpected weight changes  Respiratory: cough, hemoptysis, orthopnea  Cardiovascular: palpitations or PND  Gastrointestinal: blood in stools, change in bowel habits  Genito-Urinary: change in urinary stream,  "incontinence, frequency/urgency  Musculoskeletal: joint swelling, muscle pain  Neurological: dizziness, headaches, numbness/tingling  Dermatological: lumps and rash    Physical Exam:    BP (!) 141/85 (BP Location: Left arm, Patient Position: Sitting, Cuff Size: Adult Regular)   Pulse 101   Resp 17   Ht 1.575 m (5' 2\")   Wt 67 kg (147 lb 11.2 oz)   SpO2 98%   BMI 27.01 kg/m      General: patient appears stated age of 59 year old. Nontoxic and in no distress.   HEENT: Head: atraumatic, normocephalic. Sclerae anicteric.  Chest:  Normal respiratory effort  Cardiac: Trace bipedal edema  Abdomen: abdomen is non-distended  Extremities: normal tone and muscle bulk.  Skin: no lesions or rash on visible skin. Warm and dry.   CNS: alert and oriented. Grossly non-focal.   Psychiatric: normal mood and affect.     Lab Results:    Recent Results (from the past week)   Comprehensive metabolic panel   Result Value Ref Range    Sodium 138 135 - 145 mmol/L    Potassium 4.2 3.4 - 5.3 mmol/L    Carbon Dioxide (CO2) 26 22 - 29 mmol/L    Anion Gap 10 7 - 15 mmol/L    Urea Nitrogen 11.0 6.0 - 20.0 mg/dL    Creatinine 0.48 (L) 0.51 - 0.95 mg/dL    GFR Estimate >90 >60 mL/min/1.73m2    Calcium 8.7 (L) 8.8 - 10.4 mg/dL    Chloride 102 98 - 107 mmol/L    Glucose 107 (H) 70 - 99 mg/dL    Alkaline Phosphatase 187 (H) 40 - 150 U/L    AST 42 0 - 45 U/L    ALT 41 0 - 50 U/L    Protein Total 6.6 6.4 - 8.3 g/dL    Albumin 3.1 (L) 3.5 - 5.2 g/dL    Bilirubin Total 0.3 <=1.2 mg/dL   CBC with platelets and differential   Result Value Ref Range    WBC Count 7.2 4.0 - 11.0 10e3/uL    RBC Count 3.83 3.80 - 5.20 10e6/uL    Hemoglobin 12.2 11.7 - 15.7 g/dL    Hematocrit 36.4 35.0 - 47.0 %    MCV 95 78 - 100 fL    MCH 31.9 26.5 - 33.0 pg    MCHC 33.5 31.5 - 36.5 g/dL    RDW 13.0 10.0 - 15.0 %    Platelet Count 409 150 - 450 10e3/uL    % Neutrophils 61 %    % Lymphocytes 30 %    % Monocytes 8 %    % Eosinophils 1 %    % Basophils 0 %    % Immature " Granulocytes 1 %    NRBCs per 100 WBC 0 <1 /100    Absolute Neutrophils 4.4 1.6 - 8.3 10e3/uL    Absolute Lymphocytes 2.2 0.8 - 5.3 10e3/uL    Absolute Monocytes 0.6 0.0 - 1.3 10e3/uL    Absolute Eosinophils 0.1 0.0 - 0.7 10e3/uL    Absolute Basophils 0.0 0.0 - 0.2 10e3/uL    Absolute Immature Granulocytes 0.1 <=0.4 10e3/uL    Absolute NRBCs 0.0 10e3/uL     Imaging:    No results found.      Signed by: Man Barreto MD`q      Again, thank you for allowing me to participate in the care of your patient.        Sincerely,        Man Barreto MD    Electronically signed

## 2025-03-10 NOTE — PROGRESS NOTES
[facility] Hematology and Oncology Progress Note    Patient: Louise Corado  MRN: [id number]  Date of Service: Mar 10, 2025        Assessment and Plan:    1.  Metastatic neuroendocrine carcinoma: Her octreotide has been on hold since discovering significant and relatively acute onset fatty liver in December 2024.  She states that she thinks her symptoms have overall worsened since holding her treatment 3 months ago.  Therefore we are going to restart her octreotide at 60 mg per dose every month.  I would expect that this will help her symptoms improved.  We are going to repeat a CT scan at this point since it has been 3 months and had like to have a new pretreatment baseline.  We will continue scans every 4 months.    2.  Nausea: Continue scopolamine patch.  She is basically only using lorazepam as needed.  Nothing else really seems to have helped.      3.  Pleural effusion: Mostly resolved now.  Follow clinically and radiographically.    4.  Anxiety: Generally well-controlled.  She is taking lorazepam as needed and olanzapine 2.5 mg every night.    5.  Pain: Her MS Contin dose was recently uptitrated to 60 mg every 12.  This is being managed by the palliative care service with whom she is following.      6.  Insomnia: Zyprexa 2.5 mg nightly.     7.  Hepatic steatosis: She was seen at [facility] on January 14, 2025.  It was thought the hepatic steatosis might have been secondary to rapid weight loss.  Octreotide is unlikely to cause this condition.  It was thought reasonable to restart octreotide.  LFTs are improving.    8.  Weight loss: She states she has an appetite but really just cannot eat much due to taste disturbance.  We are going to try Megace 400 mg daily for a few weeks to see if this helps.    Medical decision Making:  I spent 42 minutes in the care of this patient today, which included time necessary for preparation for the visit, face to face time with the patient, communication  of recommendations to the care team, and documentation time.      Prescriptions written this visit:  [employer]      Labs/Tests ordered this visit:  CT scan chest to evaluate cancer.    ECOG Performance  1    Diagnosis:    1.  Metastatic neuroendocrine carcinoma, poorly differentiated:  Diagnosed April 2024 from a liver biopsy.  Her PET/CT shows malignant involvement of the liver, mediastinal nodes, left upper quadrant omentum, manubrium, and bilateral pleura.  There is some wall thickening and FDG activity in a short segment of the terminal ileum. Liver biopsy shows WHO, NET G2.     EGD 4/25/24: Occasional gastritis.  No mass.  Colonoscopy 4/25/24: Mass of terminal ileum.    NGS:  PDL1=0%, MDI-stable, TMB low,   CancerTYPE ID:  GI carcinoid 96%    Treatment:    Carboplatin and etoposide initiated May 13, 2024.  25% etopside dose reduction starting with cycle 5 secondary to nausea.  Cycle 5 was completed on August 7, 2024.    Octreotide started 7/16/2024.  30 mg.  Increase to 60 mg October 10, 2024.  Held after December 2 dose for fatty liver.    Interim History:    Louise returns today for a follow-up visit. Had some more nausea last week but now it is better this week.  Chronic back pain.  MS Contin was recently uptitrated by palliative care.  She thinks her scopolamine continues to work pretty well.  No acute complaints today.    Review of Systems:    As above in the history.     Review of Systems otherwise Negative for:  General: chills, fever or night sweats  Psychological: depression  Ophthalmic: blurry vision, double vision or loss of vision, vision change  ENT: epistaxis, oral lesions, hearing changes  Hematological and Lymphatic: bleeding, bruising, jaundice, swollen lymph nodes  Endocrine: hot flashes, unexpected weight changes  Respiratory: cough, hemoptysis, orthopnea  Cardiovascular: palpitations or PND  Gastrointestinal: blood in stools, change in bowel habits  Genito-Urinary: change in urinary stream,  "incontinence, frequency/urgency  Musculoskeletal: joint swelling, muscle pain  Neurological: dizziness, headaches, numbness/tingling  Dermatological: lumps and rash    Physical Exam:    BP (!) 141/85 (BP Location: Left arm, Patient Position: Sitting, Cuff Size: Adult Regular)   Pulse 101   Resp 17   Ht 1.575 m (5' 2\")   Wt 67 kg (147 lb 11.2 oz)   SpO2 98%   BMI 27.01 kg/m      General: patient appears stated age of 59 year old. Nontoxic and in no distress.   HEENT: Head: atraumatic, normocephalic. Sclerae anicteric.  Chest:  Normal respiratory effort  Cardiac: Trace bipedal edema  Abdomen: abdomen is non-distended  Extremities: normal tone and muscle bulk.  Skin: no lesions or rash on visible skin. Warm and dry.   CNS: alert and oriented. Grossly non-focal.   Psychiatric: normal mood and affect.     Lab Results:    Recent Results (from the past week)   Comprehensive metabolic panel   Result Value Ref Range    Sodium 138 135 - 145 mmol/L    Potassium 4.2 3.4 - 5.3 mmol/L    Carbon Dioxide (CO2) 26 22 - 29 mmol/L    Anion Gap 10 7 - 15 mmol/L    Urea Nitrogen 11.0 6.0 - 20.0 mg/dL    Creatinine 0.48 (L) 0.51 - 0.95 mg/dL    GFR Estimate >90 >60 mL/min/1.73m2    Calcium 8.7 (L) 8.8 - 10.4 mg/dL    Chloride 102 98 - 107 mmol/L    Glucose 107 (H) 70 - 99 mg/dL    Alkaline Phosphatase 187 (H) 40 - 150 U/L    AST 42 0 - 45 U/L    ALT 41 0 - 50 U/L    Protein Total 6.6 6.4 - 8.3 g/dL    Albumin 3.1 (L) 3.5 - 5.2 g/dL    Bilirubin Total 0.3 <=1.2 mg/dL   CBC with platelets and differential   Result Value Ref Range    WBC Count 7.2 4.0 - 11.0 10e3/uL    RBC Count 3.83 3.80 - 5.20 10e6/uL    Hemoglobin 12.2 11.7 - 15.7 g/dL    Hematocrit 36.4 35.0 - 47.0 %    MCV 95 78 - 100 fL    MCH 31.9 26.5 - 33.0 pg    MCHC 33.5 31.5 - 36.5 g/dL    RDW 13.0 10.0 - 15.0 %    Platelet Count 409 150 - 450 10e3/uL    % Neutrophils 61 %    % Lymphocytes 30 %    % Monocytes 8 %    % Eosinophils 1 %    % Basophils 0 %    % Immature " Granulocytes 1 %    NRBCs per 100 WBC 0 <1 /100    Absolute Neutrophils 4.4 1.6 - 8.3 10e3/uL    Absolute Lymphocytes 2.2 0.8 - 5.3 10e3/uL    Absolute Monocytes 0.6 0.0 - 1.3 10e3/uL    Absolute Eosinophils 0.1 0.0 - 0.7 10e3/uL    Absolute Basophils 0.0 0.0 - 0.2 10e3/uL    Absolute Immature Granulocytes 0.1 <=0.4 10e3/uL    Absolute NRBCs 0.0 10e3/uL     Imaging:    No results found.      Signed by: Man Barreto MD`q

## 2025-03-10 NOTE — PROGRESS NOTES
"Oncology Rooming Note    March 10, 2025 8:30 AM   Louise Corado is a 59 year old female who presents for:    Chief Complaint   Patient presents with    Oncology Clinic Visit     Follow up -   Metastatic malignant neuroendocrine tumor to liver      Initial Vitals: BP (!) 141/85 (BP Location: Left arm, Patient Position: Sitting, Cuff Size: Adult Regular)   Pulse 101   Resp 17   Ht 1.575 m (5' 2\")   Wt 67 kg (147 lb 11.2 oz)   SpO2 98%   BMI 27.01 kg/m   Estimated body mass index is 27.01 kg/m  as calculated from the following:    Height as of this encounter: 1.575 m (5' 2\").    Weight as of this encounter: 67 kg (147 lb 11.2 oz). Body surface area is 1.71 meters squared.  Mild Pain (3) Comment: Data Unavailable   No LMP recorded. Patient is postmenopausal.  Allergies reviewed: Yes  Medications reviewed: Yes    Medications: MEDICATION REFILLS NEEDED TODAY. Provider was notified.  Pharmacy name entered into [facility]:    [facility] #[id number] - [place], [place] - [address] AT [facility] [place], [place] - [address] [id number] [place], [place] - [address] AT [id number] - [place], [place] - [address] AT [facility] [address]    Frailty Screening:   Is the patient here for a new oncology consult visit in cancer care? 2. No    PHQ9:  Did this patient require a PHQ9?: No      Clinical concerns:   Follow up.   Pt reported that palliative provider increased her Morphine rx to 60mg, one tab BID. Need a refill on Morphine. Missed last tx appointment due to insurance issues.       Katia Bains MA            "

## 2025-03-12 LAB — CGA SERPL-MCNC: 4076 NG/ML

## 2025-03-13 LAB — SEROTONIN SER-MCNC: 2173 NG/ML

## 2025-03-21 ENCOUNTER — HOSPITAL ENCOUNTER (OUTPATIENT)
Dept: CT IMAGING | Facility: HOSPITAL | Age: 60
Discharge: HOME | End: 2025-03-21
Attending: INTERNAL MEDICINE
Payer: COMMERCIAL

## 2025-03-21 DIAGNOSIS — C7B.8 METASTATIC MALIGNANT NEUROENDOCRINE TUMOR TO LIVER (H): ICD-10-CM

## 2025-03-21 PROCEDURE — 250N000011 HC RX IP 250 OP 636: Performed by: INTERNAL MEDICINE

## 2025-03-21 PROCEDURE — 71260 CT THORAX DX C+: CPT

## 2025-03-21 RX ORDER — HEPARIN SODIUM (PORCINE) LOCK FLUSH IV SOLN 100 UNIT/ML 100 UNIT/ML
500 SOLUTION INTRAVENOUS ONCE
Status: COMPLETED | OUTPATIENT
Start: 2025-03-21 | End: 2025-03-21

## 2025-03-21 RX ORDER — IOPAMIDOL 755 MG/ML
75 INJECTION, SOLUTION INTRAVASCULAR ONCE
Status: COMPLETED | OUTPATIENT
Start: 2025-03-21 | End: 2025-03-21

## 2025-03-21 RX ADMIN — IOPAMIDOL 75 ML: 755 INJECTION, SOLUTION INTRAVENOUS at 11:44

## 2025-03-21 RX ADMIN — HEPARIN SODIUM (PORCINE) LOCK FLUSH IV SOLN 100 UNIT/ML 500 UNITS: 100 SOLUTION at 11:53

## 2025-03-28 ENCOUNTER — HOSPITAL ENCOUNTER (OUTPATIENT)
Dept: MAMMOGRAPHY | Facility: CLINIC | Age: 60
Discharge: HOME | End: 2025-03-28
Attending: STUDENT IN AN ORGANIZED HEALTH CARE EDUCATION/TRAINING PROGRAM | Admitting: STUDENT IN AN ORGANIZED HEALTH CARE EDUCATION/TRAINING PROGRAM
Payer: COMMERCIAL

## 2025-03-28 DIAGNOSIS — Z12.31 ENCOUNTER FOR SCREENING MAMMOGRAM FOR BREAST CANCER: ICD-10-CM

## 2025-03-28 PROCEDURE — 77063 BREAST TOMOSYNTHESIS BI: CPT

## 2025-04-06 ENCOUNTER — MYC REFILL (OUTPATIENT)
Dept: PALLIATIVE CARE | Facility: CLINIC | Age: 60
End: 2025-04-06
Payer: COMMERCIAL

## 2025-04-06 DIAGNOSIS — R07.1 PAINFUL RESPIRATION: ICD-10-CM

## 2025-04-07 ENCOUNTER — MYC REFILL (OUTPATIENT)
Dept: ONCOLOGY | Facility: HOSPITAL | Age: 60
End: 2025-04-07
Payer: COMMERCIAL

## 2025-04-07 DIAGNOSIS — R91.8 LUNG MASS: ICD-10-CM

## 2025-04-07 RX ORDER — LORAZEPAM 0.5 MG/1
.5-1 TABLET ORAL EVERY 6 HOURS PRN
Qty: 30 TABLET | Refills: 1 | Status: SHIPPED | OUTPATIENT
Start: 2025-04-07

## 2025-04-07 RX ORDER — OXYCODONE HYDROCHLORIDE 5 MG/1
15-20 TABLET ORAL EVERY 4 HOURS PRN
Qty: 120 TABLET | Refills: 0 | Status: SHIPPED | OUTPATIENT
Start: 2025-04-07

## 2025-04-07 NOTE — TELEPHONE ENCOUNTER
Received [employer] message from patient requesting refill of oxycodone.     Last refill: 9/7/25  Last office visit: 3/4/25  Scheduled for follow up 6/3/25     Will route request to MD/ for review.     Reviewed [place]  Report.

## 2025-04-14 DIAGNOSIS — C7B.8 METASTATIC MALIGNANT NEUROENDOCRINE TUMOR TO LIVER (H): Primary | ICD-10-CM

## 2025-04-14 DIAGNOSIS — C7A.8 NEUROENDOCRINE CARCINOMA METASTATIC TO LIVER (H): ICD-10-CM

## 2025-04-14 DIAGNOSIS — C7B.8 NEUROENDOCRINE CARCINOMA METASTATIC TO LIVER (H): ICD-10-CM

## 2025-04-16 ENCOUNTER — MYC REFILL (OUTPATIENT)
Dept: ONCOLOGY | Facility: HOSPITAL | Age: 60
End: 2025-04-16

## 2025-04-16 ENCOUNTER — ONCOLOGY VISIT (OUTPATIENT)
Dept: ONCOLOGY | Facility: HOSPITAL | Age: 60
End: 2025-04-16
Attending: INTERNAL MEDICINE
Payer: COMMERCIAL

## 2025-04-16 ENCOUNTER — INFUSION THERAPY VISIT (OUTPATIENT)
Dept: INFUSION THERAPY | Facility: HOSPITAL | Age: 60
End: 2025-04-16
Attending: INTERNAL MEDICINE
Payer: COMMERCIAL

## 2025-04-16 VITALS
SYSTOLIC BLOOD PRESSURE: 134 MMHG | OXYGEN SATURATION: 98 % | BODY MASS INDEX: 27.51 KG/M2 | WEIGHT: 150.4 LBS | HEART RATE: 80 BPM | DIASTOLIC BLOOD PRESSURE: 70 MMHG | RESPIRATION RATE: 16 BRPM | TEMPERATURE: 97.9 F

## 2025-04-16 DIAGNOSIS — R11.2 NAUSEA AND VOMITING, UNSPECIFIED VOMITING TYPE: ICD-10-CM

## 2025-04-16 DIAGNOSIS — C7A.8 NEUROENDOCRINE CARCINOMA METASTATIC TO LIVER (H): ICD-10-CM

## 2025-04-16 DIAGNOSIS — K76.0 HEPATIC STEATOSIS: ICD-10-CM

## 2025-04-16 DIAGNOSIS — C7B.8 METASTATIC MALIGNANT NEUROENDOCRINE TUMOR TO LIVER (H): Primary | ICD-10-CM

## 2025-04-16 DIAGNOSIS — R11.0 NAUSEA: ICD-10-CM

## 2025-04-16 DIAGNOSIS — C7B.8 METASTATIC MALIGNANT NEUROENDOCRINE TUMOR TO LIVER (H): ICD-10-CM

## 2025-04-16 DIAGNOSIS — G89.3 CANCER ASSOCIATED PAIN: Primary | ICD-10-CM

## 2025-04-16 DIAGNOSIS — C7B.8 NEUROENDOCRINE CARCINOMA METASTATIC TO LIVER (H): ICD-10-CM

## 2025-04-16 LAB
ALBUMIN SERPL BCG-MCNC: 3.2 G/DL (ref 3.5–5.2)
ALP SERPL-CCNC: 127 U/L (ref 40–150)
ALT SERPL W P-5'-P-CCNC: 19 U/L (ref 0–50)
ANION GAP SERPL CALCULATED.3IONS-SCNC: 7 MMOL/L (ref 7–15)
AST SERPL W P-5'-P-CCNC: 26 U/L (ref 0–45)
BASOPHILS # BLD AUTO: 0 10E3/UL (ref 0–0.2)
BASOPHILS NFR BLD AUTO: 1 %
BILIRUB SERPL-MCNC: 0.3 MG/DL
BUN SERPL-MCNC: 10.6 MG/DL (ref 6–20)
CALCIUM SERPL-MCNC: 8.6 MG/DL (ref 8.8–10.4)
CHLORIDE SERPL-SCNC: 105 MMOL/L (ref 98–107)
CREAT SERPL-MCNC: 0.48 MG/DL (ref 0.51–0.95)
EGFRCR SERPLBLD CKD-EPI 2021: >90 ML/MIN/1.73M2
EOSINOPHIL # BLD AUTO: 0.1 10E3/UL (ref 0–0.7)
EOSINOPHIL NFR BLD AUTO: 2 %
ERYTHROCYTE [DISTWIDTH] IN BLOOD BY AUTOMATED COUNT: 12.8 % (ref 10–15)
GLUCOSE SERPL-MCNC: 116 MG/DL (ref 70–99)
HCO3 SERPL-SCNC: 31 MMOL/L (ref 22–29)
HCT VFR BLD AUTO: 34.2 % (ref 35–47)
HGB BLD-MCNC: 11.4 G/DL (ref 11.7–15.7)
IMM GRANULOCYTES # BLD: 0 10E3/UL
IMM GRANULOCYTES NFR BLD: 0 %
LYMPHOCYTES # BLD AUTO: 1.4 10E3/UL (ref 0.8–5.3)
LYMPHOCYTES NFR BLD AUTO: 31 %
MCH RBC QN AUTO: 31.1 PG (ref 26.5–33)
MCHC RBC AUTO-ENTMCNC: 33.3 G/DL (ref 31.5–36.5)
MCV RBC AUTO: 93 FL (ref 78–100)
MONOCYTES # BLD AUTO: 0.4 10E3/UL (ref 0–1.3)
MONOCYTES NFR BLD AUTO: 9 %
NEUTROPHILS # BLD AUTO: 2.5 10E3/UL (ref 1.6–8.3)
NEUTROPHILS NFR BLD AUTO: 57 %
NRBC # BLD AUTO: 0 10E3/UL
NRBC BLD AUTO-RTO: 0 /100
PLATELET # BLD AUTO: 307 10E3/UL (ref 150–450)
POTASSIUM SERPL-SCNC: 3.8 MMOL/L (ref 3.4–5.3)
PROT SERPL-MCNC: 6.6 G/DL (ref 6.4–8.3)
RBC # BLD AUTO: 3.66 10E6/UL (ref 3.8–5.2)
SODIUM SERPL-SCNC: 143 MMOL/L (ref 135–145)
WBC # BLD AUTO: 4.4 10E3/UL (ref 4–11)

## 2025-04-16 PROCEDURE — 84450 TRANSFERASE (AST) (SGOT): CPT

## 2025-04-16 PROCEDURE — 84155 ASSAY OF PROTEIN SERUM: CPT

## 2025-04-16 PROCEDURE — 96372 THER/PROPH/DIAG INJ SC/IM: CPT | Performed by: INTERNAL MEDICINE

## 2025-04-16 PROCEDURE — 99214 OFFICE O/P EST MOD 30 MIN: CPT | Performed by: INTERNAL MEDICINE

## 2025-04-16 PROCEDURE — 85004 AUTOMATED DIFF WBC COUNT: CPT

## 2025-04-16 PROCEDURE — 250N000011 HC RX IP 250 OP 636: Mod: JZ | Performed by: INTERNAL MEDICINE

## 2025-04-16 PROCEDURE — 36591 DRAW BLOOD OFF VENOUS DEVICE: CPT

## 2025-04-16 PROCEDURE — 250N000011 HC RX IP 250 OP 636: Performed by: INTERNAL MEDICINE

## 2025-04-16 RX ORDER — HEPARIN SODIUM (PORCINE) LOCK FLUSH IV SOLN 100 UNIT/ML 100 UNIT/ML
5 SOLUTION INTRAVENOUS
Status: CANCELLED | OUTPATIENT
Start: 2025-04-16

## 2025-04-16 RX ORDER — OLANZAPINE 2.5 MG/1
2.5 TABLET, FILM COATED ORAL AT BEDTIME
Qty: 30 TABLET | Refills: 2 | Status: SHIPPED | OUTPATIENT
Start: 2025-04-16

## 2025-04-16 RX ORDER — OCTREOTIDE ACETATE 30 MG
60 KIT INTRAMUSCULAR ONCE
Status: COMPLETED | OUTPATIENT
Start: 2025-04-16 | End: 2025-04-16

## 2025-04-16 RX ORDER — SCOPOLAMINE 1 MG/3D
1 PATCH, EXTENDED RELEASE TRANSDERMAL
Qty: 10 PATCH | Refills: 3 | Status: SHIPPED | OUTPATIENT
Start: 2025-04-16

## 2025-04-16 RX ORDER — HEPARIN SODIUM (PORCINE) LOCK FLUSH IV SOLN 100 UNIT/ML 100 UNIT/ML
5 SOLUTION INTRAVENOUS
Status: DISCONTINUED | OUTPATIENT
Start: 2025-04-16 | End: 2025-04-16 | Stop reason: HOSPADM

## 2025-04-16 RX ORDER — HEPARIN SODIUM (PORCINE) LOCK FLUSH IV SOLN 100 UNIT/ML 100 UNIT/ML
5 SOLUTION INTRAVENOUS
OUTPATIENT
Start: 2025-04-16

## 2025-04-16 RX ADMIN — HEPARIN 5 ML: 100 SYRINGE at 09:12

## 2025-04-16 RX ADMIN — OCTREOTIDE ACETATE 60 MG: KIT at 11:28

## 2025-04-16 ASSESSMENT — PAIN SCALES - GENERAL: PAINLEVEL_OUTOF10: SEVERE PAIN (7)

## 2025-04-16 NOTE — LETTER
"4/16/2025      Louise Corado  [address] [place] [address]      Dear Colleague,    Thank you for referring your patient, Louise Corado, to the [facility]. Please see a copy of my visit note below.    Oncology Rooming Note    April 16, 2025 10:02 AM   Louise Corado is a 60 year old female who presents for:    Chief Complaint   Patient presents with     Oncology Clinic Visit     Return visit with lab and injection. Metastatic malignant neuroendocrine tumor to liver.     Initial Vitals: /70 (BP Location: Right arm, Patient Position: Sitting, Cuff Size: Adult Regular)   Pulse 80   Temp 97.9  F (36.6  C) (Oral)   Resp 16   Wt 68.2 kg (150 lb 6.4 oz)   SpO2 98%   BMI 27.51 kg/m   Estimated body mass index is 27.51 kg/m  as calculated from the following:    Height as of 3/10/25: 1.575 m (5' 2\").    Weight as of this encounter: 68.2 kg (150 lb 6.4 oz). Body surface area is 1.73 meters squared.  Severe Pain (7) Comment: Data Unavailable   No LMP recorded. Patient is postmenopausal.  Allergies reviewed: Yes  Medications reviewed: Yes    Medications: MEDICATION REFILLS NEEDED TODAY. Provider was notified.  Pharmacy name entered into [employer]:    [id number] - [place], [place] - [address] AT [facility] [place] [id number] [facility]    Frailty Screening:   Is the patient here for a new oncology consult visit in cancer care? 2. No    PHQ9:  Did this patient require a PHQ9?: No      Clinical concerns: pain that comes and goes in right side of chest radiating around to side and back 7/10.   Dr Barreto was notified.      Shannan Gandara [facility] Hematology and Oncology Progress Note    Patient: Louise Corado  MRN: [id number]  Date of Service: Apr 16, 2025        Assessment and Plan:    1.  Metastatic neuroendocrine carcinoma: Clinically stable.  Most recent " imaging on March 21 showed mostly stable disease in the liver and abdomen.  She restarted octreotide last month and receive a dose today after having a 3-month break.  She notes that some of her symptoms have improved since her injection last month.    She still has problems with frequent flushing during the day.  Medic these episodes are associated with pain and shortness of breath.  Serum serotonin level is 10 times the upper limit of normal.  Going to refer her to interventional radiology for consideration of radioembolization to her liver tumors to try to help control her symptoms.  I have discussed this with her other providers and he thinks it would be a reasonable intervention.  I reviewed this with the patient and her  and they agree to proceed.  Will see her back in clinic in 2 months.  Continue monthly octreotide injections.  The next treatment option would be Lutathera radioembolization is not possible are not completely effective.    2.  Nausea: She states she is currently well-controlled with her scopolamine patch.  She takes lorazepam as needed.  Nothing else really helped much.      3.  Pleural effusion: She has a small right pleural effusion.  I personally reviewed her CT imaging from March 21 today.  There is some compressive atelectasis but it is small.  She has some shortness of breath but would like to wait and see if the embolization helps this before we would potentially address pleural effusion as a contributing cause.    4.  Anxiety: Generally well-controlled.  She is taking lorazepam as needed and olanzapine 2.5 mg every night.    5.  Pain: Well-controlled.  Her MS Contin dose was recently uptitrated to 60 mg every 12.  This is being managed by the palliative care service with whom she is following.      6.  Insomnia: Zyprexa 2.5 mg nightly.     7.  Hepatic steatosis: Liver function test today are normal.  She was seen at [facility] on January 14, 2025.  It was thought  the hepatic steatosis might have been secondary to rapid weight loss.  Octreotide is unlikely to cause this condition.  It was thought reasonable to restart octreotide.     8.  Weight loss: She is no longer taking Megace.  She states her appetite is improving and she thinks she is feeling well.  Weight is slowly increasing.      Medical decision Making:  I spent 42 minutes in the care of this patient today, which included time necessary for preparation for the visit, face to face time with the patient, communication of recommendations to the care team, and documentation time.    ECOG Performance  1    Diagnosis:    1.  Metastatic neuroendocrine carcinoma, poorly differentiated:  Diagnosed April 2024 from a liver biopsy.  Her PET/CT shows malignant involvement of the liver, mediastinal nodes, left upper quadrant omentum, manubrium, and bilateral pleura.  There is some wall thickening and FDG activity in a short segment of the terminal ileum. Liver biopsy shows WHO, NET G2.     EGD 4/25/24: Occasional gastritis.  No mass.  Colonoscopy 4/25/24: Mass of terminal ileum.    NGS:  PDL1=0%, MDI-stable, TMB low,   CancerTYPE ID:  GI carcinoid 96%    Treatment:    Carboplatin and etoposide initiated May 13, 2024.  25% etopside dose reduction starting with cycle 5 secondary to nausea.  Cycle 5 was completed on August 7, 2024.    Octreotide started 7/16/2024.  30 mg.  Increase to 60 mg October 10, 2024.  Held after December 2 dose for fatty liver.  Reinitiated March 2025 for worsening symptoms.      Interim History:    Louise returns today for a follow-up visit. She notes that she feels pretty good today.  Better after she restarted the octreotide 4 weeks ago.  Still has shortness of breath on exertion.  Still gets flushing episodes maybe 20 times a day which lasts 5 to 10 minutes.  Often associated with pain and shortness of breath. Appetite is better.    Fllushing, sometimes with pain and SOB. 5-10 minutes. Has to sit  still.    Review of Systems:    As above in the history.     Review of Systems otherwise Negative for:  General: chills, fever or night sweats  Psychological: depression  Ophthalmic: blurry vision, double vision or loss of vision, vision change  ENT: epistaxis, oral lesions, hearing changes  Hematological and Lymphatic: bleeding, bruising, jaundice, swollen lymph nodes  Endocrine: hot flashes, unexpected weight changes  Respiratory: cough, hemoptysis, orthopnea  Cardiovascular: palpitations or PND  Gastrointestinal: blood in stools, change in bowel habits  Genito-Urinary: change in urinary stream, incontinence, frequency/urgency  Musculoskeletal: joint swelling, muscle pain  Neurological: dizziness, headaches, numbness/tingling  Dermatological: lumps and rash    Physical Exam:    /70 (BP Location: Right arm, Patient Position: Sitting, Cuff Size: Adult Regular)   Pulse 80   Temp 97.9  F (36.6  C) (Oral)   Resp 16   Wt 68.2 kg (150 lb 6.4 oz)   SpO2 98%   BMI 27.51 kg/m      General: patient appears stated age of 59 year old. Nontoxic and in no distress.   HEENT: Head: atraumatic, normocephalic. Sclerae anicteric.  Chest:  Normal respiratory effort  Cardiac: Trace bipedal edema  Abdomen: abdomen is non-distended  Extremities: normal tone and muscle bulk.  Skin: no lesions or rash on visible skin. Warm and dry.   CNS: alert and oriented. Grossly non-focal.   Psychiatric: normal mood and affect.     Lab Results:    Recent Results (from the past week)   Comprehensive metabolic panel (BMP + Alb, Alk Phos, ALT, AST, Total. Bili, TP)   Result Value Ref Range    Sodium 143 135 - 145 mmol/L    Potassium 3.8 3.4 - 5.3 mmol/L    Carbon Dioxide (CO2) 31 (H) 22 - 29 mmol/L    Anion Gap 7 7 - 15 mmol/L    Urea Nitrogen 10.6 6.0 - 20.0 mg/dL    Creatinine 0.48 (L) 0.51 - 0.95 mg/dL    GFR Estimate >90 >60 mL/min/1.73m2    Calcium 8.6 (L) 8.8 - 10.4 mg/dL    Chloride 105 98 - 107 mmol/L    Glucose 116 (H) 70 - 99 mg/dL     Alkaline Phosphatase 127 40 - 150 U/L    AST 26 0 - 45 U/L    ALT 19 0 - 50 U/L    Protein Total 6.6 6.4 - 8.3 g/dL    Albumin 3.2 (L) 3.5 - 5.2 g/dL    Bilirubin Total 0.3 <=1.2 mg/dL   CBC with platelets and differential   Result Value Ref Range    WBC Count 4.4 4.0 - 11.0 10e3/uL    RBC Count 3.66 (L) 3.80 - 5.20 10e6/uL    Hemoglobin 11.4 (L) 11.7 - 15.7 g/dL    Hematocrit 34.2 (L) 35.0 - 47.0 %    MCV 93 78 - 100 fL    MCH 31.1 26.5 - 33.0 pg    MCHC 33.3 31.5 - 36.5 g/dL    RDW 12.8 10.0 - 15.0 %    Platelet Count 307 150 - 450 10e3/uL    % Neutrophils 57 %    % Lymphocytes 31 %    % Monocytes 9 %    % Eosinophils 2 %    % Basophils 1 %    % Immature Granulocytes 0 %    NRBCs per 100 WBC 0 <1 /100    Absolute Neutrophils 2.5 1.6 - 8.3 10e3/uL    Absolute Lymphocytes 1.4 0.8 - 5.3 10e3/uL    Absolute Monocytes 0.4 0.0 - 1.3 10e3/uL    Absolute Eosinophils 0.1 0.0 - 0.7 10e3/uL    Absolute Basophils 0.0 0.0 - 0.2 10e3/uL    Absolute Immature Granulocytes 0.0 <=0.4 10e3/uL    Absolute NRBCs 0.0 10e3/uL     Imaging:    MA Screen Bilateral w/Jamal    Result Date: 3/28/2025  BILATERAL FULL FIELD DIGITAL SCREENING MAMMOGRAM WITH TOMOSYNTHESIS Performed on: 3/28/25 Compared to: 05/23/2018 and 05/16/2018 Technique:  This study was evaluated with the assistance of Computer-Aided Detection.  Breast Tomosynthesis was used in interpretation. Findings: The breasts are heterogeneously dense, which may obscure small masses.  There is no radiographic evidence of malignancy.     IMPRESSION: ACR BI-RADS Category 1: Negative BREAST CANCER SCREENING RECOMMENDATION: Routine yearly mammography beginning at age 40 or as discussed with your provider. The results and recommendations of this examination will be communicated to the patient. Fernanda Rosas MD Based on the pre-exam history questionnaire and medical history, there may be increased risk for developing breast cancer or other cancers in the future. The contact for  scheduling cancer genetic counseling for risk assessment and possible genetic testing and supplemental screening is: [phone].    CT Chest/Abdomen/Pelvis w Contrast    Result Date: 3/22/2025  EXAM: CT CHEST/ABDOMEN/PELVIS W CONTRAST LOCATION: [facility] DATE: 3/21/2025 INDICATION: f u NET COMPARISON: 12/28/2024 TECHNIQUE: CT scan of the chest, abdomen, and pelvis was performed following injection of IV contrast. Multiplanar reformats were obtained. Dose reduction techniques were used. CONTRAST: isovue 370 75ml FINDINGS: LUNGS AND PLEURA: Stable bilateral pleural nodules predominantly along the basilar pleural surface, right greater than left. For example, a posterior medial right pleural nodule measures 3.1 x 0.9 cm, previously 3.3 x 0.8 cm (series 3, image 108). Stable  small loculated right basilar pleural effusion and stable atelectasis/scarring in the right lower lobe. Few other stable scattered pulmonary nodules. For example, a 5 mm left upper lobe nodule (4/100) and a 9 mm medial left upper lobe nodule (4/89) are stable. No new or enlarging nodules. MEDIASTINUM/AXILLAE: Stable mediastinal and hilar lymphadenopathy. For example, a 1.3 x 1.7 cm right paratracheal lymph node previously measured 1.3 x 1.8 cm (series 3, image 45), and a 1.9 x 2.5 cm subcarinal lymph node previously measured 1.8 x 2.3 cm (3/63). No new or enlarging lymph nodes. Right IJ port catheter. No thoracic aortic aneurysm. CORONARY ARTERY CALCIFICATION: Mild. HEPATOBILIARY: Marked diffuse hepatic steatosis is stable. Stable hepatomegaly. Redemonstration of multiple hepatic metastases. Slight increased size of a few metastases. For example, a 6.6 x 6.0 cm right inferolateral metastatic lesion previously measured 6.5 x 4.8 cm (series 3, image 153), and a 3.0 x 3.2 cm lesion in the left hepatic lobe previously measured 2.8 x 3.0 cm (3/178). No definite new lesions with evaluation limited due to presence of  innumerable lesions. PANCREAS: Diffuse pancreatic parenchymal atrophy. SPLEEN: Normal. ADRENAL GLANDS: Normal. KIDNEYS/BLADDER: Normal. BOWEL: Stable mass in the right lower quadrant mesentery abutting the terminal ileum measuring 2.8 x 2.4 cm, previously 2.8 x 3.5 cm (series 3, image 113). No small bowel or colonic obstruction, or inflammatory changes. LYMPH NODES: Slight increased size of a mesenteric lymph node/metastatic deposit measuring 2.0 x 1.1 cm, previously 1.4 x 1.0 cm (series 3, image 212). Subcentimeter retroperitoneal and pelvic lymph nodes are stable.. VASCULATURE: No abdominal aortic aneurysm. PELVIC ORGANS: No pelvic masses. OTHER FINDINGS: Redemonstration of peritoneal carcinomatosis with enhancing nodular areas of peritoneal thickening. For example, a left lower quadrant peritoneal nodule measures 2.5 x 1.8 cm, previously 3.0 x 1.4 cm (series 3, image 211). Trace pelvic ascites. MUSCULOSKELETAL: Stable multiple sclerotic osseous metastases. For example, lesions in the sternum, thoracolumbar spine and pelvis are stable.     IMPRESSION: 1.  Slight increased size of a few hepatic metastases and a central mesenteric lymph node/peritoneal deposit. Otherwise, stable pleural metastases, lung nodules, mike metastases, peritoneal carcinomatosis, terminal ileal mass and osseous metastases. 2.  Stable small loculated right pleural effusion. 3.  Stable hepatomegaly and hepatic steatosis.       Signed by: Man Barreto MD`q      Again, thank you for allowing me to participate in the care of your patient.        Sincerely,        Man Barreto MD    Electronically signed Opzelura Counseling:  I discussed with the patient the risks of Opzelura including but not limited to nasopharngitis, bronchitis, ear infection, eosinophila, hives, diarrhea, folliculitis, tonsillitis, and rhinorrhea.  Taken orally, this medication has been linked to serious infections; higher rate of mortality; malignancy and lymphoproliferative disorders; major adverse cardiovascular events; thrombosis; thrombocytopenia, anemia, and neutropenia; and lipid elevations.

## 2025-04-16 NOTE — PROGRESS NOTES
Infusion Nursing Note:  Louise Corado presents today for Octreotide injections.    Patient seen by provider today: Yes: Dr. Barreto   present during visit today: Not Applicable.    Note:Louise was here today for injection x 2, 1 left gluteal naila and right gluteus naila.  Pt tolerated injections well and without incident.      Intravenous Access:  No Intravenous access/labs at this visit.    Treatment Conditions:  Not Applicable.      Post Infusion Assessment:  Patient tolerated injection without incident.       Discharge Plan:   Discharge instructions reviewed with: Patient.  Patient and/or family verbalized understanding of discharge instructions and all questions answered.  Patient discharged in stable condition accompanied by: self.  Departure Mode: Ambulatory.      Annmarie Mendez RN

## 2025-04-16 NOTE — PROGRESS NOTES
"Oncology Rooming Note    April 16, 2025 10:02 AM   Louise Corado is a 60 year old female who presents for:    Chief Complaint   Patient presents with    Oncology Clinic Visit     Return visit with lab and injection. Metastatic malignant neuroendocrine tumor to liver.     Initial Vitals: /70 (BP Location: Right arm, Patient Position: Sitting, Cuff Size: Adult Regular)   Pulse 80   Temp 97.9  F (36.6  C) (Oral)   Resp 16   Wt 68.2 kg (150 lb 6.4 oz)   SpO2 98%   BMI 27.51 kg/m   Estimated body mass index is 27.51 kg/m  as calculated from the following:    Height as of 3/10/25: 1.575 m (5' 2\").    Weight as of this encounter: 68.2 kg (150 lb 6.4 oz). Body surface area is 1.73 meters squared.  Severe Pain (7) Comment: Data Unavailable   No LMP recorded. Patient is postmenopausal.  Allergies reviewed: Yes  Medications reviewed: Yes    Medications: MEDICATION REFILLS NEEDED TODAY. Provider was notified.  Pharmacy name entered into "[employer]:    [id number] [place], [place] - [address] AT [facility] [place], [place] - [address] [place] [address]    Frailty Screening:   Is the patient here for a new oncology consult visit in cancer care? 2. No    PHQ9:  Did this patient require a PHQ9?: No      Clinical concerns: pain that comes and goes in right side of chest radiating around to side and back 7/10.   Dr Barreto was notified.      Shannan Gandara CMA                      "

## 2025-04-16 NOTE — PROGRESS NOTES
[facility] Hematology and Oncology Progress Note    Patient: Louise Corado  MRN: [id number]  Date of Service: Apr 16, 2025        Assessment and Plan:    1.  Metastatic neuroendocrine carcinoma: Clinically stable.  Most recent imaging on March 21 showed mostly stable disease in the liver and abdomen.  She restarted octreotide last month and receive a dose today after having a 3-month break.  She notes that some of her symptoms have improved since her injection last month.    She still has problems with frequent flushing during the day.  Medic these episodes are associated with pain and shortness of breath.  Serum serotonin level is 10 times the upper limit of normal.  Going to refer her to interventional radiology for consideration of radioembolization to her liver tumors to try to help control her symptoms.  I have discussed this with her other providers and he thinks it would be a reasonable intervention.  I reviewed this with the patient and her  and they agree to proceed.  Will see her back in clinic in 2 months.  Continue monthly octreotide injections.  The next treatment option would be Lutathera radioembolization is not possible are not completely effective.    2.  Nausea: She states she is currently well-controlled with her scopolamine patch.  She takes lorazepam as needed.  Nothing else really helped much.      3.  Pleural effusion: She has a small right pleural effusion.  I personally reviewed her CT imaging from March 21 today.  There is some compressive atelectasis but it is small.  She has some shortness of breath but would like to wait and see if the embolization helps this before we would potentially address pleural effusion as a contributing cause.    4.  Anxiety: Generally well-controlled.  She is taking lorazepam as needed and olanzapine 2.5 mg every night.    5.  Pain: Well-controlled.  Her MS Contin dose was recently uptitrated to 60 mg every 12.  This is being managed by the  palliative care service with whom she is following.      6.  Insomnia: Zyprexa 2.5 mg nightly.     7.  Hepatic steatosis: Liver function test today are normal.  She was seen at [facility] on January 14, 2025.  It was thought the hepatic steatosis might have been secondary to rapid weight loss.  Octreotide is unlikely to cause this condition.  It was thought reasonable to restart octreotide.     8.  Weight loss: She is no longer taking Megace.  She states her appetite is improving and she thinks she is feeling well.  Weight is slowly increasing.      Medical decision Making:  I spent 42 minutes in the care of this patient today, which included time necessary for preparation for the visit, face to face time with the patient, communication of recommendations to the care team, and documentation time.    ECOG Performance  1    Diagnosis:    1.  Metastatic neuroendocrine carcinoma, poorly differentiated:  Diagnosed April 2024 from a liver biopsy.  Her PET/CT shows malignant involvement of the liver, mediastinal nodes, left upper quadrant omentum, manubrium, and bilateral pleura.  There is some wall thickening and FDG activity in a short segment of the terminal ileum. Liver biopsy shows WHO, NET G2.     EGD 4/25/24: Occasional gastritis.  No mass.  Colonoscopy 4/25/24: Mass of terminal ileum.    NGS:  PDL1=0%, MDI-stable, TMB low,   CancerTYPE ID:  GI carcinoid 96%    Treatment:    Carboplatin and etoposide initiated May 13, 2024.  25% etopside dose reduction starting with cycle 5 secondary to nausea.  Cycle 5 was completed on August 7, 2024.    Octreotide started 7/16/2024.  30 mg.  Increase to 60 mg October 10, 2024.  Held after December 2 dose for fatty liver.  Reinitiated March 2025 for worsening symptoms.      Interim History:    Louise returns today for a follow-up visit. She notes that she feels pretty good today.  Better after she restarted the octreotide 4 weeks ago.  Still has shortness of breath  on exertion.  Still gets flushing episodes maybe 20 times a day which lasts 5 to 10 minutes.  Often associated with pain and shortness of breath. Appetite is better.    Fllushing, sometimes with pain and SOB. 5-10 minutes. Has to sit still.    Review of Systems:    As above in the history.     Review of Systems otherwise Negative for:  General: chills, fever or night sweats  Psychological: depression  Ophthalmic: blurry vision, double vision or loss of vision, vision change  ENT: epistaxis, oral lesions, hearing changes  Hematological and Lymphatic: bleeding, bruising, jaundice, swollen lymph nodes  Endocrine: hot flashes, unexpected weight changes  Respiratory: cough, hemoptysis, orthopnea  Cardiovascular: palpitations or PND  Gastrointestinal: blood in stools, change in bowel habits  Genito-Urinary: change in urinary stream, incontinence, frequency/urgency  Musculoskeletal: joint swelling, muscle pain  Neurological: dizziness, headaches, numbness/tingling  Dermatological: lumps and rash    Physical Exam:    /70 (BP Location: Right arm, Patient Position: Sitting, Cuff Size: Adult Regular)   Pulse 80   Temp 97.9  F (36.6  C) (Oral)   Resp 16   Wt 68.2 kg (150 lb 6.4 oz)   SpO2 98%   BMI 27.51 kg/m      General: patient appears stated age of 59 year old. Nontoxic and in no distress.   HEENT: Head: atraumatic, normocephalic. Sclerae anicteric.  Chest:  Normal respiratory effort  Cardiac: Trace bipedal edema  Abdomen: abdomen is non-distended  Extremities: normal tone and muscle bulk.  Skin: no lesions or rash on visible skin. Warm and dry.   CNS: alert and oriented. Grossly non-focal.   Psychiatric: normal mood and affect.     Lab Results:    Recent Results (from the past week)   Comprehensive metabolic panel (BMP + Alb, Alk Phos, ALT, AST, Total. Bili, TP)   Result Value Ref Range    Sodium 143 135 - 145 mmol/L    Potassium 3.8 3.4 - 5.3 mmol/L    Carbon Dioxide (CO2) 31 (H) 22 - 29 mmol/L    Anion Gap 7  7 - 15 mmol/L    Urea Nitrogen 10.6 6.0 - 20.0 mg/dL    Creatinine 0.48 (L) 0.51 - 0.95 mg/dL    GFR Estimate >90 >60 mL/min/1.73m2    Calcium 8.6 (L) 8.8 - 10.4 mg/dL    Chloride 105 98 - 107 mmol/L    Glucose 116 (H) 70 - 99 mg/dL    Alkaline Phosphatase 127 40 - 150 U/L    AST 26 0 - 45 U/L    ALT 19 0 - 50 U/L    Protein Total 6.6 6.4 - 8.3 g/dL    Albumin 3.2 (L) 3.5 - 5.2 g/dL    Bilirubin Total 0.3 <=1.2 mg/dL   CBC with platelets and differential   Result Value Ref Range    WBC Count 4.4 4.0 - 11.0 10e3/uL    RBC Count 3.66 (L) 3.80 - 5.20 10e6/uL    Hemoglobin 11.4 (L) 11.7 - 15.7 g/dL    Hematocrit 34.2 (L) 35.0 - 47.0 %    MCV 93 78 - 100 fL    MCH 31.1 26.5 - 33.0 pg    MCHC 33.3 31.5 - 36.5 g/dL    RDW 12.8 10.0 - 15.0 %    Platelet Count 307 150 - 450 10e3/uL    % Neutrophils 57 %    % Lymphocytes 31 %    % Monocytes 9 %    % Eosinophils 2 %    % Basophils 1 %    % Immature Granulocytes 0 %    NRBCs per 100 WBC 0 <1 /100    Absolute Neutrophils 2.5 1.6 - 8.3 10e3/uL    Absolute Lymphocytes 1.4 0.8 - 5.3 10e3/uL    Absolute Monocytes 0.4 0.0 - 1.3 10e3/uL    Absolute Eosinophils 0.1 0.0 - 0.7 10e3/uL    Absolute Basophils 0.0 0.0 - 0.2 10e3/uL    Absolute Immature Granulocytes 0.0 <=0.4 10e3/uL    Absolute NRBCs 0.0 10e3/uL     Imaging:    MA Screen Bilateral w/Jamal    Result Date: 3/28/2025  BILATERAL FULL FIELD DIGITAL SCREENING MAMMOGRAM WITH TOMOSYNTHESIS Performed on: 3/28/25 Compared to: 05/23/2018 and 05/16/2018 Technique:  This study was evaluated with the assistance of Computer-Aided Detection.  Breast Tomosynthesis was used in interpretation. Findings: The breasts are heterogeneously dense, which may obscure small masses.  There is no radiographic evidence of malignancy.     IMPRESSION: ACR BI-RADS Category 1: Negative BREAST CANCER SCREENING RECOMMENDATION: Routine yearly mammography beginning at age 40 or as discussed with your provider. The results and recommendations of this examination  will be communicated to the patient. Fernanda Rosas MD Based on the pre-exam history questionnaire and medical history, there may be increased risk for developing breast cancer or other cancers in the future. The contact for scheduling cancer genetic counseling for risk assessment and possible genetic testing and supplemental screening is: [phone].    CT Chest/Abdomen/Pelvis w Contrast    Result Date: 3/22/2025  EXAM: CT CHEST/ABDOMEN/PELVIS W CONTRAST LOCATION: [facility] DATE: 3/21/2025 INDICATION: f u NET COMPARISON: 12/28/2024 TECHNIQUE: CT scan of the chest, abdomen, and pelvis was performed following injection of IV contrast. Multiplanar reformats were obtained. Dose reduction techniques were used. CONTRAST: isovue 370 75ml FINDINGS: LUNGS AND PLEURA: Stable bilateral pleural nodules predominantly along the basilar pleural surface, right greater than left. For example, a posterior medial right pleural nodule measures 3.1 x 0.9 cm, previously 3.3 x 0.8 cm (series 3, image 108). Stable  small loculated right basilar pleural effusion and stable atelectasis/scarring in the right lower lobe. Few other stable scattered pulmonary nodules. For example, a 5 mm left upper lobe nodule (4/100) and a 9 mm medial left upper lobe nodule (4/89) are stable. No new or enlarging nodules. MEDIASTINUM/AXILLAE: Stable mediastinal and hilar lymphadenopathy. For example, a 1.3 x 1.7 cm right paratracheal lymph node previously measured 1.3 x 1.8 cm (series 3, image 45), and a 1.9 x 2.5 cm subcarinal lymph node previously measured 1.8 x 2.3 cm (3/63). No new or enlarging lymph nodes. Right IJ port catheter. No thoracic aortic aneurysm. CORONARY ARTERY CALCIFICATION: Mild. HEPATOBILIARY: Marked diffuse hepatic steatosis is stable. Stable hepatomegaly. Redemonstration of multiple hepatic metastases. Slight increased size of a few metastases. For example, a 6.6 x 6.0 cm right inferolateral  metastatic lesion previously measured 6.5 x 4.8 cm (series 3, image 153), and a 3.0 x 3.2 cm lesion in the left hepatic lobe previously measured 2.8 x 3.0 cm (3/178). No definite new lesions with evaluation limited due to presence of innumerable lesions. PANCREAS: Diffuse pancreatic parenchymal atrophy. SPLEEN: Normal. ADRENAL GLANDS: Normal. KIDNEYS/BLADDER: Normal. BOWEL: Stable mass in the right lower quadrant mesentery abutting the terminal ileum measuring 2.8 x 2.4 cm, previously 2.8 x 3.5 cm (series 3, image 113). No small bowel or colonic obstruction, or inflammatory changes. LYMPH NODES: Slight increased size of a mesenteric lymph node/metastatic deposit measuring 2.0 x 1.1 cm, previously 1.4 x 1.0 cm (series 3, image 212). Subcentimeter retroperitoneal and pelvic lymph nodes are stable.. VASCULATURE: No abdominal aortic aneurysm. PELVIC ORGANS: No pelvic masses. OTHER FINDINGS: Redemonstration of peritoneal carcinomatosis with enhancing nodular areas of peritoneal thickening. For example, a left lower quadrant peritoneal nodule measures 2.5 x 1.8 cm, previously 3.0 x 1.4 cm (series 3, image 211). Trace pelvic ascites. MUSCULOSKELETAL: Stable multiple sclerotic osseous metastases. For example, lesions in the sternum, thoracolumbar spine and pelvis are stable.     IMPRESSION: 1.  Slight increased size of a few hepatic metastases and a central mesenteric lymph node/peritoneal deposit. Otherwise, stable pleural metastases, lung nodules, mike metastases, peritoneal carcinomatosis, terminal ileal mass and osseous metastases. 2.  Stable small loculated right pleural effusion. 3.  Stable hepatomegaly and hepatic steatosis.       Signed by: Man Barreto MD`q

## 2025-04-17 DIAGNOSIS — C7B.8 METASTATIC MALIGNANT NEUROENDOCRINE TUMOR TO LIVER (H): ICD-10-CM

## 2025-04-17 NOTE — CONSULTS
Interventional Radiology- Referral Consultation CHART REVIEW  4/17/2025    Received IR referral for Y90 from Dr. Barreto.    Information sent to [facility]. Patient will be arranged for OP clinic appointment. Further orders/recommendations will be made from there.    ABDIAZIZ Dalton CNP  Interventional Radiology

## 2025-04-21 ENCOUNTER — PATIENT OUTREACH (OUTPATIENT)
Dept: ONCOLOGY | Facility: HOSPITAL | Age: 60
End: 2025-04-21
Payer: COMMERCIAL

## 2025-04-21 DIAGNOSIS — C7A.8 NEUROENDOCRINE CARCINOMA METASTATIC TO LIVER (H): Primary | ICD-10-CM

## 2025-04-21 DIAGNOSIS — C7B.8 NEUROENDOCRINE CARCINOMA METASTATIC TO LIVER (H): Primary | ICD-10-CM

## 2025-04-21 DIAGNOSIS — G89.3 CANCER ASSOCIATED PAIN: ICD-10-CM

## 2025-04-21 RX ORDER — MORPHINE SULFATE 60 MG/1
60 TABLET, FILM COATED, EXTENDED RELEASE ORAL EVERY 12 HOURS
Qty: 60 TABLET | Refills: 0 | Status: SHIPPED | OUTPATIENT
Start: 2025-04-21

## 2025-04-21 NOTE — PROGRESS NOTES
[facility]: Cancer Care                                                                                            Situation: Patient chart reviewed by care coordinator.    Background: Patient was seen in clinic on Wednesday/16/2025 for a follow-up visit with Dr. Barreto for the management of metastatic neuroendocrine carcinoma with mets to the liver.  Patient is currently receiving monthly octreotide injections for treatment.    Assessment: Patient proceeded with her octreotide injection treatment on 4/16/2025.  Patient is to continue receiving monthly octreotide injections for treatment.  Dr. Barreto is going to refer patient to interventional radiology for consideration of radioembolization of the patient's liver tumors to try to help manage the patient's symptoms.    Plan/Recommendations: Patient is scheduled to return to the clinic for a follow-up visit with doreen Tracy and octreotide injection on 5/14/2025.    Signature:  Carolin Biswas RN

## 2025-04-21 NOTE — TELEPHONE ENCOUNTER
Received [employer] message from patient requesting refill of MS contin.     Last refill: 3/7/25  Last office visit: 3/4/25  Scheduled for follow up 6/3/25     Will route request to MD/DO for review.     Reviewed [place]  Report.

## 2025-04-23 ENCOUNTER — TRANSFERRED RECORDS (OUTPATIENT)
Dept: HEALTH INFORMATION MANAGEMENT | Facility: CLINIC | Age: 60
End: 2025-04-23
Payer: COMMERCIAL

## 2025-04-28 ENCOUNTER — OFFICE VISIT (OUTPATIENT)
Dept: FAMILY MEDICINE | Facility: CLINIC | Age: 60
End: 2025-04-28
Payer: COMMERCIAL

## 2025-04-28 VITALS
RESPIRATION RATE: 12 BRPM | DIASTOLIC BLOOD PRESSURE: 70 MMHG | HEIGHT: 62 IN | WEIGHT: 151.5 LBS | HEART RATE: 86 BPM | BODY MASS INDEX: 27.88 KG/M2 | TEMPERATURE: 97.1 F | OXYGEN SATURATION: 98 % | SYSTOLIC BLOOD PRESSURE: 112 MMHG

## 2025-04-28 DIAGNOSIS — R60.0 BILATERAL LOWER EXTREMITY EDEMA: ICD-10-CM

## 2025-04-28 DIAGNOSIS — Z12.11 SCREEN FOR COLON CANCER: ICD-10-CM

## 2025-04-28 DIAGNOSIS — E66.01 MORBID OBESITY (H): ICD-10-CM

## 2025-04-28 DIAGNOSIS — Z11.1 TUBERCULOSIS SCREENING: ICD-10-CM

## 2025-04-28 DIAGNOSIS — J90 PLEURAL EFFUSION: ICD-10-CM

## 2025-04-28 DIAGNOSIS — F41.9 ANXIETY: ICD-10-CM

## 2025-04-28 DIAGNOSIS — C7B.8 METASTATIC MALIGNANT NEUROENDOCRINE TUMOR TO LIVER (H): ICD-10-CM

## 2025-04-28 DIAGNOSIS — G47.09 OTHER INSOMNIA: ICD-10-CM

## 2025-04-28 DIAGNOSIS — E11.9 TYPE 2 DIABETES MELLITUS WITHOUT COMPLICATION, WITHOUT LONG-TERM CURRENT USE OF INSULIN (H): ICD-10-CM

## 2025-04-28 DIAGNOSIS — Z01.818 PREOP GENERAL PHYSICAL EXAM: Primary | ICD-10-CM

## 2025-04-28 LAB
CREAT UR-MCNC: 19.7 MG/DL
MICROALBUMIN UR-MCNC: <12 MG/L
MICROALBUMIN/CREAT UR: NORMAL MG/G{CREAT}

## 2025-04-28 PROCEDURE — 99213 OFFICE O/P EST LOW 20 MIN: CPT | Performed by: STUDENT IN AN ORGANIZED HEALTH CARE EDUCATION/TRAINING PROGRAM

## 2025-04-28 PROCEDURE — 3074F SYST BP LT 130 MM HG: CPT | Performed by: STUDENT IN AN ORGANIZED HEALTH CARE EDUCATION/TRAINING PROGRAM

## 2025-04-28 PROCEDURE — 82043 UR ALBUMIN QUANTITATIVE: CPT | Performed by: STUDENT IN AN ORGANIZED HEALTH CARE EDUCATION/TRAINING PROGRAM

## 2025-04-28 PROCEDURE — 3078F DIAST BP <80 MM HG: CPT | Performed by: STUDENT IN AN ORGANIZED HEALTH CARE EDUCATION/TRAINING PROGRAM

## 2025-04-28 PROCEDURE — 36415 COLL VENOUS BLD VENIPUNCTURE: CPT | Performed by: STUDENT IN AN ORGANIZED HEALTH CARE EDUCATION/TRAINING PROGRAM

## 2025-04-28 PROCEDURE — G2211 COMPLEX E/M VISIT ADD ON: HCPCS | Performed by: STUDENT IN AN ORGANIZED HEALTH CARE EDUCATION/TRAINING PROGRAM

## 2025-04-28 PROCEDURE — 86481 TB AG RESPONSE T-CELL SUSP: CPT | Performed by: STUDENT IN AN ORGANIZED HEALTH CARE EDUCATION/TRAINING PROGRAM

## 2025-04-28 PROCEDURE — 82570 ASSAY OF URINE CREATININE: CPT | Performed by: STUDENT IN AN ORGANIZED HEALTH CARE EDUCATION/TRAINING PROGRAM

## 2025-04-28 ASSESSMENT — ANXIETY QUESTIONNAIRES
6. BECOMING EASILY ANNOYED OR IRRITABLE: SEVERAL DAYS
2. NOT BEING ABLE TO STOP OR CONTROL WORRYING: NOT AT ALL
1. FEELING NERVOUS, ANXIOUS, OR ON EDGE: SEVERAL DAYS
3. WORRYING TOO MUCH ABOUT DIFFERENT THINGS: NOT AT ALL
IF YOU CHECKED OFF ANY PROBLEMS ON THIS QUESTIONNAIRE, HOW DIFFICULT HAVE THESE PROBLEMS MADE IT FOR YOU TO DO YOUR WORK, TAKE CARE OF THINGS AT HOME, OR GET ALONG WITH OTHER PEOPLE: NOT DIFFICULT AT ALL
8. IF YOU CHECKED OFF ANY PROBLEMS, HOW DIFFICULT HAVE THESE MADE IT FOR YOU TO DO YOUR WORK, TAKE CARE OF THINGS AT HOME, OR GET ALONG WITH OTHER PEOPLE?: NOT DIFFICULT AT ALL
7. FEELING AFRAID AS IF SOMETHING AWFUL MIGHT HAPPEN: NOT AT ALL
GAD7 TOTAL SCORE: 2
5. BEING SO RESTLESS THAT IT IS HARD TO SIT STILL: NOT AT ALL
GAD7 TOTAL SCORE: 2
GAD7 TOTAL SCORE: 2
4. TROUBLE RELAXING: NOT AT ALL
7. FEELING AFRAID AS IF SOMETHING AWFUL MIGHT HAPPEN: NOT AT ALL

## 2025-04-28 ASSESSMENT — PATIENT HEALTH QUESTIONNAIRE - PHQ9
10. IF YOU CHECKED OFF ANY PROBLEMS, HOW DIFFICULT HAVE THESE PROBLEMS MADE IT FOR YOU TO DO YOUR WORK, TAKE CARE OF THINGS AT HOME, OR GET ALONG WITH OTHER PEOPLE: NOT DIFFICULT AT ALL
SUM OF ALL RESPONSES TO PHQ QUESTIONS 1-9: 2
SUM OF ALL RESPONSES TO PHQ QUESTIONS 1-9: 2

## 2025-04-28 NOTE — PROGRESS NOTES
Preoperative Evaluation  [facility]  [address] [address]  Phone: [phone]  Fax: [phone]  Primary Provider: Kee Mccullough MD  Pre-op Performing Provider: Kee Mccullough MD  Apr 28, 2025 4/28/2025   Surgical Information   What procedure is being done? Liver Angiogram with Blackwell Embolization/Thoracentesis   Facility or Hospital where procedure/surgery will be performed: [facility]   Who is doing the procedure / surgery? Dr. Mazin Arroyo   Date of surgery / procedure: 05/06/2025   Time of surgery / procedure: 9:00   Where do you plan to recover after surgery? Other - Staying overnight at [facility]     Fax number for surgical facility: [phone]    Assessment & Plan     The proposed surgical procedure is considered INTERMEDIATE risk.    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  Radioembolization of liver tumors is being attempted to help control flushing, shortness of breath episodes, also has pleural effusion, thoracentesis is being considered    Screen for colon cancer  Last colonoscopy was done April 2024, findings of metastatic head adenocarcinoma at that time, there were not any recommendations given on follow-up, so putting in referral for colonoscopy, as patient reports that she believes it was supposed to be in 1 year.  - Colonoscopy Screening  Referral    Prediabetes  Well-controlled on metformin 500 mg daily, will continue to repeat A1c every 3 months, last done in February 2025.  Encouraged him to get a diabetic eye exam, A1c was 6.8% and although did not meet ADA criteria for diabetes would recommend getting a initial eye exam to evaluate for possible  retinopathy.    Bilateral lower extremity edema  Well-controlled on Lasix, continue current treatment, metabolic panel recently done within the last 30 days, normal testing level and renal function  Anxiety  Well-controlled on Zyprexa and as needed lorazepam, continue current treatment.    Morbid obesity (H)      Tuberculosis screening  Reports her son just was diagnosed with tuberculosis, latent.  They are not aware of where he got the tuberculosis, he has never been a foreign traveler, no known exposure.  We will obtain QuantiFERON today  - Quantiferon TB Gold Plus    Insomnia  - Well-controlled on Zyprexa, continue current treatment          - No identified additional risk factors other than previously addressed    Preoperative Medication Instructions  Antiplatelet or Anticoagulation Medication Instructions   - We reviewed the medication list and the patient is not on an antiplatelet or anticoagulation medications.    Additional Medication Instructions   - Diuretics (furosemide, hydrochlorothiazide, chlorothalidone): DO NOT TAKE on the day of surgery.   - metformin: DO NOT TAKE day of surgery.   - fiber, laxatives: DO NOT TAKE day of surgery   - Opioids: Continue without modification.   956}   - Benzodiazepines: Continue without modification.   - SSRIs, SNRIs, TCAs, Antipsychotics: Continue without modification.     Recommendation  Approval given to proceed with proposed procedure, without further diagnostic evaluation.        Charlene Gil is a 60 year old, presenting for the following:  Pre-Op Exam          4/28/2025     1:31 PM   Additional Questions   Roomed by JODI SHAFER     HPI:           4/28/2025   Pre-Op Questionnaire   Have you ever had a heart attack or stroke? No   Have you ever had surgery on your heart or blood vessels, such as a stent placement, a coronary artery bypass, or surgery on an artery in your head, neck, heart, or legs? No   Do you have chest pain with activity? No   Do you have a  history of heart failure? No   Do you currently have a cold, bronchitis or symptoms of other infection? No   Do you have a cough, shortness of breath, or wheezing? (!) YES SOB   Do you or anyone in your family have previous history of blood clots? No   Do you or does anyone in your family have a serious bleeding problem such as prolonged bleeding following surgeries or cuts? No   Have you ever had problems with anemia or been told to take iron pills? No   Have you had any abnormal blood loss such as black, tarry or bloody stools, or abnormal vaginal bleeding? No   Have you ever had a blood transfusion? (!) YES   Have you ever had a transfusion reaction? No   Are you willing to have a blood transfusion if it is medically needed before, during, or after your surgery? Yes   Have you or any of your relatives ever had problems with anesthesia? No   Do you have sleep apnea, excessive snoring or daytime drowsiness? No   Do you have any artifical heart valves or other implanted medical devices like a pacemaker, defibrillator, or continuous glucose monitor? No   Do you have artificial joints? No   Are you allergic to latex? No     Advance Care Planning    Discussed advance care planning with patient; informed AVS has link to [employer].    Preoperative Review of    reviewed - controlled substances reflected in medication list.          Patient Active Problem List    Diagnosis Date Noted    Prediabetes 03/06/2025     Priority: Medium    Localized edema 03/06/2025     Priority: Medium    Anxiety 03/06/2025     Priority: Medium    Hepatic steatosis 03/06/2025     Priority: Medium    Chronic, continuous use of opioids 03/06/2025     Priority: Medium    Cancer associated pain 03/06/2025     Priority: Medium    Nausea and vomiting, unspecified vomiting type 09/04/2024     Priority: Medium    Metastatic malignant neuroendocrine tumor to liver (H) 04/30/2024     Priority: Medium    Systolic murmur 04/30/2024      Priority: Medium    Elevated glucose 04/30/2024     Priority: Medium    Pleural effusion 04/11/2024     Priority: Medium    Lung mass 04/11/2024     Priority: Medium    Morbid obesity (H) 04/17/2023     Priority: Medium    Throat pain 12/12/2016     Priority: Medium    Herpes Zoster (Shingles)      Priority: Medium     Created by Conversion  Replacement Utility updated for latest IMO load        Contusion With Intact Skin Surface      Priority: Medium     Created by Conversion  Replacement Utility updated for latest IMO load        Anterior Wall Chest Pain With Respiration      Priority: Medium     Created by Conversion          Past Medical History:   Diagnosis Date    Metastatic malignant neuroendocrine tumor to liver (H)      Past Surgical History:   Procedure Laterality Date    IR CHEST PORT PLACEMENT > 5 YRS OF AGE  4/23/2024     Current Outpatient Medications   Medication Sig Dispense Refill    acetaminophen (TYLENOL) 325 MG tablet Take 325-650 mg by mouth every 6 hours as needed for mild pain      bisacodyl (DULCOLAX) 5 MG EC tablet Take 1 tablet (5 mg) by mouth daily. (Patient taking differently: Take 5 mg by mouth daily as needed.) 30 tablet 0    furosemide (LASIX) 20 MG tablet Take 1 tablet (20 mg) by mouth daily. 60 tablet 1    lidocaine-prilocaine (EMLA) 2.5-2.5 % external cream Apply topically as needed for moderate pain. 30 g 1    LORazepam (ATIVAN) 0.5 MG tablet Take 1-2 tablets (0.5-1 mg) by mouth every 6 hours as needed for anxiety or sleep. 30 tablet 1    megestrol (MEGACE) 40 MG/ML suspension Take 10 mLs (400 mg) by mouth daily. (Patient not taking: Reported on 4/16/2025) 240 mL 1    metFORMIN (GLUCOPHAGE XR) 500 MG 24 hr tablet Take 1 tablet (500 mg) by mouth daily (with dinner). 90 tablet 3    morphine (MS CONTIN) 60 MG 12 hr tablet Take 1 tablet (60 mg) by mouth every 12 hours. 60 tablet 0    OLANZapine (ZYPREXA) 2.5 MG tablet Take 1 tablet (2.5 mg) by mouth at bedtime. 30 tablet 2    oxyCODONE  "(ROXICODONE) 5 MG tablet Take 3-4 tablets (15-20 mg) by mouth every 4 hours as needed for pain. 120 tablet 0    scopolamine (TRANSDERM) 1 MG/3DAYS 72 hr patch Place 1 patch over 72 hours onto the skin every 72 hours. 10 patch 3    senna (SENOKOT) 8.6 MG tablet Take 1 tablet by mouth 2 times daily. May also take 2 tablets 2 times daily as needed for constipation (if no BM previous day). 120 tablet 11    simethicone (MYLICON) 125 MG chewable tablet Take 125 mg by mouth 4 times daily as needed for intestinal gas.         No Known Allergies     Social History     Tobacco Use    Smoking status: Never     Passive exposure: Never    Smokeless tobacco: Never   Substance Use Topics    Alcohol use: Not on file       History   Drug Use Not on file             Review of Systems  Constitutional, neuro, ENT, endocrine, pulmonary, cardiac, gastrointestinal, genitourinary, musculoskeletal, integument and psychiatric systems are negative, except as otherwise noted.    Objective    /70 (BP Location: Right arm, Patient Position: Sitting, Cuff Size: Adult Regular)   Pulse 86   Temp 97.1  F (36.2  C)   Resp 12   Ht 1.575 m (5' 2\")   Wt 68.7 kg (151 lb 8 oz)   SpO2 98%   BMI 27.71 kg/m     Estimated body mass index is 27.71 kg/m  as calculated from the following:    Height as of this encounter: 1.575 m (5' 2\").    Weight as of this encounter: 68.7 kg (151 lb 8 oz).  Physical Exam  GENERAL: alert and no distress  EYES: Eyes grossly normal to inspection, PERRL and conjunctivae and sclerae normal  HENT: ear canals and TM's normal, nose and mouth without ulcers or lesions  NECK: no adenopathy, no asymmetry, masses, or scars  RESP: lungs clear to auscultation - no rales, rhonchi or wheezes  CV: regular rate and rhythm, normal S1 S2, no S3 or S4, no murmur, click or rub, trace pitting peripheral edema to mid tibia bilaterally  ABDOMEN: soft, nontender, no hepatosplenomegaly, no masses and bowel sounds normal  MS: no gross " musculoskeletal defects noted, no edema  SKIN: no suspicious lesions or rashes  NEURO: Normal strength and tone, mentation intact and speech normal  PSYCH: mentation appears normal, affect normal/bright    Recent Labs   Lab Test 04/16/25  0909 03/10/25  0754 02/13/25  1312 11/11/24  1507 10/01/24  0744 05/13/24  0920 04/30/24  1510   HGB 11.4* 12.2  --    < >  --    < >  --     409  --    < >  --    < >  --    INR  --   --   --   --  1.26*  --   --     138 142   < >  --    < >  --    POTASSIUM 3.8 4.2 3.7   < >  --    < >  --    CR 0.48* 0.48* 0.58   < >  --    < >  --    A1C  --   --  6.2*  --   --   --  6.8*    < > = values in this interval not displayed.        Diagnostics  Recent Results (from the past 720 hours)   Comprehensive metabolic panel (BMP + Alb, Alk Phos, ALT, AST, Total. Bili, TP)    Collection Time: 04/16/25  9:09 AM   Result Value Ref Range    Sodium 143 135 - 145 mmol/L    Potassium 3.8 3.4 - 5.3 mmol/L    Carbon Dioxide (CO2) 31 (H) 22 - 29 mmol/L    Anion Gap 7 7 - 15 mmol/L    Urea Nitrogen 10.6 6.0 - 20.0 mg/dL    Creatinine 0.48 (L) 0.51 - 0.95 mg/dL    GFR Estimate >90 >60 mL/min/1.73m2    Calcium 8.6 (L) 8.8 - 10.4 mg/dL    Chloride 105 98 - 107 mmol/L    Glucose 116 (H) 70 - 99 mg/dL    Alkaline Phosphatase 127 40 - 150 U/L    AST 26 0 - 45 U/L    ALT 19 0 - 50 U/L    Protein Total 6.6 6.4 - 8.3 g/dL    Albumin 3.2 (L) 3.5 - 5.2 g/dL    Bilirubin Total 0.3 <=1.2 mg/dL   CBC with platelets and differential    Collection Time: 04/16/25  9:09 AM   Result Value Ref Range    WBC Count 4.4 4.0 - 11.0 10e3/uL    RBC Count 3.66 (L) 3.80 - 5.20 10e6/uL    Hemoglobin 11.4 (L) 11.7 - 15.7 g/dL    Hematocrit 34.2 (L) 35.0 - 47.0 %    MCV 93 78 - 100 fL    MCH 31.1 26.5 - 33.0 pg    MCHC 33.3 31.5 - 36.5 g/dL    RDW 12.8 10.0 - 15.0 %    Platelet Count 307 150 - 450 10e3/uL    % Neutrophils 57 %    % Lymphocytes 31 %    % Monocytes 9 %    % Eosinophils 2 %    % Basophils 1 %    % Immature  Granulocytes 0 %    NRBCs per 100 WBC 0 <1 /100    Absolute Neutrophils 2.5 1.6 - 8.3 10e3/uL    Absolute Lymphocytes 1.4 0.8 - 5.3 10e3/uL    Absolute Monocytes 0.4 0.0 - 1.3 10e3/uL    Absolute Eosinophils 0.1 0.0 - 0.7 10e3/uL    Absolute Basophils 0.0 0.0 - 0.2 10e3/uL    Absolute Immature Granulocytes 0.0 <=0.4 10e3/uL    Absolute NRBCs 0.0 10e3/uL      No EKG required, no history of coronary heart disease, significant arrhythmia, peripheral arterial disease or other structural heart disease.    Revised Cardiac Risk Index (RCRI)  The patient has the following serious cardiovascular risks for perioperative complications:   - No serious cardiac risks = 0 points     RCRI Interpretation: 0 points: Class I (very low risk - 0.4% complication rate)       The longitudinal plan of care for the diagnosis(es)/condition(s) as documented were addressed during this visit. Due to the added complexity in care, I will continue to support Louise in the subsequent management and with ongoing continuity of care.    Signed Electronically by: Kee Mccullough MD  A copy of this evaluation report is provided to the requesting physician.

## 2025-04-28 NOTE — PATIENT INSTRUCTIONS
How to Take Your Medication Before Surgery  Preoperative Medication Instructions   Antiplatelet or Anticoagulation Medication Instructions   - We reviewed the medication list and the patient is not on an antiplatelet or anticoagulation medications.    Additional Medication Instructions   - Diuretics (furosemide, hydrochlorothiazide, chlorothalidone): DO NOT TAKE on the day of surgery.   - metformin: DO NOT TAKE day of surgery.   - fiber, laxatives: DO NOT TAKE day of surgery   - Opioids: Continue without modification.   956}   - Benzodiazepines: Continue without modification.   - SSRIs, SNRIs, TCAs, Antipsychotics: Continue without modification.        Patient Education   Preparing for Your Surgery  For Adults  Getting started  In most cases, a nurse will call to review your health history and instructions. They will give you an arrival time based on your scheduled surgery time. Please be ready to share:  Your doctor's clinic name and phone number  Your medical, surgical, and anesthesia history  A list of allergies and sensitivities  A list of medicines, including herbal treatments and over-the-counter drugs  Whether the patient has a legal guardian (ask how to send us the papers in advance)  Note: You may not receive a call if you were seen at our PAC (Preoperative Assessment Center).  Please tell us if you're pregnant--or if there's any chance you might be pregnant. Some surgeries may injure a fetus (unborn baby), so they require a pregnancy test. Surgeries that are safe for a fetus don't always need a test, and you can choose whether to have one.   Preparing for surgery  Within 10 to 30 days of surgery: Have a pre-op exam (sometimes called an H&P, or History and Physical). This can be done at a clinic or pre-operative center.  If you're having a , you may not need this exam. Talk to your care team.  At your pre-op exam, talk to your care team about all medicines you take. (This includes CBD oil and any  drugs, such as THC, marijuana, and other forms of cannabis.) If you need to stop any medicine before surgery, ask when to start taking it again.  This is for your safety. Many medicines and drugs can make you bleed too much during surgery. Some change how well surgery (anesthesia) drugs work.  Call your insurance company to let them know you're having surgery. (If you don't have insurance, call [phone].)  Call your clinic if there's any change in your health. This includes a scrape or scratch near the surgery site, or any signs of a cold (sore throat, runny nose, cough, rash, fever).  Eating and drinking guidelines  For your safety: Unless your surgeon tells you otherwise, follow the guidelines below.  Eat and drink as normal until 8 hours before you arrive for surgery. After that, no food or milk. You can spit out gum when you arrive.  Drink clear liquids until 2 hours before you arrive. These are liquids you can see through, like water, Gatorade, and Propel Water. They also include plain black coffee and tea (no cream or milk).  No alcohol for 24 hours before you arrive. The night before surgery, stop any drinks that contain THC.  If your care team tells you to take medicine on the morning of surgery, it's okay to take it with a sip of water. No other medicines or drugs are allowed (including CBD oil)--follow your care team's instructions.  If you have questions the day of surgery, call your hospital or surgery center.   Preventing infection  Shower or bathe the night before and the morning of surgery. Follow the instructions your clinic gave you. (If no instructions, use regular soap.)  Don't shave or clip hair near your surgery site. We'll remove the hair if needed.  Don't smoke or vape the morning of surgery. No chewing tobacco for 6 hours before you arrive. A nicotine patch is okay. You may spit out nicotine gum when you arrive.  For some surgeries, the surgeon will tell you to fully quit smoking and  nicotine.  We will make every effort to keep you safe from infection. We will:  Clean our hands often with soap and water (or an alcohol-based hand rub).  Clean the skin at your surgery site with a special soap that kills germs.  Give you a special gown to keep you warm. (Cold raises the risk of infection.)  Wear hair covers, masks, gowns, and gloves during surgery.  Give antibiotic medicine, if prescribed. Not all surgeries need this medicine.  What to bring on the day of surgery  Photo ID and insurance card  Copy of your health care directive, if you have one  Glasses and hearing aids (bring cases)  You can't wear contacts during surgery  Inhaler and eye drops, if you use them (tell us about these when you arrive)  CPAP machine or breathing device, if you use them  A few personal items, if spending the night  If you have . . .  A pacemaker, ICD (cardiac defibrillator), or other implant: Bring the ID card.  An implanted stimulator: Bring the remote control.  A legal guardian: Bring a copy of the certified (court-stamped) guardianship papers.  Please remove any jewelry, including body piercings. Leave jewelry and other valuables at home.  If you're going home the day of surgery  You must have a responsible adult drive you home. They should stay with you overnight as well.  If you don't have someone to stay with you, and you aren't safe to go home alone, we may keep you overnight. Insurance often won't pay for this.  After surgery  If it's hard to control your pain or you need more pain medicine, please call your surgeon's office.    Please schedule your diabetic eye examination.  After you complete the exam, please send us a message in [email or web address] or call the clinic at [phone] to let us know you were able to get it done, including the date and location of the exam.     Here are some nearby locations where you can get your diabetic eye exam:    1)  [facility]  [address] [place],  [place]  [phone]  [email or web address]    2)  [facility]  [address]  [address]  [place], [place]  [phone]  [email or web address]    3)  [place] [facility]  [address]   [place]  [phone]  [email or web address]      Questions?   If you have any questions for your care team, list them here:   ____________________________________________________________________________________________________________________________________________________________________________________________________________________________________________________________  For informational purposes only. Not to replace the advice of your health care provider. Copyright   2003, 2019 [employer]. All rights reserved. Clinically reviewed by Lew Ventura MD. [employer] 687161 - REV 08/24.

## 2025-04-30 LAB
GAMMA INTERFERON BACKGROUND BLD IA-ACNC: 0.04 IU/ML
M TB IFN-G BLD-IMP: NEGATIVE
M TB IFN-G CD4+ BCKGRND COR BLD-ACNC: 3.17 IU/ML
MITOGEN IGNF BCKGRD COR BLD-ACNC: 0.01 IU/ML
MITOGEN IGNF BCKGRD COR BLD-ACNC: 0.02 IU/ML
QUANTIFERON MITOGEN: 3.21 IU/ML
QUANTIFERON NIL TUBE: 0.04 IU/ML
QUANTIFERON TB1 TUBE: 0.06 IU/ML
QUANTIFERON TB2 TUBE: 0.05

## 2025-05-04 ENCOUNTER — MYC REFILL (OUTPATIENT)
Dept: ONCOLOGY | Facility: HOSPITAL | Age: 60
End: 2025-05-04
Payer: COMMERCIAL

## 2025-05-04 ENCOUNTER — MYC REFILL (OUTPATIENT)
Dept: FAMILY MEDICINE | Facility: CLINIC | Age: 60
End: 2025-05-04
Payer: COMMERCIAL

## 2025-05-04 DIAGNOSIS — C7A.8 NEUROENDOCRINE CARCINOMA METASTATIC TO LIVER (H): ICD-10-CM

## 2025-05-04 DIAGNOSIS — C7B.8 NEUROENDOCRINE CARCINOMA METASTATIC TO LIVER (H): ICD-10-CM

## 2025-05-05 RX ORDER — METFORMIN HYDROCHLORIDE 500 MG/1
500 TABLET, EXTENDED RELEASE ORAL
Qty: 90 TABLET | Refills: 3 | OUTPATIENT
Start: 2025-05-05

## 2025-05-05 RX ORDER — FUROSEMIDE 20 MG/1
20 TABLET ORAL DAILY
Qty: 60 TABLET | Refills: 1 | Status: SHIPPED | OUTPATIENT
Start: 2025-05-05

## 2025-05-13 DIAGNOSIS — C7B.8 NEUROENDOCRINE CARCINOMA METASTATIC TO LIVER (H): Primary | ICD-10-CM

## 2025-05-13 DIAGNOSIS — C7A.8 NEUROENDOCRINE CARCINOMA METASTATIC TO LIVER (H): Primary | ICD-10-CM

## 2025-05-14 ENCOUNTER — INFUSION THERAPY VISIT (OUTPATIENT)
Dept: INFUSION THERAPY | Facility: HOSPITAL | Age: 60
End: 2025-05-14
Attending: INTERNAL MEDICINE
Payer: COMMERCIAL

## 2025-05-14 ENCOUNTER — ONCOLOGY VISIT (OUTPATIENT)
Dept: ONCOLOGY | Facility: HOSPITAL | Age: 60
End: 2025-05-14
Attending: INTERNAL MEDICINE
Payer: COMMERCIAL

## 2025-05-14 VITALS
BODY MASS INDEX: 27.69 KG/M2 | SYSTOLIC BLOOD PRESSURE: 110 MMHG | DIASTOLIC BLOOD PRESSURE: 59 MMHG | TEMPERATURE: 98.8 F | OXYGEN SATURATION: 95 % | WEIGHT: 151.4 LBS | HEART RATE: 99 BPM | RESPIRATION RATE: 16 BRPM

## 2025-05-14 DIAGNOSIS — C7A.8 NEUROENDOCRINE CARCINOMA METASTATIC TO LIVER (H): ICD-10-CM

## 2025-05-14 DIAGNOSIS — C7B.8 METASTATIC MALIGNANT NEUROENDOCRINE TUMOR TO LIVER (H): Primary | ICD-10-CM

## 2025-05-14 DIAGNOSIS — R19.7 DIARRHEA, UNSPECIFIED TYPE: ICD-10-CM

## 2025-05-14 DIAGNOSIS — C7B.8 NEUROENDOCRINE CARCINOMA METASTATIC TO LIVER (H): ICD-10-CM

## 2025-05-14 LAB
ALBUMIN SERPL BCG-MCNC: 3.3 G/DL (ref 3.5–5.2)
ALP SERPL-CCNC: 139 U/L (ref 40–150)
ALT SERPL W P-5'-P-CCNC: 19 U/L (ref 0–50)
ANION GAP SERPL CALCULATED.3IONS-SCNC: 9 MMOL/L (ref 7–15)
AST SERPL W P-5'-P-CCNC: 19 U/L (ref 0–45)
BASOPHILS # BLD AUTO: 0 10E3/UL (ref 0–0.2)
BASOPHILS NFR BLD AUTO: 0 %
BILIRUB SERPL-MCNC: 0.3 MG/DL
BUN SERPL-MCNC: 7.6 MG/DL (ref 6–20)
CALCIUM SERPL-MCNC: 8.8 MG/DL (ref 8.8–10.4)
CHLORIDE SERPL-SCNC: 103 MMOL/L (ref 98–107)
CREAT SERPL-MCNC: 0.43 MG/DL (ref 0.51–0.95)
EGFRCR SERPLBLD CKD-EPI 2021: >90 ML/MIN/1.73M2
EOSINOPHIL # BLD AUTO: 0.1 10E3/UL (ref 0–0.7)
EOSINOPHIL NFR BLD AUTO: 1 %
ERYTHROCYTE [DISTWIDTH] IN BLOOD BY AUTOMATED COUNT: 12.6 % (ref 10–15)
GLUCOSE SERPL-MCNC: 195 MG/DL (ref 70–99)
HCO3 SERPL-SCNC: 27 MMOL/L (ref 22–29)
HCT VFR BLD AUTO: 32.3 % (ref 35–47)
HGB BLD-MCNC: 10.4 G/DL (ref 11.7–15.7)
IMM GRANULOCYTES # BLD: 0 10E3/UL
IMM GRANULOCYTES NFR BLD: 1 %
LYMPHOCYTES # BLD AUTO: 0.8 10E3/UL (ref 0.8–5.3)
LYMPHOCYTES NFR BLD AUTO: 13 %
MCH RBC QN AUTO: 29.4 PG (ref 26.5–33)
MCHC RBC AUTO-ENTMCNC: 32.2 G/DL (ref 31.5–36.5)
MCV RBC AUTO: 91 FL (ref 78–100)
MONOCYTES # BLD AUTO: 0.6 10E3/UL (ref 0–1.3)
MONOCYTES NFR BLD AUTO: 9 %
NEUTROPHILS # BLD AUTO: 5 10E3/UL (ref 1.6–8.3)
NEUTROPHILS NFR BLD AUTO: 77 %
NRBC # BLD AUTO: 0 10E3/UL
NRBC BLD AUTO-RTO: 0 /100
PLATELET # BLD AUTO: 411 10E3/UL (ref 150–450)
POTASSIUM SERPL-SCNC: 3.9 MMOL/L (ref 3.4–5.3)
PROT SERPL-MCNC: 6.7 G/DL (ref 6.4–8.3)
RBC # BLD AUTO: 3.54 10E6/UL (ref 3.8–5.2)
SODIUM SERPL-SCNC: 139 MMOL/L (ref 135–145)
WBC # BLD AUTO: 6.5 10E3/UL (ref 4–11)

## 2025-05-14 PROCEDURE — 36591 DRAW BLOOD OFF VENOUS DEVICE: CPT

## 2025-05-14 PROCEDURE — 99215 OFFICE O/P EST HI 40 MIN: CPT | Performed by: NURSE PRACTITIONER

## 2025-05-14 PROCEDURE — 96372 THER/PROPH/DIAG INJ SC/IM: CPT | Performed by: NURSE PRACTITIONER

## 2025-05-14 PROCEDURE — G2211 COMPLEX E/M VISIT ADD ON: HCPCS | Performed by: NURSE PRACTITIONER

## 2025-05-14 PROCEDURE — 250N000011 HC RX IP 250 OP 636: Performed by: INTERNAL MEDICINE

## 2025-05-14 PROCEDURE — 85025 COMPLETE CBC W/AUTO DIFF WBC: CPT

## 2025-05-14 PROCEDURE — 250N000011 HC RX IP 250 OP 636: Mod: JZ | Performed by: NURSE PRACTITIONER

## 2025-05-14 PROCEDURE — 80053 COMPREHEN METABOLIC PANEL: CPT

## 2025-05-14 PROCEDURE — 99213 OFFICE O/P EST LOW 20 MIN: CPT | Performed by: NURSE PRACTITIONER

## 2025-05-14 RX ORDER — HEPARIN SODIUM (PORCINE) LOCK FLUSH IV SOLN 100 UNIT/ML 100 UNIT/ML
5 SOLUTION INTRAVENOUS
OUTPATIENT
Start: 2025-05-14

## 2025-05-14 RX ORDER — OCTREOTIDE ACETATE 30 MG
60 KIT INTRAMUSCULAR ONCE
Status: CANCELLED | OUTPATIENT
Start: 2025-05-14 | End: 2025-05-14

## 2025-05-14 RX ORDER — OCTREOTIDE ACETATE 30 MG
60 KIT INTRAMUSCULAR ONCE
OUTPATIENT
Start: 2025-06-11 | End: 2025-06-11

## 2025-05-14 RX ORDER — OCTREOTIDE ACETATE 30 MG
60 KIT INTRAMUSCULAR ONCE
Status: COMPLETED | OUTPATIENT
Start: 2025-05-14 | End: 2025-05-14

## 2025-05-14 RX ORDER — OCTREOTIDE ACETATE 30 MG
60 KIT INTRAMUSCULAR ONCE
OUTPATIENT
Start: 2025-09-03 | End: 2025-09-03

## 2025-05-14 RX ORDER — OCTREOTIDE ACETATE 30 MG
60 KIT INTRAMUSCULAR ONCE
OUTPATIENT
Start: 2025-08-06 | End: 2025-08-06

## 2025-05-14 RX ORDER — HEPARIN SODIUM (PORCINE) LOCK FLUSH IV SOLN 100 UNIT/ML 100 UNIT/ML
5 SOLUTION INTRAVENOUS
Status: DISCONTINUED | OUTPATIENT
Start: 2025-05-14 | End: 2025-05-14 | Stop reason: HOSPADM

## 2025-05-14 RX ORDER — OCTREOTIDE ACETATE 30 MG
60 KIT INTRAMUSCULAR ONCE
OUTPATIENT
Start: 2025-07-09 | End: 2025-07-09

## 2025-05-14 RX ORDER — ONDANSETRON 4 MG/1
4 TABLET, ORALLY DISINTEGRATING ORAL
COMMUNITY
Start: 2025-05-07

## 2025-05-14 RX ADMIN — Medication 5 ML: at 12:23

## 2025-05-14 RX ADMIN — OCTREOTIDE ACETATE 60 MG: KIT at 12:23

## 2025-05-14 ASSESSMENT — PAIN SCALES - GENERAL: PAINLEVEL_OUTOF10: NO PAIN (0)

## 2025-05-14 NOTE — PROGRESS NOTES
"Oncology Rooming Note    May 14, 2025 10:56 AM   Louise Corado is a 60 year old female who presents for:    Chief Complaint   Patient presents with    Oncology Clinic Visit     Return visit with lab and injection. Metastatic malignant neuroendocrine tumor to liver.     Initial Vitals: /59 (BP Location: Left arm, Patient Position: Sitting, Cuff Size: Adult Regular)   Pulse 99   Temp 98.8  F (37.1  C) (Oral)   Resp 16   Wt 68.7 kg (151 lb 6.4 oz)   SpO2 95%   BMI 27.69 kg/m   Estimated body mass index is 27.69 kg/m  as calculated from the following:    Height as of 4/28/25: 1.575 m (5' 2\").    Weight as of this encounter: 68.7 kg (151 lb 6.4 oz). Body surface area is 1.73 meters squared.  No Pain (0) Comment: Data Unavailable   No LMP recorded. Patient is postmenopausal.  Allergies reviewed: Yes  Medications reviewed: Yes    Medications: Medication refills not needed today.  Pharmacy name entered into [facility]:    [id number] [place], [place] - [address] AT [facility] [place], [place] - [address] [address] [facility] [address]    Frailty Screening:   Is the patient here for a new oncology consult visit in cancer care? 2. No    PHQ9:  Did this patient require a PHQ9?: No      Clinical concerns: none       Shannan Gandara CMA              "

## 2025-05-14 NOTE — LETTER
5/14/2025      Louise Corado  [address] [place] [address]      Dear Colleague,    Thank you for referring your patient, Louise Corado, to the [facility] [place]. Please see a copy of my visit note below.    [facility] Hematology and Oncology Progress Note    Patient: Louise Corado  MRN: [id number]  Date of Service: 05/14/2025        Reason for Visit    Chief Complaint   Patient presents with     Oncology Clinic Visit     Return visit with lab and injection. Metastatic malignant neuroendocrine tumor to liver.       Assessment and Plan     Cancer Staging   No matching staging information was found for the patient.    Neuroendocrine tumor, metastatic disease: Patient is back on octreotide.  She will continue to get that every 4 weeks.  I will have her see Dr. Barreto in July with repeat labs including serotonin and a chromogranin a levels.  I will wait to see when she gets her second chemo embolization to her liver and we will decide when to do more imaging.  They may want to do it at a certain timeframe after her embolization is completed so I will hold off until I know their plan.    Diarrhea: This is a new finding.  She did recently finish antibiotics and her mother has C. difficile so we will go ahead and check for C. difficile.  If she can have a bowel movement she is here we will check it when she is here otherwise she can bring in a sample.    Excessive drooling at night: feels like this has gotten worse. No changes to meds. Does use clonidine patch for nausea which has really helped that finding.  I did encourage her to try some over-the-counter allergy decongestant type of medications.  Encouraged her to call if this continues to get worse or if she has any other neurological changes.    ECOG Performance    1 - Can't do physically strenuous work, but fully ambyulatory and can do light sedentary work    Distress Screening (within last 30 days)    1. How concerned are  you about your ability to eat? : 2  2. How concerned are you about unintended weight loss or your current weight? : 0  3. How concerned are you about feeling depressed or very sad? : 4  4. How concerned are you about feeling anxious or very scared? : 5  5. Do you struggle with the loss of meaning and mamadou in your life? : Not at all  6. How concerned are you about work and home life issues that may be affected by your cancer? : 0  7. How concerned are you about knowing what resources are available to help you? : 0  8. Do you currently have what you would describe as Druze or spiritual struggles?: Not at all       Pain  Pain Score: No Pain (0)    Problem List    Patient Active Problem List   Diagnosis     Herpes Zoster (Shingles)     Anterior Wall Chest Pain With Respiration     Contusion With Intact Skin Surface     Throat pain     Morbid obesity (H)     Pleural effusion     Lung mass     Metastatic malignant neuroendocrine tumor to liver (H)     Systolic murmur     Elevated glucose     Nausea and vomiting, unspecified vomiting type     Prediabetes     Localized edema     Anxiety     Hepatic steatosis     Chronic, continuous use of opioids     Cancer associated pain        ______________________________________________________________________________    History of Present Illness    Oncologist: Dr. Barreto    Diagnosis:     1.  Metastatic neuroendocrine carcinoma, poorly differentiated:  Diagnosed April 2024 from a liver biopsy.  Her PET/CT shows malignant involvement of the liver, mediastinal nodes, left upper quadrant omentum, manubrium, and bilateral pleura.  There is some wall thickening and FDG activity in a short segment of the terminal ileum. Liver biopsy shows WHO, NET G2.      EGD 4/25/24: Occasional gastritis.  No mass.  Colonoscopy 4/25/24: Mass of terminal ileum.     NGS:  PDL1=0%, MDI-stable, TMB low,   CancerTYPE ID:  GI carcinoid 96%     Treatment:     Carboplatin and etoposide initiated May 13,  2024.  25% etopside dose reduction starting with cycle 5 secondary to nausea.  Cycle 5 was completed on August 7, 2024.     Octreotide started 7/16/2024.  30 mg.  Increase to 60 mg October 10, 2024.  Held after December 2 dose for fatty liver.  Reinitiated March 2025 for worsening symptoms.  Getting every 4 weeks    May 6, 2025: 1st of a planned 2 radioembolization with Dr. Arroyo     Interim History:    Pt is here today for a 2 month follow up visit.  Overall patient feels like she is doing okay.  She states that she did have the radio embolization done last week and that overall went well.  She says she did have about 3 days of discomfort but she is feeling better now.  She does have a couple of concerns.  She says she feels like she has excess saliva and drooling at night.  No neurological changes like headaches, vision changes, weakness, vision issues, confusion.  This is getting worse.  Continues to have a lot of fatigue.  She feels like her flushing symptoms are better since being back on the shots.  She says she still gets them intermittently throughout the day but they are not as severe and as bad.  She is waiting to hear back from Dr. Arroyo about getting a second embolization done to her liver.  She is hoping to get that done in the next couple of weeks.  Patient has been complaining of some excess gas and foul-smelling diarrhea.  She states that her mother recently had C. difficile and she is worried about that.  She was recently on antibiotics post her chemo embolization.    Review of Systems    Pertinent items are noted in HPI.    Past History    Past Medical History:   Diagnosis Date     Metastatic malignant neuroendocrine tumor to liver (H)        PHYSICAL EXAM  /59 (BP Location: Left arm, Patient Position: Sitting, Cuff Size: Adult Regular)   Pulse 99   Temp 98.8  F (37.1  C) (Oral)   Resp 16   Wt 68.7 kg (151 lb 6.4 oz)   SpO2 95%   BMI 27.69 kg/m      GENERAL: no acute distress. Cooperative  in conversation. Here with . Using walker   RESP: Regular respiratory rate. No expiratory wheezes   NEURO: non focal. Alert and oriented x3.   PSYCH: within normal limits. No depression or anxiety.  SKIN: exposed skin is dry intact.     Lab Results    Recent Results (from the past week)   Comprehensive metabolic panel (BMP + Alb, Alk Phos, ALT, AST, Total. Bili, TP)   Result Value Ref Range    Sodium 139 135 - 145 mmol/L    Potassium 3.9 3.4 - 5.3 mmol/L    Carbon Dioxide (CO2) 27 22 - 29 mmol/L    Anion Gap 9 7 - 15 mmol/L    Urea Nitrogen 7.6 6.0 - 20.0 mg/dL    Creatinine 0.43 (L) 0.51 - 0.95 mg/dL    GFR Estimate >90 >60 mL/min/1.73m2    Calcium 8.8 8.8 - 10.4 mg/dL    Chloride 103 98 - 107 mmol/L    Glucose 195 (H) 70 - 99 mg/dL    Alkaline Phosphatase 139 40 - 150 U/L    AST 19 0 - 45 U/L    ALT 19 0 - 50 U/L    Protein Total 6.7 6.4 - 8.3 g/dL    Albumin 3.3 (L) 3.5 - 5.2 g/dL    Bilirubin Total 0.3 <=1.2 mg/dL   CBC with platelets and differential   Result Value Ref Range    WBC Count 6.5 4.0 - 11.0 10e3/uL    RBC Count 3.54 (L) 3.80 - 5.20 10e6/uL    Hemoglobin 10.4 (L) 11.7 - 15.7 g/dL    Hematocrit 32.3 (L) 35.0 - 47.0 %    MCV 91 78 - 100 fL    MCH 29.4 26.5 - 33.0 pg    MCHC 32.2 31.5 - 36.5 g/dL    RDW 12.6 10.0 - 15.0 %    Platelet Count 411 150 - 450 10e3/uL    % Neutrophils 77 %    % Lymphocytes 13 %    % Monocytes 9 %    % Eosinophils 1 %    % Basophils 0 %    % Immature Granulocytes 1 %    NRBCs per 100 WBC 0 <1 /100    Absolute Neutrophils 5.0 1.6 - 8.3 10e3/uL    Absolute Lymphocytes 0.8 0.8 - 5.3 10e3/uL    Absolute Monocytes 0.6 0.0 - 1.3 10e3/uL    Absolute Eosinophils 0.1 0.0 - 0.7 10e3/uL    Absolute Basophils 0.0 0.0 - 0.2 10e3/uL    Absolute Immature Granulocytes 0.0 <=0.4 10e3/uL    Absolute NRBCs 0.0 10e3/uL       Imaging    IR Embolization Non-Neuro  Result Date: 5/6/2025  [place] [facility] LOCATION: [facility] DATE: 5/6/2025 PROCEDURE: Superselective bland embolization of  hypervascular (NET) liver metastases; Thoracentesis INTERVENTIONAL RADIOLOGIST: Claudio Arroyo MD. INDICATION: 60-year-old woman with metastatic neuroendocrine tumor presenting with carcinoid syndrome. CONSENT: The risks, benefits and alternatives of the procedure were discussed with the patient in detail. All questions were answered. Informed consent was given to proceed with the procedure. MODERATE SEDATION: Versed 6 mg IV; Fentanyl 300 mcg IV.  During the timeout, immediately prior to the administration of medications, the patient was reassessed for adequacy to receive conscious sedation. Under physician supervision, Versed and fentanyl were administered for moderate sedation. Pulse oximetry, heart rate and blood pressure were continuously monitored by an independent trained observer. The physician spent 130 minutes of face-to-face sedation time with the patient. CONTRAST: 150 cc ANTIBIOTICS: Ceftriaxone 2 g IV. ADDITIONAL MEDICATIONS: 600 mg of nitroglycerin intra-arterial, 10 cc of 1% buffered lidocaine intra-arterial, Solu-Medrol 60 mg IV, Toradol 30 mg IV, Zofran 4 mg IV. FLUOROSCOPIC TIME: 42.3 minutes. RADIATION DOSE: Air Kerma: 2080 mGy. COMPLICATIONS: No immediate complications. STERILE BARRIER TECHNIQUE: Maximum sterile barrier technique was used. Cutaneous antisepsis was performed at the operative site with application of 2% chlorhexidine and large sterile drape. Prior to the procedure, the  and assistant performed hand hygiene and wore hat, mask, sterile gown, and sterile gloves during the entire procedure. PROCEDURE:   The patient was placed supine on the angiography table with the right groin and right chest sterilely prepped and draped in the usual fashion. Under sonographic guidance a Yueh needle was placed into the right-sided pleural fluid. 200 cc of clear serous fluid was removed. Sonographic evaluation of the right common femoral artery demonstrated a patent artery which was suitable  for catheterization. Using a micropuncture kit, access was achieved in the right common femoral artery. A 5 Australian sheath was placed. A Sim 1 catheter was formed in the abdominal aorta using the [place] technique. This was then used to select the phrenic artery. Angiography and CBCT was performed, demonstrating an abnormally hypertrophied right phrenic artery. There were two dominant branches of the right phrenic artery; the more cranially oriented branch supplied the diaphragm as well as likely pleural involvement of tumor. The more laterally and caudally oriented branch supplied the diaphragm, pleural tumor involvement, as well as some intrahepatic tumor. Using a 2.4 Australian Progreat microcatheter and Fathom wire, the laterally oriented branch was superselectively catheterized. Given the significant tortuosity and size of the artery, the microcatheter could not be advanced distally beyond the diaphragmatic/pleural branches. In order to prevent significant postoperative pain related to diaphragmatic embolization, no embolization was carried out from this phrenic branch. The Sim 1 catheter was then used to select the celiac artery. Angiography was performed demonstrating markedly hypertrophied and anatomically distorted common and proper hepatic arteries. Using the same microsystem, the right hepatic artery was successfully superselectivelu catheterize. Angiography and CBCT was performed, which demonstrated three dominant branches of the right hepatic artery, all of which were supplying large hypervascular masses in the right lobe. The dominant branch of the right hepatic artery supplying the posterior segments was superselectively catheterized. Angiography was performed, demonstrating arterial supply to the dominant hypervascular tumor in the posterior segments. The microcatheter was advanced superselectively beyond non-tumoral perfusion hepatic branches.  Nitroglycerin and buffered lidocaine was administered.  Angiography demonstrated selective catheterization with no evidence of arteriovenous shunt. Embolization was carried out using 100-300 um Embospheres. Completion angiography demonstrated pruning of the vasculature with no further convincing perfusion of the tumor. 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 The microcatheter was then used to select the most cranially oriented branch of the right hepatic artery. Angiography was performed demonstrating tumoral perfusion to a second dominant mass in the right lobe. Distal superselective catheterization was achieved with sparing of proximal nontumoral perfusing hepatic arteries. Nitroglycerin and buffered lidocaine was administered. Angiography demonstrated selective catheterization with no evidence of arteriovenous shunt. Embolization was carried out utilizing 100-300 um Embospheres. Completion angiography demonstrated complete stasis in this artery with no further evidence of tumoral effusion. The third branch of the right hepatic artery was then super selectively catheterized. Angiography was performed, demonstrating supply to the dominant tumor in the right lobe, as well as supply to a third large tumor in the right lobe. Superselective catheterization with the microsystem was achieved beyond a few nontumoral perfusing hepatic branches. Nitroglycerin and buffered lidocaine was administered. Angiography demonstrated selective catheterization with no evidence of arteriovenous shunt. The patient was then carried out from this location using 100-300 um Embospheres. Completion angiography demonstrated stasis of this artery with no further evidence of tumoral perfusion. The microcatheter was then retracted into the proximal right hepatic artery. Repeat angiography was performed, demonstrating excellent pruning of the  vasculature supplying the previously seen hypervascular tumors. A few residual branches arising from one of the right hepatic artery branches (posterior division) demonstrated mild continued perfusion. This branch was again superselectively catheterized and embolization was carried out using 100-300 um Embospheres. Completion angiography confirmed complete stasis. The microcatheter was then removed. Angiography via the base catheter demonstrated excellent pruning of the vasculature with no significant residual tumor perfusion. There is now more conspicuity of a tiny branch arising from the most cranially oriented right hepatic artery branch which may provide some perfusion to the dominant tumor, likely related to re-distribution of flow following embolization. This branch likely represents less than 10% of the overall tumoral perfusion to the mass.  All catheters, wires and sheath removed. The right groin access site was closed using a Perclose device. The patient tolerated the procedure well and no immediate complications occurred. IMPRESSION:   Technically successful bland embolization of multiple right hepatic lobe hypervascular metastases utilizing 100-300 um Embospheres. Right thoracentesis yielding 200 cc of clear serous fluid.    US Thoracentesis Bilateral  Result Date: 5/6/2025  [place] [facility] LOCATION: [facility] DATE: 5/6/2025 PROCEDURE: Superselective bland embolization of hypervascular (NET) liver metastases; Thoracentesis INTERVENTIONAL RADIOLOGIST: Claudio Arroyo MD. INDICATION: 60-year-old woman with metastatic neuroendocrine tumor presenting with carcinoid syndrome. CONSENT: The risks, benefits and alternatives of the procedure were discussed with the patient in detail. All questions were answered. Informed consent was given to proceed with the procedure. MODERATE SEDATION: Versed 6 mg IV; Fentanyl 300 mcg IV.  During the timeout, immediately prior to the administration of medications, the  patient was reassessed for adequacy to receive conscious sedation. Under physician supervision, Versed and fentanyl were administered for moderate sedation. Pulse oximetry, heart rate and blood pressure were continuously monitored by an independent trained observer. The physician spent 130 minutes of face-to-face sedation time with the patient. CONTRAST: 150 cc ANTIBIOTICS: Ceftriaxone 2 g IV. ADDITIONAL MEDICATIONS: 600 mg of nitroglycerin intra-arterial, 10 cc of 1% buffered lidocaine intra-arterial, Solu-Medrol 60 mg IV, Toradol 30 mg IV, Zofran 4 mg IV. FLUOROSCOPIC TIME: 42.3 minutes. RADIATION DOSE: Air Kerma: 2080 mGy. COMPLICATIONS: No immediate complications. STERILE BARRIER TECHNIQUE: Maximum sterile barrier technique was used. Cutaneous antisepsis was performed at the operative site with application of 2% chlorhexidine and large sterile drape. Prior to the procedure, the  and assistant performed hand hygiene and wore hat, mask, sterile gown, and sterile gloves during the entire procedure. PROCEDURE:   The patient was placed supine on the angiography table with the right groin and right chest sterilely prepped and draped in the usual fashion. Under sonographic guidance a Yueh needle was placed into the right-sided pleural fluid. 200 cc of clear serous fluid was removed. Sonographic evaluation of the right common femoral artery demonstrated a patent artery which was suitable for catheterization. Using a micropuncture kit, access was achieved in the right common femoral artery. A 5 Hungarian sheath was placed. A Sim 1 catheter was formed in the abdominal aorta using the [place] technique. This was then used to select the phrenic artery. Angiography and CBCT was performed, demonstrating an abnormally hypertrophied right phrenic artery. There were two dominant branches of the right phrenic artery; the more cranially oriented branch supplied the diaphragm as well as likely pleural involvement of tumor. The  more laterally and caudally oriented branch supplied the diaphragm, pleural tumor involvement, as well as some intrahepatic tumor. Using a 2.4 [place] Progreat microcatheter and Fathom wire, the laterally oriented branch was superselectively catheterized. Given the significant tortuosity and size of the artery, the microcatheter could not be advanced distally beyond the diaphragmatic/pleural branches. In order to prevent significant postoperative pain related to diaphragmatic embolization, no embolization was carried out from this phrenic branch. The Sim 1 catheter was then used to select the celiac artery. Angiography was performed demonstrating markedly hypertrophied and anatomically distorted common and proper hepatic arteries. Using the same microsystem, the right hepatic artery was successfully superselectivelu catheterize. Angiography and CBCT was performed, which demonstrated three dominant branches of the right hepatic artery, all of which were supplying large hypervascular masses in the right lobe. The dominant branch of the right hepatic artery supplying the posterior segments was superselectively catheterized. Angiography was performed, demonstrating arterial supply to the dominant hypervascular tumor in the posterior segments. The microcatheter was advanced superselectively beyond non-tumoral perfusion hepatic branches.  Nitroglycerin and buffered lidocaine was administered. Angiography demonstrated selective catheterization with no evidence of arteriovenous shunt. Embolization was carried out using 100-300 um Embospheres. Completion angiography demonstrated pruning of the vasculature with no further convincing perfusion of the tumor. 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 The microcatheter was then used to select the most cranially oriented branch of the  right hepatic artery. Angiography was performed demonstrating tumoral perfusion to a second dominant mass in the right lobe. Distal superselective catheterization was achieved with sparing of proximal nontumoral perfusing hepatic arteries. Nitroglycerin and buffered lidocaine was administered. Angiography demonstrated selective catheterization with no evidence of arteriovenous shunt. Embolization was carried out utilizing 100-300 um Embospheres. Completion angiography demonstrated complete stasis in this artery with no further evidence of tumoral effusion. The third branch of the right hepatic artery was then super selectively catheterized. Angiography was performed, demonstrating supply to the dominant tumor in the right lobe, as well as supply to a third large tumor in the right lobe. Superselective catheterization with the microsystem was achieved beyond a few nontumoral perfusing hepatic branches. Nitroglycerin and buffered lidocaine was administered. Angiography demonstrated selective catheterization with no evidence of arteriovenous shunt. The patient was then carried out from this location using 100-300 um Embospheres. Completion angiography demonstrated stasis of this artery with no further evidence of tumoral perfusion. The microcatheter was then retracted into the proximal right hepatic artery. Repeat angiography was performed, demonstrating excellent pruning of the vasculature supplying the previously seen hypervascular tumors. A few residual branches arising from one of the right hepatic artery branches (posterior division) demonstrated mild continued perfusion. This branch was again superselectively catheterized and embolization was carried out using 100-300 um Embospheres. Completion angiography confirmed complete stasis. The microcatheter was then removed. Angiography via the base catheter demonstrated excellent pruning of the vasculature with no significant residual tumor perfusion. There is now more  "conspicuity of a tiny branch arising from the most cranially oriented right hepatic artery branch which may provide some perfusion to the dominant tumor, likely related to re-distribution of flow following embolization. This branch likely represents less than 10% of the overall tumoral perfusion to the mass.  All catheters, wires and sheath removed. The right groin access site was closed using a Perclose device. The patient tolerated the procedure well and no immediate complications occurred. IMPRESSION:   Technically successful bland embolization of multiple right hepatic lobe hypervascular metastases utilizing 100-300 um Embospheres. Right thoracentesis yielding 200 cc of clear serous fluid.    Total time spent with patient in face to face time, chart review and documentation was 40 minutes.        Signed by: ABDIAZIZ Cooper CNP    Oncology Rooming Note    May 14, 2025 10:56 AM   Louise Corado is a 60 year old female who presents for:    Chief Complaint   Patient presents with     Oncology Clinic Visit     Return visit with lab and injection. Metastatic malignant neuroendocrine tumor to liver.     Initial Vitals: /59 (BP Location: Left arm, Patient Position: Sitting, Cuff Size: Adult Regular)   Pulse 99   Temp 98.8  F (37.1  C) (Oral)   Resp 16   Wt 68.7 kg (151 lb 6.4 oz)   SpO2 95%   BMI 27.69 kg/m   Estimated body mass index is 27.69 kg/m  as calculated from the following:    Height as of 4/28/25: 1.575 m (5' 2\").    Weight as of this encounter: 68.7 kg (151 lb 6.4 oz). Body surface area is 1.73 meters squared.  No Pain (0) Comment: Data Unavailable   No LMP recorded. Patient is postmenopausal.  Allergies reviewed: Yes  Medications reviewed: Yes    Medications: Medication refills not needed today.  Pharmacy name entered into [employer]:    [id number] - [place], [place] - [address] AT [facility] [place], [place] - [address]    Frailty Screening:   Is the patient here for a new oncology consult visit in cancer care? 2. No    PHQ9:  Did this patient require a PHQ9?: No      Clinical concerns: none       Shannan Gandara CMA                Again, thank you for allowing me to participate in the care of your patient.        Sincerely,        ABDIAZIZ Cooper CNP    Electronically signed

## 2025-05-14 NOTE — PROGRESS NOTES
Infusion Nursing Note:  Louise Corado presents today for D1C9.    Patient seen by provider today: Yes: Rossana Lam CNP   present during visit today: Not Applicable.    Note: Pt here for injections after seeing KE. 1 in each glut without incident. Port flushed and deaccessed. Pt denies new complaint and is aware of treatment plan.      Intravenous Access:  Implanted Port.    Treatment Conditions:  Results reviewed, labs MET treatment parameters, ok to proceed with treatment.      Post Infusion Assessment:  Patient tolerated injection without incident.       Discharge Plan:   Patient discharged in stable condition accompanied by: .  Departure Mode: rolling walker.      Maribell Esparza RN

## 2025-05-14 NOTE — PROGRESS NOTES
[facility] Hematology and Oncology Progress Note    Patient: Louise Corado  MRN: [id number]  Date of Service: 05/14/2025        Reason for Visit    Chief Complaint   Patient presents with    Oncology Clinic Visit     Return visit with lab and injection. Metastatic malignant neuroendocrine tumor to liver.       Assessment and Plan     Cancer Staging   No matching staging information was found for the patient.    Neuroendocrine tumor, metastatic disease: Patient is back on octreotide.  She will continue to get that every 4 weeks.  I will have her see Dr. Barreto in July with repeat labs including serotonin and a chromogranin a levels.  I will wait to see when she gets her second chemo embolization to her liver and we will decide when to do more imaging.  They may want to do it at a certain timeframe after her embolization is completed so I will hold off until I know their plan.    Diarrhea: This is a new finding.  She did recently finish antibiotics and her mother has C. difficile so we will go ahead and check for C. difficile.  If she can have a bowel movement she is here we will check it when she is here otherwise she can bring in a sample.    Excessive drooling at night: feels like this has gotten worse. No changes to meds. Does use clonidine patch for nausea which has really helped that finding.  I did encourage her to try some over-the-counter allergy decongestant type of medications.  Encouraged her to call if this continues to get worse or if she has any other neurological changes.    ECOG Performance    1 - Can't do physically strenuous work, but fully ambyulatory and can do light sedentary work    Distress Screening (within last 30 days)    1. How concerned are you about your ability to eat? : 2  2. How concerned are you about unintended weight loss or your current weight? : 0  3. How concerned are you about feeling depressed or very sad? : 4  4. How concerned are you about feeling anxious or very  scared? : 5  5. Do you struggle with the loss of meaning and mamadou in your life? : Not at all  6. How concerned are you about work and home life issues that may be affected by your cancer? : 0  7. How concerned are you about knowing what resources are available to help you? : 0  8. Do you currently have what you would describe as Mandaen or spiritual struggles?: Not at all       Pain  Pain Score: No Pain (0)    Problem List    Patient Active Problem List   Diagnosis    Herpes Zoster (Shingles)    Anterior Wall Chest Pain With Respiration    Contusion With Intact Skin Surface    Throat pain    Morbid obesity (H)    Pleural effusion    Lung mass    Metastatic malignant neuroendocrine tumor to liver (H)    Systolic murmur    Elevated glucose    Nausea and vomiting, unspecified vomiting type    Prediabetes    Localized edema    Anxiety    Hepatic steatosis    Chronic, continuous use of opioids    Cancer associated pain        ______________________________________________________________________________    History of Present Illness    Oncologist: Dr. Barreto    Diagnosis:     1.  Metastatic neuroendocrine carcinoma, poorly differentiated:  Diagnosed April 2024 from a liver biopsy.  Her PET/CT shows malignant involvement of the liver, mediastinal nodes, left upper quadrant omentum, manubrium, and bilateral pleura.  There is some wall thickening and FDG activity in a short segment of the terminal ileum. Liver biopsy shows WHO, NET G2.      EGD 4/25/24: Occasional gastritis.  No mass.  Colonoscopy 4/25/24: Mass of terminal ileum.     NGS:  PDL1=0%, MDI-stable, TMB low,   CancerTYPE ID:  GI carcinoid 96%     Treatment:     Carboplatin and etoposide initiated May 13, 2024.  25% etopside dose reduction starting with cycle 5 secondary to nausea.  Cycle 5 was completed on August 7, 2024.     Octreotide started 7/16/2024.  30 mg.  Increase to 60 mg October 10, 2024.  Held after December 2 dose for fatty liver.  Reinitiated March  2025 for worsening symptoms.  Getting every 4 weeks    May 6, 2025: 1st of a planned 2 radioembolization with Dr. Arroyo     Interim History:    Pt is here today for a 2 month follow up visit.  Overall patient feels like she is doing okay.  She states that she did have the radio embolization done last week and that overall went well.  She says she did have about 3 days of discomfort but she is feeling better now.  She does have a couple of concerns.  She says she feels like she has excess saliva and drooling at night.  No neurological changes like headaches, vision changes, weakness, vision issues, confusion.  This is getting worse.  Continues to have a lot of fatigue.  She feels like her flushing symptoms are better since being back on the shots.  She says she still gets them intermittently throughout the day but they are not as severe and as bad.  She is waiting to hear back from Dr. Arroyo about getting a second embolization done to her liver.  She is hoping to get that done in the next couple of weeks.  Patient has been complaining of some excess gas and foul-smelling diarrhea.  She states that her mother recently had C. difficile and she is worried about that.  She was recently on antibiotics post her chemo embolization.    Review of Systems    Pertinent items are noted in HPI.    Past History    Past Medical History:   Diagnosis Date    Metastatic malignant neuroendocrine tumor to liver (H)        PHYSICAL EXAM  /59 (BP Location: Left arm, Patient Position: Sitting, Cuff Size: Adult Regular)   Pulse 99   Temp 98.8  F (37.1  C) (Oral)   Resp 16   Wt 68.7 kg (151 lb 6.4 oz)   SpO2 95%   BMI 27.69 kg/m      GENERAL: no acute distress. Cooperative in conversation. Here with . Using walker   RESP: Regular respiratory rate. No expiratory wheezes   NEURO: non focal. Alert and oriented x3.   PSYCH: within normal limits. No depression or anxiety.  SKIN: exposed skin is dry intact.     Lab  Results    Recent Results (from the past week)   Comprehensive metabolic panel (BMP + Alb, Alk Phos, ALT, AST, Total. Bili, TP)   Result Value Ref Range    Sodium 139 135 - 145 mmol/L    Potassium 3.9 3.4 - 5.3 mmol/L    Carbon Dioxide (CO2) 27 22 - 29 mmol/L    Anion Gap 9 7 - 15 mmol/L    Urea Nitrogen 7.6 6.0 - 20.0 mg/dL    Creatinine 0.43 (L) 0.51 - 0.95 mg/dL    GFR Estimate >90 >60 mL/min/1.73m2    Calcium 8.8 8.8 - 10.4 mg/dL    Chloride 103 98 - 107 mmol/L    Glucose 195 (H) 70 - 99 mg/dL    Alkaline Phosphatase 139 40 - 150 U/L    AST 19 0 - 45 U/L    ALT 19 0 - 50 U/L    Protein Total 6.7 6.4 - 8.3 g/dL    Albumin 3.3 (L) 3.5 - 5.2 g/dL    Bilirubin Total 0.3 <=1.2 mg/dL   CBC with platelets and differential   Result Value Ref Range    WBC Count 6.5 4.0 - 11.0 10e3/uL    RBC Count 3.54 (L) 3.80 - 5.20 10e6/uL    Hemoglobin 10.4 (L) 11.7 - 15.7 g/dL    Hematocrit 32.3 (L) 35.0 - 47.0 %    MCV 91 78 - 100 fL    MCH 29.4 26.5 - 33.0 pg    MCHC 32.2 31.5 - 36.5 g/dL    RDW 12.6 10.0 - 15.0 %    Platelet Count 411 150 - 450 10e3/uL    % Neutrophils 77 %    % Lymphocytes 13 %    % Monocytes 9 %    % Eosinophils 1 %    % Basophils 0 %    % Immature Granulocytes 1 %    NRBCs per 100 WBC 0 <1 /100    Absolute Neutrophils 5.0 1.6 - 8.3 10e3/uL    Absolute Lymphocytes 0.8 0.8 - 5.3 10e3/uL    Absolute Monocytes 0.6 0.0 - 1.3 10e3/uL    Absolute Eosinophils 0.1 0.0 - 0.7 10e3/uL    Absolute Basophils 0.0 0.0 - 0.2 10e3/uL    Absolute Immature Granulocytes 0.0 <=0.4 10e3/uL    Absolute NRBCs 0.0 10e3/uL       Imaging    IR Embolization Non-Neuro  Result Date: 5/6/2025  [place] [facility] LOCATION: [facility] DATE: 5/6/2025 PROCEDURE: Superselective bland embolization of hypervascular (NET) liver metastases; Thoracentesis INTERVENTIONAL RADIOLOGIST: Claudio Arroyo MD. INDICATION: 60-year-old woman with metastatic neuroendocrine tumor presenting with carcinoid syndrome. CONSENT: The risks, benefits and alternatives  of the procedure were discussed with the patient in detail. All questions were answered. Informed consent was given to proceed with the procedure. MODERATE SEDATION: Versed 6 mg IV; Fentanyl 300 mcg IV.  During the timeout, immediately prior to the administration of medications, the patient was reassessed for adequacy to receive conscious sedation. Under physician supervision, Versed and fentanyl were administered for moderate sedation. Pulse oximetry, heart rate and blood pressure were continuously monitored by an independent trained observer. The physician spent 130 minutes of face-to-face sedation time with the patient. CONTRAST: 150 cc ANTIBIOTICS: Ceftriaxone 2 g IV. ADDITIONAL MEDICATIONS: 600 mg of nitroglycerin intra-arterial, 10 cc of 1% buffered lidocaine intra-arterial, Solu-Medrol 60 mg IV, Toradol 30 mg IV, Zofran 4 mg IV. FLUOROSCOPIC TIME: 42.3 minutes. RADIATION DOSE: Air Kerma: 2080 mGy. COMPLICATIONS: No immediate complications. STERILE BARRIER TECHNIQUE: Maximum sterile barrier technique was used. Cutaneous antisepsis was performed at the operative site with application of 2% chlorhexidine and large sterile drape. Prior to the procedure, the  and assistant performed hand hygiene and wore hat, mask, sterile gown, and sterile gloves during the entire procedure. PROCEDURE:   The patient was placed supine on the angiography table with the right groin and right chest sterilely prepped and draped in the usual fashion. Under sonographic guidance a Yueh needle was placed into the right-sided pleural fluid. 200 cc of clear serous fluid was removed. Sonographic evaluation of the right common femoral artery demonstrated a patent artery which was suitable for catheterization. Using a micropuncture kit, access was achieved in the right common femoral artery. A 5 Amharic sheath was placed. A Sim 1 catheter was formed in the abdominal aorta using the [place] technique. This was then used to select the  phrenic artery. Angiography and CBCT was performed, demonstrating an abnormally hypertrophied right phrenic artery. There were two dominant branches of the right phrenic artery; the more cranially oriented branch supplied the diaphragm as well as likely pleural involvement of tumor. The more laterally and caudally oriented branch supplied the diaphragm, pleural tumor involvement, as well as some intrahepatic tumor. Using a 2.4 Arabic Progreat microcatheter and Fathom wire, the laterally oriented branch was superselectively catheterized. Given the significant tortuosity and size of the artery, the microcatheter could not be advanced distally beyond the diaphragmatic/pleural branches. In order to prevent significant postoperative pain related to diaphragmatic embolization, no embolization was carried out from this phrenic branch. The Sim 1 catheter was then used to select the celiac artery. Angiography was performed demonstrating markedly hypertrophied and anatomically distorted common and proper hepatic arteries. Using the same microsystem, the right hepatic artery was successfully superselectivelu catheterize. Angiography and CBCT was performed, which demonstrated three dominant branches of the right hepatic artery, all of which were supplying large hypervascular masses in the right lobe. The dominant branch of the right hepatic artery supplying the posterior segments was superselectively catheterized. Angiography was performed, demonstrating arterial supply to the dominant hypervascular tumor in the posterior segments. The microcatheter was advanced superselectively beyond non-tumoral perfusion hepatic branches.  Nitroglycerin and buffered lidocaine was administered. Angiography demonstrated selective catheterization with no evidence of arteriovenous shunt. Embolization was carried out using 100-300 um Embospheres. Completion angiography demonstrated pruning of the vasculature with no further convincing perfusion  of the tumor. 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 The microcatheter was then used to select the most cranially oriented branch of the right hepatic artery. Angiography was performed demonstrating tumoral perfusion to a second dominant mass in the right lobe. Distal superselective catheterization was achieved with sparing of proximal nontumoral perfusing hepatic arteries. Nitroglycerin and buffered lidocaine was administered. Angiography demonstrated selective catheterization with no evidence of arteriovenous shunt. Embolization was carried out utilizing 100-300 um Embospheres. Completion angiography demonstrated complete stasis in this artery with no further evidence of tumoral effusion. The third branch of the right hepatic artery was then super selectively catheterized. Angiography was performed, demonstrating supply to the dominant tumor in the right lobe, as well as supply to a third large tumor in the right lobe. Superselective catheterization with the microsystem was achieved beyond a few nontumoral perfusing hepatic branches. Nitroglycerin and buffered lidocaine was administered. Angiography demonstrated selective catheterization with no evidence of arteriovenous shunt. The patient was then carried out from this location using 100-300 um Embospheres. Completion angiography demonstrated stasis of this artery with no further evidence of tumoral perfusion. The microcatheter was then retracted into the proximal right hepatic artery. Repeat angiography was performed, demonstrating excellent pruning of the vasculature supplying the previously seen hypervascular tumors. A few residual branches arising from one of the right hepatic artery branches (posterior division) demonstrated mild continued perfusion. This branch was again superselectively catheterized and  embolization was carried out using 100-300 um Embospheres. Completion angiography confirmed complete stasis. The microcatheter was then removed. Angiography via the base catheter demonstrated excellent pruning of the vasculature with no significant residual tumor perfusion. There is now more conspicuity of a tiny branch arising from the most cranially oriented right hepatic artery branch which may provide some perfusion to the dominant tumor, likely related to re-distribution of flow following embolization. This branch likely represents less than 10% of the overall tumoral perfusion to the mass.  All catheters, wires and sheath removed. The right groin access site was closed using a Perclose device. The patient tolerated the procedure well and no immediate complications occurred. IMPRESSION:   Technically successful bland embolization of multiple right hepatic lobe hypervascular metastases utilizing 100-300 um Embospheres. Right thoracentesis yielding 200 cc of clear serous fluid.    US Thoracentesis Bilateral  Result Date: 5/6/2025  [place] [facility] LOCATION: [facility] DATE: 5/6/2025 PROCEDURE: Superselective bland embolization of hypervascular (NET) liver metastases; Thoracentesis INTERVENTIONAL RADIOLOGIST: Claudio Arroyo MD. INDICATION: 60-year-old woman with metastatic neuroendocrine tumor presenting with carcinoid syndrome. CONSENT: The risks, benefits and alternatives of the procedure were discussed with the patient in detail. All questions were answered. Informed consent was given to proceed with the procedure. MODERATE SEDATION: Versed 6 mg IV; Fentanyl 300 mcg IV.  During the timeout, immediately prior to the administration of medications, the patient was reassessed for adequacy to receive conscious sedation. Under physician supervision, Versed and fentanyl were administered for moderate sedation. Pulse oximetry, heart rate and blood pressure were continuously monitored by an independent trained  observer. The physician spent 130 minutes of face-to-face sedation time with the patient. CONTRAST: 150 cc ANTIBIOTICS: Ceftriaxone 2 g IV. ADDITIONAL MEDICATIONS: 600 mg of nitroglycerin intra-arterial, 10 cc of 1% buffered lidocaine intra-arterial, Solu-Medrol 60 mg IV, Toradol 30 mg IV, Zofran 4 mg IV. FLUOROSCOPIC TIME: 42.3 minutes. RADIATION DOSE: Air Kerma: 2080 mGy. COMPLICATIONS: No immediate complications. STERILE BARRIER TECHNIQUE: Maximum sterile barrier technique was used. Cutaneous antisepsis was performed at the operative site with application of 2% chlorhexidine and large sterile drape. Prior to the procedure, the  and assistant performed hand hygiene and wore hat, mask, sterile gown, and sterile gloves during the entire procedure. PROCEDURE:   The patient was placed supine on the angiography table with the right groin and right chest sterilely prepped and draped in the usual fashion. Under sonographic guidance a Yueh needle was placed into the right-sided pleural fluid. 200 cc of clear serous fluid was removed. Sonographic evaluation of the right common femoral artery demonstrated a patent artery which was suitable for catheterization. Using a micropuncture kit, access was achieved in the right common femoral artery. A 5 Yi sheath was placed. A Sim 1 catheter was formed in the abdominal aorta using the Muir technique. This was then used to select the phrenic artery. Angiography and CBCT was performed, demonstrating an abnormally hypertrophied right phrenic artery. There were two dominant branches of the right phrenic artery; the more cranially oriented branch supplied the diaphragm as well as likely pleural involvement of tumor. The more laterally and caudally oriented branch supplied the diaphragm, pleural tumor involvement, as well as some intrahepatic tumor. Using a 2.4 Yi Progreat microcatheter and Fathom wire, the laterally oriented branch was superselectively catheterized.  Given the significant tortuosity and size of the artery, the microcatheter could not be advanced distally beyond the diaphragmatic/pleural branches. In order to prevent significant postoperative pain related to diaphragmatic embolization, no embolization was carried out from this phrenic branch. The Sim 1 catheter was then used to select the celiac artery. Angiography was performed demonstrating markedly hypertrophied and anatomically distorted common and proper hepatic arteries. Using the same microsystem, the right hepatic artery was successfully superselectivelu catheterize. Angiography and CBCT was performed, which demonstrated three dominant branches of the right hepatic artery, all of which were supplying large hypervascular masses in the right lobe. The dominant branch of the right hepatic artery supplying the posterior segments was superselectively catheterized. Angiography was performed, demonstrating arterial supply to the dominant hypervascular tumor in the posterior segments. The microcatheter was advanced superselectively beyond non-tumoral perfusion hepatic branches.  Nitroglycerin and buffered lidocaine was administered. Angiography demonstrated selective catheterization with no evidence of arteriovenous shunt. Embolization was carried out using 100-300 um Embospheres. Completion angiography demonstrated pruning of the vasculature with no further convincing perfusion of the tumor. 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 The microcatheter was then used to select the most cranially oriented branch of the right hepatic artery. Angiography was performed demonstrating tumoral perfusion to a second dominant mass in the right lobe. Distal superselective catheterization was achieved with sparing of proximal nontumoral perfusing hepatic arteries. Nitroglycerin  and buffered lidocaine was administered. Angiography demonstrated selective catheterization with no evidence of arteriovenous shunt. Embolization was carried out utilizing 100-300 um Embospheres. Completion angiography demonstrated complete stasis in this artery with no further evidence of tumoral effusion. The third branch of the right hepatic artery was then super selectively catheterized. Angiography was performed, demonstrating supply to the dominant tumor in the right lobe, as well as supply to a third large tumor in the right lobe. Superselective catheterization with the microsystem was achieved beyond a few nontumoral perfusing hepatic branches. Nitroglycerin and buffered lidocaine was administered. Angiography demonstrated selective catheterization with no evidence of arteriovenous shunt. The patient was then carried out from this location using 100-300 um Embospheres. Completion angiography demonstrated stasis of this artery with no further evidence of tumoral perfusion. The microcatheter was then retracted into the proximal right hepatic artery. Repeat angiography was performed, demonstrating excellent pruning of the vasculature supplying the previously seen hypervascular tumors. A few residual branches arising from one of the right hepatic artery branches (posterior division) demonstrated mild continued perfusion. This branch was again superselectively catheterized and embolization was carried out using 100-300 um Embospheres. Completion angiography confirmed complete stasis. The microcatheter was then removed. Angiography via the base catheter demonstrated excellent pruning of the vasculature with no significant residual tumor perfusion. There is now more conspicuity of a tiny branch arising from the most cranially oriented right hepatic artery branch which may provide some perfusion to the dominant tumor, likely related to re-distribution of flow following embolization. This branch likely represents  less than 10% of the overall tumoral perfusion to the mass.  All catheters, wires and sheath removed. The right groin access site was closed using a Perclose device. The patient tolerated the procedure well and no immediate complications occurred. IMPRESSION:   Technically successful bland embolization of multiple right hepatic lobe hypervascular metastases utilizing 100-300 um Embospheres. Right thoracentesis yielding 200 cc of clear serous fluid.    Total time spent with patient in face to face time, chart review and documentation was 40 minutes.        Signed by: ABDIAZIZ Cooper CNP

## 2025-05-15 ENCOUNTER — PATIENT OUTREACH (OUTPATIENT)
Dept: ONCOLOGY | Facility: HOSPITAL | Age: 60
End: 2025-05-15
Payer: COMMERCIAL

## 2025-05-15 NOTE — PROGRESS NOTES
[facility]: Cancer Care                                                                                          Writer sent patient a [employer] message:    Rossana Hart wanted to check in to see if you still wanted to test for C.Diff? Not sure if you were given a stool test kit and a toilet hat. If you would still like to test for C.Diff but weren't given a stool testing kit and hat, please let me know.    Signature:  Carolin Biswas RN

## 2025-05-17 ENCOUNTER — HEALTH MAINTENANCE LETTER (OUTPATIENT)
Age: 60
End: 2025-05-17

## 2025-05-19 ENCOUNTER — LAB (OUTPATIENT)
Dept: INFUSION THERAPY | Facility: HOSPITAL | Age: 60
End: 2025-05-19
Payer: COMMERCIAL

## 2025-05-19 DIAGNOSIS — R19.7 DIARRHEA, UNSPECIFIED TYPE: ICD-10-CM

## 2025-05-19 LAB — C DIFF TOX B STL QL: NEGATIVE

## 2025-05-19 PROCEDURE — 87493 C DIFF AMPLIFIED PROBE: CPT

## 2025-05-20 ENCOUNTER — RESULTS FOLLOW-UP (OUTPATIENT)
Dept: ONCOLOGY | Facility: HOSPITAL | Age: 60
End: 2025-05-20

## 2025-05-21 ENCOUNTER — PATIENT OUTREACH (OUTPATIENT)
Dept: ONCOLOGY | Facility: HOSPITAL | Age: 60
End: 2025-05-21
Payer: COMMERCIAL

## 2025-05-21 NOTE — PROGRESS NOTES
[facility]: Cancer Care                                                                                          Writer sent patient a [employer] message:    Rossana Hart reviewed your C.Diff results and wanted to let you know that the results are normal and does not show that you have C.Diff. Rossana said you use gasX or imodium if your symptoms are still present.      Signature:  Carolin Biswas RN

## 2025-05-25 ENCOUNTER — MYC REFILL (OUTPATIENT)
Dept: ONCOLOGY | Facility: HOSPITAL | Age: 60
End: 2025-05-25
Payer: COMMERCIAL

## 2025-05-25 ENCOUNTER — MYC REFILL (OUTPATIENT)
Dept: PALLIATIVE CARE | Facility: CLINIC | Age: 60
End: 2025-05-25
Payer: COMMERCIAL

## 2025-05-25 DIAGNOSIS — C7B.8 NEUROENDOCRINE CARCINOMA METASTATIC TO LIVER (H): ICD-10-CM

## 2025-05-25 DIAGNOSIS — G89.3 CANCER ASSOCIATED PAIN: ICD-10-CM

## 2025-05-25 DIAGNOSIS — C7A.8 NEUROENDOCRINE CARCINOMA METASTATIC TO LIVER (H): ICD-10-CM

## 2025-05-25 DIAGNOSIS — R11.0 NAUSEA: ICD-10-CM

## 2025-05-25 DIAGNOSIS — R07.1 PAINFUL RESPIRATION: ICD-10-CM

## 2025-05-26 NOTE — TELEPHONE ENCOUNTER
Received [employer] message from patient requesting refill of MS contin.     Last refill: 4/22/25  Last office visit: 3/4/25  Scheduled for follow up 6/3/25     Will route request to MD/DO for review.     Reviewed [place]  Report.

## 2025-05-26 NOTE — TELEPHONE ENCOUNTER
Received [employer] message from patient requesting refill of oxycodone.     Last refill: 5/16/25  Last office visit: 3/4/25  Scheduled for follow up 6/3/25     Will route request to MD/ for review.     Reviewed [place]  Report.

## 2025-05-27 ENCOUNTER — OFFICE VISIT (OUTPATIENT)
Dept: FAMILY MEDICINE | Facility: CLINIC | Age: 60
End: 2025-05-27
Attending: STUDENT IN AN ORGANIZED HEALTH CARE EDUCATION/TRAINING PROGRAM
Payer: COMMERCIAL

## 2025-05-27 ENCOUNTER — RESULTS FOLLOW-UP (OUTPATIENT)
Dept: FAMILY MEDICINE | Facility: CLINIC | Age: 60
End: 2025-05-27

## 2025-05-27 ENCOUNTER — PATIENT OUTREACH (OUTPATIENT)
Dept: ONCOLOGY | Facility: CLINIC | Age: 60
End: 2025-05-27

## 2025-05-27 VITALS
HEIGHT: 62 IN | WEIGHT: 146.9 LBS | OXYGEN SATURATION: 98 % | RESPIRATION RATE: 12 BRPM | TEMPERATURE: 98.3 F | HEART RATE: 82 BPM | DIASTOLIC BLOOD PRESSURE: 66 MMHG | SYSTOLIC BLOOD PRESSURE: 108 MMHG | BODY MASS INDEX: 27.03 KG/M2

## 2025-05-27 DIAGNOSIS — Z12.11 SCREEN FOR COLON CANCER: ICD-10-CM

## 2025-05-27 DIAGNOSIS — Z80.3 FAMILY HISTORY OF MALIGNANT NEOPLASM OF BREAST: ICD-10-CM

## 2025-05-27 DIAGNOSIS — Z01.818 PREOP GENERAL PHYSICAL EXAM: Primary | ICD-10-CM

## 2025-05-27 DIAGNOSIS — R60.0 BILATERAL LOWER EXTREMITY EDEMA: ICD-10-CM

## 2025-05-27 DIAGNOSIS — G47.09 OTHER INSOMNIA: ICD-10-CM

## 2025-05-27 DIAGNOSIS — E11.9 TYPE 2 DIABETES MELLITUS WITHOUT COMPLICATION, WITHOUT LONG-TERM CURRENT USE OF INSULIN (H): ICD-10-CM

## 2025-05-27 DIAGNOSIS — F41.9 ANXIETY: ICD-10-CM

## 2025-05-27 DIAGNOSIS — E78.2 MIXED HYPERLIPIDEMIA: ICD-10-CM

## 2025-05-27 DIAGNOSIS — Z00.00 ROUTINE GENERAL MEDICAL EXAMINATION AT A HEALTH CARE FACILITY: ICD-10-CM

## 2025-05-27 DIAGNOSIS — C7B.8 METASTATIC MALIGNANT NEUROENDOCRINE TUMOR TO LIVER (H): ICD-10-CM

## 2025-05-27 DIAGNOSIS — J90 PLEURAL EFFUSION: ICD-10-CM

## 2025-05-27 LAB
CHOLEST SERPL-MCNC: 145 MG/DL
EST. AVERAGE GLUCOSE BLD GHB EST-MCNC: 131 MG/DL
FASTING STATUS PATIENT QL REPORTED: YES
HBA1C MFR BLD: 6.2 % (ref 0–5.6)
HDLC SERPL-MCNC: 47 MG/DL
LDLC SERPL CALC-MCNC: 81 MG/DL
NONHDLC SERPL-MCNC: 98 MG/DL
TRIGL SERPL-MCNC: 83 MG/DL

## 2025-05-27 PROCEDURE — 83036 HEMOGLOBIN GLYCOSYLATED A1C: CPT | Performed by: STUDENT IN AN ORGANIZED HEALTH CARE EDUCATION/TRAINING PROGRAM

## 2025-05-27 PROCEDURE — 99213 OFFICE O/P EST LOW 20 MIN: CPT | Mod: 25 | Performed by: STUDENT IN AN ORGANIZED HEALTH CARE EDUCATION/TRAINING PROGRAM

## 2025-05-27 PROCEDURE — 36415 COLL VENOUS BLD VENIPUNCTURE: CPT | Performed by: STUDENT IN AN ORGANIZED HEALTH CARE EDUCATION/TRAINING PROGRAM

## 2025-05-27 PROCEDURE — 91320 SARSCV2 VAC 30MCG TRS-SUC IM: CPT | Performed by: STUDENT IN AN ORGANIZED HEALTH CARE EDUCATION/TRAINING PROGRAM

## 2025-05-27 PROCEDURE — 3048F LDL-C <100 MG/DL: CPT | Performed by: STUDENT IN AN ORGANIZED HEALTH CARE EDUCATION/TRAINING PROGRAM

## 2025-05-27 PROCEDURE — 99396 PREV VISIT EST AGE 40-64: CPT | Performed by: STUDENT IN AN ORGANIZED HEALTH CARE EDUCATION/TRAINING PROGRAM

## 2025-05-27 PROCEDURE — 3078F DIAST BP <80 MM HG: CPT | Performed by: STUDENT IN AN ORGANIZED HEALTH CARE EDUCATION/TRAINING PROGRAM

## 2025-05-27 PROCEDURE — 80061 LIPID PANEL: CPT | Performed by: STUDENT IN AN ORGANIZED HEALTH CARE EDUCATION/TRAINING PROGRAM

## 2025-05-27 PROCEDURE — 90480 ADMN SARSCOV2 VAC 1/ONLY CMP: CPT | Performed by: STUDENT IN AN ORGANIZED HEALTH CARE EDUCATION/TRAINING PROGRAM

## 2025-05-27 PROCEDURE — 3044F HG A1C LEVEL LT 7.0%: CPT | Performed by: STUDENT IN AN ORGANIZED HEALTH CARE EDUCATION/TRAINING PROGRAM

## 2025-05-27 PROCEDURE — G2211 COMPLEX E/M VISIT ADD ON: HCPCS | Performed by: STUDENT IN AN ORGANIZED HEALTH CARE EDUCATION/TRAINING PROGRAM

## 2025-05-27 PROCEDURE — 3074F SYST BP LT 130 MM HG: CPT | Performed by: STUDENT IN AN ORGANIZED HEALTH CARE EDUCATION/TRAINING PROGRAM

## 2025-05-27 RX ORDER — OXYCODONE HYDROCHLORIDE 5 MG/1
15-20 TABLET ORAL EVERY 4 HOURS PRN
Qty: 120 TABLET | Refills: 0 | Status: SHIPPED | OUTPATIENT
Start: 2025-05-27

## 2025-05-27 RX ORDER — MORPHINE SULFATE 60 MG/1
60 TABLET, FILM COATED, EXTENDED RELEASE ORAL EVERY 12 HOURS
Qty: 60 TABLET | Refills: 0 | Status: SHIPPED | OUTPATIENT
Start: 2025-05-27

## 2025-05-27 SDOH — HEALTH STABILITY: PHYSICAL HEALTH: ON AVERAGE, HOW MANY DAYS PER WEEK DO YOU ENGAGE IN MODERATE TO STRENUOUS EXERCISE (LIKE A BRISK WALK)?: 0 DAYS

## 2025-05-27 SDOH — HEALTH STABILITY: PHYSICAL HEALTH: ON AVERAGE, HOW MANY MINUTES DO YOU ENGAGE IN EXERCISE AT THIS LEVEL?: 0 MIN

## 2025-05-27 ASSESSMENT — SOCIAL DETERMINANTS OF HEALTH (SDOH): HOW OFTEN DO YOU GET TOGETHER WITH FRIENDS OR RELATIVES?: MORE THAN THREE TIMES A WEEK

## 2025-05-27 NOTE — PATIENT INSTRUCTIONS
Preoperative Medication Instructions  Antiplatelet or Anticoagulation Medication Instructions   - We reviewed the medication list and the patient is not on an antiplatelet or anticoagulation medications.     Additional Medication Instructions   - Diuretics (furosemide, hydrochlorothiazide, chlorothalidone): DO NOT TAKE on the day of surgery.   - metformin: DO NOT TAKE day of surgery.   - fiber, laxatives: DO NOT TAKE day of surgery   - Opioids: Continue without modification.   956}   - Benzodiazepines: Continue without modification.   - SSRIs, SNRIs, TCAs, Antipsychotics: Continue without modification.       Please schedule your diabetic eye examination.  After you complete the exam, please send us a message in [employer] or call the clinic at [phone] to let us know you were able to get it done, including the date and location of the exam.     Here are some nearby locations where you can get your diabetic eye exam:    1)  [facility]  [address]   [place], [place]  [phone]  [email or web address] [employer]    2)  [facility]  [address]  [address]  [place], [place]  [phone]  [email or web address]    3)  [facility]  [address] [place], [place]  [phone]  [email or web address]    Patient Education   Preventive Care Advice   This is general advice given by our system to help you stay healthy. However, your care team may have specific advice just for you. Please talk to your care team about your preventive care needs.  Nutrition  Eat 5 or more servings of fruits and vegetables each day.  Try wheat bread, brown rice and whole grain pasta (instead of white bread, rice, and pasta).  Get enough calcium and vitamin D. Check the label on foods and aim for 100% of the RDA (recommended daily allowance).  Lifestyle  Exercise at least 150 minutes each week  (30 minutes a day, 5 days a week).  Do muscle strengthening activities 2 days a week. These help control your  weight and prevent disease.  No smoking.  Wear sunscreen to prevent skin cancer.  Have a dental exam and cleaning every 6 months.  Yearly exams  See your health care team every year to talk about:  Any changes in your health.  Any medicines your care team has prescribed.  Preventive care, family planning, and ways to prevent chronic diseases.  Shots (vaccines)   HPV shots (up to age 26), if you've never had them before.  Hepatitis B shots (up to age 59), if you've never had them before.  COVID-19 shot: Get this shot when it's due.  Flu shot: Get a flu shot every year.  Tetanus shot: Get a tetanus shot every 10 years.  Pneumococcal, hepatitis A, and RSV shots: Ask your care team if you need these based on your risk.  Shingles shot (for age 50 and up)  General health tests  Diabetes screening:  Starting at age 35, Get screened for diabetes at least every 3 years.  If you are younger than age 35, ask your care team if you should be screened for diabetes.  Cholesterol test: At age 39, start having a cholesterol test every 5 years, or more often if advised.  Bone density scan (DEXA): At age 50, ask your care team if you should have this scan for osteoporosis (brittle bones).  Hepatitis C: Get tested at least once in your life.  STIs (sexually transmitted infections)  Before age 24: Ask your care team if you should be screened for STIs.  After age 24: Get screened for STIs if you're at risk. You are at risk for STIs (including HIV) if:  You are sexually active with more than one person.  You don't use condoms every time.  You or a partner was diagnosed with a sexually transmitted infection.  If you are at risk for HIV, ask about PrEP medicine to prevent HIV.  Get tested for HIV at least once in your life, whether you are at risk for HIV or not.  Cancer screening tests  Cervical cancer screening: If you have a cervix, begin getting regular cervical cancer screening tests starting at age 21.  Breast cancer scan (mammogram):  If you've ever had breasts, begin having regular mammograms starting at age 40. This is a scan to check for breast cancer.  Colon cancer screening: It is important to start screening for colon cancer at age 45.  Have a colonoscopy test every 10 years (or more often if you're at risk) Or, ask your provider about stool tests like a FIT test every year or Cologuard test every 3 years.  To learn more about your testing options, visit:   .  For help making a decision, visit:   https://bit.ly/ea06594.  Prostate cancer screening test: If you have a prostate, ask your care team if a prostate cancer screening test (PSA) at age 55 is right for you.  Lung cancer screening: If you are a current or former smoker ages 50 to 80, ask your care team if ongoing lung cancer screenings are right for you.  For informational purposes only. Not to replace the advice of your health care provider. Copyright   2023 [employer]. All rights reserved. Clinically reviewed by the [facility]itions [facility]. Mingly [id number] - REV 01/24.  Learning About Stress  What is stress?     Stress is your body's response to a hard situation. Your body can have a physical, emotional, or mental response. Stress is a fact of life for most people, and it affects everyone differently. What causes stress for you may not be stressful for someone else.  A lot of things can cause stress. You may feel stress when you go on a job interview, take a test, or run a race. This kind of short-term stress is normal and even useful. It can help you if you need to work hard or react quickly. For example, stress can help you finish an important job on time.  Long-term stress is caused by ongoing stressful situations or events. Examples of long-term stress include long-term health problems, ongoing problems at work, or conflicts in your family. Long-term stress can harm your health.  How does stress affect your health?  When you are stressed, your body  responds as though you are in danger. It makes hormones that speed up your heart, make you breathe faster, and give you a burst of energy. This is called the fight-or-flight stress response. If the stress is over quickly, your body goes back to normal and no harm is done.  But if stress happens too often or lasts too long, it can have bad effects. Long-term stress can make you more likely to get sick, and it can make symptoms of some diseases worse. If you tense up when you are stressed, you may develop neck, shoulder, or low back pain. Stress is linked to high blood pressure and heart disease.  Stress also harms your emotional health. It can make you spear, tense, or depressed. Your relationships may suffer, and you may not do well at work or school.  What can you do to manage stress?  You can try these things to help manage stress:   Do something active. Exercise or activity can help reduce stress. Walking is a great way to get started. Even everyday activities such as housecleaning or yard work can help.  Try yoga or harvey chi. These techniques combine exercise and meditation. You may need some training at first to learn them.  Do something you enjoy. For example, listen to music or go to a movie. Practice your hobby or do volunteer work.  Meditate. This can help you relax, because you are not worrying about what happened before or what may happen in the future.  Do guided imagery. Imagine yourself in any setting that helps you feel calm. You can use online videos, books, or a teacher to guide you.  Do breathing exercises. For example:  From a standing position, bend forward from the waist with your knees slightly bent. Let your arms dangle close to the floor.  Breathe in slowly and deeply as you return to a standing position. Roll up slowly and lift your head last.  Hold your breath for just a few seconds in the standing position.  Breathe out slowly and bend forward from the waist.  Let your feelings out. Talk,  "laugh, cry, and express anger when you need to. Talking with supportive friends or family, a counselor, or a dianna leader about your feelings is a healthy way to relieve stress. Avoid discussing your feelings with people who make you feel worse.  Write. It may help to write about things that are bothering you. This helps you find out how much stress you feel and what is causing it. When you know this, you can find better ways to cope.  What can you do to prevent stress?  You might try some of these things to help prevent stress:  Manage your time. This helps you find time to do the things you want and need to do.  Get enough sleep. Your body recovers from the stresses of the day while you are sleeping.  Get support. Your family, friends, and community can make a difference in how you experience stress.  Limit your news feed. Avoid or limit time on social media or news that may make you feel stressed.  Do something active. Exercise or activity can help reduce stress. Walking is a great way to get started.  Where can you learn more?  Go to https://www.[employer].net/patiented  Enter N032 in the search box to learn more about \"Learning About Stress.\"  Current as of: October 24, 2024  Content Version: 14.4    6868-0046 [employer].   Care instructions adapted under license by your healthcare professional. If you have questions about a medical condition or this instruction, always ask your healthcare professional. [employer] disclaims any warranty or liability for your use of this information.       "

## 2025-05-27 NOTE — PROGRESS NOTES
Preoperative Evaluation  [facility]  [address] [address]  Phone: [phone]  Fax: [phone]  Primary Provider: Kee Mccullouhg MD  Pre-op Performing Provider: Kee Mccullough MD  May 27, 2025             5/27/2025   Surgical Information   What procedure is being done? Angiogram of Liver with bland embolization   Facility or Hospital where procedure/surgery will be performed: [facility]   Who is doing the procedure / surgery? Dr. Arroyo   Date of surgery / procedure: 6/3/25   Time of surgery / procedure: Morning (between 8-9 am ?)   Where do you plan to recover after surgery? Other -      Fax number for surgical facility: [phone]    Assessment & Plan     The proposed surgical procedure is considered INTERMEDIATE risk.  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  Radioembolization of liver tumors is being attempted to help control flushing, shortness of breath episodes, also has pleural effusion, thoracentesis is being considered.     Screen for colon cancer  Last colonoscopy was done April 2024, findings of metastatic head adenocarcinoma at that time, there were not any recommendations given on follow-up, so putting in referral for colonoscopy, as patient reports that she believes it was supposed to be in 1 year.  - Colonoscopy Screening  Referral     Type 2 diabetes mellitus  Well-controlled on metformin 500 mg daily, will continue to repeat A1c every 3 months, last done in February 2025.  Encouraged to get a diabetic eye exam.     Mixed hyperlipidemia           The 10-year ASCVD risk score (Randy LIPSCOMB, et al., 2019) is: 3.2%    Values used to calculate the score:      Age: 60 years       Sex: Female      Is Non- : No      Diabetic: Yes      Tobacco smoker: No      Systolic Blood Pressure: 108 mmHg      Is BP treated: No      HDL Cholesterol: 47 mg/dL      Total Cholesterol: 145 mg/dL        Bilateral lower extremity edema  Well-controlled on Lasix 20 mg daily, continue current treatment, metabolic panel recently done within the last 30 days, normal testing level and renal function     Anxiety  Well-controlled on Zyprexa and as needed lorazepam, continue current treatment.          Insomnia  - Well-controlled on Zyprexa, continue current treatment           Preoperative Medication Instructions  Antiplatelet or Anticoagulation Medication Instructions   - We reviewed the medication list and the patient is not on an antiplatelet or anticoagulation medications.     Additional Medication Instructions   - Diuretics (furosemide, hydrochlorothiazide, chlorothalidone): DO NOT TAKE on the day of surgery.   - metformin: DO NOT TAKE day of surgery.   - fiber, laxatives: DO NOT TAKE day of surgery   - Opioids: Continue without modification.   956}   - Benzodiazepines: Continue without modification.   - SSRIs, SNRIs, TCAs, Antipsychotics: Continue without modification.      - No identified additional risk factors other than previously addressed         Recommendation  Approval given to proceed with proposed procedure, without further diagnostic evaluation.    Follow-up       Charlene Gil is a 60 year old, presenting for the following:  Pre-Op Exam and Physical          5/27/2025    11:28 AM   Additional Questions   Roomed by JODI SHAFER     HPI:           5/27/2025   Pre-Op Questionnaire   Have you ever had a heart attack or stroke? No   Have you ever had surgery on your heart or blood vessels, such as a stent placement, a coronary artery bypass, or surgery on an artery in your head, neck, heart, or legs? No   Do you have chest pain with activity? No   Do you have a history of heart  failure? No   Do you currently have a cold, bronchitis or symptoms of other infection? No   Do you have a cough, shortness of breath, or wheezing? (!) YES SOB   Do you or anyone in your family have previous history of blood clots? No   Do you or does anyone in your family have a serious bleeding problem such as prolonged bleeding following surgeries or cuts? No   Have you ever had problems with anemia or been told to take iron pills? No   Have you had any abnormal blood loss such as black, tarry or bloody stools, or abnormal vaginal bleeding? No   Have you ever had a blood transfusion? (!) YES   Have you ever had a transfusion reaction? No   Are you willing to have a blood transfusion if it is medically needed before, during, or after your surgery? Yes   Have you or any of your relatives ever had problems with anesthesia? No   Do you have sleep apnea, excessive snoring or daytime drowsiness? (!) YES   Do you have a CPAP machine? (!) NO Daytime drowsiness   Do you have any artifical heart valves or other implanted medical devices like a pacemaker, defibrillator, or continuous glucose monitor? No   Do you have artificial joints? No   Are you allergic to latex? No     Advance Care Planning    Discussed advance care planning with patient; informed AVS has link to [facility].    Preoperative Review of    reviewed - controlled substances reflected in medication list.          Patient Active Problem List    Diagnosis Date Noted    Prediabetes 03/06/2025     Priority: Medium    Localized edema 03/06/2025     Priority: Medium    Anxiety 03/06/2025     Priority: Medium    Hepatic steatosis 03/06/2025     Priority: Medium    Chronic, continuous use of opioids 03/06/2025     Priority: Medium    Cancer associated pain 03/06/2025     Priority: Medium    Nausea and vomiting, unspecified vomiting type 09/04/2024     Priority: Medium    Metastatic malignant neuroendocrine tumor to liver (H) 04/30/2024     Priority:  Medium    Systolic murmur 04/30/2024     Priority: Medium    Elevated glucose 04/30/2024     Priority: Medium    Pleural effusion 04/11/2024     Priority: Medium    Lung mass 04/11/2024     Priority: Medium    Morbid obesity (H) 04/17/2023     Priority: Medium    Throat pain 12/12/2016     Priority: Medium    Herpes Zoster (Shingles)      Priority: Medium     Created by Conversion  Replacement Utility updated for latest IMO load        Contusion With Intact Skin Surface      Priority: Medium     Created by Conversion  Replacement Utility updated for latest IMO load        Anterior Wall Chest Pain With Respiration      Priority: Medium     Created by Conversion          Past Medical History:   Diagnosis Date    Metastatic malignant neuroendocrine tumor to liver (H)      Past Surgical History:   Procedure Laterality Date    IR CHEST PORT PLACEMENT > 5 YRS OF AGE  4/23/2024     Current Outpatient Medications   Medication Sig Dispense Refill    bisacodyl (DULCOLAX) 5 MG EC tablet Take 1 tablet (5 mg) by mouth daily. (Patient taking differently: Take 5 mg by mouth daily as needed.) 30 tablet 0    furosemide (LASIX) 20 MG tablet Take 1 tablet (20 mg) by mouth daily. 60 tablet 1    lidocaine-prilocaine (EMLA) 2.5-2.5 % external cream Apply topically as needed for moderate pain. 30 g 1    LORazepam (ATIVAN) 0.5 MG tablet Take 1-2 tablets (0.5-1 mg) by mouth every 6 hours as needed for anxiety or sleep. 30 tablet 1    metFORMIN (GLUCOPHAGE XR) 500 MG 24 hr tablet Take 1 tablet (500 mg) by mouth daily (with dinner). 90 tablet 3    morphine (MS CONTIN) 60 MG 12 hr tablet Take 1 tablet (60 mg) by mouth every 12 hours. 60 tablet 0    OLANZapine (ZYPREXA) 2.5 MG tablet Take 1 tablet (2.5 mg) by mouth at bedtime. 30 tablet 2    ondansetron (ZOFRAN ODT) 4 MG ODT tab Take 4 mg by mouth.      oxyCODONE (ROXICODONE) 5 MG tablet Take 3-4 tablets (15-20 mg) by mouth every 4 hours as needed for pain. 120 tablet 0    scopolamine  "(TRANSDERM) 1 MG/3DAYS 72 hr patch Place 1 patch over 72 hours onto the skin every 72 hours. 10 patch 3    senna (SENOKOT) 8.6 MG tablet Take 1 tablet by mouth 2 times daily. May also take 2 tablets 2 times daily as needed for constipation (if no BM previous day). 120 tablet 11    simethicone (MYLICON) 125 MG chewable tablet Take 125 mg by mouth 4 times daily as needed for intestinal gas.         No Known Allergies     Social History     Tobacco Use    Smoking status: Never     Passive exposure: Never    Smokeless tobacco: Never   Substance Use Topics    Alcohol use: Not on file       History   Drug Use Not on file             Review of Systems  Constitutional, neuro, ENT, endocrine, pulmonary, cardiac, gastrointestinal, genitourinary, musculoskeletal, integument and psychiatric systems are negative, except as otherwise noted.    Objective    /66 (BP Location: Right arm, Patient Position: Sitting, Cuff Size: Adult Regular)   Pulse 82   Temp 98.3  F (36.8  C)   Resp 12   Ht 1.575 m (5' 2\")   Wt 66.6 kg (146 lb 14.4 oz)   SpO2 98%   BMI 26.87 kg/m     Estimated body mass index is 26.87 kg/m  as calculated from the following:    Height as of this encounter: 1.575 m (5' 2\").    Weight as of this encounter: 66.6 kg (146 lb 14.4 oz).  Physical Exam  See above    Recent Labs   Lab Test 05/14/25  1023 04/16/25  0909 03/10/25  0754 02/13/25  1312 11/11/24  1507 10/01/24  0744   HGB 10.4* 11.4*   < >  --    < >  --     307   < >  --    < >  --    INR  --   --   --   --   --  1.26*    143   < > 142   < >  --    POTASSIUM 3.9 3.8   < > 3.7   < >  --    CR 0.43* 0.48*   < > 0.58   < >  --    A1C  --   --   --  6.2*  --   --     < > = values in this interval not displayed.        Diagnostics  Labs pending at this time.  Results will be reviewed when available.   No EKG required, no history of coronary heart disease, significant arrhythmia, peripheral arterial disease or other structural heart " disease.    Revised Cardiac Risk Index (RCRI)  The patient has the following serious cardiovascular risks for perioperative complications:   - No serious cardiac risks = 0 points     RCRI Interpretation: 0 points: Class I (very low risk - 0.4% complication rate)         Signed Electronically by: Kee Mccullough MD  A copy of this evaluation report is provided to the requesting physician.

## 2025-05-27 NOTE — PROGRESS NOTES
Writer received referral to Cancer Risk Management/Genetic Counseling.    Referred for:       family history of breast cancer in mother and sister.       Referred By    Provider Department Location Phone   Kee Mccullough MD [facility] [phone]       Referral reviewed for appropriate plan, and sent to New Patient Scheduling ([phone]) for completion.    Netta Del Rio RN, BSN  Oncology New Patient Nurse Navigator   [facility]

## 2025-05-27 NOTE — TELEPHONE ENCOUNTER
Message is sent to patient to see if pharmacy has refills on file.    Last Written Prescription Date:  4/16/25  Last Fill Quantity: 10,  # refills: 3   Last office visit provider:  5/14/25 with Rossana Lam CNP     Requested Prescriptions   Pending Prescriptions Disp Refills    scopolamine (TRANSDERM) 1 MG/3DAYS 72 hr patch 10 patch 3     Sig: Place 1 patch over 72 hours onto the skin every 72 hours.       There is no refill protocol information for this order          Cher Lott RN 05/27/25 3:50 PM

## 2025-05-27 NOTE — PROGRESS NOTES
Preventive Care Visit  [facility]  Kee Mccullough MD, Family Medicine  May 27, 2025      Encounter for preventative adult health care examination  Louise is a 60-year-old female presents today for adult preventative examination and pre operative visit.     Recommended age-appropriate screening for patient, colonoscopy last done April 25, 2024, recommended to repeat in 1 year, at the time of colonoscopy there was finding of ileocecal valve stenosis, suggestive of malignancy.  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    Last mammogram done March 2025 with normal results, recommend we repeat in 1 year.  Pap smear done April 2024, normal results recommend repeat in 2029.    Due to 2 first-degree relatives who have breast cancer, mother and sister, do recommend that she consider genetics referral, she was agreeable and referral was placed.    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  Radioembolization of liver tumors is being attempted to help control flushing, shortness of breath episodes, also has pleural effusion, thoracentesis is being considered.     Screen for colon cancer  Last colonoscopy was done April 2024, findings of metastatic head adenocarcinoma at that time, there were not any recommendations given on follow-up, so putting in referral for colonoscopy, as patient reports that she believes it was supposed to be in 1 year.  - Colonoscopy Screening  Referral     Type 2 diabetes mellitus  Well-controlled on metformin 500 mg daily, will continue to repeat A1c every 3 months, last done in February 2025.  Encouraged to get a diabetic eye  exam.    Mixed hyperlipidemia        The 10-year ASCVD risk score (Randy LIPSCOMB, et al., 2019) is: 3.2%    Values used to calculate the score:      Age: 60 years      Sex: Female      Is Non- : No      Diabetic: Yes      Tobacco smoker: No      Systolic Blood Pressure: 108 mmHg      Is BP treated: No      HDL Cholesterol: 47 mg/dL      Total Cholesterol: 145 mg/dL       Bilateral lower extremity edema  Well-controlled on Lasix 20 mg daily, continue current treatment, metabolic panel recently done within the last 30 days, normal testing level and renal function    Anxiety  Well-controlled on Zyprexa and as needed lorazepam, continue current treatment.         Insomnia  - Well-controlled on Zyprexa, continue current treatment            Charlene Gil is a 60 year old, presenting for the following:  Pre-Op Exam and Physical        5/27/2025    11:28 AM   Additional Questions   Roomed by JODI SHAFER           Advance Care Planning    Discussed advance care planning with patient; informed AVS has link to [facility].        5/27/2025   General Health   How would you rate your overall physical health? (!) POOR   Feel stress (tense, anxious, or unable to sleep) To some extent   (!) STRESS CONCERN      5/27/2025   Nutrition   Three or more servings of calcium each day? Yes   Diet: Regular (no restrictions)   How many servings of fruit and vegetables per day? (!) 2-3   How many sweetened beverages each day? 0-1         5/27/2025   Exercise   Days per week of moderate/strenous exercise 0 days   Average minutes spent exercising at this level 0 min   (!) EXERCISE CONCERN      5/27/2025   Social Factors   Frequency of gathering with friends or relatives More than three times a week   Worry food won't last until get money to buy more No   Food not last or not have enough money for food? No   Do you have housing? (Housing is defined as stable permanent housing and does not include staying  outside in a car, in a tent, in an abandoned building, in an overnight shelter, or couch-surfing.) Yes   Are you worried about losing your housing? No   Lack of transportation? No   Unable to get utilities (heat,electricity)? No         5/27/2025   Fall Risk   Fallen 2 or more times in the past year? No    Trouble with walking or balance? No        Proxy-reported          5/27/2025   Dental   Dentist two times every year? (!) NO           Today's PHQ-2 Score:       3/5/2025     1:39 PM   PHQ-2 ( 1999 [employer])   Q1: Little interest or pleasure in doing things 0   Q2: Feeling down, depressed or hopeless 0   PHQ-2 Score 0    Q1: Little interest or pleasure in doing things Not at all   Q2: Feeling down, depressed or hopeless Not at all   PHQ-2 Score 0       Patient-reported         5/27/2025   Substance Use   Alcohol more than 3/day or more than 7/wk No   Do you use any other substances recreationally? No     Social History     Tobacco Use    Smoking status: Never     Passive exposure: Never    Smokeless tobacco: Never   Vaping Use    Vaping status: Never Used           3/28/2025   LAST FHS-7 RESULTS   1st degree relative breast or ovarian cancer Yes   Any relative bilateral breast cancer No   Any male have breast cancer No   Any ONE woman have BOTH breast AND ovarian cancer No   Any woman with breast cancer before 50yrs No   2 or more relatives with breast AND/OR ovarian cancer No   2 or more relatives with breast AND/OR bowel cancer No        Mammogram Screening - Mammogram every 1-2 years updated in Health Maintenance based on mutual decision making        5/27/2025   STI Screening   New sexual partner(s) since last STI/HIV test? No     History of abnormal Pap smear: No - age 30- 64 PAP with HPV every 5 years recommended        Latest Ref Rng & Units 4/30/2024     1:59 PM   PAP / HPV   PAP  Negative for Intraepithelial Lesion or Malignancy (NILM)    HPV 16 DNA Negative Negative    HPV 18 DNA Negative Negative    Other  HR HPV Negative Negative      ASCVD Risk   The 10-year ASCVD risk score (Randy LIPSCOMB, et al., 2019) is: 4.6%    Values used to calculate the score:      Age: 60 years      Sex: Female      Is Non- : No      Diabetic: Yes      Tobacco smoker: No      Systolic Blood Pressure: 108 mmHg      Is BP treated: No      HDL Cholesterol: 35 mg/dL      Total Cholesterol: 171 mg/dL           Reviewed and updated as needed this visit by Provider   Tobacco  Allergies  Meds  Problems  Med Hx  Surg Hx  Fam Hx     Sexual Activity          Past Medical History:   Diagnosis Date    Metastatic malignant neuroendocrine tumor to liver (H)      Past Surgical History:   Procedure Laterality Date    IR CHEST PORT PLACEMENT > 5 YRS OF AGE  4/23/2024     Labs reviewed in EPIC  BP Readings from Last 3 Encounters:   05/27/25 108/66   05/14/25 110/59   04/28/25 112/70    Wt Readings from Last 3 Encounters:   05/27/25 66.6 kg (146 lb 14.4 oz)   05/14/25 68.7 kg (151 lb 6.4 oz)   04/28/25 68.7 kg (151 lb 8 oz)                  Patient Active Problem List   Diagnosis    Herpes Zoster (Shingles)    Anterior Wall Chest Pain With Respiration    Contusion With Intact Skin Surface    Throat pain    Morbid obesity (H)    Pleural effusion    Lung mass    Metastatic malignant neuroendocrine tumor to liver (H)    Systolic murmur    Elevated glucose    Nausea and vomiting, unspecified vomiting type    Prediabetes    Bilateral lower extremity edema    Anxiety    Hepatic steatosis    Chronic, continuous use of opioids    Cancer associated pain    Type 2 diabetes mellitus without complication, without long-term current use of insulin (H)    Family history of malignant neoplasm of breast    Mixed hyperlipidemia    Other insomnia     Past Surgical History:   Procedure Laterality Date    IR CHEST PORT PLACEMENT > 5 YRS OF AGE  4/23/2024       Social History     Tobacco Use    Smoking status: Never     Passive exposure: Never     Smokeless tobacco: Never   Substance Use Topics    Alcohol use: Not on file     Family History   Problem Relation Age of Onset    Breast Cancer Mother 70.00    Breast Cancer Sister 53.00         Current Outpatient Medications   Medication Sig Dispense Refill    bisacodyl (DULCOLAX) 5 MG EC tablet Take 1 tablet (5 mg) by mouth daily. (Patient taking differently: Take 5 mg by mouth daily as needed.) 30 tablet 0    furosemide (LASIX) 20 MG tablet Take 1 tablet (20 mg) by mouth daily. 60 tablet 1    lidocaine-prilocaine (EMLA) 2.5-2.5 % external cream Apply topically as needed for moderate pain. 30 g 1    LORazepam (ATIVAN) 0.5 MG tablet Take 1-2 tablets (0.5-1 mg) by mouth every 6 hours as needed for anxiety or sleep. 30 tablet 1    metFORMIN (GLUCOPHAGE XR) 500 MG 24 hr tablet Take 1 tablet (500 mg) by mouth daily (with dinner). 90 tablet 3    morphine (MS CONTIN) 60 MG 12 hr tablet Take 1 tablet (60 mg) by mouth every 12 hours. 60 tablet 0    OLANZapine (ZYPREXA) 2.5 MG tablet Take 1 tablet (2.5 mg) by mouth at bedtime. 30 tablet 2    ondansetron (ZOFRAN ODT) 4 MG ODT tab Take 4 mg by mouth.      oxyCODONE (ROXICODONE) 5 MG tablet Take 3-4 tablets (15-20 mg) by mouth every 4 hours as needed for pain. 120 tablet 0    scopolamine (TRANSDERM) 1 MG/3DAYS 72 hr patch Place 1 patch over 72 hours onto the skin every 72 hours. 10 patch 3    senna (SENOKOT) 8.6 MG tablet Take 1 tablet by mouth 2 times daily. May also take 2 tablets 2 times daily as needed for constipation (if no BM previous day). 120 tablet 11    simethicone (MYLICON) 125 MG chewable tablet Take 125 mg by mouth 4 times daily as needed for intestinal gas.       No Known Allergies  Recent Labs   Lab Test 05/27/25  1225 05/14/25  1023 04/16/25  0909 03/10/25  0754 02/13/25  1312 09/04/24  1206 08/23/24  1427 06/12/24  1414 06/07/24  0959 05/13/24  0920 04/30/24  1510 04/11/24  0607 05/02/18  1558   A1C 6.2*  --   --   --  6.2*  --   --   --   --   --  6.8*   "--   --    LDL 81  --   --   --   --   --   --   --  98  --  142*  --   --    HDL 47*  --   --   --   --   --   --   --  35*  --  34*  --   --    TRIG 83  --   --   --   --   --   --   --  190*  --  182*  --   --    ALT  --  19 19 41 31   < > 10   < >  --    < >  --    < > 25   CR  --  0.43* 0.48* 0.48* 0.58   < > 0.70   < >  --    < >  --    < > 0.77   GFRESTIMATED  --  >90 >90 >90 >90   < > >90   < >  --    < >  --    < > >60   GFRESTBLACK  --   --   --   --   --   --   --   --   --   --   --   --  >60   POTASSIUM  --  3.9 3.8 4.2 3.7   < > 4.0   < >  --    < >  --    < > 4.5   TSH  --   --   --   --   --   --  1.51  --   --   --   --   --  4.27    < > = values in this interval not displayed.          Review of Systems  Constitutional, neuro, ENT, endocrine, pulmonary, cardiac, gastrointestinal, genitourinary, musculoskeletal, integument and psychiatric systems are negative, except as otherwise noted.     Objective    Exam  /66 (BP Location: Right arm, Patient Position: Sitting, Cuff Size: Adult Regular)   Pulse 82   Temp 98.3  F (36.8  C)   Resp 12   Ht 1.575 m (5' 2\")   Wt 66.6 kg (146 lb 14.4 oz)   SpO2 98%   BMI 26.87 kg/m     Estimated body mass index is 26.87 kg/m  as calculated from the following:    Height as of this encounter: 1.575 m (5' 2\").    Weight as of this encounter: 66.6 kg (146 lb 14.4 oz).    Physical Exam  GENERAL: alert and no distress  EYES: Eyes grossly normal to inspection, PERRL and conjunctivae and sclerae normal  HENT: ear canals and TM's normal, nose and mouth without ulcers or lesions  NECK: no adenopathy, no asymmetry, masses, or scars  RESP: lungs clear to auscultation - no rales, rhonchi or wheezes  CV: regular rate and rhythm, normal S1 S2, no S3 or S4, no murmur, click or rub, no peripheral edema  ABDOMEN: soft, nontender, no hepatosplenomegaly, no masses and bowel sounds normal  MS: no gross musculoskeletal defects noted, no edema  SKIN: no suspicious lesions or " rashes  NEURO: Normal strength and tone, mentation intact and speech normal  PSYCH: mentation appears normal, affect normal/bright    The longitudinal plan of care for the diagnosis(es)/condition(s) as documented were addressed during this visit. Due to the added complexity in care, I will continue to support Louise in the subsequent management and with ongoing continuity of care.      Signed Electronically by: Kee Mccullough MD

## 2025-05-28 ENCOUNTER — PATIENT OUTREACH (OUTPATIENT)
Dept: ONCOLOGY | Facility: HOSPITAL | Age: 60
End: 2025-05-28
Payer: COMMERCIAL

## 2025-05-28 ENCOUNTER — PATIENT OUTREACH (OUTPATIENT)
Dept: CARE COORDINATION | Facility: CLINIC | Age: 60
End: 2025-05-28
Payer: COMMERCIAL

## 2025-05-28 PROBLEM — E78.2 MIXED HYPERLIPIDEMIA: Status: ACTIVE | Noted: 2025-05-28

## 2025-05-28 PROBLEM — G47.09 OTHER INSOMNIA: Status: ACTIVE | Noted: 2025-05-28

## 2025-05-28 PROBLEM — Z80.3 FAMILY HISTORY OF MALIGNANT NEOPLASM OF BREAST: Status: ACTIVE | Noted: 2025-05-28

## 2025-05-28 RX ORDER — SCOPOLAMINE 1 MG/3D
1 PATCH, EXTENDED RELEASE TRANSDERMAL
Qty: 10 PATCH | Refills: 3 | OUTPATIENT
Start: 2025-05-28

## 2025-05-28 NOTE — PROGRESS NOTES
[facility]: Cancer Care                                                                                          Writer called [facility] and asked to speak to Dr. Arroyo's nurse to ask when the patient's second embolization procedure was going to be and if Dr. Arroyo was planning on doing any sort of imaging post the procedure. Dr. Arroyo's nurse Mike stated that the patient's second embolization has been scheduled for June 3rd. that typically Dr. Arroyo let's doesn't let him know what type of imaging he wants to do until after the embolization has been completed.     Signature:  Carolin Biswas RN

## 2025-05-28 NOTE — TELEPHONE ENCOUNTER
Call placed to pharmacy, there are refills on file. They will get order ready for the patient. Will refuse RX refill request.    Cher Lott RN

## 2025-05-29 ENCOUNTER — TRANSFERRED RECORDS (OUTPATIENT)
Dept: HEALTH INFORMATION MANAGEMENT | Facility: CLINIC | Age: 60
End: 2025-05-29
Payer: COMMERCIAL

## 2025-06-03 DIAGNOSIS — C78.7 METASTASES TO THE LIVER (H): Primary | ICD-10-CM

## 2025-06-11 ENCOUNTER — INFUSION THERAPY VISIT (OUTPATIENT)
Dept: INFUSION THERAPY | Facility: HOSPITAL | Age: 60
End: 2025-06-11
Attending: INTERNAL MEDICINE
Payer: COMMERCIAL

## 2025-06-11 ENCOUNTER — TELEPHONE (OUTPATIENT)
Dept: ONCOLOGY | Facility: HOSPITAL | Age: 60
End: 2025-06-11

## 2025-06-11 VITALS
TEMPERATURE: 98.3 F | DIASTOLIC BLOOD PRESSURE: 85 MMHG | HEART RATE: 90 BPM | RESPIRATION RATE: 16 BRPM | OXYGEN SATURATION: 97 % | SYSTOLIC BLOOD PRESSURE: 142 MMHG

## 2025-06-11 DIAGNOSIS — C7B.8 METASTATIC MALIGNANT NEUROENDOCRINE TUMOR TO LIVER (H): Primary | ICD-10-CM

## 2025-06-11 PROCEDURE — 250N000011 HC RX IP 250 OP 636: Mod: JZ | Performed by: NURSE PRACTITIONER

## 2025-06-11 PROCEDURE — 96372 THER/PROPH/DIAG INJ SC/IM: CPT | Performed by: NURSE PRACTITIONER

## 2025-06-11 RX ORDER — OCTREOTIDE ACETATE 30 MG
60 KIT INTRAMUSCULAR ONCE
Status: COMPLETED | OUTPATIENT
Start: 2025-06-11 | End: 2025-06-11

## 2025-06-11 RX ADMIN — OCTREOTIDE ACETATE 60 MG: KIT at 11:04

## 2025-06-11 ASSESSMENT — PAIN SCALES - GENERAL: PAINLEVEL_OUTOF10: MODERATE PAIN (5)

## 2025-06-11 NOTE — PROGRESS NOTES
"Patient complained of LUQ pain rated 4-5 for 2-3 days.   Watery loose stools and incontinence also. She has tried imodium which was \"helpful\"   This was reported during infusion appointment today.   She will call Nurse triage line.   Edited to add: Discussed with charge nurse patient to call triage to connect with Carolin (Dr. Barreto nurse.)    Infusion Nursing Note:  Louise Corado presents today for Octreotide injection(s)   Patient seen by provider today: No   present during visit today: Not Applicable.        Intravenous Access:  No Intravenous access/labs at this visit.      Post Infusion Assessment:  Patient tolerated injection without incident.       Discharge Plan:   Patient discharged in stable condition accompanied by: .  Departure Mode: Ambulatory.      Marcelina Mtz LPN    "

## 2025-06-11 NOTE — TELEPHONE ENCOUNTER
Patient returned call. She reports that she has had the abdominal pain for 2-3 days. It is located on the right side of her abdomen in the mid area. The pain is constant. Currently she rates it at a 3 on pain scale, sometimes it is a 5. She describes the pain as a soreness, the area is also tender when palpating. Patient has had watery stools this past week and has been taking Imodium for the past 2 days. She had one stool today that she said was not watery so she feels that this is improving. Patient does say that for the past couple of mornings she has woke up with a small amount of stool present that she was unaware that she passed. She has not had any incontinence during the day. She denies any blood in the stool, denies any fever, nausea or vomiting. Patient is drinking fluids well, denies any dizziness or weakness. Patient had octreotide injection today for metastatic malignant neuroendocrine tumor to liver. She also had a bland embolization done on 6/3.    Symptoms reviewed with Rossana Lam CNP. She advises that patient call IR at [facility] to run symptoms by them to see if pain could be related to procedure. Otherwise she recommends that patient monitor for the next couple of days. If pain is worsening we may need to get a CT.    Return call placed to patient with recommendations. She will call IR for any further advice and continue to monitor the pain. She will call the clinic if symptoms are worsening or not improving.    Cher Lott RN

## 2025-06-11 NOTE — TELEPHONE ENCOUNTER
Patient called and left a voice message on the  phone reporting symptoms of pain in her side for the past 2-3 days, watery diarrhea and fecal incontinence. Attempted to call her back and left a voice message.     Argelia Simon RN on 6/11/2025 at 3:50 PM

## 2025-06-22 ENCOUNTER — MYC REFILL (OUTPATIENT)
Dept: PALLIATIVE CARE | Facility: CLINIC | Age: 60
End: 2025-06-22
Payer: COMMERCIAL

## 2025-06-22 DIAGNOSIS — C7A.8 NEUROENDOCRINE CARCINOMA METASTATIC TO LIVER (H): ICD-10-CM

## 2025-06-22 DIAGNOSIS — G89.3 CANCER ASSOCIATED PAIN: ICD-10-CM

## 2025-06-22 DIAGNOSIS — C7B.8 NEUROENDOCRINE CARCINOMA METASTATIC TO LIVER (H): ICD-10-CM

## 2025-06-23 RX ORDER — MORPHINE SULFATE 60 MG/1
60 TABLET, FILM COATED, EXTENDED RELEASE ORAL EVERY 12 HOURS
Qty: 60 TABLET | Refills: 0 | Status: SHIPPED | OUTPATIENT
Start: 2025-06-24

## 2025-06-23 NOTE — TELEPHONE ENCOUNTER
Received [employer]t message from patient requesting refill of MS contin.     Last refill: 5/27/25  Last office visit: 3/4/25  Scheduled for follow up 7/15/25     Will route request to MD/ for review.     Reviewed [place]  Report.

## 2025-07-01 ENCOUNTER — HOSPITAL ENCOUNTER (OUTPATIENT)
Dept: CT IMAGING | Facility: CLINIC | Age: 60
Discharge: HOME | End: 2025-07-01
Attending: STUDENT IN AN ORGANIZED HEALTH CARE EDUCATION/TRAINING PROGRAM
Payer: COMMERCIAL

## 2025-07-01 DIAGNOSIS — C78.7 METASTASES TO THE LIVER (H): ICD-10-CM

## 2025-07-01 PROCEDURE — 250N000011 HC RX IP 250 OP 636: Performed by: STUDENT IN AN ORGANIZED HEALTH CARE EDUCATION/TRAINING PROGRAM

## 2025-07-01 PROCEDURE — 74178 CT ABD&PLV WO CNTR FLWD CNTR: CPT

## 2025-07-01 RX ORDER — IOPAMIDOL 755 MG/ML
90 INJECTION, SOLUTION INTRAVASCULAR ONCE
Status: COMPLETED | OUTPATIENT
Start: 2025-07-01 | End: 2025-07-01

## 2025-07-01 RX ADMIN — IOPAMIDOL 90 ML: 755 INJECTION, SOLUTION INTRAVENOUS at 14:21

## 2025-07-07 ENCOUNTER — INFUSION THERAPY VISIT (OUTPATIENT)
Dept: INFUSION THERAPY | Facility: HOSPITAL | Age: 60
End: 2025-07-07
Attending: INTERNAL MEDICINE
Payer: COMMERCIAL

## 2025-07-07 ENCOUNTER — ONCOLOGY VISIT (OUTPATIENT)
Dept: ONCOLOGY | Facility: HOSPITAL | Age: 60
End: 2025-07-07
Attending: INTERNAL MEDICINE
Payer: COMMERCIAL

## 2025-07-07 VITALS
DIASTOLIC BLOOD PRESSURE: 68 MMHG | HEIGHT: 62 IN | OXYGEN SATURATION: 98 % | SYSTOLIC BLOOD PRESSURE: 116 MMHG | RESPIRATION RATE: 18 BRPM | HEART RATE: 78 BPM | WEIGHT: 145 LBS | TEMPERATURE: 99.5 F | BODY MASS INDEX: 26.68 KG/M2

## 2025-07-07 DIAGNOSIS — C7B.8 METASTATIC MALIGNANT NEUROENDOCRINE TUMOR TO LIVER (H): Primary | ICD-10-CM

## 2025-07-07 DIAGNOSIS — R19.7 DIARRHEA, UNSPECIFIED TYPE: ICD-10-CM

## 2025-07-07 DIAGNOSIS — K76.0 HEPATIC STEATOSIS: ICD-10-CM

## 2025-07-07 DIAGNOSIS — C7B.8 METASTATIC MALIGNANT NEUROENDOCRINE TUMOR TO LIVER (H): ICD-10-CM

## 2025-07-07 LAB
ALBUMIN SERPL BCG-MCNC: 3.9 G/DL (ref 3.5–5.2)
ALP SERPL-CCNC: 93 U/L (ref 40–150)
ALT SERPL W P-5'-P-CCNC: 7 U/L (ref 0–50)
ANION GAP SERPL CALCULATED.3IONS-SCNC: 12 MMOL/L (ref 7–15)
AST SERPL W P-5'-P-CCNC: 17 U/L (ref 0–45)
BASOPHILS # BLD AUTO: 0 10E3/UL (ref 0–0.2)
BASOPHILS NFR BLD AUTO: 1 %
BILIRUB SERPL-MCNC: 0.3 MG/DL
BUN SERPL-MCNC: 11.2 MG/DL (ref 6–20)
CALCIUM SERPL-MCNC: 9.1 MG/DL (ref 8.8–10.4)
CHLORIDE SERPL-SCNC: 99 MMOL/L (ref 98–107)
CREAT SERPL-MCNC: 0.55 MG/DL (ref 0.51–0.95)
EGFRCR SERPLBLD CKD-EPI 2021: >90 ML/MIN/1.73M2
EOSINOPHIL # BLD AUTO: 0.1 10E3/UL (ref 0–0.7)
EOSINOPHIL NFR BLD AUTO: 2 %
ERYTHROCYTE [DISTWIDTH] IN BLOOD BY AUTOMATED COUNT: 13.7 % (ref 10–15)
GLUCOSE SERPL-MCNC: 138 MG/DL (ref 70–99)
HCO3 SERPL-SCNC: 28 MMOL/L (ref 22–29)
HCT VFR BLD AUTO: 32.7 % (ref 35–47)
HGB BLD-MCNC: 10.4 G/DL (ref 11.7–15.7)
IMM GRANULOCYTES # BLD: 0 10E3/UL
IMM GRANULOCYTES NFR BLD: 0 %
LYMPHOCYTES # BLD AUTO: 0.9 10E3/UL (ref 0.8–5.3)
LYMPHOCYTES NFR BLD AUTO: 17 %
MCH RBC QN AUTO: 27.9 PG (ref 26.5–33)
MCHC RBC AUTO-ENTMCNC: 31.8 G/DL (ref 31.5–36.5)
MCV RBC AUTO: 88 FL (ref 78–100)
MONOCYTES # BLD AUTO: 0.3 10E3/UL (ref 0–1.3)
MONOCYTES NFR BLD AUTO: 6 %
NEUTROPHILS # BLD AUTO: 3.8 10E3/UL (ref 1.6–8.3)
NEUTROPHILS NFR BLD AUTO: 74 %
NRBC # BLD AUTO: 0 10E3/UL
NRBC BLD AUTO-RTO: 0 /100
PLATELET # BLD AUTO: 292 10E3/UL (ref 150–450)
POTASSIUM SERPL-SCNC: 3.7 MMOL/L (ref 3.4–5.3)
PROT SERPL-MCNC: 7.2 G/DL (ref 6.4–8.3)
RBC # BLD AUTO: 3.73 10E6/UL (ref 3.8–5.2)
SODIUM SERPL-SCNC: 139 MMOL/L (ref 135–145)
WBC # BLD AUTO: 5.2 10E3/UL (ref 4–11)

## 2025-07-07 PROCEDURE — 96372 THER/PROPH/DIAG INJ SC/IM: CPT | Performed by: NURSE PRACTITIONER

## 2025-07-07 PROCEDURE — 99213 OFFICE O/P EST LOW 20 MIN: CPT | Mod: 25 | Performed by: INTERNAL MEDICINE

## 2025-07-07 PROCEDURE — 84260 ASSAY OF SEROTONIN: CPT

## 2025-07-07 PROCEDURE — 36591 DRAW BLOOD OFF VENOUS DEVICE: CPT

## 2025-07-07 PROCEDURE — 250N000011 HC RX IP 250 OP 636: Mod: JZ | Performed by: NURSE PRACTITIONER

## 2025-07-07 PROCEDURE — 86316 IMMUNOASSAY TUMOR OTHER: CPT

## 2025-07-07 PROCEDURE — 85025 COMPLETE CBC W/AUTO DIFF WBC: CPT

## 2025-07-07 PROCEDURE — G2211 COMPLEX E/M VISIT ADD ON: HCPCS | Performed by: INTERNAL MEDICINE

## 2025-07-07 PROCEDURE — 250N000011 HC RX IP 250 OP 636: Performed by: INTERNAL MEDICINE

## 2025-07-07 PROCEDURE — 82374 ASSAY BLOOD CARBON DIOXIDE: CPT

## 2025-07-07 PROCEDURE — 99215 OFFICE O/P EST HI 40 MIN: CPT | Performed by: INTERNAL MEDICINE

## 2025-07-07 RX ORDER — HEPARIN SODIUM (PORCINE) LOCK FLUSH IV SOLN 100 UNIT/ML 100 UNIT/ML
5 SOLUTION INTRAVENOUS
Status: DISCONTINUED | OUTPATIENT
Start: 2025-07-07 | End: 2025-07-07 | Stop reason: HOSPADM

## 2025-07-07 RX ORDER — OCTREOTIDE ACETATE 30 MG
60 KIT INTRAMUSCULAR ONCE
Status: COMPLETED | OUTPATIENT
Start: 2025-07-07 | End: 2025-07-07

## 2025-07-07 RX ADMIN — HEPARIN 5 ML: 100 SYRINGE at 12:27

## 2025-07-07 RX ADMIN — OCTREOTIDE ACETATE 60 MG: KIT at 14:31

## 2025-07-07 ASSESSMENT — PAIN SCALES - GENERAL: PAINLEVEL_OUTOF10: NO PAIN (0)

## 2025-07-07 NOTE — PROGRESS NOTES
Port accessed without difficulty today, and labs drawn. Port deaccessed following lab draw. Vernell Villalta RN

## 2025-07-07 NOTE — LETTER
7/7/2025      Louise Corado  [address] [place] [address]      Dear Colleague,    Thank you for referring your patient, Louise Corado, to the [facility] CANCER [facility]. Please see a copy of my visit note below.    [facility] Hematology and Oncology Progress Note    Patient: Louise Corado  MRN: [id number]  Date of Service: Jul 7, 2025        Assessment and Plan:    1.  Metastatic neuroendocrine carcinoma: Chemoembolization to sites of disease were carried out in May and June of this year.  This was done for palliation of persistent symptoms.  She has noted improvement in her symptoms, notably her fatigue and diarrhea.  She had a CT scan of the liver performed on July 1.  I personally viewed the images and shared them with Louise and interpreted them for her.  Findings show evidence of residual disease in the posterior right hepatic lobe lesions with interval decrease in size.  No evidence of progressive disease.  Overall she has had significant reduction in a few of the largest liver metastases.    For now we will continue monthly octreotide.  I reviewed with them that if she progresses we could consider more embolization versus changing to Lutathera.  Questions were answered.    2.  Nausea: This has improved significantly since her embolizations.     3.  Pleural effusion: Persistent small right pleural effusion seen on her recent CT.  Clinically asymptomatic.  At this point we can continue to follow clinically and radiographically.  She is not having any significant shortness of breath at this time.    4.  Anxiety: Generally well-controlled.  She is taking lorazepam as needed and olanzapine 2.5 mg every night.    5.  Pain: Well-controlled.  Her MS Contin dose was recently uptitrated to 60 mg every 12.  This is being managed by the palliative care service with whom she is following.      6.  Hepatic steatosis: Per my own read of her serial imaging her liver looks somewhat smaller.   She still has steatosis but I think it is improving some as she is losing some weight.  Liver function test today are normal.    She was seen at [facility] on January 14, 2025.   CMP from today was reviewed and shows a normal alkaline phosphatase of 93, AST 17, ALT 7 and bilirubin 0.3.  All LFTs normal.    Medical decision Making:  I spent 42 minutes in the care of this patient today, which included time necessary for preparation for the visit, face to face time with the patient, communication of recommendations to the care team, and documentation time.    ECOG Performance  1    Diagnosis:    1.  Metastatic neuroendocrine carcinoma, poorly differentiated:  Diagnosed April 2024 from a liver biopsy.  Her PET/CT shows malignant involvement of the liver, mediastinal nodes, left upper quadrant omentum, manubrium, and bilateral pleura.  There is some wall thickening and FDG activity in a short segment of the terminal ileum. Liver biopsy shows WHO, NET G2.     EGD 4/25/24: Occasional gastritis.  No mass.  Colonoscopy 4/25/24: Mass of terminal ileum.    NGS:  PDL1=0%, MDI-stable, TMB low,   CancerTYPE ID:  GI carcinoid 96%    Treatment:    Carboplatin and etoposide initiated May 13, 2024.  25% etopside dose reduction starting with cycle 5 secondary to nausea.  Cycle 5 was completed on August 7, 2024.    Octreotide started 7/16/2024.  30 mg.  Increase to 60 mg October 10, 2024.  Held after December 2 dose for fatty liver.  Reinitiated March 2025 for worsening symptoms.      Chemoembolization multiple liver metastases May 6, 2025.  Chemoembolization abdominal metastases as well as pleural and mediastinal metastases.  Zahida 3, 2025.    Interim History:    Louise returns today for a follow-up visit.  Feeling better after her embolizations.  She has a lot more energy and stamina.  Much less nausea.  She denies having diarrhea.  She still gets her episodes but they happen less frequently and of shorter duration.   "She has also been eating well.    Review of Systems:    As above in the history.     Review of Systems otherwise Negative for:  General: chills, fever or night sweats  Psychological: depression  Ophthalmic: blurry vision, double vision or loss of vision, vision change  ENT: epistaxis, oral lesions, hearing changes  Hematological and Lymphatic: bleeding, bruising, jaundice, swollen lymph nodes  Endocrine: hot flashes, unexpected weight changes  Respiratory: cough, hemoptysis, orthopnea  Cardiovascular: palpitations or PND  Gastrointestinal: blood in stools, change in bowel habits  Genito-Urinary: change in urinary stream, incontinence, frequency/urgency  Musculoskeletal: joint swelling, muscle pain  Neurological: dizziness, headaches, numbness/tingling  Dermatological: lumps and rash    Physical Exam:    /68 (BP Location: Left arm, Patient Position: Sitting, Cuff Size: Adult Regular)   Pulse 78   Temp 99.5  F (37.5  C) (Temporal)   Resp 18   Ht 1.575 m (5' 2.01\")   Wt 65.8 kg (145 lb)   SpO2 98%   BMI 26.51 kg/m      General: patient appears stated age of 59 year old. Nontoxic and in no distress.   HEENT: Head: atraumatic, normocephalic. Sclerae anicteric.  Chest:  Normal respiratory effort  Cardiac: Trace bipedal edema  Abdomen: abdomen is non-distended  Extremities: normal tone and muscle bulk.  Skin: no lesions or rash on visible skin. Warm and dry.   CNS: alert and oriented. Grossly non-focal.   Psychiatric: normal mood and affect.     Lab Results:    Recent Results (from the past week)   Comprehensive metabolic panel (BMP + Alb, Alk Phos, ALT, AST, Total. Bili, TP)   Result Value Ref Range    Sodium 139 135 - 145 mmol/L    Potassium 3.7 3.4 - 5.3 mmol/L    Carbon Dioxide (CO2) 28 22 - 29 mmol/L    Anion Gap 12 7 - 15 mmol/L    Urea Nitrogen 11.2 6.0 - 20.0 mg/dL    Creatinine 0.55 0.51 - 0.95 mg/dL    GFR Estimate >90 >60 mL/min/1.73m2    Calcium 9.1 8.8 - 10.4 mg/dL    Chloride 99 98 - 107 mmol/L "    Glucose 138 (H) 70 - 99 mg/dL    Alkaline Phosphatase 93 40 - 150 U/L    AST 17 0 - 45 U/L    ALT 7 0 - 50 U/L    Protein Total 7.2 6.4 - 8.3 g/dL    Albumin 3.9 3.5 - 5.2 g/dL    Bilirubin Total 0.3 <=1.2 mg/dL   CBC with platelets and differential   Result Value Ref Range    WBC Count 5.2 4.0 - 11.0 10e3/uL    RBC Count 3.73 (L) 3.80 - 5.20 10e6/uL    Hemoglobin 10.4 (L) 11.7 - 15.7 g/dL    Hematocrit 32.7 (L) 35.0 - 47.0 %    MCV 88 78 - 100 fL    MCH 27.9 26.5 - 33.0 pg    MCHC 31.8 31.5 - 36.5 g/dL    RDW 13.7 10.0 - 15.0 %    Platelet Count 292 150 - 450 10e3/uL    % Neutrophils 74 %    % Lymphocytes 17 %    % Monocytes 6 %    % Eosinophils 2 %    % Basophils 1 %    % Immature Granulocytes 0 %    NRBCs per 100 WBC 0 <1 /100    Absolute Neutrophils 3.8 1.6 - 8.3 10e3/uL    Absolute Lymphocytes 0.9 0.8 - 5.3 10e3/uL    Absolute Monocytes 0.3 0.0 - 1.3 10e3/uL    Absolute Eosinophils 0.1 0.0 - 0.7 10e3/uL    Absolute Basophils 0.0 0.0 - 0.2 10e3/uL    Absolute Immature Granulocytes 0.0 <=0.4 10e3/uL    Absolute NRBCs 0.0 10e3/uL     Imaging:    CT Liver wo & w Contrast  Result Date: 7/2/2025  EXAM: CT LIVER WO and W CONTRAST LOCATION: [facility] DATE: 7/1/2025 INDICATION: Follow up s p [place]mot Embolization of Hypervascular Metastatic Neuroendocrine Carcinoma Hepatic Lesion COMPARISON: CT chest abdomen and pelvis with contrast 03/21/2025 TECHNIQUE: CT scan of the abdomen without contrast, and during arterial, portal venous, and equilibrium phases of injection of IV contrast. The pelvis was imaged during the portal venous phase. Multiplanar reformats were obtained. Dose reduction techniques were used. CONTRAST: Isovue  370 90mL FINDINGS: LOWER CHEST: Small loculated right pleural effusion and similar partially imaged consolidative pulmonary opacity in the right lower and medial right middle lobe. No left pleural effusion. Normal imaged heart size. Partially imaged central venous catheter   with distal catheter tip at the superior caval atrial junction. HEPATOBILIARY: The liver is enlarged measuring 17 cm in the right midclavicular line. Marked diffuse hepatic steatosis. There are multiple hypervascular metastases in both hepatic lobes. The largest within the subcapsular right hepatic lobe segment 8/7 has decreased in size in the interval with increased central hypoenhancement, probable posttreatment change. This lesion measures approximately 4.5 x 3.3 cm (image 25, series 7), previously measured 6.6 x 6.0 cm. There is persistent peripheral nodular enhancement about this lesion, consistent with residual disease. Additional right hepatic lobe segment 8 lesion is also decreased in size with increased central hypoenhancement, measuring approximately 2.4 x 2.3 cm (image 18, series 7), previously measured 3.7 x 3.0 cm (remeasured, 03/21/2025). This lesion also demonstrates peripheral nodular contrast enhancement, consistent with residual disease. Medial segment left hepatic lobe lesion has decreased in size and enhancement since the prior examination, measuring 2.3 x 2.2 cm (image 35, series 7), previously measured 3.2 x 3.0 cm. No intralesional enhancement is seen within this lesion. There are multiple additional smaller arterially enhancing lesions in both hepatic lobes, which appear similar to the prior examination. No definitely new hepatic lesion.. The gallbladder appears normal. No intra or extrahepatic biliary ductal dilation. PANCREAS: Unchanged diffuse pancreatic atrophy. No main pancreatic ductal dilation. SPLEEN: Top normal in size. ADRENAL GLANDS: Normal. KIDNEYS: Normal. BOWEL: Similar appearance of right lower abdomen mesenteric mass abutting the terminal ileum measuring approximately 3.2 x 2.3 cm, previously measured 2.8 x 2.4 cm, probably unchanged allowing for differences in technique/slice selection. No findings of bowel obstruction. Similar mild wall thickening of the terminal ileum.  Trace ascites. Similar appearance of arterially enhancing peritoneal carcinomatosis/mesenteric implants (for example in the left paracolic lower abdomen image 108 and anterior right abdomen image 125, series 8). LYMPH NODES: Rounded retroperitoneal arterially enhancing mildly enlarged lymph node measuring 1.2 cm on image 95, series 8, unchanged. No new or enlarging lymphadenopathy in the imaged abdomen or pelvis. VASCULATURE: No abdominal aortic aneurysm. Mild atherosclerosis. PELVIC ORGANS: Normal. MUSCULOSKELETAL: Similar appearance of faintly sclerotic osseous lesions. No pathologic fracture.     IMPRESSION: 1.  Postprocedural findings in the largest 3 hepatic lesions in the posterior right and medial segment of the left hepatic lobe, consistent with given history of [place] embolization. There are findings of residual disease along the periphery of the posterior right hepatic lobe lesions with interval decrease in size. No definite residual disease within the treated left hepatic lobe medial segment lesion. Additional hepatic metastases appear similar. No new hepatic lesion identified. 2.  Small volume ascites appearing since the prior examination. 3.  Partially imaged small loculated right pleural effusion and right pleural disease, mesenteric mass adjacent to the terminal ileum, peritoneal carcinomatosis, and retroperitoneal mike disease appear similar. No findings of worsening metastatic disease in the abdomen or pelvis. 4.  Similar hepatomegaly and hepatic steatosis and additional incidental/chronic findings, as detailed.      Signed by: Man Barreto MD`q    Again, thank you for allowing me to participate in the care of your patient.        Sincerely,        Man Barreto MD    Electronically signed

## 2025-07-07 NOTE — PROGRESS NOTES
Infusion Nursing Note:  Louise Corado presents today for Octreotide injections.    Patient seen by provider today: Yes: Dr. Barreto   present during visit today: Not Applicable.    Note: Pt came to the clinic ambulatory accompanied by her , after an appointment with Dr. Barreto. Reviewed the plan with the patient and answered her questions. Injected Octreotide injections intramuscularly simultaneously on buttocks. Tolerated well       Intravenous Access:  No Intravenous access/labs at this visit.    Treatment Conditions:  Lab Results   Component Value Date    HGB 10.4 (L) 07/07/2025    WBC 5.2 07/07/2025    ANEU 3.8 07/07/2025     07/07/2025        Lab Results   Component Value Date     07/07/2025    POTASSIUM 3.7 07/07/2025    MAG 1.8 08/23/2024    CR 0.55 07/07/2025    ANGELA 9.1 07/07/2025    BILITOTAL 0.3 07/07/2025    ALBUMIN 3.9 07/07/2025    ALT 7 07/07/2025    AST 17 07/07/2025       Results reviewed, labs MET treatment parameters, ok to proceed with treatment.      Post Infusion Assessment:  Site patent and intact, free from redness, edema or discomfort.       Discharge Plan:   Discharge instructions reviewed with: Patient.  AVS to patient via [employer].  Patient will return 8/4 for next appointment.   Patient discharged in stable condition accompanied by: self and .  Departure Mode: Ambulatory.      Jami Belcher RN

## 2025-07-07 NOTE — PROGRESS NOTES
[facility] On[facility] Progress Note    Patient: Louise Corado  MRN: [id number]  Date of Service: Jul 7, 2025        Assessment and Plan:    1.  Metastatic neuroendocrine carcinoma: Chemoembolization to sites of disease were carried out in May and June of this year.  This was done for palliation of persistent symptoms.  She has noted improvement in her symptoms, notably her fatigue and diarrhea.  She had a CT scan of the liver performed on July 1.  I personally viewed the images and shared them with Louise and interpreted them for her.  Findings show evidence of residual disease in the posterior right hepatic lobe lesions with interval decrease in size.  No evidence of progressive disease.  Overall she has had significant reduction in a few of the largest liver metastases.    For now we will continue monthly octreotide.  I reviewed with them that if she progresses we could consider more embolization versus changing to Lutathera.  Questions were answered.    2.  Nausea: This has improved significantly since her embolizations.     3.  Pleural effusion: Persistent small right pleural effusion seen on her recent CT.  Clinically asymptomatic.  At this point we can continue to follow clinically and radiographically.  She is not having any significant shortness of breath at this time.    4.  Anxiety: Generally well-controlled.  She is taking lorazepam as needed and olanzapine 2.5 mg every night.    5.  Pain: Well-controlled.  Her MS Contin dose was recently uptitrated to 60 mg every 12.  This is being managed by the palliative care service with whom she is following.      6.  Hepatic steatosis: Per my own read of her serial imaging her liver looks somewhat smaller.  She still has steatosis but I think it is improving some as she is losing some weight.  Liver function test today are normal.    She was seen at [facility] on January 14, 2025.   CMP from today was reviewed and shows a  normal alkaline phosphatase of 93, AST 17, ALT 7 and bilirubin 0.3.  All LFTs normal.    Medical decision Making:  I spent 42 minutes in the care of this patient today, which included time necessary for preparation for the visit, face to face time with the patient, communication of recommendations to the care team, and documentation time.    ECOG Performance  1    Diagnosis:    1.  Metastatic neuroendocrine carcinoma, poorly differentiated:  Diagnosed April 2024 from a liver biopsy.  Her PET/CT shows malignant involvement of the liver, mediastinal nodes, left upper quadrant omentum, manubrium, and bilateral pleura.  There is some wall thickening and FDG activity in a short segment of the terminal ileum. Liver biopsy shows WHO, NET G2.     EGD 4/25/24: Occasional gastritis.  No mass.  Colonoscopy 4/25/24: Mass of terminal ileum.    NGS:  PDL1=0%, MDI-stable, TMB low,   CancerTYPE ID:  GI carcinoid 96%    Treatment:    Carboplatin and etoposide initiated May 13, 2024.  25% etopside dose reduction starting with cycle 5 secondary to nausea.  Cycle 5 was completed on August 7, 2024.    Octreotide started 7/16/2024.  30 mg.  Increase to 60 mg October 10, 2024.  Held after December 2 dose for fatty liver.  Reinitiated March 2025 for worsening symptoms.      Chemoembolization multiple liver metastases May 6, 2025.  Chemoembolization abdominal metastases as well as pleural and mediastinal metastases.  Zahida 3, 2025.    Interim History:    Louise returns today for a follow-up visit.  Feeling better after her embolizations.  She has a lot more energy and stamina.  Much less nausea.  She denies having diarrhea.  She still gets her episodes but they happen less frequently and of shorter duration.  She has also been eating well.    Review of Systems:    As above in the history.     Review of Systems otherwise Negative for:  General: chills, fever or night sweats  Psychological: depression  Ophthalmic: blurry vision, double vision  "or loss of vision, vision change  ENT: epistaxis, oral lesions, hearing changes  Hematological and Lymphatic: bleeding, bruising, jaundice, swollen lymph nodes  Endocrine: hot flashes, unexpected weight changes  Respiratory: cough, hemoptysis, orthopnea  Cardiovascular: palpitations or PND  Gastrointestinal: blood in stools, change in bowel habits  Genito-Urinary: change in urinary stream, incontinence, frequency/urgency  Musculoskeletal: joint swelling, muscle pain  Neurological: dizziness, headaches, numbness/tingling  Dermatological: lumps and rash    Physical Exam:    /68 (BP Location: Left arm, Patient Position: Sitting, Cuff Size: Adult Regular)   Pulse 78   Temp 99.5  F (37.5  C) (Temporal)   Resp 18   Ht 1.575 m (5' 2.01\")   Wt 65.8 kg (145 lb)   SpO2 98%   BMI 26.51 kg/m      General: patient appears stated age of 59 year old. Nontoxic and in no distress.   HEENT: Head: atraumatic, normocephalic. Sclerae anicteric.  Chest:  Normal respiratory effort  Cardiac: Trace bipedal edema  Abdomen: abdomen is non-distended  Extremities: normal tone and muscle bulk.  Skin: no lesions or rash on visible skin. Warm and dry.   CNS: alert and oriented. Grossly non-focal.   Psychiatric: normal mood and affect.     Lab Results:    Recent Results (from the past week)   Comprehensive metabolic panel (BMP + Alb, Alk Phos, ALT, AST, Total. Bili, TP)   Result Value Ref Range    Sodium 139 135 - 145 mmol/L    Potassium 3.7 3.4 - 5.3 mmol/L    Carbon Dioxide (CO2) 28 22 - 29 mmol/L    Anion Gap 12 7 - 15 mmol/L    Urea Nitrogen 11.2 6.0 - 20.0 mg/dL    Creatinine 0.55 0.51 - 0.95 mg/dL    GFR Estimate >90 >60 mL/min/1.73m2    Calcium 9.1 8.8 - 10.4 mg/dL    Chloride 99 98 - 107 mmol/L    Glucose 138 (H) 70 - 99 mg/dL    Alkaline Phosphatase 93 40 - 150 U/L    AST 17 0 - 45 U/L    ALT 7 0 - 50 U/L    Protein Total 7.2 6.4 - 8.3 g/dL    Albumin 3.9 3.5 - 5.2 g/dL    Bilirubin Total 0.3 <=1.2 mg/dL   CBC with platelets " and differential   Result Value Ref Range    WBC Count 5.2 4.0 - 11.0 10e3/uL    RBC Count 3.73 (L) 3.80 - 5.20 10e6/uL    Hemoglobin 10.4 (L) 11.7 - 15.7 g/dL    Hematocrit 32.7 (L) 35.0 - 47.0 %    MCV 88 78 - 100 fL    MCH 27.9 26.5 - 33.0 pg    MCHC 31.8 31.5 - 36.5 g/dL    RDW 13.7 10.0 - 15.0 %    Platelet Count 292 150 - 450 10e3/uL    % Neutrophils 74 %    % Lymphocytes 17 %    % Monocytes 6 %    % Eosinophils 2 %    % Basophils 1 %    % Immature Granulocytes 0 %    NRBCs per 100 WBC 0 <1 /100    Absolute Neutrophils 3.8 1.6 - 8.3 10e3/uL    Absolute Lymphocytes 0.9 0.8 - 5.3 10e3/uL    Absolute Monocytes 0.3 0.0 - 1.3 10e3/uL    Absolute Eosinophils 0.1 0.0 - 0.7 10e3/uL    Absolute Basophils 0.0 0.0 - 0.2 10e3/uL    Absolute Immature Granulocytes 0.0 <=0.4 10e3/uL    Absolute NRBCs 0.0 10e3/uL     Imaging:    CT Liver wo & w Contrast  Result Date: 7/2/2025  EXAM: CT LIVER WO and W CONTRAST LOCATION: [facility] DATE: 7/1/2025 INDICATION: Follow up s p [place] Embolization of Hypervascular Metastatic Neuroendocrine Carcinoma Hepatic Lesion COMPARISON: CT chest abdomen and pelvis with contrast 03/21/2025 TECHNIQUE: CT scan of the abdomen without contrast, and during arterial, portal venous, and equilibrium phases of injection of IV contrast. The pelvis was imaged during the portal venous phase. Multiplanar reformats were obtained. Dose reduction techniques were used. CONTRAST: Isovue  370 90mL FINDINGS: LOWER CHEST: Small loculated right pleural effusion and similar partially imaged consolidative pulmonary opacity in the right lower and medial right middle lobe. No left pleural effusion. Normal imaged heart size. Partially imaged central venous catheter  with distal catheter tip at the superior caval atrial junction. HEPATOBILIARY: The liver is enlarged measuring 17 cm in the right midclavicular line. Marked diffuse hepatic steatosis. There are multiple hypervascular metastases in both  hepatic lobes. The largest within the subcapsular right hepatic lobe segment 8/7 has decreased in size in the interval with increased central hypoenhancement, probable posttreatment change. This lesion measures approximately 4.5 x 3.3 cm (image 25, series 7), previously measured 6.6 x 6.0 cm. There is persistent peripheral nodular enhancement about this lesion, consistent with residual disease. Additional right hepatic lobe segment 8 lesion is also decreased in size with increased central hypoenhancement, measuring approximately 2.4 x 2.3 cm (image 18, series 7), previously measured 3.7 x 3.0 cm (remeasured, 03/21/2025). This lesion also demonstrates peripheral nodular contrast enhancement, consistent with residual disease. Medial segment left hepatic lobe lesion has decreased in size and enhancement since the prior examination, measuring 2.3 x 2.2 cm (image 35, series 7), previously measured 3.2 x 3.0 cm. No intralesional enhancement is seen within this lesion. There are multiple additional smaller arterially enhancing lesions in both hepatic lobes, which appear similar to the prior examination. No definitely new hepatic lesion.. The gallbladder appears normal. No intra or extrahepatic biliary ductal dilation. PANCREAS: Unchanged diffuse pancreatic atrophy. No main pancreatic ductal dilation. SPLEEN: Top normal in size. ADRENAL GLANDS: Normal. KIDNEYS: Normal. BOWEL: Similar appearance of right lower abdomen mesenteric mass abutting the terminal ileum measuring approximately 3.2 x 2.3 cm, previously measured 2.8 x 2.4 cm, probably unchanged allowing for differences in technique/slice selection. No findings of bowel obstruction. Similar mild wall thickening of the terminal ileum. Trace ascites. Similar appearance of arterially enhancing peritoneal carcinomatosis/mesenteric implants (for example in the left paracolic lower abdomen image 108 and anterior right abdomen image 125, series 8). LYMPH NODES: Rounded  retroperitoneal arterially enhancing mildly enlarged lymph node measuring 1.2 cm on image 95, series 8, unchanged. No new or enlarging lymphadenopathy in the imaged abdomen or pelvis. VASCULATURE: No abdominal aortic aneurysm. Mild atherosclerosis. PELVIC ORGANS: Normal. MUSCULOSKELETAL: Similar appearance of faintly sclerotic osseous lesions. No pathologic fracture.     IMPRESSION: 1.  Postprocedural findings in the largest 3 hepatic lesions in the posterior right and medial segment of the left hepatic lobe, consistent with given history of [place] embolization. There are findings of residual disease along the periphery of the posterior right hepatic lobe lesions with interval decrease in size. No definite residual disease within the treated left hepatic lobe medial segment lesion. Additional hepatic metastases appear similar. No new hepatic lesion identified. 2.  Small volume ascites appearing since the prior examination. 3.  Partially imaged small loculated right pleural effusion and right pleural disease, mesenteric mass adjacent to the terminal ileum, peritoneal carcinomatosis, and retroperitoneal mike disease appear similar. No findings of worsening metastatic disease in the abdomen or pelvis. 4.  Similar hepatomegaly and hepatic steatosis and additional incidental/chronic findings, as detailed.      Signed by: Man Barreto MD`q

## 2025-07-08 DIAGNOSIS — C7B.8 METASTATIC MALIGNANT NEUROENDOCRINE TUMOR TO LIVER (H): Primary | ICD-10-CM

## 2025-07-08 LAB — CGA SERPL-MCNC: 1758 NG/ML

## 2025-07-09 ENCOUNTER — MYC REFILL (OUTPATIENT)
Dept: PALLIATIVE CARE | Facility: CLINIC | Age: 60
End: 2025-07-09
Payer: COMMERCIAL

## 2025-07-09 DIAGNOSIS — R07.1 PAINFUL RESPIRATION: ICD-10-CM

## 2025-07-10 RX ORDER — OXYCODONE HYDROCHLORIDE 5 MG/1
15-20 TABLET ORAL EVERY 4 HOURS PRN
Qty: 120 TABLET | Refills: 0 | Status: SHIPPED | OUTPATIENT
Start: 2025-07-10

## 2025-07-10 NOTE — TELEPHONE ENCOUNTER
Received [employer] message from patient requesting refill of oxycodone.     Last refill: 6/14/25  Last office visit: 3/4/25  Scheduled for follow up 7/15/25     Will route request to MD/ for review.     Reviewed [place]  Report.

## 2025-07-14 DIAGNOSIS — R11.2 NAUSEA AND VOMITING, UNSPECIFIED VOMITING TYPE: ICD-10-CM

## 2025-07-14 RX ORDER — OLANZAPINE 2.5 MG/1
2.5 TABLET, FILM COATED ORAL AT BEDTIME
Qty: 30 TABLET | Refills: 2 | Status: SHIPPED | OUTPATIENT
Start: 2025-07-14

## 2025-07-14 NOTE — TELEPHONE ENCOUNTER
Faxed refill request received today from [facility] pharmacy requesting a refill of patient's olanzapine 2.5 mg tablets.  This was last refilled 4/16/2025, #30, 2 refills.  Will route to Rossana Lam CNP to review and refill in Dr. Barreto absence.    Argelia Simon RN on 7/14/2025 at 10:16 AM

## 2025-07-15 ENCOUNTER — OFFICE VISIT (OUTPATIENT)
Dept: RADIATION ONCOLOGY | Facility: HOSPITAL | Age: 60
End: 2025-07-15
Payer: COMMERCIAL

## 2025-07-15 VITALS
WEIGHT: 146.8 LBS | OXYGEN SATURATION: 98 % | HEART RATE: 84 BPM | SYSTOLIC BLOOD PRESSURE: 112 MMHG | BODY MASS INDEX: 26.84 KG/M2 | DIASTOLIC BLOOD PRESSURE: 66 MMHG | RESPIRATION RATE: 20 BRPM

## 2025-07-15 DIAGNOSIS — C7A.8 NEUROENDOCRINE CARCINOMA METASTATIC TO LIVER (H): ICD-10-CM

## 2025-07-15 DIAGNOSIS — C78.7 METASTASES TO THE LIVER (H): ICD-10-CM

## 2025-07-15 DIAGNOSIS — Z51.5 PALLIATIVE CARE PATIENT: Primary | ICD-10-CM

## 2025-07-15 DIAGNOSIS — G89.3 CANCER RELATED PAIN: ICD-10-CM

## 2025-07-15 DIAGNOSIS — T40.2X5A THERAPEUTIC OPIOID-INDUCED CONSTIPATION (OIC): ICD-10-CM

## 2025-07-15 DIAGNOSIS — K59.03 THERAPEUTIC OPIOID-INDUCED CONSTIPATION (OIC): ICD-10-CM

## 2025-07-15 DIAGNOSIS — C7B.8 NEUROENDOCRINE CARCINOMA METASTATIC TO LIVER (H): ICD-10-CM

## 2025-07-15 PROCEDURE — 99214 OFFICE O/P EST MOD 30 MIN: CPT | Performed by: FAMILY MEDICINE

## 2025-07-15 ASSESSMENT — PAIN SCALES - GENERAL: PAINLEVEL_OUTOF10: NO PAIN (0)

## 2025-07-15 NOTE — PROGRESS NOTES
"Oncology Rooming Note    July 15, 2025 2:51 PM   Louise Corado is a 60 year old female who presents for:    Chief Complaint   Patient presents with    Oncology Clinic Visit    Palliative Care Referral Automated Outreach     Follow up     Initial Vitals: /66   Pulse 84   Resp 20   Wt 66.6 kg (146 lb 12.8 oz)   SpO2 98%   BMI 26.84 kg/m   Estimated body mass index is 26.84 kg/m  as calculated from the following:    Height as of 7/7/25: 1.575 m (5' 2.01\").    Weight as of this encounter: 66.6 kg (146 lb 12.8 oz). Body surface area is 1.71 meters squared.  No Pain (0) Comment: Data Unavailable   No LMP recorded. Patient is postmenopausal.  Allergies reviewed: Yes  Medications reviewed: Yes    Medications: Medication refills not needed today.  Pharmacy name entered into Quture:    [id number] [place], [place] - [address] AT [facility] [place], [place] - [facility]    PHQ9:  Did this patient require a PHQ9?: No      Clinical concerns: Follow up palliative care, doing well today Dr. Neil was notified.      Naheed Verduzco RN              "

## 2025-07-15 NOTE — PROGRESS NOTES
Palliative Care Outpatient Clinic Progress Note    Patient Name: Louise Corado  Primary Provider: Kee Mccullough     Recommendations & Counseling      GOALS OF CARE:   Life-prolonging without limits         ADVANCE CARE PLANNING:  No health care directive on file. Per system policy, Surrogate Decision-makers for Patients With Diminished Decision-making Capacity offers guidance on possible decision-makers.  Rowdy has been identified as a surrogate decision maker.  Recommend exploring further in future palliative medicine conversations.  There is no POLST form on file, defer to patient and/or next of kin for decisions   Code status: Full Code     MEDICAL MANAGEMENT:      #Pain,acute on chronic : Has R sided back/chest wall pain, sclerotic lesions noted on imaging, cancer-related.     Recommendations:    On APAP PRN-=-continue same.  Oxycodone 20 mg q4H PRN mod-severe pain, continue same and refill with new quantity of #120 provided today typically uses this TID.  INCREASE MS contin   from 45 mg q12 hours to 60 mg po BID--new rx sent; OK to use up old 15 and 30 mg tabs to make 60 mg doses  Suggest use of heat as needed/ice as needed.  Continue with octreotide per Dr. Barreto.  RNCC symptom check in 2 weeks.        #Nausea,disease processes with neuroendocrine tumor and suspected carcinoid tumor, plus nausea due to chemotherapy infusions.  This has resolved currently and Louise has ondansetron supposed to be scheduled daily but she is using it prn and prochlorpemazine prn and phenergan which she isn't using        #Deconditioning : has been self limiting activity due to flushing episodes.  Mostly sitting/in bed during daytime.  Encourage ambulation/movement.  She is aware of functional status necessary for continuing cytotoxic chemotherapy.     #Unintended weight loss due to metastatic neuroendocrine tumor, issues with nausea  #Protein calorie malnutrition in the context of acute illness as evidenced by  "unintentional weight loss and muscle loss   Work on antiemetic medications.  Discussed ways to increase protein intake  We provided Ensure samples today and several coupons, as well.     #Constipation,Medication adverse effect  At home--on docusate only.  Recommend avoiding docusate as \"all mush, no push\" and studies show it worsens stool quality for cancer patients in RCT but Louise feels it makes a difference.     RECOMMENDATIONS     - Continue Senna 1 tablet po BID on days she has a BM or 2 tabs po BID on days with no BM     - goal is soft easy to pass BM daily to every other day.     # Anxiety, in part related to medical diagnosis.  - has not been taking citalopram with concerns for serotonergic effect.  - using lorazepam up to three times weekly at baseline, continue same.   prescribes this.  - [facility]/processing--order placed for outpatient setting.         Counseling: All of the above was explained to the patient in lay language. The patient has verbalized a clear understanding of the discussion, asked appropriate questions, which have been answered to patient's apparent satisfaction. The patient is in agreement with the above plan.      Chief Complaint/Patient ID: Louise Corado 60 year old female with PMHx of metastatic neuroendocrine carcinoma with cancer associated pain     Last Palliative care appointment: 03/04/2025 with me     Reviewed:  Yes:   reviewed - controlled substances reflected in medication list..    Interim History:  Louise Corado is a 60 year old female who is seen today for follow up with Palliative Care clinic visit. She had [place] embolization on the first week of July and tolerated it well.  Plan is to continue using octreotide monthly.  No further emesis for months.     Pain:  well controlled with current regimen    Appetite/Nausea: good appetite and stable weight     Bowels: has good prn constipation regimen     Sleep: " good     Mood: some anxiety with good response to lorazepam; anxiety is related to closing estates for her and Rowdy's parents     Coping:  overall well.    Family History- Reviewed in Epic.    No Known Allergies    Social History:  Pertinent changes to social history/social situation since last visit: her mom just .  Key support resources:   Advance Directive Status:  no ACP documents    Social History     Tobacco Use    Smoking status: Never     Passive exposure: Never    Smokeless tobacco: Never   Vaping Use    Vaping status: Never Used         No Known Allergies  Current Outpatient Medications   Medication Sig Dispense Refill    bisacodyl (DULCOLAX) 5 MG EC tablet Take 1 tablet (5 mg) by mouth daily. (Patient taking differently: Take 5 mg by mouth daily as needed.) 30 tablet 0    furosemide (LASIX) 20 MG tablet Take 1 tablet (20 mg) by mouth daily. 60 tablet 1    lidocaine-prilocaine (EMLA) 2.5-2.5 % external cream Apply topically as needed for moderate pain. 30 g 1    LORazepam (ATIVAN) 0.5 MG tablet Take 1-2 tablets (0.5-1 mg) by mouth every 6 hours as needed for anxiety or sleep. 30 tablet 1    metFORMIN (GLUCOPHAGE XR) 500 MG 24 hr tablet Take 1 tablet (500 mg) by mouth daily (with dinner). 90 tablet 3    morphine (MS CONTIN) 60 MG 12 hr tablet Take 1 tablet (60 mg) by mouth every 12 hours. 60 tablet 0    OLANZapine (ZYPREXA) 2.5 MG tablet Take 1 tablet (2.5 mg) by mouth at bedtime. 30 tablet 2    ondansetron (ZOFRAN ODT) 4 MG ODT tab Take 4 mg by mouth.      oxyCODONE (ROXICODONE) 5 MG tablet Take 3-4 tablets (15-20 mg) by mouth every 4 hours as needed for pain. 120 tablet 0    scopolamine (TRANSDERM) 1 MG/3DAYS 72 hr patch Place 1 patch over 72 hours onto the skin every 72 hours. 10 patch 3    senna (SENOKOT) 8.6 MG tablet Take 1 tablet by mouth 2 times daily. May also take 2 tablets 2 times daily as needed for constipation (if no BM previous day). 120 tablet 11    simethicone (MYLICON) 125 MG  chewable tablet Take 125 mg by mouth 4 times daily as needed for intestinal gas.       Past Medical History:   Diagnosis Date    Insomnia     Metastatic malignant neuroendocrine tumor to liver (H)     Type 2 diabetes mellitus (H)      Past Surgical History:   Procedure Laterality Date    IR CHEST PORT PLACEMENT > 5 YRS OF AGE  2024       Physical Exam:   GENERAL APPEARANCE: healthy appearing, alert and no distress; neatly groomed  EYES: Eyes grossly normal to inspection, PERRLA, conjunctivae and sclerae without injection or discharge, EOM intact   RESP:  no increased work of breathing; speaks in complete sentences;   MS: No musculoskeletal defects are noted  SKIN: No suspicious lesions or rashes, hydration status appears adequate with normal skin turgor   PSYCH: Alert and oriented x3; speech- coherent , normal rate and volume; able to articulate logical thoughts, able to abstract reason, no tangential thoughts, no hallucinations or delusions, mentation appears normal, Mood is euthymic. Affect is appropriate for this mood state and bright. Thought content is free of suicidal ideation, hallucinations, and delusions.  Eye contact is good during conversation.       Key Data Reviewed:  LABS: 2025- Cr 0.55, Albumin 3.9,  Hgb 10.4, serotonin 2265      IMAGIN2025 CT LIVER WO & W/CONTRAST  IMPRESSION:  1.  Postprocedural findings in the largest 3 hepatic lesions in the posterior right and medial segment of the left hepatic lobe, consistent with given history of [place] embolization. There are findings of residual disease along the periphery of the   posterior right hepatic lobe lesions with interval decrease in size. No definite residual disease within the treated left hepatic lobe medial segment lesion. Additional hepatic metastases appear similar. No new hepatic lesion identified.  2.  Small volume ascites appearing since the prior examination.  3.  Partially imaged small loculated right pleural effusion  and right pleural disease, mesenteric mass adjacent to the terminal ileum, peritoneal carcinomatosis, and retroperitoneal mike disease appear similar. No findings of worsening metastatic   disease in the abdomen or pelvis.  4.  Similar hepatomegaly and hepatic steatosis and additional incidental/chronic findings, as detailed.    35 minutes spent on the date of the encounter doing chart review, history and exam, patient education & counseling, documentation and other activities as noted above.    The longitudinal plan of care for the diagnosis(es)/condition(s) as documented were addressed during this visit. Due to the added complexity in care, I will continue to support Louise in the subsequent management and with ongoing continuity of care.    Stephen Neil MD MS FAAFP CAQHPM  [facility]  Office [phone]  Fax [phone]

## 2025-07-15 NOTE — LETTER
7/15/2025      Louise Corado  [address] [place] [address]      Dear Colleague,    Thank you for referring your patient, Louise Corado, to the [facility] [place]. Please see a copy of my visit note below.    Palliative Care Outpatient Clinic Progress Note    Patient Name: Louise Corado  Primary Provider: Kee Mccullough     Recommendations & Counseling      GOALS OF CARE:   Life-prolonging without limits         ADVANCE CARE PLANNING:  No health care directive on file. Per system policy, Surrogate Decision-makers for Patients With Diminished Decision-making Capacity offers guidance on possible decision-makers.  Rowdy has been identified as a surrogate decision maker.  Recommend exploring further in future palliative medicine conversations.  There is no POLST form on file, defer to patient and/or next of kin for decisions   Code status: Full Code     MEDICAL MANAGEMENT:      #Pain,acute on chronic : Has R sided back/chest wall pain, sclerotic lesions noted on imaging, cancer-related.     Recommendations:    On APAP PRN-=-continue same.  Oxycodone 20 mg q4H PRN mod-severe pain, continue same and refill with new quantity of #120 provided today typically uses this TID.  INCREASE MS contin   from 45 mg q12 hours to 60 mg po BID--new rx sent; OK to use up old 15 and 30 mg tabs to make 60 mg doses  Suggest use of heat as needed/ice as needed.  Continue with octreotide per Dr. Barreto.  CC symptom check in 2 weeks.        #Nausea,disease processes with neuroendocrine tumor and suspected carcinoid tumor, plus nausea due to chemotherapy infusions.  This has resolved currently and Louise has ondansetron supposed to be scheduled daily but she is using it prn and prochlorpemazine prn and phenergan which she isn't using        #Deconditioning : has been self limiting activity due to flushing episodes.  Mostly sitting/in bed during daytime.  Encourage ambulation/movement.  She is  "aware of functional status necessary for continuing cytotoxic chemotherapy.     #Unintended weight loss due to metastatic neuroendocrine tumor, issues with nausea  #Protein calorie malnutrition in the context of acute illness as evidenced by unintentional weight loss and muscle loss   Work on antiemetic medications.  Discussed ways to increase protein intake  We provided Ensure samples today and several coupons, as well.     #Constipation,Medication adverse effect  At home--on docusate only.  Recommend avoiding docusate as \"all mush, no push\" and studies show it worsens stool quality for cancer patients in RCT but Louise feels it makes a difference.     RECOMMENDATIONS     - Continue Senna 1 tablet po BID on days she has a BM or 2 tabs po BID on days with no BM     - goal is soft easy to pass BM daily to every other day.     # Anxiety, in part related to medical diagnosis.  - has not been taking citalopram with concerns for serotonergic effect.  - using lorazepam up to three times weekly at baseline, continue same.   prescribes this.  - [facility]/processing--order placed for outpatient setting.         Counseling: All of the above was explained to the patient in lay language. The patient has verbalized a clear understanding of the discussion, asked appropriate questions, which have been answered to patient's apparent satisfaction. The patient is in agreement with the above plan.      Chief Complaint/Patient ID: Louise Corado 60 year old female with PMHx of metastatic neuroendocrine carcinoma with cancer associated pain     Last Palliative care appointment: 03/04/2025 with me     Reviewed:  Yes:   reviewed - controlled substances reflected in medication list..    Interim History:  Louise Corado is a 60 year old female who is seen today for follow up with Palliative Care clinic visit. She had [place] embolization on the first week of July and tolerated it " well.  Plan is to continue using octreotide monthly.  No further emesis for months.     Pain:  well controlled with current regimen    Appetite/Nausea: good appetite and stable weight     Bowels: has good prn constipation regimen     Sleep: good     Mood: some anxiety with good response to lorazepam; anxiety is related to closing estates for her and Rowdy's parents     Coping:  overall well.    Family History- Reviewed in Epic.    No Known Allergies    Social History:  Pertinent changes to social history/social situation since last visit: her mom just .  Key support resources:   Advance Directive Status:  no ACP documents    Social History     Tobacco Use     Smoking status: Never     Passive exposure: Never     Smokeless tobacco: Never   Vaping Use     Vaping status: Never Used         No Known Allergies  Current Outpatient Medications   Medication Sig Dispense Refill     bisacodyl (DULCOLAX) 5 MG EC tablet Take 1 tablet (5 mg) by mouth daily. (Patient taking differently: Take 5 mg by mouth daily as needed.) 30 tablet 0     furosemide (LASIX) 20 MG tablet Take 1 tablet (20 mg) by mouth daily. 60 tablet 1     lidocaine-prilocaine (EMLA) 2.5-2.5 % external cream Apply topically as needed for moderate pain. 30 g 1     LORazepam (ATIVAN) 0.5 MG tablet Take 1-2 tablets (0.5-1 mg) by mouth every 6 hours as needed for anxiety or sleep. 30 tablet 1     metFORMIN (GLUCOPHAGE XR) 500 MG 24 hr tablet Take 1 tablet (500 mg) by mouth daily (with dinner). 90 tablet 3     morphine (MS CONTIN) 60 MG 12 hr tablet Take 1 tablet (60 mg) by mouth every 12 hours. 60 tablet 0     OLANZapine (ZYPREXA) 2.5 MG tablet Take 1 tablet (2.5 mg) by mouth at bedtime. 30 tablet 2     ondansetron (ZOFRAN ODT) 4 MG ODT tab Take 4 mg by mouth.       oxyCODONE (ROXICODONE) 5 MG tablet Take 3-4 tablets (15-20 mg) by mouth every 4 hours as needed for pain. 120 tablet 0     scopolamine (TRANSDERM) 1 MG/3DAYS 72 hr patch Place 1 patch over 72  hours onto the skin every 72 hours. 10 patch 3     senna (SENOKOT) 8.6 MG tablet Take 1 tablet by mouth 2 times daily. May also take 2 tablets 2 times daily as needed for constipation (if no BM previous day). 120 tablet 11     simethicone (MYLICON) 125 MG chewable tablet Take 125 mg by mouth 4 times daily as needed for intestinal gas.       Past Medical History:   Diagnosis Date     Insomnia      Metastatic malignant neuroendocrine tumor to liver (H)      Type 2 diabetes mellitus (H)      Past Surgical History:   Procedure Laterality Date     IR CHEST PORT PLACEMENT > 5 YRS OF AGE  2024       Physical Exam:   GENERAL APPEARANCE: healthy appearing, alert and no distress; neatly groomed  EYES: Eyes grossly normal to inspection, PERRLA, conjunctivae and sclerae without injection or discharge, EOM intact   RESP:  no increased work of breathing; speaks in complete sentences;   MS: No musculoskeletal defects are noted  SKIN: No suspicious lesions or rashes, hydration status appears adequate with normal skin turgor   PSYCH: Alert and oriented x3; speech- coherent , normal rate and volume; able to articulate logical thoughts, able to abstract reason, no tangential thoughts, no hallucinations or delusions, mentation appears normal, Mood is euthymic. Affect is appropriate for this mood state and bright. Thought content is free of suicidal ideation, hallucinations, and delusions.  Eye contact is good during conversation.       Key Data Reviewed:  LABS: 2025- Cr 0.55, Albumin 3.9,  Hgb 10.4, serotonin 2265      IMAGIN2025 CT LIVER WO & W/CONTRAST  IMPRESSION:  1.  Postprocedural findings in the largest 3 hepatic lesions in the posterior right and medial segment of the left hepatic lobe, consistent with given history of [place] embolization. There are findings of residual disease along the periphery of the   posterior right hepatic lobe lesions with interval decrease in size. No definite residual disease within  "the treated left hepatic lobe medial segment lesion. Additional hepatic metastases appear similar. No new hepatic lesion identified.  2.  Small volume ascites appearing since the prior examination.  3.  Partially imaged small loculated right pleural effusion and right pleural disease, mesenteric mass adjacent to the terminal ileum, peritoneal carcinomatosis, and retroperitoneal mike disease appear similar. No findings of worsening metastatic   disease in the abdomen or pelvis.  4.  Similar hepatomegaly and hepatic steatosis and additional incidental/chronic findings, as detailed.    35 minutes spent on the date of the encounter doing chart review, history and exam, patient education & counseling, documentation and other activities as noted above.    The longitudinal plan of care for the diagnosis(es)/condition(s) as documented were addressed during this visit. Due to the added complexity in care, I will continue to support Louise in the subsequent management and with ongoing continuity of care.    Stephen Neil MD MS FAAFP CAQHPM  [facility] Palliative Care [facility]  Office [phone]  Fax [phone]     Oncology Rooming Note    July 15, 2025 2:51 PM   Louise Corado is a 60 year old female who presents for:    Chief Complaint   Patient presents with     Oncology Clinic Visit     Palliative Care Referral Automated Outreach     Follow up     Initial Vitals: /66   Pulse 84   Resp 20   Wt 66.6 kg (146 lb 12.8 oz)   SpO2 98%   BMI 26.84 kg/m   Estimated body mass index is 26.84 kg/m  as calculated from the following:    Height as of 7/7/25: 1.575 m (5' 2.01\").    Weight as of this encounter: 66.6 kg (146 lb 12.8 oz). Body surface area is 1.71 meters squared.  No Pain (0) Comment: Data Unavailable   No LMP recorded. Patient is postmenopausal.  Allergies reviewed: Yes  Medications reviewed: Yes    Medications: Medication refills not needed today.  Pharmacy name entered into [employer]:    [id number] [place], [place] - [address] AT [place], [place] - [facility]    PHQ9:  Did this patient require a PHQ9?: No      Clinical concerns: Follow up palliative care, doing well today Dr. Neil was notified.      Naheed Verduzco, JANET                Again, thank you for allowing me to participate in the care of your patient.        Sincerely,        Stephen Neil MD    Electronically signed

## 2025-07-15 NOTE — PATIENT INSTRUCTIONS
It was good to see you today, Louise.  You look good.    Here are the things we talked about:  No change to medicines    Stop to schedule a follow up appointment in 4 months.     How to get a hold of us:  For non-urgent matters, MyChart works best.    For more urgent matters, or if you prefer not to use MyChart, call our clinic nurse coordinator Amanda GONZALES at [phone]    We have an on-call number for evenings and weekends. Please call this only if you are having uncontrolled symptoms or serious side effects from your medicines: [phone].     For refills, please give us a week (5 working days) notice. We don't always have providers available everyday to do refills. If you call the day you run out of your medicine, we may not be able to refill it in time, so call 5 days in advance!    Stephen Neil MD MS FAAFP CAQHPM  [facility] Pall[facility]  Office [phone]  Fax [phone]

## 2025-07-26 ENCOUNTER — MYC REFILL (OUTPATIENT)
Dept: PALLIATIVE CARE | Facility: CLINIC | Age: 60
End: 2025-07-26
Payer: COMMERCIAL

## 2025-07-26 DIAGNOSIS — C7B.8 NEUROENDOCRINE CARCINOMA METASTATIC TO LIVER (H): ICD-10-CM

## 2025-07-26 DIAGNOSIS — C7A.8 NEUROENDOCRINE CARCINOMA METASTATIC TO LIVER (H): ICD-10-CM

## 2025-07-26 DIAGNOSIS — G89.3 CANCER ASSOCIATED PAIN: ICD-10-CM

## 2025-07-27 ENCOUNTER — MYC REFILL (OUTPATIENT)
Dept: ONCOLOGY | Facility: HOSPITAL | Age: 60
End: 2025-07-27
Payer: COMMERCIAL

## 2025-07-27 DIAGNOSIS — R91.8 LUNG MASS: ICD-10-CM

## 2025-07-28 DIAGNOSIS — C7A.8 NEUROENDOCRINE CARCINOMA METASTATIC TO LIVER (H): ICD-10-CM

## 2025-07-28 DIAGNOSIS — C7B.8 NEUROENDOCRINE CARCINOMA METASTATIC TO LIVER (H): ICD-10-CM

## 2025-07-28 DIAGNOSIS — G89.3 CANCER ASSOCIATED PAIN: ICD-10-CM

## 2025-07-28 RX ORDER — MORPHINE SULFATE 60 MG/1
60 TABLET, FILM COATED, EXTENDED RELEASE ORAL EVERY 12 HOURS
Qty: 60 TABLET | Refills: 0 | Status: SHIPPED | OUTPATIENT
Start: 2025-07-28

## 2025-07-28 RX ORDER — LORAZEPAM 0.5 MG/1
.5-1 TABLET ORAL EVERY 6 HOURS PRN
Qty: 30 TABLET | Refills: 1 | Status: SHIPPED | OUTPATIENT
Start: 2025-07-28

## 2025-07-28 RX ORDER — MORPHINE SULFATE 60 MG/1
60 TABLET, FILM COATED, EXTENDED RELEASE ORAL EVERY 12 HOURS
Qty: 60 TABLET | Refills: 0 | Status: SHIPPED | OUTPATIENT
Start: 2025-07-28 | End: 2025-07-28

## 2025-07-28 NOTE — TELEPHONE ENCOUNTER
Received [employer] message from patient requesting refill of MS contin.     Last refill: 6/24/25  Last office visit: 7/15/25  Scheduled for follow up 11/18/25     Will route request to MD/ for review.     Reviewed [place]  Report.

## 2025-07-28 NOTE — TELEPHONE ENCOUNTER
Resending MS contin prescription to a different pharmacy who has it in stock.    FARIHA GarcíaN, RN  Palliative Care Nurse Clinician    [phone] (Direct)  [phone] (Main)  [phone] (Appointment Scheduling)

## 2025-08-03 ENCOUNTER — MYC REFILL (OUTPATIENT)
Dept: PALLIATIVE CARE | Facility: CLINIC | Age: 60
End: 2025-08-03
Payer: COMMERCIAL

## 2025-08-03 DIAGNOSIS — C7A.8 NEUROENDOCRINE CARCINOMA METASTATIC TO LIVER (H): ICD-10-CM

## 2025-08-03 DIAGNOSIS — C7B.8 NEUROENDOCRINE CARCINOMA METASTATIC TO LIVER (H): ICD-10-CM

## 2025-08-03 DIAGNOSIS — G89.3 CANCER ASSOCIATED PAIN: ICD-10-CM

## 2025-08-04 ENCOUNTER — APPOINTMENT (OUTPATIENT)
Dept: INFUSION THERAPY | Facility: HOSPITAL | Age: 60
End: 2025-08-04
Attending: INTERNAL MEDICINE
Payer: COMMERCIAL

## 2025-08-04 VITALS
DIASTOLIC BLOOD PRESSURE: 65 MMHG | HEART RATE: 80 BPM | OXYGEN SATURATION: 95 % | TEMPERATURE: 98.6 F | RESPIRATION RATE: 16 BRPM | SYSTOLIC BLOOD PRESSURE: 109 MMHG

## 2025-08-04 DIAGNOSIS — C7B.8 METASTATIC MALIGNANT NEUROENDOCRINE TUMOR TO LIVER (H): Primary | ICD-10-CM

## 2025-08-04 PROCEDURE — 96372 THER/PROPH/DIAG INJ SC/IM: CPT | Performed by: NURSE PRACTITIONER

## 2025-08-04 PROCEDURE — 250N000011 HC RX IP 250 OP 636: Mod: JZ | Performed by: NURSE PRACTITIONER

## 2025-08-04 PROCEDURE — 250N000011 HC RX IP 250 OP 636: Performed by: NURSE PRACTITIONER

## 2025-08-04 RX ORDER — HEPARIN SODIUM (PORCINE) LOCK FLUSH IV SOLN 100 UNIT/ML 100 UNIT/ML
5 SOLUTION INTRAVENOUS
Status: DISCONTINUED | OUTPATIENT
Start: 2025-08-04 | End: 2025-08-04 | Stop reason: HOSPADM

## 2025-08-04 RX ORDER — MORPHINE SULFATE 60 MG/1
60 TABLET, FILM COATED, EXTENDED RELEASE ORAL EVERY 12 HOURS
Qty: 60 TABLET | Refills: 0 | Status: SHIPPED | OUTPATIENT
Start: 2025-08-12

## 2025-08-04 RX ORDER — OCTREOTIDE ACETATE 30 MG
60 KIT INTRAMUSCULAR ONCE
Status: COMPLETED | OUTPATIENT
Start: 2025-08-04 | End: 2025-08-04

## 2025-08-04 RX ADMIN — OCTREOTIDE ACETATE 60 MG: KIT at 11:04

## 2025-08-04 RX ADMIN — HEPARIN 5 ML: 100 SYRINGE at 10:56

## 2025-08-06 ENCOUNTER — MYC REFILL (OUTPATIENT)
Dept: PALLIATIVE CARE | Facility: CLINIC | Age: 60
End: 2025-08-06
Payer: COMMERCIAL

## 2025-08-06 DIAGNOSIS — R07.1 PAINFUL RESPIRATION: ICD-10-CM

## 2025-08-07 RX ORDER — OXYCODONE HYDROCHLORIDE 5 MG/1
15-20 TABLET ORAL EVERY 4 HOURS PRN
Qty: 120 TABLET | Refills: 0 | Status: SHIPPED | OUTPATIENT
Start: 2025-08-07

## 2025-08-21 ENCOUNTER — MYC REFILL (OUTPATIENT)
Dept: ONCOLOGY | Facility: HOSPITAL | Age: 60
End: 2025-08-21
Payer: COMMERCIAL

## 2025-08-21 DIAGNOSIS — R11.0 NAUSEA: ICD-10-CM

## 2025-08-23 RX ORDER — SCOPOLAMINE 1 MG/3D
1 PATCH, EXTENDED RELEASE TRANSDERMAL
Qty: 10 PATCH | Refills: 3 | Status: SHIPPED | OUTPATIENT
Start: 2025-08-23

## 2025-08-27 ENCOUNTER — HOSPITAL ENCOUNTER (EMERGENCY)
Facility: CLINIC | Age: 60
Discharge: HOME | End: 2025-08-27
Attending: STUDENT IN AN ORGANIZED HEALTH CARE EDUCATION/TRAINING PROGRAM | Admitting: STUDENT IN AN ORGANIZED HEALTH CARE EDUCATION/TRAINING PROGRAM
Payer: COMMERCIAL

## 2025-08-27 ENCOUNTER — TELEPHONE (OUTPATIENT)
Dept: ONCOLOGY | Facility: HOSPITAL | Age: 60
End: 2025-08-27
Payer: COMMERCIAL

## 2025-08-27 VITALS
WEIGHT: 146 LBS | SYSTOLIC BLOOD PRESSURE: 113 MMHG | OXYGEN SATURATION: 93 % | TEMPERATURE: 98 F | HEIGHT: 63 IN | RESPIRATION RATE: 12 BRPM | HEART RATE: 80 BPM | DIASTOLIC BLOOD PRESSURE: 58 MMHG | BODY MASS INDEX: 25.87 KG/M2

## 2025-08-27 DIAGNOSIS — C7A.8 NEUROENDOCRINE CARCINOMA METASTATIC TO MULTIPLE SITES (H): ICD-10-CM

## 2025-08-27 DIAGNOSIS — R06.02 SOB (SHORTNESS OF BREATH): Primary | ICD-10-CM

## 2025-08-27 DIAGNOSIS — C7B.8 NEUROENDOCRINE CARCINOMA METASTATIC TO MULTIPLE SITES (H): ICD-10-CM

## 2025-08-27 LAB
ALBUMIN SERPL BCG-MCNC: 3.9 G/DL (ref 3.5–5.2)
ALP SERPL-CCNC: 88 U/L (ref 40–150)
ALT SERPL W P-5'-P-CCNC: 9 U/L (ref 0–50)
ANION GAP SERPL CALCULATED.3IONS-SCNC: 10 MMOL/L (ref 7–15)
AST SERPL W P-5'-P-CCNC: 17 U/L (ref 0–45)
ATRIAL RATE - MUSE: 73 BPM
BASOPHILS # BLD AUTO: <0.03 10E3/UL (ref 0–0.2)
BASOPHILS NFR BLD AUTO: 0.2 %
BILIRUB SERPL-MCNC: 0.2 MG/DL
BILIRUBIN DIRECT (ROCHE PRO & PURE): 0.15 MG/DL (ref 0–0.45)
BUN SERPL-MCNC: 14.5 MG/DL (ref 6–20)
CALCIUM SERPL-MCNC: 9.1 MG/DL (ref 8.8–10.4)
CHLORIDE SERPL-SCNC: 101 MMOL/L (ref 98–107)
CREAT SERPL-MCNC: 0.78 MG/DL (ref 0.51–0.95)
DIASTOLIC BLOOD PRESSURE - MUSE: 65 MMHG
EGFRCR SERPLBLD CKD-EPI 2021: 88 ML/MIN/1.73M2
EOSINOPHIL # BLD AUTO: 0.04 10E3/UL (ref 0–0.7)
EOSINOPHIL NFR BLD AUTO: 0.9 %
ERYTHROCYTE [DISTWIDTH] IN BLOOD BY AUTOMATED COUNT: 15 % (ref 10–15)
GLUCOSE SERPL-MCNC: 114 MG/DL (ref 70–99)
HCO3 SERPL-SCNC: 29 MMOL/L (ref 22–29)
HCT VFR BLD AUTO: 33.2 % (ref 35–47)
HGB BLD-MCNC: 10.4 G/DL (ref 11.7–15.7)
IMM GRANULOCYTES # BLD: <0.03 10E3/UL
IMM GRANULOCYTES NFR BLD: 0.2 %
INR PPP: 1.15 (ref 0.85–1.15)
INTERPRETATION ECG - MUSE: NORMAL
LYMPHOCYTES # BLD AUTO: 0.78 10E3/UL (ref 0.8–5.3)
LYMPHOCYTES NFR BLD AUTO: 18.2 %
MAGNESIUM SERPL-MCNC: 2.3 MG/DL (ref 1.7–2.3)
MCH RBC QN AUTO: 27.5 PG (ref 26.5–33)
MCHC RBC AUTO-ENTMCNC: 31.3 G/DL (ref 31.5–36.5)
MCV RBC AUTO: 87.8 FL (ref 78–100)
MONOCYTES # BLD AUTO: 0.33 10E3/UL (ref 0–1.3)
MONOCYTES NFR BLD AUTO: 7.7 %
NEUTROPHILS # BLD AUTO: 3.11 10E3/UL (ref 1.6–8.3)
NEUTROPHILS NFR BLD AUTO: 72.8 %
NRBC # BLD AUTO: <0.03 10E3/UL
NRBC BLD AUTO-RTO: 0 /100
NT-PROBNP SERPL-MCNC: 258 PG/ML (ref 0–226)
P AXIS - MUSE: 21 DEGREES
PLATELET # BLD AUTO: 262 10E3/UL (ref 150–450)
POTASSIUM SERPL-SCNC: 4.2 MMOL/L (ref 3.4–5.3)
PR INTERVAL - MUSE: 168 MS
PROT SERPL-MCNC: 7.4 G/DL (ref 6.4–8.3)
PROTHROMBIN TIME: 14.9 SECONDS (ref 11.8–14.8)
QRS DURATION - MUSE: 78 MS
QT - MUSE: 394 MS
QTC - MUSE: 434 MS
R AXIS - MUSE: -24 DEGREES
RBC # BLD AUTO: 3.78 10E6/UL (ref 3.8–5.2)
SODIUM SERPL-SCNC: 140 MMOL/L (ref 135–145)
SYSTOLIC BLOOD PRESSURE - MUSE: 138 MMHG
T AXIS - MUSE: 2 DEGREES
TROPONIN T SERPL HS-MCNC: 15 NG/L
TROPONIN T SERPL HS-MCNC: 15 NG/L
VENTRICULAR RATE- MUSE: 73 BPM
WBC # BLD AUTO: 4.28 10E3/UL (ref 4–11)

## 2025-08-27 PROCEDURE — 83880 ASSAY OF NATRIURETIC PEPTIDE: CPT | Performed by: STUDENT IN AN ORGANIZED HEALTH CARE EDUCATION/TRAINING PROGRAM

## 2025-08-27 PROCEDURE — 85610 PROTHROMBIN TIME: CPT | Performed by: STUDENT IN AN ORGANIZED HEALTH CARE EDUCATION/TRAINING PROGRAM

## 2025-08-27 PROCEDURE — 83735 ASSAY OF MAGNESIUM: CPT | Performed by: STUDENT IN AN ORGANIZED HEALTH CARE EDUCATION/TRAINING PROGRAM

## 2025-08-27 PROCEDURE — 96374 THER/PROPH/DIAG INJ IV PUSH: CPT | Mod: 59

## 2025-08-27 PROCEDURE — 85004 AUTOMATED DIFF WBC COUNT: CPT | Performed by: STUDENT IN AN ORGANIZED HEALTH CARE EDUCATION/TRAINING PROGRAM

## 2025-08-27 PROCEDURE — 84484 ASSAY OF TROPONIN QUANT: CPT | Performed by: STUDENT IN AN ORGANIZED HEALTH CARE EDUCATION/TRAINING PROGRAM

## 2025-08-27 PROCEDURE — 250N000011 HC RX IP 250 OP 636: Performed by: STUDENT IN AN ORGANIZED HEALTH CARE EDUCATION/TRAINING PROGRAM

## 2025-08-27 PROCEDURE — 82248 BILIRUBIN DIRECT: CPT | Performed by: STUDENT IN AN ORGANIZED HEALTH CARE EDUCATION/TRAINING PROGRAM

## 2025-08-27 PROCEDURE — 96376 TX/PRO/DX INJ SAME DRUG ADON: CPT

## 2025-08-27 PROCEDURE — 93005 ELECTROCARDIOGRAM TRACING: CPT | Performed by: EMERGENCY MEDICINE

## 2025-08-27 PROCEDURE — 99285 EMERGENCY DEPT VISIT HI MDM: CPT | Mod: 25 | Performed by: STUDENT IN AN ORGANIZED HEALTH CARE EDUCATION/TRAINING PROGRAM

## 2025-08-27 PROCEDURE — 250N000013 HC RX MED GY IP 250 OP 250 PS 637: Performed by: STUDENT IN AN ORGANIZED HEALTH CARE EDUCATION/TRAINING PROGRAM

## 2025-08-27 PROCEDURE — 80048 BASIC METABOLIC PNL TOTAL CA: CPT | Performed by: STUDENT IN AN ORGANIZED HEALTH CARE EDUCATION/TRAINING PROGRAM

## 2025-08-27 PROCEDURE — 36415 COLL VENOUS BLD VENIPUNCTURE: CPT | Performed by: STUDENT IN AN ORGANIZED HEALTH CARE EDUCATION/TRAINING PROGRAM

## 2025-08-27 RX ORDER — ACETAMINOPHEN 325 MG/1
650 TABLET ORAL ONCE
Status: COMPLETED | OUTPATIENT
Start: 2025-08-27 | End: 2025-08-27

## 2025-08-27 RX ORDER — IOPAMIDOL 755 MG/ML
75 INJECTION, SOLUTION INTRAVASCULAR ONCE
Status: COMPLETED | OUTPATIENT
Start: 2025-08-27 | End: 2025-08-27

## 2025-08-27 RX ORDER — HYDROMORPHONE HYDROCHLORIDE 1 MG/ML
0.5 INJECTION, SOLUTION INTRAMUSCULAR; INTRAVENOUS; SUBCUTANEOUS EVERY 30 MIN PRN
Refills: 0 | Status: DISCONTINUED | OUTPATIENT
Start: 2025-08-27 | End: 2025-08-27 | Stop reason: HOSPADM

## 2025-08-27 RX ADMIN — ACETAMINOPHEN 650 MG: 325 TABLET ORAL at 16:19

## 2025-08-27 RX ADMIN — HYDROMORPHONE HYDROCHLORIDE 0.5 MG: 1 INJECTION, SOLUTION INTRAMUSCULAR; INTRAVENOUS; SUBCUTANEOUS at 16:19

## 2025-08-27 RX ADMIN — IOPAMIDOL 75 ML: 755 INJECTION, SOLUTION INTRAVENOUS at 15:46

## 2025-08-27 RX ADMIN — HYDROMORPHONE HYDROCHLORIDE 0.5 MG: 1 INJECTION, SOLUTION INTRAMUSCULAR; INTRAVENOUS; SUBCUTANEOUS at 15:26

## 2025-08-27 ASSESSMENT — ACTIVITIES OF DAILY LIVING (ADL)
ADLS_ACUITY_SCORE: 57

## 2025-08-28 ENCOUNTER — PATIENT OUTREACH (OUTPATIENT)
Dept: CARE COORDINATION | Facility: CLINIC | Age: 60
End: 2025-08-28
Payer: COMMERCIAL

## 2025-08-30 ENCOUNTER — HEALTH MAINTENANCE LETTER (OUTPATIENT)
Age: 60
End: 2025-08-30

## 2025-09-02 ENCOUNTER — PATIENT OUTREACH (OUTPATIENT)
Dept: ONCOLOGY | Facility: CLINIC | Age: 60
End: 2025-09-02
Payer: COMMERCIAL

## 2025-09-02 ENCOUNTER — PATIENT OUTREACH (OUTPATIENT)
Dept: ONCOLOGY | Facility: HOSPITAL | Age: 60
End: 2025-09-02
Payer: COMMERCIAL

## 2025-09-02 DIAGNOSIS — G89.3 CANCER ASSOCIATED PAIN: ICD-10-CM

## 2025-09-02 DIAGNOSIS — C7A.8 NEUROENDOCRINE CARCINOMA METASTATIC TO LIVER (H): ICD-10-CM

## 2025-09-02 DIAGNOSIS — C7B.8 NEUROENDOCRINE CARCINOMA METASTATIC TO LIVER (H): ICD-10-CM

## 2025-09-02 DIAGNOSIS — C7B.8 METASTATIC MALIGNANT NEUROENDOCRINE TUMOR TO LIVER (H): Primary | ICD-10-CM

## 2025-09-02 RX ORDER — OXYCODONE HYDROCHLORIDE 10 MG/1
20-30 TABLET ORAL EVERY 4 HOURS PRN
Qty: 90 TABLET | Refills: 0 | Status: SHIPPED | OUTPATIENT
Start: 2025-09-02

## 2025-09-02 RX ORDER — MORPHINE SULFATE 60 MG/1
60 TABLET, FILM COATED, EXTENDED RELEASE ORAL EVERY 12 HOURS
Qty: 60 TABLET | Refills: 0 | Status: SHIPPED | OUTPATIENT
Start: 2025-09-02

## 2025-09-03 DIAGNOSIS — C7A.8 NEUROENDOCRINE CARCINOMA METASTATIC TO LIVER (H): ICD-10-CM

## 2025-09-03 DIAGNOSIS — C7B.8 NEUROENDOCRINE CARCINOMA METASTATIC TO LIVER (H): ICD-10-CM

## 2025-09-03 RX ORDER — FUROSEMIDE 20 MG/1
20 TABLET ORAL DAILY
Qty: 60 TABLET | Refills: 1 | Status: SHIPPED | OUTPATIENT
Start: 2025-09-03

## 2025-09-04 ENCOUNTER — PRE VISIT (OUTPATIENT)
Dept: PULMONOLOGY | Facility: CLINIC | Age: 60
End: 2025-09-04

## 2025-09-04 ENCOUNTER — ONCOLOGY VISIT (OUTPATIENT)
Dept: PULMONOLOGY | Facility: CLINIC | Age: 60
End: 2025-09-04
Attending: INTERNAL MEDICINE
Payer: COMMERCIAL

## 2025-09-04 VITALS
BODY MASS INDEX: 26.31 KG/M2 | OXYGEN SATURATION: 97 % | DIASTOLIC BLOOD PRESSURE: 66 MMHG | WEIGHT: 148.5 LBS | SYSTOLIC BLOOD PRESSURE: 107 MMHG | HEART RATE: 80 BPM

## 2025-09-04 DIAGNOSIS — C7B.8 METASTATIC MALIGNANT NEUROENDOCRINE TUMOR TO LIVER (H): ICD-10-CM

## (undated) RX ORDER — HEPARIN SODIUM (PORCINE) LOCK FLUSH IV SOLN 100 UNIT/ML 100 UNIT/ML
SOLUTION INTRAVENOUS
Status: DISPENSED
Start: 2024-04-23

## (undated) RX ORDER — CEFAZOLIN SODIUM/WATER 2 G/20 ML
SYRINGE (ML) INTRAVENOUS
Status: DISPENSED
Start: 2024-04-23

## (undated) RX ORDER — LIDOCAINE HYDROCHLORIDE 10 MG/ML
INJECTION, SOLUTION EPIDURAL; INFILTRATION; INTRACAUDAL; PERINEURAL
Status: DISPENSED
Start: 2024-10-01

## (undated) RX ORDER — FENTANYL CITRATE 50 UG/ML
INJECTION, SOLUTION INTRAMUSCULAR; INTRAVENOUS
Status: DISPENSED
Start: 2024-04-12

## (undated) RX ORDER — HEPARIN SODIUM (PORCINE) LOCK FLUSH IV SOLN 100 UNIT/ML 100 UNIT/ML
SOLUTION INTRAVENOUS
Status: DISPENSED
Start: 2025-03-21

## (undated) RX ORDER — ONDANSETRON 2 MG/ML
INJECTION INTRAMUSCULAR; INTRAVENOUS
Status: DISPENSED
Start: 2024-10-01

## (undated) RX ORDER — FENTANYL CITRATE 50 UG/ML
INJECTION, SOLUTION INTRAMUSCULAR; INTRAVENOUS
Status: DISPENSED
Start: 2024-04-23

## (undated) RX ORDER — HEPARIN SODIUM (PORCINE) LOCK FLUSH IV SOLN 100 UNIT/ML 100 UNIT/ML
SOLUTION INTRAVENOUS
Status: DISPENSED
Start: 2024-09-19

## (undated) RX ORDER — HEPARIN SODIUM (PORCINE) LOCK FLUSH IV SOLN 100 UNIT/ML 100 UNIT/ML
SOLUTION INTRAVENOUS
Status: DISPENSED
Start: 2024-12-20

## (undated) RX ORDER — LIDOCAINE HYDROCHLORIDE AND EPINEPHRINE 10; 10 MG/ML; UG/ML
INJECTION, SOLUTION INFILTRATION; PERINEURAL
Status: DISPENSED
Start: 2024-04-23

## (undated) RX ORDER — LIDOCAINE HYDROCHLORIDE 10 MG/ML
INJECTION, SOLUTION EPIDURAL; INFILTRATION; INTRACAUDAL; PERINEURAL
Status: DISPENSED
Start: 2024-04-11

## (undated) RX ORDER — LIDOCAINE HYDROCHLORIDE 10 MG/ML
INJECTION, SOLUTION INFILTRATION; PERINEURAL
Status: DISPENSED
Start: 2024-04-23

## (undated) RX ORDER — FENTANYL CITRATE 50 UG/ML
INJECTION, SOLUTION INTRAMUSCULAR; INTRAVENOUS
Status: DISPENSED
Start: 2024-10-01